# Patient Record
Sex: MALE | Race: BLACK OR AFRICAN AMERICAN | NOT HISPANIC OR LATINO | Employment: FULL TIME | URBAN - METROPOLITAN AREA
[De-identification: names, ages, dates, MRNs, and addresses within clinical notes are randomized per-mention and may not be internally consistent; named-entity substitution may affect disease eponyms.]

---

## 2017-01-06 DIAGNOSIS — E11.9 TYPE 2 DIABETES MELLITUS WITHOUT COMPLICATION: ICD-10-CM

## 2017-01-10 ENCOUNTER — OFFICE VISIT (OUTPATIENT)
Dept: FAMILY MEDICINE | Facility: CLINIC | Age: 52
End: 2017-01-10
Attending: FAMILY MEDICINE
Payer: COMMERCIAL

## 2017-01-10 ENCOUNTER — PATIENT MESSAGE (OUTPATIENT)
Dept: FAMILY MEDICINE | Facility: CLINIC | Age: 52
End: 2017-01-10

## 2017-01-10 ENCOUNTER — HOSPITAL ENCOUNTER (OUTPATIENT)
Dept: RADIOLOGY | Facility: HOSPITAL | Age: 52
Discharge: HOME | End: 2017-01-10
Attending: FAMILY MEDICINE
Payer: COMMERCIAL

## 2017-01-10 VITALS
HEART RATE: 68 BPM | BODY MASS INDEX: 31.3 KG/M2 | WEIGHT: 243.88 LBS | DIASTOLIC BLOOD PRESSURE: 74 MMHG | HEIGHT: 74 IN | OXYGEN SATURATION: 98 % | SYSTOLIC BLOOD PRESSURE: 130 MMHG

## 2017-01-10 DIAGNOSIS — R10.2 SUPRAPUBIC PAIN: Primary | ICD-10-CM

## 2017-01-10 DIAGNOSIS — I15.2 HYPERTENSION ASSOCIATED WITH DIABETES: ICD-10-CM

## 2017-01-10 DIAGNOSIS — R19.5 HARD STOOL: ICD-10-CM

## 2017-01-10 DIAGNOSIS — N31.8 FREQUENCY-URGENCY SYNDROME: ICD-10-CM

## 2017-01-10 DIAGNOSIS — R39.9 LOWER URINARY TRACT SYMPTOMS (LUTS): ICD-10-CM

## 2017-01-10 DIAGNOSIS — R10.2 SUPRAPUBIC PAIN: ICD-10-CM

## 2017-01-10 DIAGNOSIS — E11.59 HYPERTENSION ASSOCIATED WITH DIABETES: ICD-10-CM

## 2017-01-10 LAB
BACTERIA #/AREA URNS AUTO: NORMAL /HPF
BILIRUB UR QL STRIP: NEGATIVE
CLARITY UR REFRACT.AUTO: CLEAR
COLOR UR AUTO: YELLOW
CREAT UR-MCNC: 175 MG/DL
GLUCOSE UR QL STRIP: ABNORMAL
HGB UR QL STRIP: NEGATIVE
KETONES UR QL STRIP: NEGATIVE
LEUKOCYTE ESTERASE UR QL STRIP: NEGATIVE
MICROALBUMIN UR DL<=1MG/L-MCNC: 4 UG/ML
MICROALBUMIN/CREATININE RATIO: 2.3 UG/MG
MICROSCOPIC COMMENT: NORMAL
NITRITE UR QL STRIP: NEGATIVE
PH UR STRIP: 5 [PH] (ref 5–8)
PROT UR QL STRIP: NEGATIVE
RBC #/AREA URNS AUTO: 1 /HPF (ref 0–4)
SP GR UR STRIP: 1.02 (ref 1–1.03)
URN SPEC COLLECT METH UR: ABNORMAL
UROBILINOGEN UR STRIP-ACNC: NEGATIVE EU/DL
WBC #/AREA URNS AUTO: 1 /HPF (ref 0–5)
YEAST UR QL AUTO: NORMAL

## 2017-01-10 PROCEDURE — 3075F SYST BP GE 130 - 139MM HG: CPT | Mod: S$GLB,,, | Performed by: FAMILY MEDICINE

## 2017-01-10 PROCEDURE — 74020 XR ABDOMEN FLAT AND ERECT: CPT | Mod: TC,PO

## 2017-01-10 PROCEDURE — 82570 ASSAY OF URINE CREATININE: CPT

## 2017-01-10 PROCEDURE — 81001 URINALYSIS AUTO W/SCOPE: CPT

## 2017-01-10 PROCEDURE — 99214 OFFICE O/P EST MOD 30 MIN: CPT | Mod: S$GLB,,, | Performed by: FAMILY MEDICINE

## 2017-01-10 PROCEDURE — 3078F DIAST BP <80 MM HG: CPT | Mod: S$GLB,,, | Performed by: FAMILY MEDICINE

## 2017-01-10 PROCEDURE — 1159F MED LIST DOCD IN RCRD: CPT | Mod: S$GLB,,, | Performed by: FAMILY MEDICINE

## 2017-01-10 PROCEDURE — 3045F PR MOST RECENT HEMOGLOBIN A1C LEVEL 7.0-9.0%: CPT | Mod: S$GLB,,, | Performed by: FAMILY MEDICINE

## 2017-01-10 PROCEDURE — 99999 PR PBB SHADOW E&M-EST. PATIENT-LVL III: CPT | Mod: PBBFAC,,, | Performed by: FAMILY MEDICINE

## 2017-01-10 PROCEDURE — 4010F ACE/ARB THERAPY RXD/TAKEN: CPT | Mod: S$GLB,,, | Performed by: FAMILY MEDICINE

## 2017-01-10 PROCEDURE — 2022F DILAT RTA XM EVC RTNOPTHY: CPT | Mod: S$GLB,,, | Performed by: FAMILY MEDICINE

## 2017-01-10 PROCEDURE — 74020 XR ABDOMEN FLAT AND ERECT: CPT | Mod: 26,,, | Performed by: RADIOLOGY

## 2017-01-10 NOTE — MR AVS SNAPSHOT
[facility]  [address]  [address]  [facility] [address]  Phone: [phone]                  Suhas Castillo   1/10/2017 9:00 AM   Office Visit    Description:  Male : 1965   Provider:  Josiah Ching Jr., MD   Department:  [facility]           Reason for Visit     Abdominal Pain           Diagnoses this Visit        Comments    Suprapubic pain    -  Primary     Lower urinary tract symptoms (LUTS)         Frequency-urgency syndrome         Hard stool         Uncontrolled type 2 diabetes mellitus without complication, without long-term current use of insulin         Hypertension associated with diabetes                To Do List           Future Appointments        Provider Department Dept Phone    1/10/2017 9:45 AM LAB, [facility] [phone]    1/10/2017 10:00 AM LKWH XR1 300 LB LIMIT [facility] [phone]      Goals (5 Years of Data)     None      [facility]     [facility] Nurse Care [facility] -  Assistance  Registered nurses in the [facility] provide clinical advisement, health education, appointment booking, and other advisory services.  Call for this free service at [phone].             Medications           Message regarding Medications     Verify the changes and/or additions to your medication regime listed below are the same as discussed with your clinician today.  If any of these changes or additions are incorrect, please notify your healthcare provider.             Verify that the below list of medications is an accurate representation of the medications you are currently taking.  If none reported, the list may be blank. If incorrect, please contact your healthcare provider. Carry this list with you in case of emergency.           Current Medications     amlodipine-benazepril (LOTREL) 10-40 mg per capsule take 1 capsule by mouth once daily    blood sugar diagnostic  "(CONTOUR TEST STRIPS) Strp USE TO TEST BLOOD SUGAR TWICE A DAY    fluticasone (FLONASE) 50 mcg/actuation nasal spray 2 sprays by Each Nare route once daily.    JANUVIA 100 mg Tab take 1 tablet by mouth once daily    metformin (GLUCOPHAGE-XR) 500 MG 24 hr tablet take 2 tablets by mouth twice a day    naproxen (EC NAPROSYN) 500 MG EC tablet Take 1 tablet (500 mg total) by mouth 2 (two) times daily with meals.    simvastatin (ZOCOR) 20 MG tablet Take 1 tablet (20 mg total) by mouth every evening. 1 Tablet Oral Every day    albuterol 90 mcg/actuation inhaler Inhale 2 puffs into the lungs every 4 (four) hours as needed for Wheezing or Shortness of Breath.           Clinical Reference Information           Vital Signs - Last Recorded  Most recent update: 1/10/2017  8:58 AM by Ana Luisa Heaton LPN    BP Pulse Ht Wt SpO2 BMI    130/74 (BP Location: Left arm, Patient Position: Sitting, BP Method: Manual) 68 6' 2" (1.88 m) 110.6 kg (243 lb 14.4 oz) 98% 31.31 kg/m2      Blood Pressure          Most Recent Value    BP  130/74      Allergies as of 1/10/2017     No Known Allergies      Immunizations Administered on Date of Encounter - 1/10/2017     None      Orders Placed During Today's Visit      Normal Orders This Visit    Microalbumin/creatinine urine ratio     Urinalysis     Future Labs/Procedures Expected by Expires    Basic metabolic panel  1/10/2017 1/10/2018    Hemoglobin A1c  1/10/2017 1/11/2018    PSA, Screening  1/10/2017 1/11/2018    X-Ray Abdomen Flat And Erect  1/10/2017 1/10/2018      "

## 2017-01-10 NOTE — PROGRESS NOTES
Subjective:       Patient ID: Suhas Castillo is a 51 y.o. male.    Chief Complaint: Abdominal Pain    Abdominal Pain   This is a new problem. The current episode started 1 to 4 weeks ago. The onset quality is gradual. The problem occurs 2 to 4 times per day. The problem has been gradually worsening. The pain is located in the suprapubic region. The pain is at a severity of 4/10. The pain is mild. The quality of the pain is aching and dull. The abdominal pain does not radiate. Associated symptoms include constipation (firm, hard stools; now normal, nearly daily), flatus and frequency. Pertinent negatives include no belching, diarrhea, dysuria, headaches, hematuria, melena, nausea or vomiting. Nothing aggravates the pain. The pain is relieved by liquids and bowel movements. He has tried nothing for the symptoms. There is no history of abdominal surgery, colon cancer, Crohn's disease, gallstones, GERD, irritable bowel syndrome, pancreatitis, PUD or ulcerative colitis.     Patient Active Problem List   Diagnosis    Uncontrolled type 2 diabetes mellitus without complication, without long-term current use of insulin    Hypertension associated with diabetes    DDD (degenerative disc disease), cervical    Hyperlipidemia associated with type 2 diabetes mellitus    AR (allergic rhinitis)    DJD (degenerative joint disease), cervical    Cervical spinal stenosis    Elevated transaminase level       Current Outpatient Prescriptions:     amlodipine-benazepril (LOTREL) 10-40 mg per capsule, take 1 capsule by mouth once daily, Disp: 30 capsule, Rfl: 2    blood sugar diagnostic (CONTOUR TEST STRIPS) Strp, USE TO TEST BLOOD SUGAR TWICE A DAY, Disp: 50 each, Rfl: 11    fluticasone (FLONASE) 50 mcg/actuation nasal spray, 2 sprays by Each Nare route once daily., Disp: 1 Bottle, Rfl: 0    JANUVIA 100 mg Tab, take 1 tablet by mouth once daily, Disp: 30 tablet, Rfl: 2    metformin (GLUCOPHAGE-XR) 500 MG 24 hr tablet, take 2  tablets by mouth twice a day, Disp: 120 tablet, Rfl: 4    naproxen (EC NAPROSYN) 500 MG EC tablet, Take 1 tablet (500 mg total) by mouth 2 (two) times daily with meals., Disp: 30 tablet, Rfl: 1    simvastatin (ZOCOR) 20 MG tablet, Take 1 tablet (20 mg total) by mouth every evening. 1 Tablet Oral Every day, Disp: 90 tablet, Rfl: 0    albuterol 90 mcg/actuation inhaler, Inhale 2 puffs into the lungs every 4 (four) hours as needed for Wheezing or Shortness of Breath., Disp: 18 g, Rfl: 0    There have been no changes to the patient's past medical, surgical, family, or social histories.      Review of Systems   Respiratory: Negative for cough, chest tightness and wheezing.    Cardiovascular: Positive for palpitations. Negative for chest pain.   Gastrointestinal: Positive for abdominal pain, constipation (firm, hard stools; now normal, nearly daily) and flatus. Negative for diarrhea, melena, nausea and vomiting.   Genitourinary: Positive for frequency and urgency. Negative for dysuria, hematuria, scrotal swelling and testicular pain.   Neurological: Negative for dizziness, light-headedness and headaches.       Objective:      Physical Exam   Constitutional: He is oriented to person, place, and time. He appears well-developed and well-nourished.   HENT:   Head: Normocephalic and atraumatic.   Eyes: Conjunctivae are normal. No scleral icterus.   Abdominal: Soft. Bowel sounds are normal. There is tenderness in the suprapubic area. There is no rigidity and no guarding.   Genitourinary: Testes normal and penis normal. Rectal exam shows no tenderness. Prostate is not tender. Circumcised. No penile tenderness. No discharge found.   Genitourinary Comments: Normal size prostate; left lateral lobe slightly larger than right.   Musculoskeletal: He exhibits no edema.   Neurological: He is alert and oriented to person, place, and time.   Skin: Skin is warm and dry.   Psychiatric: He has a normal mood and affect.   Vitals  "reviewed.        Assessment:       1. Suprapubic pain    2. Lower urinary tract symptoms (LUTS)    3. Frequency-urgency syndrome    4. Hard stool    5. Uncontrolled type 2 diabetes mellitus without complication, without long-term current use of insulin    6. Hypertension associated with diabetes          Plan:       Discussed possible origins for symptoms: BPH vs. Constipation?    Orders Placed This Encounter    X-Ray Abdomen Flat And Erect    PSA, Screening    Urinalysis    Basic metabolic panel    Hemoglobin A1c    Microalbumin/creatinine urine ratio     We will call the patient with results & make further recommendations at that time.      "This note will not be shared with the patient."  "

## 2017-01-11 ENCOUNTER — PATIENT MESSAGE (OUTPATIENT)
Dept: FAMILY MEDICINE | Facility: CLINIC | Age: 52
End: 2017-01-11

## 2017-01-22 ENCOUNTER — PATIENT MESSAGE (OUTPATIENT)
Dept: FAMILY MEDICINE | Facility: CLINIC | Age: 52
End: 2017-01-22

## 2017-01-22 DIAGNOSIS — E11.59 HYPERTENSION ASSOCIATED WITH DIABETES: ICD-10-CM

## 2017-01-22 DIAGNOSIS — E11.69 HYPERLIPIDEMIA ASSOCIATED WITH TYPE 2 DIABETES MELLITUS: ICD-10-CM

## 2017-01-22 DIAGNOSIS — I15.2 HYPERTENSION ASSOCIATED WITH DIABETES: ICD-10-CM

## 2017-01-22 DIAGNOSIS — E78.5 HYPERLIPIDEMIA ASSOCIATED WITH TYPE 2 DIABETES MELLITUS: ICD-10-CM

## 2017-01-23 ENCOUNTER — PATIENT MESSAGE (OUTPATIENT)
Dept: FAMILY MEDICINE | Facility: CLINIC | Age: 52
End: 2017-01-23

## 2017-01-24 NOTE — TELEPHONE ENCOUNTER
Please call patient to facilitate an appointment with endocrinology.  Last referral authorized in March of last year.    Patient should see endocrinologist, or nurse practitioner, soon to discuss possible injectable medication, for uncontrolled diabetes.

## 2017-02-01 RX ORDER — SITAGLIPTIN 100 MG/1
TABLET, FILM COATED ORAL
Qty: 30 TABLET | Refills: 2 | Status: SHIPPED | OUTPATIENT
Start: 2017-02-01 | End: 2017-05-04 | Stop reason: SDUPTHER

## 2017-02-02 ENCOUNTER — OFFICE VISIT (OUTPATIENT)
Dept: ENDOCRINOLOGY | Facility: CLINIC | Age: 52
End: 2017-02-02
Payer: COMMERCIAL

## 2017-02-02 VITALS
WEIGHT: 245 LBS | HEART RATE: 70 BPM | HEIGHT: 74 IN | DIASTOLIC BLOOD PRESSURE: 72 MMHG | SYSTOLIC BLOOD PRESSURE: 119 MMHG | BODY MASS INDEX: 31.44 KG/M2

## 2017-02-02 DIAGNOSIS — I15.2 HYPERTENSION ASSOCIATED WITH DIABETES: ICD-10-CM

## 2017-02-02 DIAGNOSIS — E11.69 HYPERLIPIDEMIA ASSOCIATED WITH TYPE 2 DIABETES MELLITUS: ICD-10-CM

## 2017-02-02 DIAGNOSIS — E78.5 HYPERLIPIDEMIA ASSOCIATED WITH TYPE 2 DIABETES MELLITUS: ICD-10-CM

## 2017-02-02 DIAGNOSIS — E11.59 HYPERTENSION ASSOCIATED WITH DIABETES: ICD-10-CM

## 2017-02-02 DIAGNOSIS — E55.9 VITAMIN D DEFICIENCY: ICD-10-CM

## 2017-02-02 PROCEDURE — 3074F SYST BP LT 130 MM HG: CPT | Mod: S$GLB,,, | Performed by: INTERNAL MEDICINE

## 2017-02-02 PROCEDURE — 99204 OFFICE O/P NEW MOD 45 MIN: CPT | Mod: S$GLB,,, | Performed by: INTERNAL MEDICINE

## 2017-02-02 PROCEDURE — 3045F PR MOST RECENT HEMOGLOBIN A1C LEVEL 7.0-9.0%: CPT | Mod: S$GLB,,, | Performed by: INTERNAL MEDICINE

## 2017-02-02 PROCEDURE — 4010F ACE/ARB THERAPY RXD/TAKEN: CPT | Mod: S$GLB,,, | Performed by: INTERNAL MEDICINE

## 2017-02-02 PROCEDURE — 2022F DILAT RTA XM EVC RTNOPTHY: CPT | Mod: S$GLB,,, | Performed by: INTERNAL MEDICINE

## 2017-02-02 PROCEDURE — 3078F DIAST BP <80 MM HG: CPT | Mod: S$GLB,,, | Performed by: INTERNAL MEDICINE

## 2017-02-02 PROCEDURE — 99999 PR PBB SHADOW E&M-EST. PATIENT-LVL III: CPT | Mod: PBBFAC,,, | Performed by: INTERNAL MEDICINE

## 2017-02-02 RX ORDER — METFORMIN HYDROCHLORIDE 500 MG/1
TABLET, EXTENDED RELEASE ORAL
Qty: 120 TABLET | Refills: 4 | Status: SHIPPED | OUTPATIENT
Start: 2017-02-02 | End: 2017-07-14 | Stop reason: SDUPTHER

## 2017-02-02 NOTE — LETTER
February 2, 2017      Josiah Ching Jr., MD  [address]  [address]  [facility] [address]           [facility]  [address]  [facility] [address]  Phone: [phone]  Fax: [phone]          Patient: Suhas Castillo   MR Number: [id number]   YOB: 1965   Date of Visit: 2/2/2017       Dear Dr. Josiah Ching Jr.:    Thank you for referring Suhas Castillo to me for evaluation. Attached you will find relevant portions of my assessment and plan of care.    If you have questions, please do not hesitate to call me. I look forward to following Suhas Castillo along with you.    Sincerely,    Ba Rucker MD    Enclosure  CC:  No Recipients    If you would like to receive this communication electronically, please contact [email or web address] or [phone] to request more information on [employer] access.    For providers and/or their staff who would like to refer a patient to [facility], please contact us through our one-stop-shop provider referral line, [facility], at [phone].    If you feel you have received this communication in error or would no longer like to receive these types of communications, please e-mail [email or web address]

## 2017-02-02 NOTE — MR AVS SNAPSHOT
[facility]  [address]  [facility] [address]  Phone: [phone]  Fax: [phone]                  Suhas Castillo   2017 1:00 PM   Office Visit    Description:  Male : 1965   Provider:  Fransisca Hightower NP   Department:  [facility]           Reason for Visit     Diabetes Mellitus           Diagnoses this Visit        Comments    Uncontrolled type 2 diabetes mellitus without complication, without long-term current use of insulin    -  Primary     Hypertension associated with diabetes         Hyperlipidemia associated with type 2 diabetes mellitus         Vitamin D deficiency                To Do List           Future Appointments        Provider Department Dept Phone    5/15/2017 8:00 AM [facility], [facility] [phone]    2017 4:00 PM [facility] [phone]      Goals (5 Years of Data)     None      Follow-Up and Disposition     Return in about 3 months (around 2017).       These Medications        Disp Refills Start End    empagliflozin (JARDIANCE) 10 mg Tab 30 tablet 4 2017     Take 10 mg by mouth once daily. - Oral    [facility]: [facility] [place] [address] [address] #: [phone]         [facility]     [facility] Care [facility] -  Assistance  Registered nurses in the [facility] provide clinical advisement, health education, appointment booking, and other advisory services.  Call for this free service at [phone].             Medications           Message regarding Medications     Verify the changes and/or additions to your medication regime listed below are the same as discussed with your clinician today.  If any of these changes or additions are incorrect, please notify your healthcare provider.        START taking these NEW medications        Refills    empagliflozin  "(JARDIANCE) 10 mg Tab 4    Sig: Take 10 mg by mouth once daily.    Class: Normal    Route: Oral           Verify that the below list of medications is an accurate representation of the medications you are currently taking.  If none reported, the list may be blank. If incorrect, please contact your healthcare provider. Carry this list with you in case of emergency.           Current Medications     amlodipine-benazepril (LOTREL) 10-40 mg per capsule take 1 capsule by mouth once daily    blood sugar diagnostic (CONTOUR TEST STRIPS) Strp USE TO TEST BLOOD SUGAR TWICE A DAY    fluticasone (FLONASE) 50 mcg/actuation nasal spray 2 sprays by Each Nare route once daily.    JANUVIA 100 mg Tab take 1 tablet by mouth once daily    metformin (GLUCOPHAGE-XR) 500 MG 24 hr tablet take 2 tablets by mouth twice a day    naproxen (EC NAPROSYN) 500 MG EC tablet Take 1 tablet (500 mg total) by mouth 2 (two) times daily with meals.    simvastatin (ZOCOR) 20 MG tablet Take 1 tablet (20 mg total) by mouth every evening. 1 Tablet Oral Every day    albuterol 90 mcg/actuation inhaler Inhale 2 puffs into the lungs every 4 (four) hours as needed for Wheezing or Shortness of Breath.    empagliflozin (JARDIANCE) 10 mg Tab Take 10 mg by mouth once daily.           Clinical Reference Information           Your Vitals Were     BP Pulse Height Weight BMI    119/72 (BP Location: Left arm, Patient Position: Sitting) 70 6' 2" (1.88 m) 111.1 kg (245 lb) 31.46 kg/m2      Blood Pressure          Most Recent Value    BP  119/72      Allergies as of 2/2/2017     No Known Allergies      Immunizations Administered on Date of Encounter - 2/2/2017     None      Orders Placed During Today's Visit     Future Labs/Procedures Expected by Expires    Comprehensive metabolic panel  2/2/2017 (Approximate) 1/28/2018    Hemoglobin A1c  2/2/2017 4/3/2018    Vitamin D  2/2/2017 4/3/2018      Language Assistance Services     ATTENTION: Language assistance services are " available, free of charge. Please call [phone].      ATENCIÓN: Si habla español, tiene a layton disposición servicios gratuitos de asistencia lingüística. Llame al [phone].     CHÚ Ý: N?u b?n nói Ti?ng Vi?t, có các d?ch v? h? tr? ngôn ng? mi?n phí dành cho b?n. G?i s? [phone].         [facility] complies with applicable Federal civil rights laws and does not discriminate on the basis of race, color, national origin, age, disability, or sex.

## 2017-02-16 RX ORDER — AMLODIPINE AND BENAZEPRIL HYDROCHLORIDE 10; 40 MG/1; MG/1
CAPSULE ORAL
Qty: 30 CAPSULE | Refills: 2 | Status: SHIPPED | OUTPATIENT
Start: 2017-02-16 | End: 2017-05-15 | Stop reason: SDUPTHER

## 2017-02-21 RX ORDER — SIMVASTATIN 20 MG/1
TABLET, FILM COATED ORAL
Qty: 90 TABLET | Refills: 2 | Status: SHIPPED | OUTPATIENT
Start: 2017-02-21 | End: 2017-11-20 | Stop reason: SDUPTHER

## 2017-02-27 ENCOUNTER — OFFICE VISIT (OUTPATIENT)
Dept: FAMILY MEDICINE | Facility: CLINIC | Age: 52
End: 2017-02-27
Attending: FAMILY MEDICINE
Payer: COMMERCIAL

## 2017-02-27 VITALS
OXYGEN SATURATION: 96 % | SYSTOLIC BLOOD PRESSURE: 116 MMHG | BODY MASS INDEX: 30.75 KG/M2 | HEART RATE: 91 BPM | HEIGHT: 74 IN | DIASTOLIC BLOOD PRESSURE: 76 MMHG | WEIGHT: 239.63 LBS

## 2017-02-27 DIAGNOSIS — M47.22 CERVICAL RADICULOPATHY DUE TO DEGENERATIVE JOINT DISEASE OF SPINE: Primary | ICD-10-CM

## 2017-02-27 PROCEDURE — 3078F DIAST BP <80 MM HG: CPT | Mod: S$GLB,,, | Performed by: FAMILY MEDICINE

## 2017-02-27 PROCEDURE — 99999 PR PBB SHADOW E&M-EST. PATIENT-LVL III: CPT | Mod: PBBFAC,,, | Performed by: FAMILY MEDICINE

## 2017-02-27 PROCEDURE — 1160F RVW MEDS BY RX/DR IN RCRD: CPT | Mod: S$GLB,,, | Performed by: FAMILY MEDICINE

## 2017-02-27 PROCEDURE — 99213 OFFICE O/P EST LOW 20 MIN: CPT | Mod: S$GLB,,, | Performed by: FAMILY MEDICINE

## 2017-02-27 PROCEDURE — 3074F SYST BP LT 130 MM HG: CPT | Mod: S$GLB,,, | Performed by: FAMILY MEDICINE

## 2017-02-27 RX ORDER — TRAMADOL HYDROCHLORIDE 50 MG/1
50 TABLET ORAL EVERY 6 HOURS PRN
Qty: 30 TABLET | Refills: 0 | Status: SHIPPED | OUTPATIENT
Start: 2017-02-27 | End: 2017-03-09

## 2017-02-27 RX ORDER — FLUOCINONIDE TOPICAL SOLUTION USP, 0.05% 0.5 MG/ML
SOLUTION TOPICAL
Refills: 0 | COMMUNITY
Start: 2017-01-18 | End: 2022-02-04

## 2017-02-27 NOTE — PROGRESS NOTES
"  Subjective:       Suhas Castillo is a 51 y.o. male who presents for evaluation of neck pain. Event that precipitated these symptoms: none known. Onset of symptoms was 2 days ago, and have been gradually worsening since that time. Current symptoms are numbness in arm/hand and pain in right side of neck (shooting and tingling in character; 6/10 in severity). Patient denies stiffness in RUE and weakness in RUE. Patient has had previous osteoarthritis of cervical spine and s/p cervical DEBORAH. Previous treatments: physical therapy and DEBORAH.    The following portions of the patient's history were reviewed and updated as appropriate: allergies, current medications, past family history, past medical history, past social history, past surgical history and problem list.    Review of Systems  Pertinent items are noted in HPI.      Objective:      /76 (BP Location: Left arm, Patient Position: Sitting, BP Method: Manual)  Pulse 91  Ht 6' 2" (1.88 m)  Wt 108.7 kg (239 lb 9.6 oz)  SpO2 96%  BMI 30.76 kg/m2  General:   alert, appears stated age and cooperative   External Deformity:  absent   ROM Cervical Spine:  normal range of motion, flexion to 45 degrees, extension to 15 degrees, left rotation to 60 degrees, right rotation to 30 degrees, left lateral bending to 45 degrees and right lateral bending to 15 degrees   Midline Tenderness:  absent midline   Paraspinous tenderness:  moderate on the right   UE Neurologic Exam:  unremarkable     X-ray of the cervical spine: Not indicated      Assessment:      Cervical pain 2/t DJD w/radiculopathy     Plan:      NSAIDs per medication orders.  Muscle relaxants added and continued per medication orders.  Tramadol prn pain.    Refer back to [facility] for re-evaluation; previously failed PT w/traction and dry needling.    "

## 2017-02-27 NOTE — MR AVS SNAPSHOT
[address]  [address]  [facility] [address]  Phone: [phone]                  Suhas Castillo   2017 9:40 AM   Office Visit    Description:  Male : 1965   Provider:  Josiah Ching Jr., MD   Department:  [facility]           Reason for Visit     Neck Pain           Diagnoses this Visit        Comments    Cervical radiculopathy due to degenerative joint disease of spine    -  Primary            To Do List           Future Appointments        Provider Department Dept Phone    3/7/2017 3:15 PM Bernie Estrada MD [facility] [phone]    5/15/2017 8:00 AM [facility] [phone]    2017 4:00 PM [facility] [phone]      Goals (5 Years of Data)     None       These Medications        Disp Refills Start End    tramadol (ULTRAM) 50 mg tablet 30 tablet 0 2017 3/9/2017    Take 1 tablet (50 mg total) by mouth every 6 (six) hours as needed for Pain. - Oral    [facility]: [facility] - [address] #: [phone]         [facility]     [facility] Nurse Care [facility] -  Assistance  Registered nurses in the [facility] provide clinical advisement, health education, appointment booking, and other advisory services.  Call for this free service at [phone].             Medications           Message regarding Medications     Verify the changes and/or additions to your medication regime listed below are the same as discussed with your clinician today.  If any of these changes or additions are incorrect, please notify your healthcare provider.        START taking these NEW medications        Refills    tramadol (ULTRAM) 50 mg tablet 0    Sig: Take 1 tablet (50 mg total) by mouth every 6 (six) hours as needed for Pain.    Class: Normal    Route: Oral            Verify that the below list of medications is an accurate representation of the medications you are currently taking.  If none reported, the list may be blank. If incorrect, please contact your healthcare provider. Carry this list with you in case of emergency.           Current Medications     amlodipine-benazepril (LOTREL) 10-40 mg per capsule take 1 capsule by mouth once daily    blood sugar diagnostic (CONTOUR TEST STRIPS) Strp USE TO TEST BLOOD SUGAR TWICE A DAY    empagliflozin (JARDIANCE) 10 mg Tab Take 10 mg by mouth once daily.    fluocinonide (LIDEX) 0.05 % external solution As directed    fluticasone (FLONASE) 50 mcg/actuation nasal spray 2 sprays by Each Nare route once daily.    JANUVIA 100 mg Tab take 1 tablet by mouth once daily    metformin (GLUCOPHAGE-XR) 500 MG 24 hr tablet take 2 tablets by mouth twice a day    naproxen (EC NAPROSYN) 500 MG EC tablet Take 1 tablet (500 mg total) by mouth 2 (two) times daily with meals.    simvastatin (ZOCOR) 20 MG tablet take 1 tablet by mouth every evening    albuterol 90 mcg/actuation inhaler Inhale 2 puffs into the lungs every 4 (four) hours as needed for Wheezing or Shortness of Breath.    tramadol (ULTRAM) 50 mg tablet Take 1 tablet (50 mg total) by mouth every 6 (six) hours as needed for Pain.           Clinical Reference Information           Your Vitals Were     BP                   116/76 (BP Location: Left arm, Patient Position: Sitting, BP Method: Manual)           Blood Pressure          Most Recent Value    BP  116/76      Allergies as of 2/27/2017     No Known Allergies      Immunizations Administered on Date of Encounter - 2/27/2017     None      Orders Placed During Today's Visit      Normal Orders This Visit    Ambulatory referral to Pain Clinic       Language Assistance Services     ATTENTION: Language assistance services are available, free of charge. Please call [phone].      ATENCIÓN: Si earlene mcfadden disposición  servicios gratuitos de asistencia lingüística. Lloyd hook [phone].     [facility] Ý: N?u b?n nói Ti?ng Vi?t, có các d?ch v? h? tr? ngôn ng? mi?n phí dành cho b?n. G?i s? [phone].         [facility] complies with applicable Federal civil rights laws and does not discriminate on the basis of race, color, national origin, age, disability, or sex.

## 2017-03-08 ENCOUNTER — OFFICE VISIT (OUTPATIENT)
Dept: PAIN MEDICINE | Facility: CLINIC | Age: 52
End: 2017-03-08
Attending: ANESTHESIOLOGY
Payer: COMMERCIAL

## 2017-03-08 VITALS
TEMPERATURE: 98 F | WEIGHT: 240 LBS | DIASTOLIC BLOOD PRESSURE: 81 MMHG | BODY MASS INDEX: 30.8 KG/M2 | HEART RATE: 80 BPM | SYSTOLIC BLOOD PRESSURE: 137 MMHG | HEIGHT: 74 IN

## 2017-03-08 DIAGNOSIS — M50.30 DDD (DEGENERATIVE DISC DISEASE), CERVICAL: Primary | ICD-10-CM

## 2017-03-08 DIAGNOSIS — M54.12 CERVICAL RADICULOPATHY: ICD-10-CM

## 2017-03-08 PROCEDURE — 1160F RVW MEDS BY RX/DR IN RCRD: CPT | Mod: S$GLB,,, | Performed by: ANESTHESIOLOGY

## 2017-03-08 PROCEDURE — 3079F DIAST BP 80-89 MM HG: CPT | Mod: S$GLB,,, | Performed by: ANESTHESIOLOGY

## 2017-03-08 PROCEDURE — 99213 OFFICE O/P EST LOW 20 MIN: CPT | Mod: S$GLB,,, | Performed by: ANESTHESIOLOGY

## 2017-03-08 PROCEDURE — 99999 PR PBB SHADOW E&M-EST. PATIENT-LVL III: CPT | Mod: PBBFAC,,, | Performed by: ANESTHESIOLOGY

## 2017-03-08 PROCEDURE — 3075F SYST BP GE 130 - 139MM HG: CPT | Mod: S$GLB,,, | Performed by: ANESTHESIOLOGY

## 2017-03-08 NOTE — PROGRESS NOTES
Subjective:      Patient ID: Suhas Castillo is a 52 y.o. male.    Chief Complaint: Neck Pain    Referred by: No ref. provider found       HPI:    Suhas Castillo is a 52 y.o. male (RHD) who presents today with neck pain with radiation into the right upper extremity. He reports that he has had a h/o this same type and quality of pain before, but did not have radiation into his fingers. He did have a selective C6 nerve root block performed by Dr. Hughes in 2008 which gave her great relief for several years.  His pain returned 4.5 to 5 weeks ago. His pain is right sided in the neck and extends in the medial upper arm and lower arm and into his fingers. He has tingling at the fingertips. His pain is described in detail below.    Interval History (10/30/2015):  He returns today for follow up.  He reports that the cervical DEBORAH has been helpful for the pain.  He is currently pain free    Interval History (3/8/2017):  He returns today for follow up.  He reports that, following the LAURITA in 10/2015, he was pain free until a week ago when he increased his weights at the gym.  Now his pain has returned in the same location as before.    Physical Therapy: Previously tried several years ago - no benefit.  Done before procedures    Non-pharmacologic Treatment: None         · TENS? None    Pain Medications:         · Currently taking: None    · Has tried in the past:  Tramadol 50mg prn and Diclofenac 75mg BID, minimal benefit.  OTC NSAIDS and tylenol    · Has not tried: Gabapentin    Blood thinners: None    Interventional Therapies:   · Selective C6 nerve root block (2008) - Dr. Hughes  · C7/T1 ILESI 10/2015: 100% relief until ~1 week ago    Relevant Surgeries: None    Affecting sleep? Yes    Affecting daily activities? Yes    Depressive symptoms? None          · SI/HI? No    Work status:     Pain Scales  Best: 5/10  Worst: 7/10  Usually: 0/10  Today: 7/10    Neck Pain    This is a chronic problem. The  appt made. Noris CHAVISA     current episode started more than 1 year ago. The problem occurs constantly. The problem has been gradually worsening. The pain is present in the anterior neck and midline. Quality: throbbing numb. The pain is at a severity of 7/10. The pain is mild. Exacerbated by: activity  The pain is same all the time. Stiffness is present all day. He has tried oral narcotics and injection treatment for the symptoms. The treatment provided significant relief.       Review of Systems   Cardiovascular:        Hypertension   Endocrine:        Diabetes   Musculoskeletal: Positive for neck pain.   All other systems reviewed and are negative.            Past Medical History:   Diagnosis Date    AR (allergic rhinitis)     Cervical spinal stenosis     Diabetes mellitus type II     DJD (degenerative joint disease), cervical     Hyperlipidemia     Hypertension        Past Surgical History:   Procedure Laterality Date    EPIDURAL BLOCK INJECTION      cervical x 2    TESTICLE SURGERY  age 16    torsion       Review of patient's allergies indicates:  No Known Allergies    Current Outpatient Prescriptions   Medication Sig Dispense Refill    amlodipine-benazepril (LOTREL) 10-40 mg per capsule take 1 capsule by mouth once daily 30 capsule 2    blood sugar diagnostic (CONTOUR TEST STRIPS) Strp USE TO TEST BLOOD SUGAR TWICE A DAY 50 each 11    empagliflozin (JARDIANCE) 10 mg Tab Take 10 mg by mouth once daily. 30 tablet 4    fluocinonide (LIDEX) 0.05 % external solution As directed  0    fluticasone (FLONASE) 50 mcg/actuation nasal spray 2 sprays by Each Nare route once daily. 1 Bottle 0    JANUVIA 100 mg Tab take 1 tablet by mouth once daily 30 tablet 2    metformin (GLUCOPHAGE-XR) 500 MG 24 hr tablet take 2 tablets by mouth twice a day 120 tablet 4    naproxen (EC NAPROSYN) 500 MG EC tablet Take 1 tablet (500 mg total) by mouth 2 (two) times daily with meals. 30 tablet 1    simvastatin (ZOCOR) 20 MG tablet take 1 tablet by  "mouth every evening 90 tablet 2    tramadol (ULTRAM) 50 mg tablet Take 1 tablet (50 mg total) by mouth every 6 (six) hours as needed for Pain. 30 tablet 0    albuterol 90 mcg/actuation inhaler Inhale 2 puffs into the lungs every 4 (four) hours as needed for Wheezing or Shortness of Breath. 18 g 0     No current facility-administered medications for this visit.        Family History   Problem Relation Age of Onset    Hypertension Mother     Diabetes Mother     Stroke Mother        Social History     Social History    Marital status: Single     Spouse name: N/A    Number of children: 4    Years of education: N/A     Occupational History    secutiry supervisor [employer]     Social History Main Topics    Smoking status: Never Smoker    Smokeless tobacco: Never Used    Alcohol use No      Comment: none x 4 years; previously social drinker    Drug use: No    Sexual activity: Yes     Partners: Female     Birth control/ protection: None     Other Topics Concern    Not on file     Social History Narrative    The patient does exercise regularly (treadmill, light weights: TIW-QIW).      Rates diet as fair.      He is satisfied with weight.               Objective:     Vitals:    03/08/17 0828   BP: 137/81   Pulse: 80   Temp: 98.1 °F (36.7 °C)   TempSrc: Oral   Weight: 108.9 kg (240 lb)   Height: 6' 2" (1.88 m)   PainSc:   7   PainLoc: Neck       GEN:  Well developed, well nourished.  No acute distress.  No pain behavior.  HEENT:  No trauma.  Mucous membranes moist.  Nares patent bilaterally.  PSYCH: Normal affect. Thought content appropriate.  CHEST:  Breathing symmetric.  No audible wheezing.  ABD: Soft, non-tender, non-distended.  SKIN:  Warm, pink, dry.  No rash on exposed areas.    EXT:  No cyanosis, clubbing, or edema.  No color change or changes in nail or hair growth.  NEURO/MUSCULOSKELETAL:  Fully alert, oriented, and appropriate. Speech normal juan. No cranial nerve deficits.   Gait: " normal.  5/5 motor strength throughout upper extremities.   C-Spine:  Normal ROM without pain.    Positive Spurling's to the right with pain in a C6 distribution.  Negative TTP over cervical facet joints, cervical paraspinal muscles, shoulders, elbows or hands.   Negative shirley's.  Positive Tinel's over right cubital tunnel.  No sensory deficit in BUE       Imaging:      Cervical Xray (6/10/15)  Impression     Multilevel degenerative changes as discussed above, most pronounced at the C5-6 level. There has been some progression of this degenerative change, particularly with regard to be more pronounced vertebral endplate spurring, since the 2008 study.     Cervical MRI from 2008 in [facility] shows large disk protrusion at C5/6 to the right.    Assessment:       Encounter Diagnoses   Name Primary?    DDD (degenerative disc disease), cervical Yes    Cervical radiculopathy          Plan:       Suhas was seen today for neck pain.    Diagnoses and all orders for this visit:    DDD (degenerative disc disease), cervical    Cervical radiculopathy     Neck pain is consistent with cervical radiculopathy    I discussed the treatment options with the patient today, including risks, benefits, and alternatives. All available images were reviewed. The patient is aware of the risks and benefits of the medications being prescribed, common side effects, and proper usage.    1. Schedule for repeat cervical DEBORAH.  2. Recommend limiting overhead lifting with current exercise regimen until procedure  3. If limited benefit from home exercises, can consider formal PT  4. RTC 3 weeks after injection or sooner if needed.     The above plan and management options were discussed at length with patient. Patient is in agreement with the above and verbalized understanding. It will be communicated with the consulting physician via electronic record, fax, or mail          Ortho/SPM Exam

## 2017-03-08 NOTE — MR AVS SNAPSHOT
[facility]  [address]  [place] [address]  Phone: [phone]  Fax: [phone]                  Suhas Castillo   3/8/2017 9:15 AM   Office Visit    Description:  Male : 1965   Provider:  Bernie Estrada MD   Department:  [facility]           Reason for Visit     Neck Pain           Diagnoses this Visit        Comments    DDD (degenerative disc disease), cervical    -  Primary     Cervical radiculopathy                To Do List           Future Appointments        Provider Department Dept Phone    2017 7:00 AM Bernie Estrada MD [facility] [phone]    5/15/2017 8:00 AM [facility] [phone]    2017 4:00 PM BETHANY Acu[facility]ay - Endo/Diab/Metab [phone]      Your Future Surgeries/Procedures     Mar 14, 2017   Surgery with Bernie Estrada MD   [facility]    [address] [address] [phone]              Goals (5 Years of Data)     None      [facility]     [facility] Nurse Care Line -  Assistance  Registered nurses in the [facility] provide clinical advisement, health education, appointment booking, and other advisory services.  Call for this free service at [phone].             Medications           Message regarding Medications     Verify the changes and/or additions to your medication regime listed below are the same as discussed with your clinician today.  If any of these changes or additions are incorrect, please notify your healthcare provider.             Verify that the below list of medications is an accurate representation of the medications you are currently taking.  If none reported, the list may be blank. If incorrect, please contact your healthcare provider. Carry this list with you in case of emergency.           Current Medications     albuterol 90  "mcg/actuation inhaler Inhale 2 puffs into the lungs every 4 (four) hours as needed for Wheezing or Shortness of Breath.    amlodipine-benazepril (LOTREL) 10-40 mg per capsule take 1 capsule by mouth once daily    blood sugar diagnostic (CONTOUR TEST STRIPS) Strp USE TO TEST BLOOD SUGAR TWICE A DAY    empagliflozin (JARDIANCE) 10 mg Tab Take 10 mg by mouth once daily.    fluocinonide (LIDEX) 0.05 % external solution As directed    fluticasone (FLONASE) 50 mcg/actuation nasal spray 2 sprays by Each Nare route once daily.    JANUVIA 100 mg Tab take 1 tablet by mouth once daily    metformin (GLUCOPHAGE-XR) 500 MG 24 hr tablet take 2 tablets by mouth twice a day    naproxen (EC NAPROSYN) 500 MG EC tablet Take 1 tablet (500 mg total) by mouth 2 (two) times daily with meals.    simvastatin (ZOCOR) 20 MG tablet take 1 tablet by mouth every evening    tramadol (ULTRAM) 50 mg tablet Take 1 tablet (50 mg total) by mouth every 6 (six) hours as needed for Pain.           Clinical Reference Information           Your Vitals Were     BP Pulse Temp Height Weight BMI    137/81 80 98.1 °F (36.7 °C) (Oral) 6' 2" (1.88 m) 108.9 kg (240 lb) 30.81 kg/m2      Blood Pressure          Most Recent Value    BP  137/81      Allergies as of 3/8/2017     No Known Allergies      Immunizations Administered on Date of Encounter - 3/8/2017     None      Language Assistance Services     ATTENTION: Language assistance services are available, free of charge. Please call [phone].      ATENCIÓN: Si habla español, tiene a layton disposición servicios gratuitos de asistencia lingüística. Llame al [phone].     [facility] Ý: N?u b?n nói Ti?ng Vi?t, có các d?ch v? h? tr? ngôn ng? mi?n phí dành cho b?n. G?i s? [phone].         [facility] complies with applicable Federal civil rights laws and does not discriminate on the basis of race, color, national origin, age, disability, or sex.        "

## 2017-03-14 ENCOUNTER — HOSPITAL ENCOUNTER (OUTPATIENT)
Facility: OTHER | Age: 52
Discharge: HOME | End: 2017-03-14
Attending: ANESTHESIOLOGY | Admitting: ANESTHESIOLOGY
Payer: COMMERCIAL

## 2017-03-14 VITALS
DIASTOLIC BLOOD PRESSURE: 79 MMHG | SYSTOLIC BLOOD PRESSURE: 132 MMHG | TEMPERATURE: 98 F | WEIGHT: 240 LBS | HEIGHT: 74 IN | RESPIRATION RATE: 17 BRPM | BODY MASS INDEX: 30.8 KG/M2 | OXYGEN SATURATION: 97 % | HEART RATE: 71 BPM

## 2017-03-14 DIAGNOSIS — M50.30 DDD (DEGENERATIVE DISC DISEASE), CERVICAL: Primary | ICD-10-CM

## 2017-03-14 LAB — POCT GLUCOSE: 152 MG/DL (ref 70–110)

## 2017-03-14 PROCEDURE — 62321 NJX INTERLAMINAR CRV/THRC: CPT | Performed by: ANESTHESIOLOGY

## 2017-03-14 PROCEDURE — 63600175 PHARM REV CODE 636 W HCPCS: Performed by: ANESTHESIOLOGY

## 2017-03-14 PROCEDURE — 25500020 PHARM REV CODE 255: Performed by: ANESTHESIOLOGY

## 2017-03-14 PROCEDURE — 77003 FLUOROGUIDE FOR SPINE INJECT: CPT | Performed by: ANESTHESIOLOGY

## 2017-03-14 PROCEDURE — 62320 NJX INTERLAMINAR CRV/THRC: CPT | Performed by: ANESTHESIOLOGY

## 2017-03-14 PROCEDURE — 25000003 PHARM REV CODE 250: Performed by: ANESTHESIOLOGY

## 2017-03-14 PROCEDURE — 62321 NJX INTERLAMINAR CRV/THRC: CPT | Mod: ,,, | Performed by: ANESTHESIOLOGY

## 2017-03-14 RX ORDER — SODIUM CHLORIDE 9 MG/ML
INJECTION, SOLUTION INTRAMUSCULAR; INTRAVENOUS; SUBCUTANEOUS
Status: DISCONTINUED | OUTPATIENT
Start: 2017-03-14 | End: 2017-03-14 | Stop reason: HOSPADM

## 2017-03-14 RX ORDER — ALPRAZOLAM 0.5 MG/1
1 TABLET, ORALLY DISINTEGRATING ORAL
Status: COMPLETED | OUTPATIENT
Start: 2017-03-14 | End: 2017-03-14

## 2017-03-14 RX ORDER — METHYLPREDNISOLONE ACETATE 80 MG/ML
80 INJECTION, SUSPENSION INTRA-ARTICULAR; INTRALESIONAL; INTRAMUSCULAR; SOFT TISSUE ONCE
Status: DISCONTINUED | OUTPATIENT
Start: 2017-03-14 | End: 2017-03-14 | Stop reason: HOSPADM

## 2017-03-14 RX ORDER — LIDOCAINE HYDROCHLORIDE 10 MG/ML
INJECTION INFILTRATION; PERINEURAL
Status: DISCONTINUED | OUTPATIENT
Start: 2017-03-14 | End: 2017-03-14 | Stop reason: HOSPADM

## 2017-03-14 RX ORDER — LIDOCAINE HYDROCHLORIDE 10 MG/ML
10 INJECTION INFILTRATION; PERINEURAL ONCE
Status: COMPLETED | OUTPATIENT
Start: 2017-03-14 | End: 2017-03-14

## 2017-03-14 RX ORDER — BUPIVACAINE HYDROCHLORIDE 2.5 MG/ML
INJECTION, SOLUTION EPIDURAL; INFILTRATION; INTRACAUDAL
Status: DISCONTINUED | OUTPATIENT
Start: 1840-12-31 | End: 2017-03-14 | Stop reason: HOSPADM

## 2017-03-14 RX ORDER — METHYLPREDNISOLONE ACETATE 80 MG/ML
INJECTION, SUSPENSION INTRA-ARTICULAR; INTRALESIONAL; INTRAMUSCULAR; SOFT TISSUE
Status: DISCONTINUED | OUTPATIENT
Start: 2017-03-14 | End: 2017-03-14 | Stop reason: HOSPADM

## 2017-03-14 RX ADMIN — ALPRAZOLAM 1 MG: 0.5 TABLET, ORALLY DISINTEGRATING ORAL at 07:03

## 2017-03-14 NOTE — DISCHARGE INSTRUCTIONS

## 2017-03-14 NOTE — IP AVS SNAPSHOT
[facility] ([facility])  [address] [place], [place] [address]  Phone: [phone]           Patient Discharge Instructions     Our goal is to set you up for success. This packet includes information on your condition, medications, and your home care. It will help you to care for yourself so you don't get sicker and need to go back to the hospital.     Please ask your nurse if you have any questions.        There are many details to remember when preparing to leave the hospital. Here is what you will need to do:    1. Take your medicine. If you are prescribed medications, review your Medication List in the following pages. You may have new medications to  at the pharmacy and others that you'll need to stop taking. Review the instructions for how and when to take your medications. Talk with your doctor or nurses if you are unsure of what to do.     2. Go to your follow-up appointments. Specific follow-up information is listed in the following pages. Your may be contacted by a transition nurse or clinical provider about future appointments. Be sure we have all of the phone numbers to reach you, if needed. Please contact your provider's office if you are unable to make an appointment.     3. Watch for warning signs. Your doctor or nurse will give you detailed warning signs to watch for and when to call for assistance. These instructions may also include educational information about your condition. If you experience any of warning signs to your health, call your doctor.               [facility]  Unless otherwise directed by your provider, please contact [facility], our nurse care line that is available for 24/7 assistance.     [phone] (toll-free)    Registered nurses in the [facility] provide clinical advisement, health education, appointment booking, and other advisory services.                    ** Verify the list of medication(s) below is accurate and up to  date. Carry this with you in case of emergency. If your medications have changed, please notify your healthcare provider.             Medication List      ASK your doctor about these medications        Additional Info                      albuterol 90 mcg/actuation inhaler   Quantity:  18 g   Refills:  0   Dose:  2 puff    Instructions:  Inhale 2 puffs into the lungs every 4 (four) hours as needed for Wheezing or Shortness of Breath.     Begin Date    AM    Noon    PM    Bedtime       amlodipine-benazepril 10-40 mg per capsule   Commonly known as:  LOTREL   Quantity:  30 capsule   Refills:  2    Instructions:  take 1 capsule by mouth once daily     Begin Date    AM    Noon    PM    Bedtime       blood sugar diagnostic Strp   Commonly known as:  CONTOUR TEST STRIPS   Quantity:  50 each   Refills:  11    Instructions:  USE TO TEST BLOOD SUGAR TWICE A DAY     Begin Date    AM    Noon    PM    Bedtime       empagliflozin 10 mg Tab   Commonly known as:  JARDIANCE   Quantity:  30 tablet   Refills:  4   Dose:  10 mg    Instructions:  Take 10 mg by mouth once daily.     Begin Date    AM    Noon    PM    Bedtime       fluocinonide 0.05 % external solution   Commonly known as:  LIDEX   Refills:  0    Instructions:  As directed     Begin Date    AM    Noon    PM    Bedtime       fluticasone 50 mcg/actuation nasal spray   Commonly known as:  FLONASE   Quantity:  1 Bottle   Refills:  0   Dose:  2 spray    Instructions:  2 sprays by Each Nare route once daily.     Begin Date    AM    Noon    PM    Bedtime       JANUVIA 100 MG Tab   Quantity:  30 tablet   Refills:  2   Generic drug:  SITagliptan    Instructions:  take 1 tablet by mouth once daily     Begin Date    AM    Noon    PM    Bedtime       metformin 500 MG 24 hr tablet   Commonly known as:  GLUCOPHAGE-XR   Quantity:  120 tablet   Refills:  4    Instructions:  take 2 tablets by mouth twice a day     Begin Date    AM    Noon    PM    Bedtime       naproxen 500 MG EC tablet    Commonly known as:  EC NAPROSYN   Quantity:  30 tablet   Refills:  1   Dose:  500 mg    Instructions:  Take 1 tablet (500 mg total) by mouth 2 (two) times daily with meals.     Begin Date    AM    Noon    PM    Bedtime       simvastatin 20 MG tablet   Commonly known as:  ZOCOR   Quantity:  90 tablet   Refills:  2    Instructions:  take 1 tablet by mouth every evening     Begin Date    AM    Noon    PM    Bedtime                  Please bring to all follow up appointments:    1. A copy of your discharge instructions.  2. All medicines you are currently taking in their original bottles.  3. Identification and insurance card.    Please arrive 15 minutes ahead of scheduled appointment time.    Please call 24 hours in advance if you must reschedule your appointment and/or time.        Your Scheduled Appointments     Apr 05, 2017  7:00 AM CDT   Established Patient Visit with Bernie Estrada MD   [facility] ([facility] [place])    [address]  [place] [address] [phone]            May 15, 2017  8:00 AM CDT   Non-Fasting Lab with LAB, [facility] ([facility])    [address], [address]  [facility] [place] [id number] [phone]            May 22, 2017  4:00 PM CDT   Established Patient with BETHANY Acuña - Endo/Diab/Metab ([facility] )    [address]  [facility] [address] [phone]              Your Future Surgeries/Procedures     Mar 14, 2017   Surgery with Bernie Estrada MD   [facility] ([facility])    [address] [address] [phone]                  Discharge Instructions       Home Care Instructions Pain Management:    1. DIET:   You may resume your normal diet today.   2. BATHING:   You may shower with luke warm water. No soaking in tub.  3. DRESSING:   You may remove your bandage today.   4. ACTIVITY LEVEL:   You may resume your normal  "activities 24 hrs after your procedure.  5. MEDICATIONS:   You may resume your normal medications today.   6. SPECIAL INSTRUCTIONS:   No heat to the injection site for 24 hrs including, bath or shower, heating pad, moist heat, or hot tubs.    Use ice pack to injection site for any pain or discomfort.  Apply ice packs for 20 minute intervals as needed.   If you have received any sedatives by mouth today you may not drive for 12 hours.    If you have received any sedation through your IV, you may not drive for 24 hrs.     PLEASE CALL YOUR DOCTOR IF:  1. Redness or swelling around the injection site.  2. Fever of 101 degrees  3. Drainage (pus) from the injection site.  4. For any continuous bleeding (some dried blood over the incision is normal.)  5. For severe headache that is relieved when lying flat.    FOR EMERGENCIES:   If any unusual problems or difficulties occur during clinic hours, call [phone] or [phone].         Admission Information     Date & Time Provider Department [facility]    3/14/2017  7:18 AM Bernie Estrada MD [facility] [phone]      Care Providers     Provider Role Specialty Primary office phone    Bernie Estrada MD Attending Provider Pain Medicine [phone]    Bernie Estrada MD Surgeon  Pain Medicine [phone]      Your Vitals Were     BP Pulse Temp Resp Height Weight    132/79 (BP Location: Right arm, Patient Position: Lying, BP Method: Automatic) 71 97.8 °F (36.6 °C) (Oral) 17 6' 2" (1.88 m) 108.9 kg (240 lb)    SpO2 BMI             97% 30.81 kg/m2         Recent Lab Values        4/4/2014 10/29/2014 6/10/2015 9/14/2015 11/19/2015 3/24/2016 6/23/2016 1/10/2017      8:00 AM  9:20 AM  8:26 AM 11:08 AM 11:24 AM  8:47 AM 11:21 AM  9:40 AM    A1C 7.5 (H) 7.5 (H) 7.2 (H) 8.5 (H) 7.8 (H) 9.6 (H) 7.9 (H) 8.7 (H)       7.2 (H)     8.7 (H)    Comment for A1C at  9:40 AM on 1/10/2017:  According to ADA guidelines, hemoglobin A1C <7.0% represents  optimal control in non-pregnant " diabetic patients.  Different  metrics may apply to specific populations.   Standards of Medical Care in Diabetes - 2016.  For the purpose of screening for the presence of diabetes:  <5.7%     Consistent with the absence of diabetes  5.7-6.4%  Consistent with increasing risk for diabetes   (prediabetes)  >or=6.5%  Consistent with diabetes  Currently no consensus exists for use of hemoglobin A1C  for diagnosis of diabetes for children.      Comment for A1C at  9:40 AM on 1/10/2017:  According to ADA guidelines, hemoglobin A1C <7.0% represents  optimal control in non-pregnant diabetic patients.  Different  metrics may apply to specific populations.   Standards of Medical Care in Diabetes - 2016.  For the purpose of screening for the presence of diabetes:  <5.7%     Consistent with the absence of diabetes  5.7-6.4%  Consistent with increasing risk for diabetes   (prediabetes)  >or=6.5%  Consistent with diabetes  Currently no consensus exists for use of hemoglobin A1C  for diagnosis of diabetes for children.        Allergies as of 3/14/2017     No Known Allergies      Advance Directives     An advance directive is a document which, in the event you are no longer able to make decisions for yourself, tells your healthcare team what kind of treatment you do or do not want to receive, or who you would like to make those decisions for you.  If you do not currently have an advance directive, [facility] encourages you to create one.  For more information call:  [phone]), [phone] ([phone]),  or log on to [email or web address].        Language Assistance Services     ATTENTION: Language assistance services are available, free of charge. Please call [phone].      ATENCIÓN: Si habla español, tiene a layton disposición servicios gratuitos de asistencia lingüística. Llame al [phone].     CHÚ Ý: N?u b?n nói Ti?ng Vi?t, có các d?ch v? h? tr? ngôn ng? mi?n phí dành cho b?n. G?i s?  [phone].        Diabetes Discharge Instructions                                    [facility] complies with applicable Federal civil rights laws and does not discriminate on the basis of race, color, national origin, age, disability, or sex.

## 2017-04-10 ENCOUNTER — TELEPHONE (OUTPATIENT)
Dept: PAIN MEDICINE | Facility: CLINIC | Age: 52
End: 2017-04-10

## 2017-04-10 NOTE — TELEPHONE ENCOUNTER
----- Message from Bertha Barrow sent at 4/7/2017  6:50 AM CDT -----  Contact: Pt [phone]  Pt called to speak to the nurse to cancel/reschedule his appt 7am today with the provider due to a family emergency and would like a call back asap.    Pt can be reached at [phone].    Thanks

## 2017-04-28 ENCOUNTER — OFFICE VISIT (OUTPATIENT)
Dept: FAMILY MEDICINE | Facility: CLINIC | Age: 52
End: 2017-04-28
Attending: FAMILY MEDICINE
Payer: COMMERCIAL

## 2017-04-28 VITALS
BODY MASS INDEX: 30.62 KG/M2 | HEIGHT: 74 IN | DIASTOLIC BLOOD PRESSURE: 74 MMHG | HEART RATE: 87 BPM | OXYGEN SATURATION: 96 % | SYSTOLIC BLOOD PRESSURE: 120 MMHG | WEIGHT: 238.63 LBS

## 2017-04-28 DIAGNOSIS — I86.1 LEFT VARICOCELE: Primary | ICD-10-CM

## 2017-04-28 PROCEDURE — 3074F SYST BP LT 130 MM HG: CPT | Mod: S$GLB,,, | Performed by: FAMILY MEDICINE

## 2017-04-28 PROCEDURE — 99213 OFFICE O/P EST LOW 20 MIN: CPT | Mod: S$GLB,,, | Performed by: FAMILY MEDICINE

## 2017-04-28 PROCEDURE — 99999 PR PBB SHADOW E&M-EST. PATIENT-LVL III: CPT | Mod: PBBFAC,,, | Performed by: FAMILY MEDICINE

## 2017-04-28 PROCEDURE — 1160F RVW MEDS BY RX/DR IN RCRD: CPT | Mod: S$GLB,,, | Performed by: FAMILY MEDICINE

## 2017-04-28 PROCEDURE — 3078F DIAST BP <80 MM HG: CPT | Mod: S$GLB,,, | Performed by: FAMILY MEDICINE

## 2017-04-28 NOTE — MR AVS SNAPSHOT
[facility]  [address], [address]  [facility] [address]  Phone: [phone]                  Suhas Castillo   2017 9:00 AM   Office Visit    Description:  Male : 1965   Provider:  Josiah Ching Jr., MD   Department:  [facility]           Reason for Visit     Groin Pain           Diagnoses this Visit        Comments    Left varicocele    -  Primary            To Do List           Future Appointments        Provider Department Dept Phone    2017 8:15 AM [facility] USOP2 [facility] [phone]    5/15/2017 8:00 AM LAB, [facility] [phone]    2017 4:00 PM [facility] - Endo/Diab/Metab [phone]      Goals (5 Years of Data)     None      [facility]     [facility] Nurse Care Line -  Assistance  Unless otherwise directed by your provider, please contact [facility], our nurse care line that is available for  assistance.     Registered nurses in the [facility] Center provide: appointment scheduling, clinical advisement, health education, and other advisory services.  Call: [phone] (toll free)               Medications           Message regarding Medications     Verify the changes and/or additions to your medication regime listed below are the same as discussed with your clinician today.  If any of these changes or additions are incorrect, please notify your healthcare provider.        STOP taking these medications     naproxen (EC NAPROSYN) 500 MG EC tablet Take 1 tablet (500 mg total) by mouth 2 (two) times daily with meals.           Verify that the below list of medications is an accurate representation of the medications you are currently taking.  If none reported, the list may be blank. If incorrect, please contact your healthcare provider. Carry this list with you in case of emergency.           Current Medications      "albuterol 90 mcg/actuation inhaler Inhale 2 puffs into the lungs every 4 (four) hours as needed for Wheezing or Shortness of Breath.    amlodipine-benazepril (LOTREL) 10-40 mg per capsule take 1 capsule by mouth once daily    blood sugar diagnostic (CONTOUR TEST STRIPS) Strp USE TO TEST BLOOD SUGAR TWICE A DAY    empagliflozin (JARDIANCE) 10 mg Tab Take 10 mg by mouth once daily.    fluocinonide (LIDEX) 0.05 % external solution As directed    fluticasone (FLONASE) 50 mcg/actuation nasal spray 2 sprays by Each Nare route once daily.    JANUVIA 100 mg Tab take 1 tablet by mouth once daily    metformin (GLUCOPHAGE-XR) 500 MG 24 hr tablet take 2 tablets by mouth twice a day    simvastatin (ZOCOR) 20 MG tablet take 1 tablet by mouth every evening           Clinical Reference Information           Your Vitals Were     BP Pulse Height Weight SpO2 BMI    120/74 (BP Location: Left arm, Patient Position: Sitting, BP Method: Manual) 87 6' 2" (1.88 m) 108.2 kg (238 lb 9.6 oz) 96% 30.63 kg/m2      Blood Pressure          Most Recent Value    BP  120/74      Allergies as of 4/28/2017     No Known Allergies      Immunizations Administered on Date of Encounter - 4/28/2017     None      Orders Placed During Today's Visit     Future Labs/Procedures Expected by Expires    US Scrotum And Testicles  4/28/2017 4/28/2018      Language Assistance Services     ATTENTION: Language assistance services are available, free of charge. Please call [phone].      ATENCIÓN: Si habla español, tiene a layton disposición servicios gratuitos de asistencia lingüística. Llame al [phone].     [facility] Ý: N?u b?n nói Ti?ng Vi?t, có các d?ch v? h? tr? ngôn ng? mi?n phí dành cho b?n. G?i s? [phone].         [facility] complies with applicable Federal civil rights laws and does not discriminate on the basis of race, color, national origin, age, disability, or sex.        "

## 2017-04-28 NOTE — PROGRESS NOTES
"Subjective:       Patient ID: Suhas Castillo is a 52 y.o. male.    Chief Complaint: Groin Pain    Groin Pain   The patient's primary symptoms include testicular pain. This is a new problem. The current episode started in the past 7 days. The problem occurs daily. The problem has been unchanged. The pain is mild. Pertinent negatives include no discolored urine, frequency, hematuria, painful intercourse, urgency or urinary retention. The testicular pain affects the left testicle. He is sexually active. No, his partner does not have an STD. His past medical history is significant for gonorrhea. There is no history of BPH, chlamydia, herpes simplex, prostatitis, syphilis or varicocele.       The following portions of the patient's history were reviewed and updated as appropriate: allergies, current medications, past family history, past medical history, past social history, past surgical history and problem list.    Review of Systems   Genitourinary: Positive for testicular pain. Negative for frequency and urgency.       Objective:      Physical Exam   Constitutional: He appears well-developed and well-nourished.   Eyes: Conjunctivae are normal. No scleral icterus.   Abdominal: Hernia confirmed negative in the right inguinal area and confirmed negative in the left inguinal area.   Genitourinary: Penis normal. Right testis shows no mass and no tenderness. Left testis shows mass and tenderness. Circumcised. No penile tenderness. No discharge found.   Genitourinary Comments: Left varicocele appreciated on exam.   Neurological: He is alert.   Skin: Skin is warm.   Psychiatric: He has a normal mood and affect.   Vitals reviewed.        Assessment:       1. Left varicocele          Plan:       I explained my findings and the patient.  Scrotal ultrasound today.  Discussed probable urologic referral.    "This note will not be shared with the patient."  "

## 2017-05-01 ENCOUNTER — PATIENT MESSAGE (OUTPATIENT)
Dept: FAMILY MEDICINE | Facility: CLINIC | Age: 52
End: 2017-05-01

## 2017-05-01 ENCOUNTER — HOSPITAL ENCOUNTER (OUTPATIENT)
Dept: RADIOLOGY | Facility: OTHER | Age: 52
Discharge: HOME | End: 2017-05-01
Attending: FAMILY MEDICINE
Payer: COMMERCIAL

## 2017-05-01 DIAGNOSIS — I86.1 LEFT VARICOCELE: ICD-10-CM

## 2017-05-01 DIAGNOSIS — N50.82 SCROTAL PAIN: Primary | ICD-10-CM

## 2017-05-01 PROCEDURE — 76870 US EXAM SCROTUM: CPT | Mod: TC

## 2017-05-01 PROCEDURE — 76870 US EXAM SCROTUM: CPT | Mod: 26,,, | Performed by: RADIOLOGY

## 2017-05-04 RX ORDER — SITAGLIPTIN 100 MG/1
TABLET, FILM COATED ORAL
Qty: 30 TABLET | Refills: 2 | Status: SHIPPED | OUTPATIENT
Start: 2017-05-04 | End: 2017-08-04 | Stop reason: SDUPTHER

## 2017-05-15 ENCOUNTER — LAB VISIT (OUTPATIENT)
Dept: LAB | Facility: HOSPITAL | Age: 52
End: 2017-05-15
Attending: FAMILY MEDICINE
Payer: COMMERCIAL

## 2017-05-15 DIAGNOSIS — E11.69 HYPERLIPIDEMIA ASSOCIATED WITH TYPE 2 DIABETES MELLITUS: ICD-10-CM

## 2017-05-15 DIAGNOSIS — E78.5 HYPERLIPIDEMIA ASSOCIATED WITH TYPE 2 DIABETES MELLITUS: ICD-10-CM

## 2017-05-15 DIAGNOSIS — I15.2 HYPERTENSION ASSOCIATED WITH DIABETES: ICD-10-CM

## 2017-05-15 DIAGNOSIS — E11.59 HYPERTENSION ASSOCIATED WITH DIABETES: ICD-10-CM

## 2017-05-15 DIAGNOSIS — E55.9 VITAMIN D DEFICIENCY: ICD-10-CM

## 2017-05-15 LAB
25(OH)D3+25(OH)D2 SERPL-MCNC: 29 NG/ML
ALBUMIN SERPL BCP-MCNC: 3.6 G/DL
ALP SERPL-CCNC: 72 U/L
ALT SERPL W/O P-5'-P-CCNC: 17 U/L
ANION GAP SERPL CALC-SCNC: 10 MMOL/L
AST SERPL-CCNC: 18 U/L
BILIRUB SERPL-MCNC: 0.4 MG/DL
BUN SERPL-MCNC: 12 MG/DL
CALCIUM SERPL-MCNC: 8.7 MG/DL
CHLORIDE SERPL-SCNC: 111 MMOL/L
CO2 SERPL-SCNC: 23 MMOL/L
CREAT SERPL-MCNC: 1.2 MG/DL
EST. GFR  (AFRICAN AMERICAN): >60 ML/MIN/1.73 M^2
EST. GFR  (NON AFRICAN AMERICAN): >60 ML/MIN/1.73 M^2
ESTIMATED AVG GLUCOSE: 146 MG/DL
GLUCOSE SERPL-MCNC: 129 MG/DL
HBA1C MFR BLD HPLC: 6.7 %
POTASSIUM SERPL-SCNC: 4 MMOL/L
PROT SERPL-MCNC: 7.2 G/DL
SODIUM SERPL-SCNC: 144 MMOL/L

## 2017-05-15 PROCEDURE — 83704 LIPOPROTEIN BLD QUAN PART: CPT

## 2017-05-15 PROCEDURE — 36415 COLL VENOUS BLD VENIPUNCTURE: CPT | Mod: PO

## 2017-05-15 PROCEDURE — 80061 LIPID PANEL: CPT

## 2017-05-15 PROCEDURE — 82306 VITAMIN D 25 HYDROXY: CPT

## 2017-05-15 PROCEDURE — 83036 HEMOGLOBIN GLYCOSYLATED A1C: CPT

## 2017-05-15 PROCEDURE — 80053 COMPREHEN METABOLIC PANEL: CPT

## 2017-05-15 RX ORDER — AMLODIPINE AND BENAZEPRIL HYDROCHLORIDE 10; 40 MG/1; MG/1
CAPSULE ORAL
Qty: 30 CAPSULE | Refills: 2 | Status: SHIPPED | OUTPATIENT
Start: 2017-05-15 | End: 2017-08-13 | Stop reason: SDUPTHER

## 2017-05-18 LAB
CHOLEST SERPL-MCNC: 91 MG/DL (ref 100–199)
HDL SERPL QN: 56 NM
HDL SERPL QN: 8.6 NM
HDL SERPL-SCNC: 25.5 UMOL/L
HDLC SERPL-MCNC: 34 MG/DL
HLD.LARGE SERPL-SCNC: 2.3 UMOL/L
LDL MED SERPL QN: 19.8 NM
LDL SERPL QN: 19.8 NM
LDL SERPL-SCNC: 569 NMOL/L
LDL SMALL SERPL-SCNC: 352 NMOL/L
LDL SMALL SERPL-SCNC: 352 NMOL/L
LDLC SERPL CALC-MCNC: 41 MG/DL (ref 0–99)
TRIGL SERPL-MCNC: 80 MG/DL (ref 0–149)
VLDL LARGE SERPL-SCNC: 1.5 NMOL/L
VLDL SERPL QN: 45.4 NM

## 2017-05-19 ENCOUNTER — PATIENT MESSAGE (OUTPATIENT)
Dept: FAMILY MEDICINE | Facility: CLINIC | Age: 52
End: 2017-05-19

## 2017-05-22 ENCOUNTER — OFFICE VISIT (OUTPATIENT)
Dept: UROLOGY | Facility: CLINIC | Age: 52
End: 2017-05-22
Payer: COMMERCIAL

## 2017-05-22 VITALS
DIASTOLIC BLOOD PRESSURE: 76 MMHG | WEIGHT: 236.75 LBS | BODY MASS INDEX: 30.38 KG/M2 | SYSTOLIC BLOOD PRESSURE: 123 MMHG | HEART RATE: 77 BPM | HEIGHT: 74 IN

## 2017-05-22 DIAGNOSIS — N50.82 SCROTAL PAIN: Primary | ICD-10-CM

## 2017-05-22 DIAGNOSIS — R10.32 LLQ PAIN: ICD-10-CM

## 2017-05-22 PROCEDURE — 1160F RVW MEDS BY RX/DR IN RCRD: CPT | Mod: S$GLB,,, | Performed by: UROLOGY

## 2017-05-22 PROCEDURE — 3078F DIAST BP <80 MM HG: CPT | Mod: S$GLB,,, | Performed by: UROLOGY

## 2017-05-22 PROCEDURE — 3074F SYST BP LT 130 MM HG: CPT | Mod: S$GLB,,, | Performed by: UROLOGY

## 2017-05-22 PROCEDURE — 99999 PR PBB SHADOW E&M-EST. PATIENT-LVL III: CPT | Mod: PBBFAC,,, | Performed by: UROLOGY

## 2017-05-22 PROCEDURE — 99203 OFFICE O/P NEW LOW 30 MIN: CPT | Mod: S$GLB,,, | Performed by: UROLOGY

## 2017-05-22 NOTE — PROGRESS NOTES
Subjective:       Patient ID: Suhas Castillo is a 52 y.o. male.    Chief Complaint: Advice Only (c/o  scrotal pain, pt had scrotal u/s done on 04/28/2017.)    HPI  patient is here for left lower quadrant pain and left testicular pain.  It's a 3 out of 10 and has been going on for 3-4 weeks.  Occasionally feels some pain in his left side of his back.  Urinalysis is clear and his had a negative scrotal ultrasound.  No fever chills nausea or vomiting or voiding complaints    Past Medical History:   Diagnosis Date    AR (allergic rhinitis)     Cervical spinal stenosis     Diabetes mellitus type II     DJD (degenerative joint disease), cervical     Hyperlipidemia     Hypertension        Past Surgical History:   Procedure Laterality Date    EPIDURAL BLOCK INJECTION      cervical x 2    TESTICLE SURGERY  age 16    torsion       Family History   Problem Relation Age of Onset    Hypertension Mother     Diabetes Mother     Stroke Mother        Social History     Social History    Marital status: Single     Spouse name: N/A    Number of children: 4    Years of education: N/A     Occupational History    secutiry supervisor [employer]     Social History Main Topics    Smoking status: Never Smoker    Smokeless tobacco: Never Used    Alcohol use No      Comment: none x 4 years; previously social drinker    Drug use: No    Sexual activity: Yes     Partners: Female     Birth control/ protection: None     Other Topics Concern    Not on file     Social History Narrative    The patient does exercise regularly (treadmill, light weights: TIW-QIW).      Rates diet as fair.      He is satisfied with weight.               Allergies:  Review of patient's allergies indicates no known allergies.    Medications:    Current Outpatient Prescriptions:     amlodipine-benazepril (LOTREL) 10-40 mg per capsule, take 1 capsule by mouth once daily, Disp: 30 capsule, Rfl: 2    blood sugar diagnostic (CONTOUR TEST  STRIPS) Strp, USE TO TEST BLOOD SUGAR TWICE A DAY, Disp: 50 each, Rfl: 11    empagliflozin (JARDIANCE) 10 mg Tab, Take 10 mg by mouth once daily., Disp: 30 tablet, Rfl: 4    fluocinonide (LIDEX) 0.05 % external solution, As directed, Disp: , Rfl: 0    fluticasone (FLONASE) 50 mcg/actuation nasal spray, 2 sprays by Each Nare route once daily., Disp: 1 Bottle, Rfl: 0    JANUVIA 100 mg Tab, take 1 tablet by mouth once daily, Disp: 30 tablet, Rfl: 2    metformin (GLUCOPHAGE-XR) 500 MG 24 hr tablet, take 2 tablets by mouth twice a day, Disp: 120 tablet, Rfl: 4    simvastatin (ZOCOR) 20 MG tablet, take 1 tablet by mouth every evening, Disp: 90 tablet, Rfl: 2    albuterol 90 mcg/actuation inhaler, Inhale 2 puffs into the lungs every 4 (four) hours as needed for Wheezing or Shortness of Breath., Disp: 18 g, Rfl: 0    Review of Systems   Constitutional: Negative for activity change, appetite change, chills, diaphoresis, fatigue, fever and unexpected weight change.   HENT: Negative for congestion, dental problem, hearing loss, mouth sores, postnasal drip, rhinorrhea, sinus pressure and trouble swallowing.    Eyes: Negative for pain, discharge and itching.   Respiratory: Negative for apnea, cough, choking, chest tightness, shortness of breath and wheezing.    Cardiovascular: Negative for chest pain, palpitations and leg swelling.   Gastrointestinal: Negative for abdominal distention, abdominal pain, anal bleeding, blood in stool, constipation, diarrhea, nausea, rectal pain and vomiting.   Endocrine: Negative for polydipsia and polyuria.   Genitourinary: Negative for decreased urine volume, difficulty urinating, discharge, dysuria, enuresis, flank pain, frequency, genital sores, hematuria, penile pain, penile swelling, scrotal swelling, testicular pain and urgency.   Musculoskeletal: Negative for arthralgias, back pain and myalgias.   Skin: Negative for color change, rash and wound.   Neurological: Negative for  dizziness, syncope, speech difficulty, light-headedness and headaches.   Hematological: Negative for adenopathy. Does not bruise/bleed easily.   Psychiatric/Behavioral: Negative for behavioral problems, confusion, hallucinations and sleep disturbance.       Objective:      Physical Exam   Constitutional: He appears well-developed.   HENT:   Head: Normocephalic.   Cardiovascular: Normal rate.    Pulmonary/Chest: Effort normal.   Abdominal: Soft.   Genitourinary: Prostate normal.   Genitourinary Comments: Prostate is 30 g benign no nodules.  PSA is normal.  No hernias are palpable.  Abdomen is unremarkable   Neurological: He is alert.   Skin: Skin is warm.     Psychiatric: He has a normal mood and affect.       Assessment:       1. Scrotal pain    2. LLQ pain        Plan:       Suhas was seen today for advice only.    Diagnoses and all orders for this visit:    Scrotal pain    LLQ pain  -     CT Renal Stone Study ABD Pelvis WO; Future        phone review CT scan

## 2017-05-22 NOTE — LETTER
May 22, 2017      Josiah Ching Jr., MD  [address]  [address]  [place] [facility] [address]           [facility] - Urology 4th Floor  [address]  [facility] [address]  Phone: [phone]          Patient: Suhas Castillo   MR Number: [id number]   YOB: 1965   Date of Visit: 5/22/2017       Dear Dr. Josiah Ching Jr.:    Thank you for referring Suhas Castillo to me for evaluation. Attached you will find relevant portions of my assessment and plan of care.    If you have questions, please do not hesitate to call me. I look forward to following Suhas Castillo along with you.    Sincerely,    Erik Ewing Jr., MD    Enclosure  CC:  No Recipients    If you would like to receive this communication electronically, please contact [email or web address] or [phone] to request more information on [facility] access.    For providers and/or their staff who would like to refer a patient to [facility], please contact us through our one-stop-shop provider referral line, [facility], at [phone].    If you feel you have received this communication in error or would no longer like to receive these types of communications, please e-mail [email or web address]

## 2017-05-24 ENCOUNTER — HOSPITAL ENCOUNTER (OUTPATIENT)
Dept: RADIOLOGY | Facility: HOSPITAL | Age: 52
Discharge: HOME | End: 2017-05-24
Attending: UROLOGY
Payer: COMMERCIAL

## 2017-05-24 DIAGNOSIS — R10.32 LLQ PAIN: ICD-10-CM

## 2017-05-24 PROCEDURE — 74176 CT ABD & PELVIS W/O CONTRAST: CPT | Mod: TC

## 2017-05-24 PROCEDURE — 74176 CT ABD & PELVIS W/O CONTRAST: CPT | Mod: 26,,, | Performed by: RADIOLOGY

## 2017-06-02 ENCOUNTER — OFFICE VISIT (OUTPATIENT)
Dept: ENDOCRINOLOGY | Facility: CLINIC | Age: 52
End: 2017-06-02
Payer: COMMERCIAL

## 2017-06-02 VITALS
BODY MASS INDEX: 31.55 KG/M2 | WEIGHT: 245.81 LBS | HEIGHT: 74 IN | HEART RATE: 90 BPM | DIASTOLIC BLOOD PRESSURE: 79 MMHG | SYSTOLIC BLOOD PRESSURE: 133 MMHG

## 2017-06-02 DIAGNOSIS — I15.2 HYPERTENSION ASSOCIATED WITH DIABETES: ICD-10-CM

## 2017-06-02 DIAGNOSIS — E55.9 VITAMIN D DEFICIENCY: Primary | ICD-10-CM

## 2017-06-02 DIAGNOSIS — E78.5 HYPERLIPIDEMIA ASSOCIATED WITH TYPE 2 DIABETES MELLITUS: ICD-10-CM

## 2017-06-02 DIAGNOSIS — E11.69 HYPERLIPIDEMIA ASSOCIATED WITH TYPE 2 DIABETES MELLITUS: ICD-10-CM

## 2017-06-02 DIAGNOSIS — E11.59 HYPERTENSION ASSOCIATED WITH DIABETES: ICD-10-CM

## 2017-06-02 PROCEDURE — 4010F ACE/ARB THERAPY RXD/TAKEN: CPT | Mod: S$GLB,,, | Performed by: INTERNAL MEDICINE

## 2017-06-02 PROCEDURE — 99999 PR PBB SHADOW E&M-EST. PATIENT-LVL III: CPT | Mod: PBBFAC,,, | Performed by: INTERNAL MEDICINE

## 2017-06-02 PROCEDURE — 99214 OFFICE O/P EST MOD 30 MIN: CPT | Mod: S$GLB,,, | Performed by: INTERNAL MEDICINE

## 2017-06-02 PROCEDURE — 3044F HG A1C LEVEL LT 7.0%: CPT | Mod: S$GLB,,, | Performed by: INTERNAL MEDICINE

## 2017-06-02 NOTE — PROGRESS NOTES
Chief Complaint: Diabetes Mellitus      HPI:  Suhas Castillo is 52 y.o. male returns to clinic today for T2DM follow up     The patient reports he is feeling well. He continues to monitor his diet closely and reports that he has lost weight since his last visit     Diabetes was first diagnosed in   At the time of diagnosis, patient reported symptoms of nocturia, polydipsia, unexplained weight loss     Currently, he denies nocturia, polyuria, unexplained weight loss or blurred vision.     Current DM regimen:   Metformin 1000 mg po bid   Januvia 100 mg po qd   Jardiance 10 mg qd ( added in 2017)     Reports drinking 6-8 glasses of water daily   Denies dysuria or hematuria     Other medications tried: none     Hypoglycemia: no recent episodes or symptoms   Reports hospital admission related to diabetes at initial diagnosis in     Known complications: none     Diabetic eye exam: [facility] in [place], [place] 2017     Last podiatry exam: never   Denies neuropathy     24 hour dietary recall:   Breakfast: egg white and turkey on wheat flatbread with lettuce and bell pepper  Lunch: grilled chicken, fried oysters and boiled shrimp   Dinner: grilled shrimp salad  Mainly drinking water/ crystal light     Tests BG 2 x daily   Fastin, 105, 120,118  Bedtime: 97,96, 100      Diabetes education: No  Exercise: treadmill x 30 mins weekly, weights weekly     ADA STANDARDS OF CARE:   ACE inhibitor or angiotension II receptor blocker: benazepril   Statin drug: simvastatin   Low dose ASA: denies   Dental exam: scheduled for 2017       Review of Systems   Constitutional: Negative for fatigue and unexpected weight change.   HENT: Negative for sore throat, trouble swallowing and voice change.    Eyes: Negative for visual disturbance.   Respiratory: Negative for cough and shortness of breath.    Cardiovascular: Negative for chest pain, palpitations and leg swelling.   Gastrointestinal: Negative for abdominal  pain, constipation, diarrhea, nausea and vomiting.   Endocrine: Negative for polydipsia, polyphagia and polyuria.   Genitourinary: Negative for dysuria.   Musculoskeletal: Positive for myalgias.   Skin: Negative for rash and wound.   Neurological: Negative for dizziness, tremors and headaches.   Hematological: Does not bruise/bleed easily.   Psychiatric/Behavioral: Positive for sleep disturbance (+snoring ). Negative for behavioral problems.       Physical Exam   Constitutional: He is oriented to person, place, and time. He appears well-developed and well-nourished. No distress.   HENT:   Hearing normal    Neck: No tracheal deviation present. No thyromegaly present.   Cardiovascular: Normal rate and regular rhythm.    No murmur heard.  Pulmonary/Chest: Effort normal and breath sounds normal.   Abdominal: Soft. He exhibits no mass.   Musculoskeletal: He exhibits no edema.   Neurological: He is alert and oriented to person, place, and time. No cranial nerve deficit or sensory deficit. Coordination and gait normal.        Skin: Skin is warm and dry. No rash noted.   +acanthosis and skin tags  No supraclavicular fulness  Normal proximal muscle strength     Psychiatric: He has a normal mood and affect. Judgment normal.   Nursing note and vitals reviewed.    Labs:  Hemoglobin A1C   Date Value Ref Range Status   05/15/2017 6.7 (H) 4.5 - 6.2 % Final     Comment:     According to ADA guidelines, hemoglobin A1C <7.0% represents  optimal control in non-pregnant diabetic patients.  Different  metrics may apply to specific populations.   Standards of Medical Care in Diabetes - 2016.  For the purpose of screening for the presence of diabetes:  <5.7%     Consistent with the absence of diabetes  5.7-6.4%  Consistent with increasing risk for diabetes   (prediabetes)  >or=6.5%  Consistent with diabetes  Currently no consensus exists for use of hemoglobin A1C  for diagnosis of diabetes for children.     01/10/2017 8.7 (H) 4.5 - 6.2 %  Final     Comment:     According to ADA guidelines, hemoglobin A1C <7.0% represents  optimal control in non-pregnant diabetic patients.  Different  metrics may apply to specific populations.   Standards of Medical Care in Diabetes - 2016.  For the purpose of screening for the presence of diabetes:  <5.7%     Consistent with the absence of diabetes  5.7-6.4%  Consistent with increasing risk for diabetes   (prediabetes)  >or=6.5%  Consistent with diabetes  Currently no consensus exists for use of hemoglobin A1C  for diagnosis of diabetes for children.     01/10/2017 8.7 (H) 4.5 - 6.2 % Final     Comment:     According to ADA guidelines, hemoglobin A1C <7.0% represents  optimal control in non-pregnant diabetic patients.  Different  metrics may apply to specific populations.   Standards of Medical Care in Diabetes - 2016.  For the purpose of screening for the presence of diabetes:  <5.7%     Consistent with the absence of diabetes  5.7-6.4%  Consistent with increasing risk for diabetes   (prediabetes)  >or=6.5%  Consistent with diabetes  Currently no consensus exists for use of hemoglobin A1C  for diagnosis of diabetes for children.     06/23/2016 7.9 (H) 4.5 - 6.2 % Final   03/24/2016 9.6 (H) 4.5 - 6.2 % Final     Lab Results   Component Value Date    CHOL 84 (L) 06/23/2016    HDL 28 (L) 06/23/2016    LDLCALC 43.2 (L) 06/23/2016    TRIG 64 06/23/2016    CHOLHDL 33.3 06/23/2016     Lab Results   Component Value Date     05/15/2017    K 4.0 05/15/2017     (H) 05/15/2017    CO2 23 05/15/2017     (H) 05/15/2017    BUN 12 05/15/2017    CREATININE 1.2 05/15/2017    CALCIUM 8.7 05/15/2017    PROT 7.2 05/15/2017    ALBUMIN 3.6 05/15/2017    BILITOT 0.4 05/15/2017    ALKPHOS 72 05/15/2017    AST 18 05/15/2017    ALT 17 05/15/2017    ANIONGAP 10 05/15/2017    ESTGFRAFRICA >60.0 05/15/2017    EGFRNONAA >60.0 05/15/2017         Assessment and Plan:  1. Uncontrolled type 2 diabetes mellitus without complication,  without long-term current use of insulin  - excellent control at this time per A1c - 6.7%   - continue current medical regimen   - continue dietary and lifestyle modifications   - reviewed signs and symptoms of hypoglycemia along with appropriate treatment protocol     2. Hypertension associated with diabetes  - stable and controlled on ACEi   - discussed the importance of following a low sodium diet   - continue current treatment plan as previously prescribed     3. Hyperlipidemia associated with type 2 diabetes mellitus  - on statin therapy   - discussed the importance of following a low fat, low chol diet   - encouraged to continue exercise as tolerated        RTC in 1 year for follow up with labs prior     The patient is instructed to notify the office with BG concerns or questions

## 2017-07-10 DIAGNOSIS — Z12.11 COLON CANCER SCREENING: ICD-10-CM

## 2017-07-14 RX ORDER — METFORMIN HYDROCHLORIDE 500 MG/1
TABLET, EXTENDED RELEASE ORAL
Qty: 120 TABLET | Refills: 2 | Status: SHIPPED | OUTPATIENT
Start: 2017-07-14 | End: 2017-10-17 | Stop reason: SDUPTHER

## 2017-08-04 DIAGNOSIS — Z12.11 COLON CANCER SCREENING: ICD-10-CM

## 2017-08-04 RX ORDER — SITAGLIPTIN 100 MG/1
TABLET, FILM COATED ORAL
Qty: 30 TABLET | Refills: 2 | Status: SHIPPED | OUTPATIENT
Start: 2017-08-04 | End: 2017-11-12 | Stop reason: SDUPTHER

## 2017-08-13 RX ORDER — AMLODIPINE AND BENAZEPRIL HYDROCHLORIDE 10; 40 MG/1; MG/1
CAPSULE ORAL
Qty: 90 CAPSULE | Refills: 0 | Status: SHIPPED | OUTPATIENT
Start: 2017-08-13 | End: 2017-11-15 | Stop reason: SDUPTHER

## 2017-09-19 ENCOUNTER — OFFICE VISIT (OUTPATIENT)
Dept: FAMILY MEDICINE | Facility: CLINIC | Age: 52
End: 2017-09-19
Attending: FAMILY MEDICINE
Payer: COMMERCIAL

## 2017-09-19 VITALS
HEIGHT: 75 IN | BODY MASS INDEX: 29.57 KG/M2 | HEART RATE: 80 BPM | DIASTOLIC BLOOD PRESSURE: 74 MMHG | WEIGHT: 237.81 LBS | RESPIRATION RATE: 16 BRPM | SYSTOLIC BLOOD PRESSURE: 120 MMHG

## 2017-09-19 DIAGNOSIS — J30.9 ACUTE ALLERGIC RHINITIS, UNSPECIFIED SEASONALITY, UNSPECIFIED TRIGGER: Primary | ICD-10-CM

## 2017-09-19 DIAGNOSIS — E11.9 CONTROLLED TYPE 2 DIABETES MELLITUS WITHOUT COMPLICATION, WITHOUT LONG-TERM CURRENT USE OF INSULIN: ICD-10-CM

## 2017-09-19 PROCEDURE — 96372 THER/PROPH/DIAG INJ SC/IM: CPT | Mod: S$GLB,,, | Performed by: FAMILY MEDICINE

## 2017-09-19 PROCEDURE — 3074F SYST BP LT 130 MM HG: CPT | Mod: S$GLB,,, | Performed by: FAMILY MEDICINE

## 2017-09-19 PROCEDURE — 3008F BODY MASS INDEX DOCD: CPT | Mod: S$GLB,,, | Performed by: FAMILY MEDICINE

## 2017-09-19 PROCEDURE — 99214 OFFICE O/P EST MOD 30 MIN: CPT | Mod: 25,S$GLB,, | Performed by: FAMILY MEDICINE

## 2017-09-19 PROCEDURE — 99999 PR PBB SHADOW E&M-EST. PATIENT-LVL III: CPT | Mod: PBBFAC,,, | Performed by: FAMILY MEDICINE

## 2017-09-19 PROCEDURE — 3044F HG A1C LEVEL LT 7.0%: CPT | Mod: S$GLB,,, | Performed by: FAMILY MEDICINE

## 2017-09-19 PROCEDURE — 3078F DIAST BP <80 MM HG: CPT | Mod: S$GLB,,, | Performed by: FAMILY MEDICINE

## 2017-09-19 PROCEDURE — 4010F ACE/ARB THERAPY RXD/TAKEN: CPT | Mod: S$GLB,,, | Performed by: FAMILY MEDICINE

## 2017-09-19 RX ORDER — MINERAL OIL
180 ENEMA (ML) RECTAL DAILY
Qty: 30 TABLET | Refills: 2 | Status: SHIPPED | OUTPATIENT
Start: 2017-09-19 | End: 2018-01-09 | Stop reason: ALTCHOICE

## 2017-09-19 RX ORDER — FLUTICASONE PROPIONATE 50 MCG
2 SPRAY, SUSPENSION (ML) NASAL DAILY
Qty: 16 G | Refills: 2 | Status: SHIPPED | OUTPATIENT
Start: 2017-09-19 | End: 2018-12-26 | Stop reason: SDUPTHER

## 2017-09-19 RX ORDER — GUAIFENESIN AND PSEUDOEPHEDRINE HCL 1200; 120 MG/1; MG/1
1 TABLET, EXTENDED RELEASE ORAL 2 TIMES DAILY
Qty: 20 EACH | Refills: 0 | Status: SHIPPED | OUTPATIENT
Start: 2017-09-19 | End: 2018-01-09

## 2017-09-19 RX ORDER — METHYLPREDNISOLONE ACETATE 40 MG/ML
40 INJECTION, SUSPENSION INTRA-ARTICULAR; INTRALESIONAL; INTRAMUSCULAR; SOFT TISSUE
Status: COMPLETED | OUTPATIENT
Start: 2017-09-19 | End: 2017-09-19

## 2017-09-19 RX ADMIN — METHYLPREDNISOLONE ACETATE 40 MG: 40 INJECTION, SUSPENSION INTRA-ARTICULAR; INTRALESIONAL; INTRAMUSCULAR; SOFT TISSUE at 02:09

## 2017-09-19 NOTE — PROGRESS NOTES
Patient was given Depo Medrol IM injection in the Left Ventrogluteal as per orders from Dr. Ching. Aseptic technique was used. Patient was monitored for 15 mins with no reaction noted. Patient tolerated well.

## 2017-09-19 NOTE — PROGRESS NOTES
"Subjective:       Suhas Castillo is a 52 y.o. male who presents for evaluation and treatment of allergic symptoms. Symptoms include: clear rhinorrhea, cough, itchy nose, nasal congestion, postnasal drip, pressure sensation in ears, sinus pressure, sneezing and watery eyes and are present in a seasonal pattern. Precipitants include: unknown. Treatment currently includes none.  The following portions of the patient's history were reviewed and updated as appropriate: allergies, current medications, past family history, past medical history, past social history, past surgical history and problem list.    Review of Systems  Pertinent items are noted in HPI.      Objective:      /74 (BP Location: Right arm, Patient Position: Sitting, BP Method: X-Large (Manual))   Pulse 80   Resp 16   Ht 6' 3" (1.905 m)   Wt 107.9 kg (237 lb 12.8 oz)   BMI 29.72 kg/m²   General appearance: alert, appears stated age and cooperative  Head: Normocephalic, without obvious abnormality, atraumatic  Ears: normal TM's and external ear canals both ears  Nose: turbinates pale, swollen, edematous  Throat: abnormal findings: mild oropharyngeal erythema  Neck: no adenopathy, no carotid bruit, no JVD, supple, symmetrical, trachea midline and thyroid not enlarged, symmetric, no tenderness/mass/nodules  Lungs: clear to auscultation bilaterally  Heart: regular rate and rhythm, S1, S2 normal, no murmur, click, rub or gallop      Assessment:      Allergic rhinitis.      Plan:      Medications: intranasal steroids: Flonase, oral decongestants: Mucinex D, oral antihistamines: Allegra, intramuscular steroids: DepoMedrol 40mg.  Allergen avoidance discussed.  Follow-up in a few days.   "

## 2017-09-19 NOTE — PATIENT INSTRUCTIONS
Your test results will be communicated to you via : [facility], Telephone or Letter.   If you have not received your test results in one week, please contact the clinic at [phone].

## 2017-10-17 RX ORDER — METFORMIN HYDROCHLORIDE 500 MG/1
TABLET, EXTENDED RELEASE ORAL
Qty: 120 TABLET | Refills: 2 | Status: SHIPPED | OUTPATIENT
Start: 2017-10-17 | End: 2018-01-05 | Stop reason: SDUPTHER

## 2017-11-12 RX ORDER — SITAGLIPTIN 100 MG/1
TABLET, FILM COATED ORAL
Qty: 30 TABLET | Refills: 11 | Status: SHIPPED | OUTPATIENT
Start: 2017-11-12 | End: 2018-11-28 | Stop reason: SDUPTHER

## 2017-11-16 RX ORDER — AMLODIPINE AND BENAZEPRIL HYDROCHLORIDE 10; 40 MG/1; MG/1
CAPSULE ORAL
Qty: 90 CAPSULE | Refills: 2 | Status: SHIPPED | OUTPATIENT
Start: 2017-11-16 | End: 2018-08-23 | Stop reason: SDUPTHER

## 2017-11-20 RX ORDER — SIMVASTATIN 20 MG/1
TABLET, FILM COATED ORAL
Qty: 90 TABLET | Refills: 2 | Status: SHIPPED | OUTPATIENT
Start: 2017-11-20 | End: 2018-04-18 | Stop reason: SDUPTHER

## 2017-12-26 ENCOUNTER — LAB VISIT (OUTPATIENT)
Dept: LAB | Facility: HOSPITAL | Age: 52
End: 2017-12-26
Attending: FAMILY MEDICINE
Payer: COMMERCIAL

## 2017-12-26 DIAGNOSIS — Z12.11 COLON CANCER SCREENING: ICD-10-CM

## 2017-12-26 PROCEDURE — 82274 ASSAY TEST FOR BLOOD FECAL: CPT

## 2017-12-27 LAB — HEMOCCULT STL QL IA: NEGATIVE

## 2018-01-07 RX ORDER — METFORMIN HYDROCHLORIDE 500 MG/1
TABLET, EXTENDED RELEASE ORAL
Qty: 120 TABLET | Refills: 1 | Status: SHIPPED | OUTPATIENT
Start: 2018-01-07 | End: 2018-03-08 | Stop reason: SDUPTHER

## 2018-01-08 ENCOUNTER — TELEPHONE (OUTPATIENT)
Dept: FAMILY MEDICINE | Facility: CLINIC | Age: 53
End: 2018-01-08

## 2018-01-08 NOTE — TELEPHONE ENCOUNTER
----- Message from Sully Borjas sent at 1/8/2018  8:18 AM CST -----  Contact: Pt  _  1st Request  _  2nd Request  _  3rd Request        Who: pt    Why: Requesting a call back in regards to Pt would like to be seen today for sinus infection    What Number to Call Back:[phone]    When to Expect a call back: (Within 24 hours)    Please return the call at earliest convenience. Thanks!

## 2018-01-09 ENCOUNTER — OFFICE VISIT (OUTPATIENT)
Dept: FAMILY MEDICINE | Facility: CLINIC | Age: 53
End: 2018-01-09
Attending: FAMILY MEDICINE
Payer: COMMERCIAL

## 2018-01-09 VITALS
HEART RATE: 110 BPM | HEIGHT: 75 IN | WEIGHT: 235.63 LBS | OXYGEN SATURATION: 97 % | TEMPERATURE: 98 F | BODY MASS INDEX: 29.3 KG/M2 | SYSTOLIC BLOOD PRESSURE: 124 MMHG | RESPIRATION RATE: 16 BRPM | DIASTOLIC BLOOD PRESSURE: 82 MMHG

## 2018-01-09 DIAGNOSIS — H69.93 ETD (EUSTACHIAN TUBE DYSFUNCTION), BILATERAL: ICD-10-CM

## 2018-01-09 DIAGNOSIS — J30.9 ALLERGIC RHINITIS, UNSPECIFIED CHRONICITY, UNSPECIFIED SEASONALITY, UNSPECIFIED TRIGGER: Primary | ICD-10-CM

## 2018-01-09 DIAGNOSIS — J01.00 ACUTE MAXILLARY SINUSITIS, RECURRENCE NOT SPECIFIED: ICD-10-CM

## 2018-01-09 PROCEDURE — 99213 OFFICE O/P EST LOW 20 MIN: CPT | Mod: 25,S$GLB,, | Performed by: FAMILY MEDICINE

## 2018-01-09 PROCEDURE — 99999 PR PBB SHADOW E&M-EST. PATIENT-LVL III: CPT | Mod: PBBFAC,,, | Performed by: FAMILY MEDICINE

## 2018-01-09 PROCEDURE — 96372 THER/PROPH/DIAG INJ SC/IM: CPT | Mod: S$GLB,,, | Performed by: FAMILY MEDICINE

## 2018-01-09 RX ORDER — METHYLPREDNISOLONE ACETATE 40 MG/ML
40 INJECTION, SUSPENSION INTRA-ARTICULAR; INTRALESIONAL; INTRAMUSCULAR; SOFT TISSUE
Status: COMPLETED | OUTPATIENT
Start: 2018-01-09 | End: 2018-01-09

## 2018-01-09 RX ORDER — AMOXICILLIN AND CLAVULANATE POTASSIUM 875; 125 MG/1; MG/1
1 TABLET, FILM COATED ORAL 2 TIMES DAILY
Qty: 20 TABLET | Refills: 0 | Status: SHIPPED | OUTPATIENT
Start: 2018-01-09 | End: 2018-01-19

## 2018-01-09 RX ADMIN — METHYLPREDNISOLONE ACETATE 40 MG: 40 INJECTION, SUSPENSION INTRA-ARTICULAR; INTRALESIONAL; INTRAMUSCULAR; SOFT TISSUE at 02:01

## 2018-01-09 NOTE — PROGRESS NOTES
After attaining consent, in per orders of Dr. Ching , injection of Depo Medrol LOT [id number] exp. 08/2018 given in Left Ventrogluteal by Ana Luisa Heaton. Patient tolerated well and band aid applied. Pt. Instructed to remain in clinic for 15 mins. afterwards, and to report any adverse reactions to me immediately .

## 2018-01-09 NOTE — PROGRESS NOTES
Subjective:       Suhas Castillo is a 52 y.o. male who presents for evaluation of symptoms of a URI. Symptoms include nasal congestion, no  fever, post nasal drip, productive cough with  yellow and green colored sputum, sinus pressure and sneezing. Onset of symptoms was 4 days ago, and has been gradually worsening since that time. Treatment to date: antihistamines.    Patient Active Problem List   Diagnosis    Controlled type 2 diabetes mellitus without complication, without long-term current use of insulin    Hypertension associated with diabetes    Hyperlipidemia associated with type 2 diabetes mellitus    AR (allergic rhinitis)    DJD (degenerative joint disease), cervical    Cervical spinal stenosis    Elevated transaminase level    Vitamin D deficiency    DDD (degenerative disc disease), cervical       Current Outpatient Prescriptions:     albuterol 90 mcg/actuation inhaler, Inhale 2 puffs into the lungs every 4 (four) hours as needed for Wheezing or Shortness of Breath., Disp: 18 g, Rfl: 0    amlodipine-benazepril (LOTREL) 10-40 mg per capsule, take 1 capsule by mouth once daily, Disp: 90 capsule, Rfl: 2    blood sugar diagnostic (CONTOUR TEST STRIPS) Strp, USE TO TEST BLOOD SUGAR TWICE A DAY, Disp: 50 each, Rfl: 11    empagliflozin (JARDIANCE) 10 mg Tab, Take 10 mg by mouth once daily., Disp: 90 tablet, Rfl: 3    fluocinonide (LIDEX) 0.05 % external solution, As directed, Disp: , Rfl: 0    fluticasone (FLONASE) 50 mcg/actuation nasal spray, 2 sprays by Each Nare route once daily., Disp: 16 g, Rfl: 2    JANUVIA 100 mg Tab, take 1 tablet by mouth once daily, Disp: 30 tablet, Rfl: 11    metFORMIN (GLUCOPHAGE-XR) 500 MG 24 hr tablet, take 2 tablets by mouth twice a day, Disp: 120 tablet, Rfl: 1    simvastatin (ZOCOR) 20 MG tablet, take 1 tablet by mouth every evening, Disp: 90 tablet, Rfl: 2    amoxicillin-clavulanate 875-125mg (AUGMENTIN) 875-125 mg per tablet, Take 1 tablet by mouth 2  "(two) times daily., Disp: 20 tablet, Rfl: 0    IH-AH-JVYADN/NZ-GJCUXH-USNUVLU 10-5-325MG(D)/ -6.25MG ORAL CPSQ, Use as directed., Disp: , Rfl:     Current Facility-Administered Medications:     methylPREDNISolone acetate injection 40 mg, 40 mg, Intramuscular, 1 time in Clinic/HOD, Josiah Ching Jr., MD    The following portions of the patient's history were reviewed and updated as appropriate: allergies, past family history, past medical history, past social history and past surgical history.    Review of Systems  Pertinent items are noted in HPI.     Objective:      /82 (BP Location: Right arm, Patient Position: Sitting, BP Method: Large (Manual))   Pulse 110   Temp 98.3 °F (36.8 °C) (Oral)   Resp 16   Ht 6' 3" (1.905 m)   Wt 106.9 kg (235 lb 9.6 oz)   SpO2 97%   BMI 29.45 kg/m²   General appearance: alert, appears stated age and cooperative  Head: Normocephalic, without obvious abnormality, atraumatic  Ears: abnormal TM right ear - diminished mobility, erythematous and bulging and abnormal TM left ear - diminished mobility, erythematous and bulging  Nose: turbinates pink, swollen, grade 3 out of 4, edematous  Throat: abnormal findings: moderate oropharyngeal erythema  Neck: no adenopathy, no carotid bruit, no JVD, supple, symmetrical, trachea midline and thyroid not enlarged, symmetric, no tenderness/mass/nodules  Lungs: clear to auscultation bilaterally  Heart: regular rate and rhythm, S1, S2 normal, no murmur, click, rub or gallop     Assessment:     1. Allergic rhinitis, unspecified chronicity, unspecified seasonality, unspecified trigger     2. Acute maxillary sinusitis, recurrence not specified     3. ETD (Eustachian tube dysfunction), bilateral         Plan:      Suggested symptomatic OTC remedies.  Suggested brief course of Afrin or similar.  Augmentin per orders.  Nasal steroids per orders.  Follow up in a few days or as needed.   "

## 2018-01-09 NOTE — PATIENT INSTRUCTIONS
Your test results will be communicated to you via : [facility], Telephone or Letter.   If you have not received your test results in one week, please contact the clinic at [phone].

## 2018-01-25 ENCOUNTER — TELEPHONE (OUTPATIENT)
Dept: ENDOCRINOLOGY | Facility: CLINIC | Age: 53
End: 2018-01-25

## 2018-01-25 DIAGNOSIS — E11.9 CONTROLLED TYPE 2 DIABETES MELLITUS WITHOUT COMPLICATION, WITHOUT LONG-TERM CURRENT USE OF INSULIN: Primary | ICD-10-CM

## 2018-01-25 RX ORDER — DAPAGLIFLOZIN 5 MG/1
5 TABLET, FILM COATED ORAL DAILY
Qty: 30 TABLET | Refills: 6 | Status: SHIPPED | OUTPATIENT
Start: 2018-01-25 | End: 2018-10-01 | Stop reason: SDUPTHER

## 2018-01-25 NOTE — TELEPHONE ENCOUNTER
----- Message from Mary Dalton sent at 1/25/2018  9:02 AM CST -----  Contact: pt  x_  1st Request  _  2nd Request  _  3rd Request    Who: TERESE CAMPBELL [[id number]]    Why: Patient would like to speak with staff in reference to his empagliflozin (JARDIANCE) 10 mg prescription. Pt states his insurance did not approve his medication and would like to speak with  To see if he can speak with the insurance company.    What Number to Call Back:[phone]    When to Expect a call back: (Within 24 hours)    Please return the call at earliest convenience. Thanks!

## 2018-03-08 RX ORDER — METFORMIN HYDROCHLORIDE 500 MG/1
TABLET, EXTENDED RELEASE ORAL
Qty: 120 TABLET | Refills: 1 | Status: SHIPPED | OUTPATIENT
Start: 2018-03-08 | End: 2018-05-10 | Stop reason: SDUPTHER

## 2018-03-27 ENCOUNTER — TELEPHONE (OUTPATIENT)
Dept: FAMILY MEDICINE | Facility: CLINIC | Age: 53
End: 2018-03-27

## 2018-03-27 NOTE — TELEPHONE ENCOUNTER
----- Message from Sully Borajs sent at 3/27/2018  8:40 AM CDT -----  Contact: Pt  x_  1st Request  _  2nd Request  _  3rd Request        Who: Pt    Why: Requesting a call back in regards to Pt would like labs drawn before his appointment on 04/10  Please return the call at earliest convenience. Thanks!    What Number to Call Back: [phone]      When to Expect a call back: (Within 24 hours)

## 2018-04-02 ENCOUNTER — LAB VISIT (OUTPATIENT)
Dept: LAB | Facility: HOSPITAL | Age: 53
End: 2018-04-02
Attending: INTERNAL MEDICINE
Payer: COMMERCIAL

## 2018-04-02 DIAGNOSIS — E55.9 VITAMIN D DEFICIENCY: ICD-10-CM

## 2018-04-02 DIAGNOSIS — E11.69 HYPERLIPIDEMIA ASSOCIATED WITH TYPE 2 DIABETES MELLITUS: ICD-10-CM

## 2018-04-02 DIAGNOSIS — E78.5 HYPERLIPIDEMIA ASSOCIATED WITH TYPE 2 DIABETES MELLITUS: ICD-10-CM

## 2018-04-02 LAB
25(OH)D3+25(OH)D2 SERPL-MCNC: 17 NG/ML
ALBUMIN SERPL BCP-MCNC: 3.6 G/DL
ALP SERPL-CCNC: 77 U/L
ALT SERPL W/O P-5'-P-CCNC: 17 U/L
ANION GAP SERPL CALC-SCNC: 6 MMOL/L
AST SERPL-CCNC: 16 U/L
BILIRUB SERPL-MCNC: 0.2 MG/DL
BUN SERPL-MCNC: 17 MG/DL
CALCIUM SERPL-MCNC: 8.7 MG/DL
CHLORIDE SERPL-SCNC: 111 MMOL/L
CHOLEST SERPL-MCNC: 82 MG/DL
CHOLEST/HDLC SERPL: 2.7 {RATIO}
CO2 SERPL-SCNC: 26 MMOL/L
CREAT SERPL-MCNC: 1.3 MG/DL
EST. GFR  (AFRICAN AMERICAN): >60 ML/MIN/1.73 M^2
EST. GFR  (NON AFRICAN AMERICAN): >60 ML/MIN/1.73 M^2
ESTIMATED AVG GLUCOSE: 117 MG/DL
GLUCOSE SERPL-MCNC: 115 MG/DL
HBA1C MFR BLD HPLC: 5.7 %
HDLC SERPL-MCNC: 30 MG/DL
HDLC SERPL: 36.6 %
LDLC SERPL CALC-MCNC: 41.2 MG/DL
NONHDLC SERPL-MCNC: 52 MG/DL
POTASSIUM SERPL-SCNC: 4.1 MMOL/L
PROT SERPL-MCNC: 7 G/DL
SODIUM SERPL-SCNC: 143 MMOL/L
TRIGL SERPL-MCNC: 54 MG/DL
TSH SERPL DL<=0.005 MIU/L-ACNC: 1.69 UIU/ML

## 2018-04-02 PROCEDURE — 84443 ASSAY THYROID STIM HORMONE: CPT

## 2018-04-02 PROCEDURE — 83036 HEMOGLOBIN GLYCOSYLATED A1C: CPT

## 2018-04-02 PROCEDURE — 80053 COMPREHEN METABOLIC PANEL: CPT

## 2018-04-02 PROCEDURE — 82306 VITAMIN D 25 HYDROXY: CPT

## 2018-04-02 PROCEDURE — 80061 LIPID PANEL: CPT

## 2018-04-03 ENCOUNTER — LAB VISIT (OUTPATIENT)
Dept: LAB | Facility: HOSPITAL | Age: 53
End: 2018-04-03
Attending: INTERNAL MEDICINE
Payer: COMMERCIAL

## 2018-04-03 DIAGNOSIS — I15.2 HYPERTENSION ASSOCIATED WITH DIABETES: ICD-10-CM

## 2018-04-03 DIAGNOSIS — E11.59 HYPERTENSION ASSOCIATED WITH DIABETES: ICD-10-CM

## 2018-04-03 LAB
CREAT UR-MCNC: 194 MG/DL
MICROALBUMIN UR DL<=1MG/L-MCNC: 5 UG/ML
MICROALBUMIN/CREATININE RATIO: 2.6 UG/MG

## 2018-04-03 PROCEDURE — 82043 UR ALBUMIN QUANTITATIVE: CPT

## 2018-04-17 NOTE — PROGRESS NOTES
Subjective:       Patient ID: Suhas Castillo is a 53 y.o. male.    Chief Complaint: Follow-up ( discuss labs)    Diabetes   He presents for his follow-up diabetic visit. He has type 2 diabetes mellitus. There are no hypoglycemic associated symptoms. Pertinent negatives for hypoglycemia include no dizziness, headaches or nervousness/anxiousness. There are no diabetic associated symptoms. Pertinent negatives for diabetes include no chest pain, no fatigue and no weakness. There are no hypoglycemic complications. There are no diabetic complications. Risk factors for coronary artery disease include diabetes mellitus, male sex and hypertension. Current diabetic treatment includes oral agent (triple therapy). He is compliant with treatment all of the time. His weight is stable. He is following a generally healthy diet. When asked about meal planning, he reported none. He has had a previous visit with a dietitian. He participates in exercise daily. There is no change in his home blood glucose trend. An ACE inhibitor/angiotensin II receptor blocker is being taken. He does not see a podiatrist.Eye exam is not current (scheduled for next week).       Patient Active Problem List   Diagnosis    Controlled type 2 diabetes mellitus without complication, without long-term current use of insulin    Hypertension associated with diabetes    Hyperlipidemia associated with type 2 diabetes mellitus    AR (allergic rhinitis)    DJD (degenerative joint disease), cervical    Cervical spinal stenosis    Elevated transaminase level    Vitamin D deficiency    DDD (degenerative disc disease), cervical       Current Outpatient Prescriptions:     amlodipine-benazepril (LOTREL) 10-40 mg per capsule, take 1 capsule by mouth once daily, Disp: 90 capsule, Rfl: 2    blood sugar diagnostic (CONTOUR TEST STRIPS) Strp, USE TO TEST BLOOD SUGAR TWICE A DAY, Disp: 50 each, Rfl: 11    dapagliflozin (FARXIGA) 5 mg Tab tablet, Take 1 tablet (5  mg total) by mouth once daily., Disp: 30 tablet, Rfl: 6    fluocinonide (LIDEX) 0.05 % external solution, As directed, Disp: , Rfl: 0    fluticasone (FLONASE) 50 mcg/actuation nasal spray, 2 sprays by Each Nare route once daily., Disp: 16 g, Rfl: 2    JANUVIA 100 mg Tab, take 1 tablet by mouth once daily, Disp: 30 tablet, Rfl: 11    metFORMIN (GLUCOPHAGE-XR) 500 MG 24 hr tablet, take 2 tablets by mouth twice a day, Disp: 120 tablet, Rfl: 1    simvastatin (ZOCOR) 20 MG tablet, take 1 tablet by mouth every evening, Disp: 90 tablet, Rfl: 2    albuterol 90 mcg/actuation inhaler, Inhale 2 puffs into the lungs every 4 (four) hours as needed for Wheezing or Shortness of Breath., Disp: 18 g, Rfl: 0    The following portions of the patient's history were reviewed and updated as appropriate: allergies, past family history, past medical history, past social history and past surgical history    Review of Systems   Constitutional: Negative for fatigue and unexpected weight change.   HENT: Negative for ear discharge, ear pain, hearing loss, tinnitus and voice change.    Eyes: Negative for pain.   Respiratory: Negative for cough and shortness of breath.    Cardiovascular: Negative for chest pain, palpitations and leg swelling.   Gastrointestinal: Negative for abdominal pain, blood in stool, constipation, diarrhea, nausea and vomiting.   Genitourinary: Negative for decreased urine volume, difficulty urinating, dysuria, enuresis, frequency, hematuria and urgency.   Musculoskeletal: Negative for arthralgias, back pain and myalgias.   Skin: Negative for rash.   Neurological: Negative for dizziness, weakness, light-headedness and headaches.   Hematological: Does not bruise/bleed easily.   Psychiatric/Behavioral: Negative for dysphoric mood and sleep disturbance. The patient is not nervous/anxious.          Lab Visit on 04/03/2018   Component Date Value Ref Range Status    Microalbum.,U,Random 04/03/2018 5.0  ug/mL Final     Creatinine, Random Ur 04/03/2018 194.0  23.0 - 375.0 mg/dL Final    Microalb Creat Ratio 04/03/2018 2.6  0.0 - 30.0 ug/mg Final   Lab Visit on 04/02/2018   Component Date Value Ref Range Status    TSH 04/02/2018 1.685  0.400 - 4.000 uIU/mL Final    Hemoglobin A1C 04/02/2018 5.7* 4.0 - 5.6 % Final    Estimated Avg Glucose 04/02/2018 117  68 - 131 mg/dL Final    Sodium 04/02/2018 143  136 - 145 mmol/L Final    Potassium 04/02/2018 4.1  3.5 - 5.1 mmol/L Final    Chloride 04/02/2018 111* 95 - 110 mmol/L Final    CO2 04/02/2018 26  23 - 29 mmol/L Final    Glucose 04/02/2018 115* 70 - 110 mg/dL Final    BUN, Bld 04/02/2018 17  6 - 20 mg/dL Final    Creatinine 04/02/2018 1.3  0.5 - 1.4 mg/dL Final    Calcium 04/02/2018 8.7  8.7 - 10.5 mg/dL Final    Total Protein 04/02/2018 7.0  6.0 - 8.4 g/dL Final    Albumin 04/02/2018 3.6  3.5 - 5.2 g/dL Final    Total Bilirubin 04/02/2018 0.2  0.1 - 1.0 mg/dL Final    Alkaline Phosphatase 04/02/2018 77  55 - 135 U/L Final    AST 04/02/2018 16  10 - 40 U/L Final    ALT 04/02/2018 17  10 - 44 U/L Final    Anion Gap 04/02/2018 6* 8 - 16 mmol/L Final    eGFR if African American 04/02/2018 >60.0  >60 mL/min/1.73 m^2 Final    eGFR if non African American 04/02/2018 >60.0  >60 mL/min/1.73 m^2 Final    Vit D, 25-Hydroxy 04/02/2018 17* 30 - 96 ng/mL Final    Cholesterol 04/02/2018 82* 120 - 199 mg/dL Final    Triglycerides 04/02/2018 54  30 - 150 mg/dL Final    HDL 04/02/2018 30* 40 - 75 mg/dL Final    LDL Cholesterol 04/02/2018 41.2* 63.0 - 159.0 mg/dL Final    HDL/Chol Ratio 04/02/2018 36.6  20.0 - 50.0 % Final    Total Cholesterol/HDL Ratio 04/02/2018 2.7  2.0 - 5.0 Final    Non-HDL Cholesterol 04/02/2018 52  mg/dL Final         Objective:      Physical Exam   Constitutional: He is oriented to person, place, and time. He appears well-developed and well-nourished. He is cooperative.   HENT:   Head: Normocephalic and atraumatic.   Right Ear: External ear normal.  "  Left Ear: External ear normal.   Nose: Nose normal.   Mouth/Throat: Oropharynx is clear and moist and mucous membranes are normal. No oropharyngeal exudate.   Eyes: Conjunctivae are normal. No scleral icterus.   Neck: Neck supple. No JVD present. Carotid bruit is not present. No thyromegaly present.   Cardiovascular: Normal rate, regular rhythm, normal heart sounds and normal pulses.  Exam reveals no gallop and no friction rub.    No murmur heard.  Pulmonary/Chest: Effort normal and breath sounds normal. He has no wheezes. He has no rhonchi. He has no rales.   Abdominal: Soft. Bowel sounds are normal. He exhibits no distension and no mass. There is no splenomegaly or hepatomegaly. There is no tenderness.   Musculoskeletal: Normal range of motion. He exhibits no edema or tenderness.   Lymphadenopathy:     He has no cervical adenopathy.     He has no axillary adenopathy.   Neurological: He is alert and oriented to person, place, and time. He has normal strength and normal reflexes. No cranial nerve deficit or sensory deficit. Coordination normal.   Skin: Skin is warm and dry.   Psychiatric: He has a normal mood and affect.   Vitals reviewed.        EKG: normal sinus rhythm    Assessment:       1. Controlled type 2 diabetes mellitus without complication, without long-term current use of insulin    2. Hypertension associated with diabetes    3. Hyperlipidemia associated with type 2 diabetes mellitus    4. Vitamin D deficiency        Plan:       Decrease simvastatin to 10mg daily.  Continue triple therapy, exercise regimen.  RTC 6 months      "This note will not be shared with the patient."  "

## 2018-04-18 ENCOUNTER — OFFICE VISIT (OUTPATIENT)
Dept: FAMILY MEDICINE | Facility: CLINIC | Age: 53
End: 2018-04-18
Attending: FAMILY MEDICINE
Payer: COMMERCIAL

## 2018-04-18 VITALS
DIASTOLIC BLOOD PRESSURE: 78 MMHG | HEART RATE: 82 BPM | HEIGHT: 75 IN | BODY MASS INDEX: 30.24 KG/M2 | OXYGEN SATURATION: 98 % | WEIGHT: 243.19 LBS | SYSTOLIC BLOOD PRESSURE: 126 MMHG

## 2018-04-18 DIAGNOSIS — E78.5 HYPERLIPIDEMIA ASSOCIATED WITH TYPE 2 DIABETES MELLITUS: ICD-10-CM

## 2018-04-18 DIAGNOSIS — R00.9 HEART BEAT ABNORMALITY: ICD-10-CM

## 2018-04-18 DIAGNOSIS — E11.69 HYPERLIPIDEMIA ASSOCIATED WITH TYPE 2 DIABETES MELLITUS: ICD-10-CM

## 2018-04-18 DIAGNOSIS — E55.9 VITAMIN D DEFICIENCY: ICD-10-CM

## 2018-04-18 DIAGNOSIS — E11.59 HYPERTENSION ASSOCIATED WITH DIABETES: ICD-10-CM

## 2018-04-18 DIAGNOSIS — E11.9 CONTROLLED TYPE 2 DIABETES MELLITUS WITHOUT COMPLICATION, WITHOUT LONG-TERM CURRENT USE OF INSULIN: Primary | ICD-10-CM

## 2018-04-18 DIAGNOSIS — I15.2 HYPERTENSION ASSOCIATED WITH DIABETES: ICD-10-CM

## 2018-04-18 PROCEDURE — 99214 OFFICE O/P EST MOD 30 MIN: CPT | Mod: S$GLB,,, | Performed by: FAMILY MEDICINE

## 2018-04-18 PROCEDURE — 93010 ELECTROCARDIOGRAM REPORT: CPT | Mod: S$GLB,,, | Performed by: INTERNAL MEDICINE

## 2018-04-18 PROCEDURE — 99999 PR PBB SHADOW E&M-EST. PATIENT-LVL III: CPT | Mod: PBBFAC,,, | Performed by: FAMILY MEDICINE

## 2018-04-18 PROCEDURE — 93005 ELECTROCARDIOGRAM TRACING: CPT | Mod: S$GLB,,, | Performed by: FAMILY MEDICINE

## 2018-04-18 RX ORDER — SIMVASTATIN 20 MG/1
10 TABLET, FILM COATED ORAL NIGHTLY
Qty: 90 TABLET | Refills: 2
Start: 2018-04-18 | End: 2019-03-04 | Stop reason: SDUPTHER

## 2018-05-10 RX ORDER — METFORMIN HYDROCHLORIDE 500 MG/1
TABLET, EXTENDED RELEASE ORAL
Qty: 120 TABLET | Refills: 1 | Status: SHIPPED | OUTPATIENT
Start: 2018-05-10 | End: 2018-07-05 | Stop reason: SDUPTHER

## 2018-07-05 NOTE — TELEPHONE ENCOUNTER
----- Message from Cyndi Bergman sent at 7/5/2018 12:38 PM CDT -----  Contact: pt  Can the clinic reply in [facility]: no      Please refill the medication(s) listed below. The patient can be reached at this phone number [phone]) once it is called into the pharmacy.      Medication #1metFORMIN (GLUCOPHAGE-XR) 500 MG 24 hr tablet       Medication #2      [facility]

## 2018-07-06 RX ORDER — METFORMIN HYDROCHLORIDE 500 MG/1
1000 TABLET, EXTENDED RELEASE ORAL 2 TIMES DAILY
Qty: 360 TABLET | Refills: 3 | Status: SHIPPED | OUTPATIENT
Start: 2018-07-06 | End: 2019-08-18 | Stop reason: SDUPTHER

## 2018-07-06 NOTE — TELEPHONE ENCOUNTER
Please inform patient that request has been forwarded to the endocrinology nurse practitioner .  Remind patient to request refills from pharmacy.

## 2018-08-23 RX ORDER — AMLODIPINE AND BENAZEPRIL HYDROCHLORIDE 10; 40 MG/1; MG/1
CAPSULE ORAL
Qty: 90 CAPSULE | Refills: 0 | Status: SHIPPED | OUTPATIENT
Start: 2018-08-23 | End: 2018-11-17 | Stop reason: SDUPTHER

## 2018-09-21 ENCOUNTER — LAB VISIT (OUTPATIENT)
Dept: LAB | Facility: HOSPITAL | Age: 53
End: 2018-09-21
Attending: FAMILY MEDICINE
Payer: COMMERCIAL

## 2018-09-21 ENCOUNTER — OFFICE VISIT (OUTPATIENT)
Dept: FAMILY MEDICINE | Facility: CLINIC | Age: 53
End: 2018-09-21
Attending: FAMILY MEDICINE
Payer: COMMERCIAL

## 2018-09-21 VITALS
WEIGHT: 246 LBS | HEART RATE: 93 BPM | SYSTOLIC BLOOD PRESSURE: 132 MMHG | OXYGEN SATURATION: 96 % | HEIGHT: 75 IN | BODY MASS INDEX: 30.59 KG/M2 | DIASTOLIC BLOOD PRESSURE: 80 MMHG

## 2018-09-21 DIAGNOSIS — N50.812 LEFT TESTICULAR PAIN: ICD-10-CM

## 2018-09-21 DIAGNOSIS — N50.812 LEFT TESTICULAR PAIN: Primary | ICD-10-CM

## 2018-09-21 LAB
BACTERIA #/AREA URNS AUTO: NORMAL /HPF
BILIRUB UR QL STRIP: NEGATIVE
C TRACH DNA SPEC QL NAA+PROBE: NOT DETECTED
CLARITY UR REFRACT.AUTO: CLEAR
COLOR UR AUTO: YELLOW
GLUCOSE UR QL STRIP: ABNORMAL
HGB UR QL STRIP: ABNORMAL
KETONES UR QL STRIP: NEGATIVE
LEUKOCYTE ESTERASE UR QL STRIP: NEGATIVE
MICROSCOPIC COMMENT: NORMAL
N GONORRHOEA DNA SPEC QL NAA+PROBE: NOT DETECTED
NITRITE UR QL STRIP: NEGATIVE
PH UR STRIP: 5 [PH] (ref 5–8)
PROT UR QL STRIP: NEGATIVE
RBC #/AREA URNS AUTO: 1 /HPF (ref 0–4)
SP GR UR STRIP: 1.03 (ref 1–1.03)
SQUAMOUS #/AREA URNS AUTO: 0 /HPF
URN SPEC COLLECT METH UR: ABNORMAL
UROBILINOGEN UR STRIP-ACNC: NEGATIVE EU/DL
WBC #/AREA URNS AUTO: 1 /HPF (ref 0–5)
YEAST UR QL AUTO: NORMAL

## 2018-09-21 PROCEDURE — 99213 OFFICE O/P EST LOW 20 MIN: CPT | Mod: S$GLB,,, | Performed by: FAMILY MEDICINE

## 2018-09-21 PROCEDURE — 99999 PR PBB SHADOW E&M-EST. PATIENT-LVL III: CPT | Mod: PBBFAC,,, | Performed by: FAMILY MEDICINE

## 2018-09-21 PROCEDURE — 87491 CHLMYD TRACH DNA AMP PROBE: CPT

## 2018-09-21 PROCEDURE — 81001 URINALYSIS AUTO W/SCOPE: CPT

## 2018-09-21 NOTE — PROGRESS NOTES
Subjective:       Patient ID: Suhas Castillo is a 53 y.o. male.    Chief Complaint: Groin Pain    Groin Pain   The patient's primary symptoms include scrotal swelling and testicular pain. This is a new problem. The current episode started in the past 7 days. The problem occurs constantly. The problem has been resolved. The pain is mild. Pertinent negatives include no abdominal pain, discolored urine, dysuria, fever, frequency, hesitancy, painful intercourse, urgency or urinary retention. The testicular pain affects the left testicle. There is swelling in the left testicle. The color of the testicles is normal. Nothing aggravates the symptoms. He has tried nothing for the symptoms. He is sexually active. He never uses condoms. No, his partner does not have an STD. His past medical history is significant for gonorrhea. There is no history of chlamydia, herpes simplex, kidney stones or prostatitis.       Patient Active Problem List   Diagnosis    Controlled type 2 diabetes mellitus without complication, without long-term current use of insulin    Hypertension associated with diabetes    Hyperlipidemia associated with type 2 diabetes mellitus    AR (allergic rhinitis)    DJD (degenerative joint disease), cervical    Cervical spinal stenosis    Elevated transaminase level    Vitamin D deficiency    DDD (degenerative disc disease), cervical       Current Outpatient Medications:     amlodipine-benazepril (LOTREL) 10-40 mg per capsule, TAKE 1 CAPSULE BY MOUTH ONCE DAILY, Disp: 90 capsule, Rfl: 0    blood sugar diagnostic (CONTOUR TEST STRIPS) Strp, USE TO TEST BLOOD SUGAR TWICE A DAY, Disp: 50 each, Rfl: 11    dapagliflozin (FARXIGA) 5 mg Tab tablet, Take 1 tablet (5 mg total) by mouth once daily., Disp: 30 tablet, Rfl: 6    fluocinonide (LIDEX) 0.05 % external solution, As directed, Disp: , Rfl: 0    fluticasone (FLONASE) 50 mcg/actuation nasal spray, 2 sprays by Each Nare route once daily., Disp: 16 g,  "Rfl: 2    JANUVIA 100 mg Tab, take 1 tablet by mouth once daily, Disp: 30 tablet, Rfl: 11    metFORMIN (GLUCOPHAGE-XR) 500 MG 24 hr tablet, Take 2 tablets (1,000 mg total) by mouth 2 (two) times daily., Disp: 360 tablet, Rfl: 3    simvastatin (ZOCOR) 20 MG tablet, Take 0.5 tablets (10 mg total) by mouth every evening., Disp: 90 tablet, Rfl: 2    albuterol 90 mcg/actuation inhaler, Inhale 2 puffs into the lungs every 4 (four) hours as needed for Wheezing or Shortness of Breath., Disp: 18 g, Rfl: 0    The following portions of the patient's history were reviewed and updated as appropriate: allergies, past family history, past medical history, past social history and past surgical history.    Review of Systems   Constitutional: Negative for fever.   Gastrointestinal: Negative for abdominal pain.   Genitourinary: Positive for scrotal swelling and testicular pain. Negative for dysuria, frequency, hesitancy and urgency.       Objective:      /80 (BP Location: Left arm, Patient Position: Sitting, BP Method: Large (Manual))   Pulse 93   Ht 6' 3" (1.905 m)   Wt 111.6 kg (246 lb)   SpO2 96%   BMI 30.75 kg/m²     Physical Exam   Constitutional: He is oriented to person, place, and time. He appears well-developed and well-nourished.   Genitourinary: Testes normal and penis normal. Right testis shows no mass and no tenderness. Left testis shows no mass and no tenderness. Circumcised. No penile erythema or penile tenderness. No discharge found.   Genitourinary Comments: Again possiblemild varicocele palpated; US 4/2017 was negative.   Musculoskeletal: He exhibits no edema.   Neurological: He is alert and oriented to person, place, and time.   Skin: Skin is warm and dry.   Vitals reviewed.        Assessment:       1. Left testicular pain, resolved         Plan:       Will check U/A, plus GC/Chlamydia screen.  Hold off on repeat US, given resolution of pain.  We will call the patient with results & make further " "recommendations at that time.          "This note will not be shared with the patient."  "

## 2018-09-22 ENCOUNTER — PATIENT MESSAGE (OUTPATIENT)
Dept: FAMILY MEDICINE | Facility: CLINIC | Age: 53
End: 2018-09-22

## 2018-09-26 ENCOUNTER — RX ONLY (OUTPATIENT)
Age: 53
Setting detail: RX ONLY
End: 2018-09-26

## 2018-09-26 RX ORDER — MOMETASONE FUROATE 1 MG/ML
SOLUTION TOPICAL
Qty: 1 | Refills: 1 | Status: ERX | COMMUNITY
Start: 2018-09-26

## 2018-09-27 ENCOUNTER — PATIENT MESSAGE (OUTPATIENT)
Dept: PAIN MEDICINE | Facility: CLINIC | Age: 53
End: 2018-09-27

## 2018-09-27 ENCOUNTER — OFFICE VISIT (OUTPATIENT)
Dept: PAIN MEDICINE | Facility: CLINIC | Age: 53
End: 2018-09-27
Attending: ANESTHESIOLOGY
Payer: COMMERCIAL

## 2018-09-27 VITALS
SYSTOLIC BLOOD PRESSURE: 138 MMHG | DIASTOLIC BLOOD PRESSURE: 88 MMHG | BODY MASS INDEX: 30.7 KG/M2 | RESPIRATION RATE: 18 BRPM | WEIGHT: 246.88 LBS | HEART RATE: 66 BPM | HEIGHT: 75 IN | TEMPERATURE: 98 F

## 2018-09-27 DIAGNOSIS — M54.12 CERVICAL RADICULOPATHY: Primary | ICD-10-CM

## 2018-09-27 DIAGNOSIS — M54.12 CERVICAL RADICULOPATHY: ICD-10-CM

## 2018-09-27 DIAGNOSIS — M50.30 DDD (DEGENERATIVE DISC DISEASE), CERVICAL: Primary | ICD-10-CM

## 2018-09-27 DIAGNOSIS — M50.30 DDD (DEGENERATIVE DISC DISEASE), CERVICAL: ICD-10-CM

## 2018-09-27 PROCEDURE — 99213 OFFICE O/P EST LOW 20 MIN: CPT | Mod: S$GLB,,, | Performed by: ANESTHESIOLOGY

## 2018-09-27 PROCEDURE — 99999 PR PBB SHADOW E&M-EST. PATIENT-LVL III: CPT | Mod: PBBFAC,,, | Performed by: ANESTHESIOLOGY

## 2018-09-27 NOTE — PROGRESS NOTES
Subjective:      Patient ID: Suhas Castillo is a 53 y.o. male.    Chief Complaint: No chief complaint on file.    Referred by: No ref. provider found       HPI:    Suhas Castillo is a 53 y.o. male (RHD) who presents today with neck pain with radiation into the right upper extremity. He reports that he has had a h/o this same type and quality of pain before, but did not have radiation into his fingers. He did have a selective C6 nerve root block performed by Dr. Hughes in 2008 which gave her great relief for several years.  His pain returned 4.5 to 5 weeks ago. His pain is right sided in the neck and extends in the medial upper arm and lower arm and into his fingers. He has tingling at the fingertips. His pain is described in detail below.    Interval History (10/30/2015):  He returns today for follow up.  He reports that the cervical DEBORAH has been helpful for the pain.  He is currently pain free    Interval History (3/8/2017):  He returns today for follow up.  He reports that, following the LAURITA in 10/2015, he was pain free until a week ago when he increased his weights at the gym.  Now his pain has returned in the same location as before.    Interval History (9/27/2018):  He returns today for follow up.  He reports that the C7/T1 ILESI provided pain relief until this past Tuesday.  This previous weekend, he was doing some work at his mother's house where he pull on a pole.  He felt a twinge then then his pain returned on Tuesday.  The pain has been severe for the past 24 hr.  It is limiting his ability to do his daily activities and his job    Physical Therapy: Previously tried several years ago - no benefit.  Done before procedures    Non-pharmacologic Treatment: None         · TENS? None    Pain Medications:         · Currently taking: None    · Has tried in the past:  Tramadol 50mg prn and Diclofenac 75mg BID, minimal benefit.  OTC NSAIDS and tylenol    · Has not tried: Gabapentin    Blood thinners:  None    Interventional Therapies:   · Selective C6 nerve root block (2008) - Dr. Hughes  · C7/T1 ILESI 10/2015: 100% relief until ~1 week ago  · C7/T1 ILESI 03/14/2017: 100% relief until Tuesday, 09/25/2018    Relevant Surgeries: None    Affecting sleep? Yes    Affecting daily activities? Yes    Depressive symptoms? None          · SI/HI? No    Work status:     Pain Scales  Best: 5/10  Worst: 7/10  Usually: 0/10  Today: 7/10    Neck Pain    This is a chronic problem. The current episode started more than 1 year ago. The problem occurs constantly. The problem has been gradually worsening. The pain is present in the anterior neck and midline. Quality: throbbing numb. The pain is at a severity of 7/10. The pain is mild. Exacerbated by: activity  The pain is same all the time. Stiffness is present all day. He has tried oral narcotics and injection treatment for the symptoms. The treatment provided significant relief.       Review of Systems   Cardiovascular:        Hypertension   Endocrine:        Diabetes   Musculoskeletal: Positive for neck pain.   All other systems reviewed and are negative.            Past Medical History:   Diagnosis Date    AR (allergic rhinitis)     Cervical spinal stenosis     Diabetes mellitus type II     DJD (degenerative joint disease), cervical     Hyperlipidemia     Hypertension        Past Surgical History:   Procedure Laterality Date    EPIDURAL BLOCK INJECTION      cervical x 2    INJECTION-STEROID-EPIDURAL-CERVICAL N/A 3/14/2017    [facility]    INJECTION-STEROID-EPIDURAL-CERVICAL N/A 10/6/2015    [facility] at [facility]    TESTICLE SURGERY  age 16    torsion       Review of patient's allergies indicates:  No Known Allergies    Current Outpatient Medications   Medication Sig Dispense Refill    albuterol 90 mcg/actuation inhaler Inhale 2 puffs into the lungs every 4 (four) hours as needed  for Wheezing or Shortness of Breath. 18 g 0    amlodipine-benazepril (LOTREL) 10-40 mg per capsule TAKE 1 CAPSULE BY MOUTH ONCE DAILY 90 capsule 0    blood sugar diagnostic (CONTOUR TEST STRIPS) Strp USE TO TEST BLOOD SUGAR TWICE A DAY 50 each 11    dapagliflozin (FARXIGA) 5 mg Tab tablet Take 1 tablet (5 mg total) by mouth once daily. 30 tablet 6    fluocinonide (LIDEX) 0.05 % external solution As directed  0    fluticasone (FLONASE) 50 mcg/actuation nasal spray 2 sprays by Each Nare route once daily. 16 g 2    JANUVIA 100 mg Tab take 1 tablet by mouth once daily 30 tablet 11    metFORMIN (GLUCOPHAGE-XR) 500 MG 24 hr tablet Take 2 tablets (1,000 mg total) by mouth 2 (two) times daily. 360 tablet 3    simvastatin (ZOCOR) 20 MG tablet Take 0.5 tablets (10 mg total) by mouth every evening. 90 tablet 2     No current facility-administered medications for this visit.        Family History   Problem Relation Age of Onset    Hypertension Mother     Diabetes Mother     Stroke Mother        Social History     Socioeconomic History    Marital status: Single     Spouse name: Not on file    Number of children: 4    Years of education: Not on file    Highest education level: Not on file   Social Needs    Financial resource strain: Not on file    Food insecurity - worry: Not on file    Food insecurity - inability: Not on file    Transportation needs - medical: Not on file    Transportation needs - non-medical: Not on file   Occupational History    Occupation: secutiry supervisor     Employer: [employer]   Tobacco Use    Smoking status: Never Smoker    Smokeless tobacco: Never Used   Substance and Sexual Activity    Alcohol use: No     Alcohol/week: 0.0 oz     Comment: none x 4 years; previously social drinker    Drug use: No    Sexual activity: Yes     Partners: Female     Birth control/protection: None   Other Topics Concern    Not on file   Social History Narrative    The patient does  "exercise regularly (treadmill, light weights: TIW-QIW).      Rates diet as fair.      He is satisfied with weight.           Objective:     Vitals:    09/27/18 1322   BP: 138/88   Pulse: 66   Resp: 18   Temp: 97.8 °F (36.6 °C)   TempSrc: Oral   Weight: 112 kg (246 lb 14.4 oz)   Height: 6' 3" (1.905 m)   PainSc:   8   PainLoc: Neck       GEN:  Well developed, well nourished.  No acute distress.  No pain behavior.  HEENT:  No trauma.  Mucous membranes moist.  Nares patent bilaterally.  PSYCH: Normal affect. Thought content appropriate.  CHEST:  Breathing symmetric.  No audible wheezing.  ABD: Soft, non-tender, non-distended.  SKIN:  Warm, pink, dry.  No rash on exposed areas.    EXT:  No cyanosis, clubbing, or edema.  No color change or changes in nail or hair growth.  NEURO/MUSCULOSKELETAL:  Fully alert, oriented, and appropriate. Speech normal juan. No cranial nerve deficits.   Gait: normal.  5/5 motor strength throughout upper extremities.   C-Spine:  Normal ROM without pain.    Positive Spurling's to the right with pain in a C6 distribution.  No sensory deficit in BUE     Previous physical exam:  Negative TTP over cervical facet joints, cervical paraspinal muscles, shoulders, elbows or hands.   Negative shirley's.  Positive Tinel's over right cubital tunnel.    Imaging:      Cervical Xray (6/10/15)  Impression     Multilevel degenerative changes as discussed above, most pronounced at the C5-6 level. There has been some progression of this degenerative change, particularly with regard to be more pronounced vertebral endplate spurring, since the 2008 study.     Cervical MRI from 2008 in [facility] shows large disk protrusion at C5/6 to the right.    Assessment:       Encounter Diagnoses   Name Primary?    DDD (degenerative disc disease), cervical Yes    Cervical radiculopathy          Plan:       Suhas was seen today for neck pain.    Diagnoses and all orders for this visit:    DDD (degenerative disc " disease), cervical    Cervical radiculopathy       Neck pain is consistent with cervical radiculopathy    I discussed the treatment options with the patient today, including risks, benefits, and alternatives. All available images were reviewed. The patient is aware of the risks and benefits of the medications being prescribed, common side effects, and proper usage.    1. Schedule for repeat cervical DEBORAH tomorrow.  Given his job, he needs to be at maximum capacity.  The fact that this limiting his functionality inability work has caused me to list this as a medically urgent procedure  2. Recommend limiting overhead lifting with current exercise regimen until procedure  3. If limited benefit from home exercises, can consider formal PT  4. RTC 3 weeks after injection or sooner if needed.     Bernie Estrada MD  09/27/2018    The above plan and management options were discussed at length with patient. Patient is in agreement with the above and verbalized understanding. It will be communicated with the consulting physician via electronic record, fax, or mail          Ortho/SPM Exam

## 2018-09-28 ENCOUNTER — HOSPITAL ENCOUNTER (OUTPATIENT)
Facility: OTHER | Age: 53
Discharge: HOME | End: 2018-09-28
Attending: ANESTHESIOLOGY | Admitting: ANESTHESIOLOGY
Payer: COMMERCIAL

## 2018-09-28 VITALS
SYSTOLIC BLOOD PRESSURE: 141 MMHG | RESPIRATION RATE: 18 BRPM | OXYGEN SATURATION: 96 % | WEIGHT: 240 LBS | TEMPERATURE: 98 F | DIASTOLIC BLOOD PRESSURE: 76 MMHG | HEIGHT: 75 IN | BODY MASS INDEX: 29.84 KG/M2 | HEART RATE: 67 BPM

## 2018-09-28 DIAGNOSIS — M50.30 DDD (DEGENERATIVE DISC DISEASE), CERVICAL: Primary | ICD-10-CM

## 2018-09-28 LAB — POCT GLUCOSE: 73 MG/DL (ref 70–110)

## 2018-09-28 PROCEDURE — 25500020 PHARM REV CODE 255: Performed by: ANESTHESIOLOGY

## 2018-09-28 PROCEDURE — 25000003 PHARM REV CODE 250: Performed by: ANESTHESIOLOGY

## 2018-09-28 PROCEDURE — 82947 ASSAY GLUCOSE BLOOD QUANT: CPT | Performed by: ANESTHESIOLOGY

## 2018-09-28 PROCEDURE — 62321 NJX INTERLAMINAR CRV/THRC: CPT | Mod: ,,, | Performed by: ANESTHESIOLOGY

## 2018-09-28 PROCEDURE — 63600175 PHARM REV CODE 636 W HCPCS: Performed by: ANESTHESIOLOGY

## 2018-09-28 PROCEDURE — 62321 NJX INTERLAMINAR CRV/THRC: CPT | Performed by: ANESTHESIOLOGY

## 2018-09-28 RX ORDER — LIDOCAINE HYDROCHLORIDE 10 MG/ML
INJECTION INFILTRATION; PERINEURAL
Status: DISCONTINUED | OUTPATIENT
Start: 2018-09-28 | End: 2018-09-28 | Stop reason: HOSPADM

## 2018-09-28 RX ORDER — ALPRAZOLAM 0.5 MG/1
1 TABLET, ORALLY DISINTEGRATING ORAL
Status: DISCONTINUED | OUTPATIENT
Start: 2018-09-28 | End: 2018-09-28 | Stop reason: HOSPADM

## 2018-09-28 RX ORDER — METHYLPREDNISOLONE ACETATE 80 MG/ML
INJECTION, SUSPENSION INTRA-ARTICULAR; INTRALESIONAL; INTRAMUSCULAR; SOFT TISSUE
Status: DISCONTINUED | OUTPATIENT
Start: 2018-09-28 | End: 2018-09-28 | Stop reason: HOSPADM

## 2018-09-28 RX ADMIN — ALPRAZOLAM 1 MG: 0.5 TABLET, ORALLY DISINTEGRATING ORAL at 03:09

## 2018-09-28 NOTE — OP NOTE
Date: 09/28/2018    Procedure: C7/A6Prpbergo Steroid Injection    Referring Provider: None     Pre-op diagnosis: DDD (degenerative disc disease), cervical [M50.30]  Cervical radiculopathy [M54.12]    Post-op diagnosis: DDD (degenerative disc disease), cervical [M50.30]  Cervical radiculopathy [M54.12]    Physician: Dr. Bernie Estrada     Assistant: Dr. Bal    Anesthestia: local/niravam    All medications, allergies, and relevant histories were reviewed. No recent antibiotics or infections.   A time-out was taken to verify the correct patient, procedure, laterality, and appropriate medications/allergies.    Fluoroscopically-Guided, Contrast-Controlled Cervical Interlaminar Epidural Steroid Injection:     Following denial of allergy and review of potential side effects and complications including but not necessarily limited to infection, allergic reaction, local tissue breakdown, nerve injury, paresis, paralysis, spinal cord injury, and seizure, the patient indicated they understood and agreed to proceed.     The patient was positioned prone and prepped and draped in the usual sterile fashion. The C7-T1 interspace was identified fluoroscopically. The skin was anesthetized via 25-gauge 1.5 needle with approximately 4cc of bicarbonated 1% lidocaine. A 20-gauge Tuohy needle was atraumatically introduced and advanced under fluoroscopic guidance. Using HIEU to saline technique with 0.9 % saline the epidural space was entered without difficulties. Following negative aspiration for blood and CSF and confirming the absence of paresthesias, injection of approximately 1.5 cc of Omnipaque 300 demonstrated excellent epidural spread without vascular or intrathecal uptake. At this point, 2 cc of 0.9% normal saline with 80 mg of Depo-Medrol was injected without complication. The needle was flushed with 1% lidocaine withdrawn.     The patient was followedpost procedure and discharged under their own power in excellent  condition.    Future Management:   If helpful, can repeat as needed.    Follow up with my clinic in 3 weeks or sooner if needed    I certify that I provided the above services.  I was present for the entire procedure, which was performed by the fellow physician under my supervision.  There were no parts of the procedure that were performed not by myself or without my direct supervision.

## 2018-09-28 NOTE — DISCHARGE INSTRUCTIONS
Thank you for allowing us to care for you today. You may receive a survey about the care we provided. Your feedback is valuable and helps us provide excellent care throughout the community.     Home Care Instructions for Pain Management:    1. DIET:   You may resume your normal diet today.   2. BATHING:   You may shower with luke warm water. No tub baths or anything that will soak injection sites under water for the next 24 hours.  3. DRESSING:   You may remove your bandage today.   4. ACTIVITY LEVEL:   You may resume your normal activities 24 hrs after your procedure. Nothing strenuous today.  5. MEDICATIONS:   You may resume your normal medications today. To restart blood thinners, ask your doctor.  6. DRIVING    If you have received any sedatives by mouth today, you may not drive for 12 hours.    If you have received any sedation through your IV, you may not drive for 24 hrs.   7. SPECIAL INSTRUCTIONS:   No heat to the injection site for 24 hrs including, hot bath or shower, heating pad, moist heat, or hot tubs.    Use ice pack to injection site for any pain or discomfort.  Apply ice packs for 20 minute intervals as needed.    IF you have diabetes, be sure to monitor your blood sugar more closely. IF your injection contained steroids your blood sugar levels may become higher than normal.    If you are still having pain upon discharge:  Your pain may improve over the next 48 hours. The anesthetic (numbing medication) works immediately to 48 hours. IF your injection contained a steroid (anti-inflammatory medication), it takes approximately 3 days to start feeling relief and 7-10 days to see your greatest results from the medication. It is possible you may need subsequent injections. This would be discussed at your follow up appointment with pain management or your referring doctor.      PLEASE CALL YOUR DOCTOR IF:  1. Redness or swelling around the injection site.  2. Fever of 101 degrees or more  3. Drainage  (pus) from the injection site.  4. For any continuous bleeding (some dried blood over the incision is normal.)    FOR EMERGENCIES:   If any unusual problems or difficulties occur during clinic hours, call [phone] or [phone].

## 2018-10-01 DIAGNOSIS — E11.9 CONTROLLED TYPE 2 DIABETES MELLITUS WITHOUT COMPLICATION, WITHOUT LONG-TERM CURRENT USE OF INSULIN: ICD-10-CM

## 2018-10-01 RX ORDER — DAPAGLIFLOZIN 5 MG/1
TABLET, FILM COATED ORAL
Qty: 30 TABLET | Refills: 0 | Status: SHIPPED | OUTPATIENT
Start: 2018-10-01 | End: 2018-11-01 | Stop reason: SDUPTHER

## 2018-10-04 ENCOUNTER — PATIENT OUTREACH (OUTPATIENT)
Dept: ADMINISTRATIVE | Facility: HOSPITAL | Age: 53
End: 2018-10-04

## 2018-10-04 DIAGNOSIS — E11.9 CONTROLLED TYPE 2 DIABETES MELLITUS WITHOUT COMPLICATION, WITHOUT LONG-TERM CURRENT USE OF INSULIN: Primary | ICD-10-CM

## 2018-10-04 NOTE — PROGRESS NOTES
Contacted patient regarding overdue hemoglobin a1c and eye exam    Patient has completed eye exam within the last 12 months can not recall facility, did recall date will have facility fax over records, understanding verbalized    Overdue lab scheduled on the same day as an already scheduled appointment, appointment reminder mailed.

## 2018-10-10 ENCOUNTER — PATIENT MESSAGE (OUTPATIENT)
Dept: PAIN MEDICINE | Facility: CLINIC | Age: 53
End: 2018-10-10

## 2018-10-10 ENCOUNTER — TELEPHONE (OUTPATIENT)
Dept: PAIN MEDICINE | Facility: CLINIC | Age: 53
End: 2018-10-10

## 2018-10-10 DIAGNOSIS — M48.02 SPINAL STENOSIS OF CERVICAL REGION: ICD-10-CM

## 2018-10-10 DIAGNOSIS — M54.12 CERVICAL RADICULOPATHY: ICD-10-CM

## 2018-10-10 DIAGNOSIS — M50.30 DDD (DEGENERATIVE DISC DISEASE), CERVICAL: Primary | ICD-10-CM

## 2018-10-10 NOTE — TELEPHONE ENCOUNTER
Spoke to patient regarding pain after procedure, message forwarded to Dr Estrada for next step in plan of care

## 2018-10-12 ENCOUNTER — PATIENT MESSAGE (OUTPATIENT)
Dept: PAIN MEDICINE | Facility: CLINIC | Age: 53
End: 2018-10-12

## 2018-10-12 ENCOUNTER — HOSPITAL ENCOUNTER (OUTPATIENT)
Dept: RADIOLOGY | Facility: OTHER | Age: 53
Discharge: HOME | End: 2018-10-12
Attending: ANESTHESIOLOGY
Payer: COMMERCIAL

## 2018-10-12 DIAGNOSIS — M48.02 SPINAL STENOSIS OF CERVICAL REGION: ICD-10-CM

## 2018-10-12 PROCEDURE — 72141 MRI NECK SPINE W/O DYE: CPT | Mod: TC

## 2018-10-12 PROCEDURE — 72141 MRI NECK SPINE W/O DYE: CPT | Mod: 26,,, | Performed by: RADIOLOGY

## 2018-10-17 ENCOUNTER — OFFICE VISIT (OUTPATIENT)
Dept: PAIN MEDICINE | Facility: CLINIC | Age: 53
End: 2018-10-17
Payer: COMMERCIAL

## 2018-10-17 VITALS
DIASTOLIC BLOOD PRESSURE: 85 MMHG | TEMPERATURE: 97 F | SYSTOLIC BLOOD PRESSURE: 135 MMHG | RESPIRATION RATE: 18 BRPM | BODY MASS INDEX: 29.88 KG/M2 | HEART RATE: 72 BPM | WEIGHT: 240.31 LBS | HEIGHT: 75 IN

## 2018-10-17 DIAGNOSIS — M50.30 DDD (DEGENERATIVE DISC DISEASE), CERVICAL: ICD-10-CM

## 2018-10-17 DIAGNOSIS — M48.02 SPINAL STENOSIS OF CERVICAL REGION: ICD-10-CM

## 2018-10-17 DIAGNOSIS — M47.22 OSTEOARTHRITIS OF SPINE WITH RADICULOPATHY, CERVICAL REGION: ICD-10-CM

## 2018-10-17 DIAGNOSIS — M54.12 CERVICAL RADICULOPATHY: Primary | ICD-10-CM

## 2018-10-17 PROCEDURE — 99213 OFFICE O/P EST LOW 20 MIN: CPT | Mod: S$GLB,,, | Performed by: NURSE PRACTITIONER

## 2018-10-17 PROCEDURE — 99999 PR PBB SHADOW E&M-EST. PATIENT-LVL III: CPT | Mod: PBBFAC,,, | Performed by: NURSE PRACTITIONER

## 2018-10-17 NOTE — PROGRESS NOTES
Subjective:      Patient ID: Suhas Castillo is a 53 y.o. male.    Chief Complaint: Neck Pain (Radiates to right arm pain )    Referred by: No ref. provider found       HPI:    Suhas Castillo is a 53 y.o. male (RHD) who presents today with neck pain with radiation into the right upper extremity. He reports that he has had a h/o this same type and quality of pain before, but did not have radiation into his fingers. He did have a selective C6 nerve root block performed by Dr. Hughes in 2008 which gave her great relief for several years.  His pain returned 4.5 to 5 weeks ago. His pain is right sided in the neck and extends in the medial upper arm and lower arm and into his fingers. He has tingling at the fingertips. His pain is described in detail below.    Interval History (10/30/2015):  He returns today for follow up.  He reports that the cervical DEBORAH has been helpful for the pain.  He is currently pain free    Interval History (3/8/2017):  He returns today for follow up.  He reports that, following the LAURITA in 10/2015, he was pain free until a week ago when he increased his weights at the gym.  Now his pain has returned in the same location as before.    Interval History (9/27/2018):  He returns today for follow up.  He reports that the C7/T1 ILESI provided pain relief until this past Tuesday.  This previous weekend, he was doing some work at his mother's house where he pull on a pole.  He felt a twinge then then his pain returned on Tuesday.  The pain has been severe for the past 24 hr.  It is limiting his ability to do his daily activities and his job    Interval History (10/17/2018):  The patient returns to clinic today for follow up and image review. He is s/p C7-T1 IL DEBORAH on 9/28/2018. He reports 50% relief for about a week then his pain returned to baseline. He continues to report neck pain that radiates into his right shoulder and arm. He describes this pain as burning and tingling in nature. He denies  any other health changes.     Physical Therapy: Previously tried several years ago - no benefit.  Done before procedures    Non-pharmacologic Treatment: None         · TENS? None    Pain Medications:         · Currently taking: None    · Has tried in the past:  Tramadol 50mg prn and Diclofenac 75mg BID, minimal benefit.  OTC NSAIDS and tylenol    · Has not tried: Gabapentin    Blood thinners: None    Interventional Therapies:   · Selective C6 nerve root block (2008) - Dr. Hughes  · C7/T1 ILESI 10/2015: 100% relief until ~1 week ago  · C7/T1 ILESI 03/14/2017: 100% relief until Tuesday, 09/25/2018    Relevant Surgeries: None    Affecting sleep? Yes    Affecting daily activities? Yes    Depressive symptoms? None          · SI/HI? No    Work status:     Pain Scales  Best: 5/10  Worst: 7/10  Usually: 0/10  Today: 8/10    Neck Pain    This is a chronic problem. The current episode started more than 1 year ago. The problem occurs constantly. The problem has been gradually worsening. The pain is present in the anterior neck and midline. Quality: throbbing numb. The pain is at a severity of 7/10. The pain is mild. Exacerbated by: activity  The pain is same all the time. Stiffness is present all day. He has tried oral narcotics and injection treatment for the symptoms. The treatment provided significant relief.       Review of Systems   Cardiovascular:        Hypertension   Endocrine:        Diabetes   Musculoskeletal: Positive for neck pain.   All other systems reviewed and are negative.            Past Medical History:   Diagnosis Date    AR (allergic rhinitis)     Cervical spinal stenosis     Diabetes mellitus type II     DJD (degenerative joint disease), cervical     Hyperlipidemia     Hypertension        Past Surgical History:   Procedure Laterality Date    EPIDURAL BLOCK INJECTION      cervical x 2    EPIDURAL STEROID INJECTION N/A 9/28/2018    Procedure: Injection, Steroid, Epidural  C7-T1;   Surgeon: Bernie Estrada MD;  Location: [facility];  Service: Pain Management;  Laterality: N/A;  C7-T1 IL DEBORAH   MRDICALLY URGENT    Injection, Steroid, Epidural  C7-T1 N/A 9/28/2018    Performed by Bernie Estrada MD at [facility]    INJECTION-STEROID-EPIDURAL-CERVICAL N/A 3/14/2017    Performed by [facility] at [facility]    INJECTION-STEROID-EPIDURAL-CERVICAL N/A 10/6/2015    Performed by Bernie Gaines MD at [facility]    TESTICLE SURGERY  age 16    torsion       Review of patient's allergies indicates:  No Known Allergies    Current Outpatient Medications   Medication Sig Dispense Refill    albuterol 90 mcg/actuation inhaler Inhale 2 puffs into the lungs every 4 (four) hours as needed for Wheezing or Shortness of Breath. 18 g 0    amlodipine-benazepril (LOTREL) 10-40 mg per capsule TAKE 1 CAPSULE BY MOUTH ONCE DAILY 90 capsule 0    blood sugar diagnostic (CONTOUR TEST STRIPS) Strp USE TO TEST BLOOD SUGAR TWICE A DAY 50 each 11    FARXIGA 5 mg Tab tablet TAKE 1 TABLET BY MOUTH ONCE DAILY 30 tablet 0    fluocinonide (LIDEX) 0.05 % external solution As directed  0    fluticasone (FLONASE) 50 mcg/actuation nasal spray 2 sprays by Each Nare route once daily. 16 g 2    JANUVIA 100 mg Tab take 1 tablet by mouth once daily 30 tablet 11    metFORMIN (GLUCOPHAGE-XR) 500 MG 24 hr tablet Take 2 tablets (1,000 mg total) by mouth 2 (two) times daily. 360 tablet 3    simvastatin (ZOCOR) 20 MG tablet Take 0.5 tablets (10 mg total) by mouth every evening. 90 tablet 2     No current facility-administered medications for this visit.        Family History   Problem Relation Age of Onset    Hypertension Mother     Diabetes Mother     Stroke Mother        Social History     Socioeconomic History    Marital status: Single     Spouse name: Not on file    Number of children: 4    Years of education: Not on file    Highest education level: Not on file   Social Needs    Financial resource  "strain: Not on file    Food insecurity - worry: Not on file    Food insecurity - inability: Not on file    Transportation needs - medical: Not on file    Transportation needs - non-medical: Not on file   Occupational History    Occupation: secutiry supervisor     Employer: [email or web address]   Tobacco Use    Smoking status: Never Smoker    Smokeless tobacco: Never Used   Substance and Sexual Activity    Alcohol use: No     Alcohol/week: 0.0 oz     Comment: none x 4 years; previously social drinker    Drug use: No    Sexual activity: Yes     Partners: Female     Birth control/protection: None   Other Topics Concern    Not on file   Social History Narrative    The patient does exercise regularly (treadmill, light weights: TIW-QIW).      Rates diet as fair.      He is satisfied with weight.           Objective:     Vitals:    10/17/18 1046   BP: 135/85   Pulse: 72   Resp: 18   Temp: 97.1 °F (36.2 °C)   TempSrc: Oral   Weight: 109 kg (240 lb 4.8 oz)   Height: 6' 3" (1.905 m)   PainSc:   8   PainLoc: Neck       GEN:  Well developed, well nourished.  No acute distress.  No pain behavior.  HEENT:  No trauma.  Mucous membranes moist.  Nares patent bilaterally.  PSYCH: Normal affect. Thought content appropriate.  CHEST:  Breathing symmetric.  No audible wheezing.  ABD: Soft, non-tender, non-distended.  SKIN:  Warm, pink, dry.  No rash on exposed areas.    EXT:  No cyanosis, clubbing, or edema.  No color change or changes in nail or hair growth.  NEURO/MUSCULOSKELETAL:  Fully alert, oriented, and appropriate. Speech normal juan. No cranial nerve deficits.   Gait: normal.  5/5 motor strength throughout upper extremities.   C-Spine:  Normal ROM with pain on extension.    Positive Spurling's to the right with pain in a C6 distribution.  No sensory deficit in BUE   Positive TTP over cervical facet joints. Negative TTP over cervical paraspinal muscles, shoulders, elbows or hands.   Negative shirley's.  Positive " Tinel's over right cubital tunnel.    Imaging:      MRI Cervical Spine 10/12/2018:  FINDINGS:  Alignment: Normal.    Vertebrae: Normal marrow signal. No fracture.    Discs: Normal height and signal.    Cord: Normal.    Skull base and craniocervical junction: Normal.    Degenerative findings:    C2-C3: Disc osteophyte complex resulting in mild left neural foraminal narrowing.    C3-C4: Disc osteophyte complex.  This results in mild canal stenosis and moderate left and mild right neural foraminal narrowing.    C4-C5: There is a disc osteophyte complex which was results in mild canal stenosis, moderate severe left and moderate right neural foraminal narrowing.    C5-C6: Disc osteophyte complex results in mild canal stenosis, mild left and moderate-severe right neural foraminal narrowing.    C6-C7: Disc osteophyte complex resulting in moderate bilateral neural foraminal narrowing.  No significant canal stenosis.    C7-T1: Unremarkable.    Paraspinal muscles & soft tissues: Unremarkable.      Impression       Multilevel degenerative changes as outlined above.  This is most prominent at the C3-4, C4-5, C5-6.         Cervical Xray (6/10/15)  Impression     Multilevel degenerative changes as discussed above, most pronounced at the C5-6 level. There has been some progression of this degenerative change, particularly with regard to be more pronounced vertebral endplate spurring, since the 2008 study.     Cervical MRI from 2008 in [facility] shows large disk protrusion at C5/6 to the right.    Assessment:       Encounter Diagnoses   Name Primary?    Cervical radiculopathy Yes    Spinal stenosis of cervical region     DDD (degenerative disc disease), cervical     Osteoarthritis of spine with radiculopathy, cervical region          Plan:       Suhas was seen today for neck pain.    Diagnoses and all orders for this visit:    Cervical radiculopathy    Spinal stenosis of cervical region    DDD (degenerative disc  disease), cervical    Osteoarthritis of spine with radiculopathy, cervical region       Neck pain is consistent with cervical radiculopathy    I discussed the treatment options with the patient today, including risks, benefits, and alternatives. All available images were reviewed. The patient is aware of the risks and benefits of the medications being prescribed, common side effects, and proper usage.    1. Schedule for repeat cervical DEBORAH. The procedure, risks, benefits and options were discussed with patient. There are no contraindications to the procedure. The patient expressed understanding and agreed to proceed.    2. If limited relief, consider cervical MBB.   3. Recommend limiting overhead lifting with current exercise regimen until procedure  4. If limited benefit from home exercises, can consider formal PT  5. RTC 3 weeks after injection or sooner if needed.     - Dr. Estrada was consulted on the patient and agrees with this plan.    The above plan and management options were discussed at length with patient. Patient is in agreement with the above and verbalized understanding.     Roseline Spicer NP  10/17/2018            Ortho/SPM Exam

## 2018-10-25 ENCOUNTER — LAB VISIT (OUTPATIENT)
Dept: LAB | Facility: HOSPITAL | Age: 53
End: 2018-10-25
Attending: FAMILY MEDICINE
Payer: COMMERCIAL

## 2018-10-25 DIAGNOSIS — E11.9 CONTROLLED TYPE 2 DIABETES MELLITUS WITHOUT COMPLICATION, WITHOUT LONG-TERM CURRENT USE OF INSULIN: ICD-10-CM

## 2018-10-25 LAB
ESTIMATED AVG GLUCOSE: 131 MG/DL
HBA1C MFR BLD HPLC: 6.2 %

## 2018-10-25 PROCEDURE — 83036 HEMOGLOBIN GLYCOSYLATED A1C: CPT

## 2018-10-26 ENCOUNTER — HOSPITAL ENCOUNTER (OUTPATIENT)
Facility: OTHER | Age: 53
Discharge: HOME | End: 2018-10-26
Attending: ANESTHESIOLOGY | Admitting: ANESTHESIOLOGY
Payer: COMMERCIAL

## 2018-10-26 VITALS
HEART RATE: 74 BPM | DIASTOLIC BLOOD PRESSURE: 86 MMHG | HEIGHT: 74 IN | RESPIRATION RATE: 18 BRPM | SYSTOLIC BLOOD PRESSURE: 139 MMHG | BODY MASS INDEX: 31.18 KG/M2 | TEMPERATURE: 98 F | WEIGHT: 243 LBS | OXYGEN SATURATION: 97 %

## 2018-10-26 DIAGNOSIS — M50.30 DDD (DEGENERATIVE DISC DISEASE), CERVICAL: Primary | ICD-10-CM

## 2018-10-26 LAB — POCT GLUCOSE: 145 MG/DL (ref 70–110)

## 2018-10-26 PROCEDURE — 62321 NJX INTERLAMINAR CRV/THRC: CPT | Mod: ,,, | Performed by: ANESTHESIOLOGY

## 2018-10-26 PROCEDURE — 63600175 PHARM REV CODE 636 W HCPCS: Performed by: ANESTHESIOLOGY

## 2018-10-26 PROCEDURE — 25000003 PHARM REV CODE 250: Performed by: ANESTHESIOLOGY

## 2018-10-26 PROCEDURE — 25500020 PHARM REV CODE 255: Performed by: ANESTHESIOLOGY

## 2018-10-26 PROCEDURE — 62321 NJX INTERLAMINAR CRV/THRC: CPT | Performed by: ANESTHESIOLOGY

## 2018-10-26 RX ORDER — ALPRAZOLAM 0.5 MG/1
1 TABLET, ORALLY DISINTEGRATING ORAL
Status: COMPLETED | OUTPATIENT
Start: 2018-10-26 | End: 2018-10-26

## 2018-10-26 RX ORDER — DEXAMETHASONE SODIUM PHOSPHATE 10 MG/ML
INJECTION INTRAMUSCULAR; INTRAVENOUS
Status: DISCONTINUED | OUTPATIENT
Start: 2018-10-26 | End: 2018-10-26 | Stop reason: HOSPADM

## 2018-10-26 RX ORDER — LIDOCAINE HYDROCHLORIDE 10 MG/ML
INJECTION INFILTRATION; PERINEURAL
Status: DISCONTINUED | OUTPATIENT
Start: 2018-10-26 | End: 2018-10-26 | Stop reason: HOSPADM

## 2018-10-26 RX ORDER — METHYLPREDNISOLONE ACETATE 80 MG/ML
INJECTION, SUSPENSION INTRA-ARTICULAR; INTRALESIONAL; INTRAMUSCULAR; SOFT TISSUE
Status: DISCONTINUED | OUTPATIENT
Start: 2018-10-26 | End: 2018-10-26 | Stop reason: HOSPADM

## 2018-10-26 RX ADMIN — ALPRAZOLAM 1 MG: 0.5 TABLET, ORALLY DISINTEGRATING ORAL at 09:10

## 2018-10-26 NOTE — OP NOTE
Date: 10/26/2018    Procedure: C7/[id number] Steroid Injection    Referring Provider: None     Pre-op diagnosis: DDD (degenerative disc disease), cervical [M50.30]  Cervical radiculopathy [M54.12]    Post-op diagnosis: DDD (degenerative disc disease), cervical [M50.30]  Cervical radiculopathy [M54.12]    Physician: Dr. Bernie Estrada     Assistant: Dr. Mohan    Anesthestia: local.[facility]    All medications, allergies, and relevant histories were reviewed. No recent antibiotics or infections.   A time-out was taken to verify the correct patient, procedure, laterality, and appropriate medications/allergies.    Fluoroscopically-Guided, Contrast-Controlled Cervical Interlaminar Epidural Steroid Injection:     Following denial of allergy and review of potential side effects and complications including but not necessarily limited to infection, allergic reaction, local tissue breakdown, nerve injury, paresis, paralysis, spinal cord injury, and seizure, the patient indicated they understood and agreed to proceed.     The patient was positioned prone and prepped and draped in the usual sterile fashion. The C7-T1 interspace was identified fluoroscopically. The skin was anesthetized via 25-gauge 1.5 needle with approximately 4cc of bicarbonated 1% lidocaine. A 20-gauge Tuohy needle was atraumatically introduced and advanced under fluoroscopic guidance. Using HIEU to saline technique with 0.9 % saline the epidural space was entered without difficulties. Following negative aspiration for blood and CSF and confirming the absence of paresthesias, injection of approximately 1.5 cc of Omnipaque 300 demonstrated excellent epidural spread without vascular or intrathecal uptake. At this point, 2 cc of 0.9% normal saline with 80 mg of Depo-Medrol was injected without complication. The needle was flushed with 1% lidocaine withdrawn.     The patient was followedpost procedure and discharged under their own power in excellent  condition.    Future Management:   If helpful, can repeat as needed.    Follow up with my clinic in 3 weeks or sooner if needed    I certify that I provided the above services.  I was present for the entire procedure, which was performed by myself with the assistance of the resident physician.  There were no parts of the procedure that were performed not by myself or without my direct supervision.

## 2018-10-26 NOTE — PLAN OF CARE
Pt tolerated procedure well. Pt reports 3/10 pain after procedure. Assisted pt up for first time. Steady on feet. All discharge instructions given.

## 2018-10-26 NOTE — DISCHARGE INSTRUCTIONS
Thank you for allowing us to care for you today. You may receive a survey about the care we provided. Your feedback is valuable and helps us provide excellent care throughout the community.     Home Care Instructions for Pain Management:    1. DIET:   You may resume your normal diet today.   2. BATHING:   You may shower with luke warm water. No tub baths or anything that will soak injection sites under water for the next 24 hours.  3. DRESSING:   You may remove your bandage today.   4. ACTIVITY LEVEL:   You may resume your normal activities 24 hrs after your procedure. Nothing strenuous today.  5. MEDICATIONS:   You may resume your normal medications today. To restart blood thinners, ask your doctor.  6. DRIVING    If you have received any sedatives by mouth today, you may not drive for 12 hours.    If you have received any sedation through your IV, you may not drive for 24 hrs.   7. SPECIAL INSTRUCTIONS:   No heat to the injection site for 24 hrs including, hot bath or shower, heating pad, moist heat, or hot tubs.    Use ice pack to injection site for any pain or discomfort.  Apply ice packs for 20 minute intervals as needed.    IF you have diabetes, be sure to monitor your blood sugar more closely. IF your injection contained steroids your blood sugar levels may become higher than normal.    If you are still having pain upon discharge:  Your pain may improve over the next 48 hours. The anesthetic (numbing medication) works immediately to 48 hours. IF your injection contained a steroid (anti-inflammatory medication), it takes approximately 3 days to start feeling relief and 7-10 days to see your greatest results from the medication. It is possible you may need subsequent injections. This would be discussed at your follow up appointment with pain management or your referring doctor.      PLEASE CALL YOUR DOCTOR IF:  1. Redness or swelling around the injection site.  2. Fever of 101 degrees or more  3. Drainage  (pus) from the injection site.  4. For any continuous bleeding (some dried blood over the incision is normal.)    FOR EMERGENCIES:   If any unusual problems or difficulties occur during clinic hours, call [phone] or [phone].

## 2018-10-27 ENCOUNTER — PATIENT MESSAGE (OUTPATIENT)
Dept: FAMILY MEDICINE | Facility: CLINIC | Age: 53
End: 2018-10-27

## 2018-11-01 DIAGNOSIS — E11.9 CONTROLLED TYPE 2 DIABETES MELLITUS WITHOUT COMPLICATION, WITHOUT LONG-TERM CURRENT USE OF INSULIN: ICD-10-CM

## 2018-11-01 RX ORDER — DAPAGLIFLOZIN 5 MG/1
TABLET, FILM COATED ORAL
Qty: 30 TABLET | Refills: 0 | Status: SHIPPED | OUTPATIENT
Start: 2018-11-01 | End: 2018-11-28 | Stop reason: SDUPTHER

## 2018-11-14 ENCOUNTER — OFFICE VISIT (OUTPATIENT)
Dept: PAIN MEDICINE | Facility: CLINIC | Age: 53
End: 2018-11-14
Attending: ANESTHESIOLOGY
Payer: COMMERCIAL

## 2018-11-14 VITALS
WEIGHT: 250.25 LBS | TEMPERATURE: 98 F | BODY MASS INDEX: 32.12 KG/M2 | HEIGHT: 74 IN | HEART RATE: 90 BPM | SYSTOLIC BLOOD PRESSURE: 125 MMHG | DIASTOLIC BLOOD PRESSURE: 80 MMHG

## 2018-11-14 DIAGNOSIS — M47.22 OSTEOARTHRITIS OF SPINE WITH RADICULOPATHY, CERVICAL REGION: ICD-10-CM

## 2018-11-14 DIAGNOSIS — M48.02 SPINAL STENOSIS OF CERVICAL REGION: ICD-10-CM

## 2018-11-14 DIAGNOSIS — M54.12 CERVICAL RADICULOPATHY: Primary | ICD-10-CM

## 2018-11-14 DIAGNOSIS — M50.30 DDD (DEGENERATIVE DISC DISEASE), CERVICAL: ICD-10-CM

## 2018-11-14 PROCEDURE — 99213 OFFICE O/P EST LOW 20 MIN: CPT | Mod: S$GLB,,, | Performed by: NURSE PRACTITIONER

## 2018-11-14 PROCEDURE — 99999 PR PBB SHADOW E&M-EST. PATIENT-LVL III: CPT | Mod: PBBFAC,,, | Performed by: NURSE PRACTITIONER

## 2018-11-14 NOTE — PROGRESS NOTES
Subjective:      Patient ID: Suhas Castillo is a 53 y.o. male.    Chief Complaint: Follow-up (3 week after DEBORAH)    Referred by: No ref. provider found       HPI:    Suhas Castillo is a 53 y.o. male (RHD) who presents today with neck pain with radiation into the right upper extremity. He reports that he has had a h/o this same type and quality of pain before, but did not have radiation into his fingers. He did have a selective C6 nerve root block performed by Dr. Hughes in 2008 which gave her great relief for several years.  His pain returned 4.5 to 5 weeks ago. His pain is right sided in the neck and extends in the medial upper arm and lower arm and into his fingers. He has tingling at the fingertips. His pain is described in detail below.    Interval History (10/30/2015):  He returns today for follow up.  He reports that the cervical DEBORAH has been helpful for the pain.  He is currently pain free    Interval History (3/8/2017):  He returns today for follow up.  He reports that, following the LAURITA in 10/2015, he was pain free until a week ago when he increased his weights at the gym.  Now his pain has returned in the same location as before.    Interval History (9/27/2018):  He returns today for follow up.  He reports that the C7/T1 ILESI provided pain relief until this past Tuesday.  This previous weekend, he was doing some work at his mother's house where he pull on a pole.  He felt a twinge then then his pain returned on Tuesday.  The pain has been severe for the past 24 hr.  It is limiting his ability to do his daily activities and his job    Interval History (10/17/2018):  The patient returns to clinic today for follow up and image review. He is s/p C7-T1 IL DEBORAH on 9/28/2018. He reports 50% relief for about a week then his pain returned to baseline. He continues to report neck pain that radiates into his right shoulder and arm. He describes this pain as burning and tingling in nature. He denies any other  health changes.     Interval History (11/14/2018):  The patient presents for follow up of neck pain.  He is s/p repeat cervical DEBORAH with 100% relief.  He is no longer having shooting pain.  He does have numbness to right hand, digits 1-3.  He denies any weakness.  His pain today is 0/10.    Physical Therapy: Previously tried several years ago - no benefit.  Done before procedures    Non-pharmacologic Treatment: None         · TENS? None    Pain Medications:         · Currently taking: None    · Has tried in the past:  Tramadol 50mg prn and Diclofenac 75mg BID, minimal benefit.  OTC NSAIDS and tylenol    · Has not tried: Gabapentin    Blood thinners: None    Interventional Therapies:   · Selective C6 nerve root block (2008) - Dr. Hughes  · C7/T1 ILESI 10/2015: 100% relief until ~1 week ago  · C7/T1 ILESI 03/14/2017: 100% relief until Tuesday, 09/25/2018  · 10/26/18 C7/T1 IL DEBORAH- 100% relief    Relevant Surgeries: None    Affecting sleep? Yes    Affecting daily activities? Yes    Depressive symptoms? None          · SI/HI? No    Work status:     Pain Scales  Best: 5/10  Worst: 7/10  Usually: 0/10  Today: 0/10    Neck Pain    This is a chronic problem. The current episode started more than 1 year ago. The problem occurs constantly. The problem has been gradually worsening. The pain is present in the anterior neck and midline. Quality: throbbing numb. The pain is at a severity of 7/10. The pain is mild. Exacerbated by: activity  The pain is same all the time. Stiffness is present all day. He has tried oral narcotics and injection treatment for the symptoms. The treatment provided significant relief.       Review of Systems   Cardiovascular:        Hypertension   Endocrine:        Diabetes   Musculoskeletal: Positive for neck pain.   All other systems reviewed and are negative.            Past Medical History:   Diagnosis Date    AR (allergic rhinitis)     Cervical spinal stenosis     Diabetes  mellitus type II     DJD (degenerative joint disease), cervical     Hyperlipidemia     Hypertension        Past Surgical History:   Procedure Laterality Date    EPIDURAL BLOCK INJECTION      cervical x 2    EPIDURAL STEROID INJECTION N/A 9/28/2018    Procedure: Injection, Steroid, Epidural  C7-T1;  Surgeon: Bernie Estrada MD;  Location: [facility];  Service: Pain Management;  Laterality: N/A;  C7-T1 IL DEBORAH   MRDICALLY URGENT    EPIDURAL STEROID INJECTION N/A 10/26/2018    Procedure: Injection, Steroid, Epidural CERVICAL C7-T1 DEBORAH;  Surgeon: Bernie Estrada MD;  Location: [facility];  Service: Pain Management;  Laterality: N/A;    Injection, Steroid, Epidural  C7-T1 N/A 9/28/2018    Performed by Bernie Estrada MD at [facility]    Injection, Steroid, Epidural CERVICAL C7-T1 DEBORAH N/A 10/26/2018    [facility]    INJECTION-STEROID-EPIDURAL-CERVICAL N/A 3/14/2017    [facility]    INJECTION-STEROID-EPIDURAL-CERVICAL N/A 10/6/2015    Performed by Bernie Gaines MD at [facility]    TESTICLE SURGERY  age 16    torsion       Review of patient's allergies indicates:  No Known Allergies    Current Outpatient Medications   Medication Sig Dispense Refill    amlodipine-benazepril (LOTREL) 10-40 mg per capsule TAKE 1 CAPSULE BY MOUTH ONCE DAILY 90 capsule 0    blood sugar diagnostic (CONTOUR TEST STRIPS) Strp USE TO TEST BLOOD SUGAR TWICE A DAY 50 each 11    FARXIGA 5 mg Tab tablet TAKE 1 TABLET BY MOUTH EVERY DAY 30 tablet 0    fluticasone (FLONASE) 50 mcg/actuation nasal spray 2 sprays by Each Nare route once daily. 16 g 2    JANUVIA 100 mg Tab take 1 tablet by mouth once daily 30 tablet 11    metFORMIN (GLUCOPHAGE-XR) 500 MG 24 hr tablet Take 2 tablets (1,000 mg total) by mouth 2 (two) times daily. 360 tablet 3    simvastatin (ZOCOR) 20 MG tablet Take 0.5 tablets (10 mg total) by mouth every evening. 90 tablet 2     "albuterol 90 mcg/actuation inhaler Inhale 2 puffs into the lungs every 4 (four) hours as needed for Wheezing or Shortness of Breath. 18 g 0    fluocinonide (LIDEX) 0.05 % external solution As directed  0     No current facility-administered medications for this visit.        Family History   Problem Relation Age of Onset    Hypertension Mother     Diabetes Mother     Stroke Mother        Social History     Socioeconomic History    Marital status: Single     Spouse name: Not on file    Number of children: 4    Years of education: Not on file    Highest education level: Not on file   Social Needs    Financial resource strain: Not on file    Food insecurity - worry: Not on file    Food insecurity - inability: Not on file    Transportation needs - medical: Not on file    Transportation needs - non-medical: Not on file   Occupational History    Occupation: secutiry supervisor     Employer: [employer]   Tobacco Use    Smoking status: Never Smoker    Smokeless tobacco: Never Used   Substance and Sexual Activity    Alcohol use: No     Alcohol/week: 0.0 oz     Comment: none x 4 years; previously social drinker    Drug use: No    Sexual activity: Yes     Partners: Female     Birth control/protection: None   Other Topics Concern    Not on file   Social History Narrative    The patient does exercise regularly (treadmill, light weights: TIW-QIW).      Rates diet as fair.      He is satisfied with weight.           Objective:     Vitals:    11/14/18 1441   BP: 125/80   Pulse: 90   Temp: 98.1 °F (36.7 °C)   Weight: 113.5 kg (250 lb 3.6 oz)   Height: 6' 2" (1.88 m)   PainSc: 0-No pain   PainLoc: Neck       GEN:  Well developed, well nourished.  No acute distress.  No pain behavior.  HEENT:  No trauma.  Mucous membranes moist.  Nares patent bilaterally.  PSYCH: Normal affect. Thought content appropriate.  CHEST:  Breathing symmetric.  No audible wheezing.  ABD: Soft, non-tender, non-distended.  SKIN:  " Warm, pink, dry.  No rash on exposed areas.    EXT:  No cyanosis, clubbing, or edema.  No color change or changes in nail or hair growth.  NEURO/MUSCULOSKELETAL:  Fully alert, oriented, and appropriate. Speech normal juan. No cranial nerve deficits.   Gait: normal.  5/5 motor strength throughout upper extremities.   C-Spine:  Normal ROM with pain on extension.  Negative Spurling.  Negative facet loading bilaterally.  No sensory deficit in BUE   Negative TTP over cervical paraspinal muscles, shoulders, elbows or hands.   Negative shirley's.  Positive Tinel's over right cubital tunnel.    Imaging:      MRI Cervical Spine 10/12/2018:  FINDINGS:  Alignment: Normal.    Vertebrae: Normal marrow signal. No fracture.    Discs: Normal height and signal.    Cord: Normal.    Skull base and craniocervical junction: Normal.    Degenerative findings:    C2-C3: Disc osteophyte complex resulting in mild left neural foraminal narrowing.    C3-C4: Disc osteophyte complex.  This results in mild canal stenosis and moderate left and mild right neural foraminal narrowing.    C4-C5: There is a disc osteophyte complex which was results in mild canal stenosis, moderate severe left and moderate right neural foraminal narrowing.    C5-C6: Disc osteophyte complex results in mild canal stenosis, mild left and moderate-severe right neural foraminal narrowing.    C6-C7: Disc osteophyte complex resulting in moderate bilateral neural foraminal narrowing.  No significant canal stenosis.    C7-T1: Unremarkable.    Paraspinal muscles & soft tissues: Unremarkable.      Impression       Multilevel degenerative changes as outlined above.  This is most prominent at the C3-4, C4-5, C5-6.         Cervical Xray (6/10/15)  Impression     Multilevel degenerative changes as discussed above, most pronounced at the C5-6 level. There has been some progression of this degenerative change, particularly with regard to be more pronounced vertebral endplate  spurring, since the 2008 study.     Cervical MRI from 2008 in [facility] shows large disk protrusion at C5/6 to the right.    Assessment:       Encounter Diagnoses   Name Primary?    Cervical radiculopathy Yes    DDD (degenerative disc disease), cervical     Spinal stenosis of cervical region     Osteoarthritis of spine with radiculopathy, cervical region          Plan:       Suhas was seen today for follow-up.    Diagnoses and all orders for this visit:    Cervical radiculopathy    DDD (degenerative disc disease), cervical    Spinal stenosis of cervical region    Osteoarthritis of spine with radiculopathy, cervical region       Neck pain is consistent with cervical radiculopathy    I discussed the treatment options with the patient today, including risks, benefits, and alternatives. All available images were reviewed. The patient is aware of the risks and benefits of the medications being prescribed, common side effects, and proper usage.    1. He is s/p repeat cervical DEBORAH with 100% relief.  Will continue to monitor progress.  2. The patient will continue a home exercise routine to help with pain and strengthening.    3. RTC PRN.    The above plan and management options were discussed at length with patient. Patient is in agreement with the above and verbalized understanding.     JENNIFER Rocha  11/14/2018            Ortho/SPM Exam

## 2018-11-18 RX ORDER — AMLODIPINE AND BENAZEPRIL HYDROCHLORIDE 10; 40 MG/1; MG/1
CAPSULE ORAL
Qty: 90 CAPSULE | Refills: 3 | Status: SHIPPED | OUTPATIENT
Start: 2018-11-18 | End: 2019-07-11 | Stop reason: SINTOL

## 2018-11-28 DIAGNOSIS — E11.9 CONTROLLED TYPE 2 DIABETES MELLITUS WITHOUT COMPLICATION, WITHOUT LONG-TERM CURRENT USE OF INSULIN: ICD-10-CM

## 2018-11-29 RX ORDER — SITAGLIPTIN 100 MG/1
TABLET, FILM COATED ORAL
Qty: 30 TABLET | Refills: 3 | Status: SHIPPED | OUTPATIENT
Start: 2018-11-29 | End: 2019-04-18 | Stop reason: SDUPTHER

## 2018-11-29 RX ORDER — DAPAGLIFLOZIN 5 MG/1
TABLET, FILM COATED ORAL
Qty: 30 TABLET | Refills: 3 | Status: SHIPPED | OUTPATIENT
Start: 2018-11-29 | End: 2019-07-11 | Stop reason: SDUPTHER

## 2018-12-26 ENCOUNTER — OFFICE VISIT (OUTPATIENT)
Dept: FAMILY MEDICINE | Facility: CLINIC | Age: 53
End: 2018-12-26
Payer: COMMERCIAL

## 2018-12-26 ENCOUNTER — TELEPHONE (OUTPATIENT)
Dept: FAMILY MEDICINE | Facility: CLINIC | Age: 53
End: 2018-12-26

## 2018-12-26 VITALS
SYSTOLIC BLOOD PRESSURE: 110 MMHG | WEIGHT: 242.5 LBS | OXYGEN SATURATION: 97 % | BODY MASS INDEX: 31.12 KG/M2 | DIASTOLIC BLOOD PRESSURE: 78 MMHG | HEIGHT: 74 IN | HEART RATE: 76 BPM

## 2018-12-26 DIAGNOSIS — J01.00 SUBACUTE MAXILLARY SINUSITIS: Primary | ICD-10-CM

## 2018-12-26 DIAGNOSIS — J30.9 ALLERGIC RHINITIS, UNSPECIFIED SEASONALITY, UNSPECIFIED TRIGGER: ICD-10-CM

## 2018-12-26 PROCEDURE — 99999 PR PBB SHADOW E&M-EST. PATIENT-LVL III: CPT | Mod: PBBFAC,,, | Performed by: NURSE PRACTITIONER

## 2018-12-26 PROCEDURE — 99213 OFFICE O/P EST LOW 20 MIN: CPT | Mod: 25,S$GLB,, | Performed by: NURSE PRACTITIONER

## 2018-12-26 PROCEDURE — 96372 THER/PROPH/DIAG INJ SC/IM: CPT | Mod: S$GLB,,, | Performed by: NURSE PRACTITIONER

## 2018-12-26 RX ORDER — METHYLPREDNISOLONE ACETATE 40 MG/ML
40 INJECTION, SUSPENSION INTRA-ARTICULAR; INTRALESIONAL; INTRAMUSCULAR; SOFT TISSUE
Status: COMPLETED | OUTPATIENT
Start: 2018-12-26 | End: 2018-12-26

## 2018-12-26 RX ORDER — AMOXICILLIN AND CLAVULANATE POTASSIUM 875; 125 MG/1; MG/1
1 TABLET, FILM COATED ORAL EVERY 12 HOURS
Qty: 14 TABLET | Refills: 0 | Status: SHIPPED | OUTPATIENT
Start: 2018-12-26 | End: 2019-01-02

## 2018-12-26 RX ORDER — FLUTICASONE PROPIONATE 50 MCG
2 SPRAY, SUSPENSION (ML) NASAL DAILY
Qty: 16 G | Refills: 2 | Status: SHIPPED | OUTPATIENT
Start: 2018-12-26 | End: 2019-08-06 | Stop reason: SDUPTHER

## 2018-12-26 RX ADMIN — METHYLPREDNISOLONE ACETATE 40 MG: 40 INJECTION, SUSPENSION INTRA-ARTICULAR; INTRALESIONAL; INTRAMUSCULAR; SOFT TISSUE at 03:12

## 2018-12-26 NOTE — TELEPHONE ENCOUNTER
----- Message from Ana Cristina Maurice sent at 12/26/2018  8:09 AM CST -----  Contact: pt    Name of Who is Calling:TERESE CAMPBELL [[id number]]    What is the request in detail: patient would like to be seen sooner then 01/02/19 , patient  States he has a sinus infection and sore throat Please contact to further discuss and advise    Can the clinic reply by [facility]: No    What Number to Call Back if not in [facility]: [phone]

## 2018-12-26 NOTE — PROGRESS NOTES
After attaining consent, in per orders of NP Arian , injection of Depo Medrol IM  LOT [id number] exp. 11/2019 given in Left Ventrogluteal  by Ana Luisa Heaton. Patient tolerated well and band aid applied. Pt. Instructed to remain in clinic for 15 mins. afterwards, and to report any adverse reactions to me immediately .

## 2018-12-26 NOTE — PATIENT INSTRUCTIONS
Your test results will be communicated to you via : [facility], Telephone or Letter.   If you have not received your test results in one week, please contact the clinic at [phone].

## 2018-12-26 NOTE — PROGRESS NOTES
"Subjective:       Patient ID: Suhas Castlilo is a 53 y.o. male.    Chief Complaint: URI    HPI   Patient presents with c/o URI symptoms. Pt states that his symptoms have been getting worse over the last 2 weeks. He has a postnasal drip, a cough that is worse in the morning - productive, green mucus. No fever/chills. Nasal/maxiallary sinus pressure, no pain. No ear pain or fullness. He is taking zyrtec daily for the last 2 weeks and he also gargles with warm salt water in the morning to help "clear his throat".      Current Outpatient Medications:     albuterol 90 mcg/actuation inhaler, Inhale 2 puffs into the lungs every 4 (four) hours as needed for Wheezing or Shortness of Breath., Disp: 18 g, Rfl: 0    amlodipine-benazepril (LOTREL) 10-40 mg per capsule, TAKE 1 CAPSULE BY MOUTH ONCE DAILY, Disp: 90 capsule, Rfl: 3    blood sugar diagnostic (CONTOUR TEST STRIPS) Strp, USE TO TEST BLOOD SUGAR TWICE A DAY, Disp: 50 each, Rfl: 11    FARXIGA 5 mg Tab tablet, TAKE 1 TABLET BY MOUTH EVERY DAY, Disp: 30 tablet, Rfl: 3    fluocinonide (LIDEX) 0.05 % external solution, As directed, Disp: , Rfl: 0    fluticasone (FLONASE) 50 mcg/actuation nasal spray, 2 sprays (100 mcg total) by Each Nare route once daily., Disp: 16 g, Rfl: 2    JANUVIA 100 mg Tab, TAKE 1 TABLET BY MOUTH ONCE DAILY, Disp: 30 tablet, Rfl: 3    metFORMIN (GLUCOPHAGE-XR) 500 MG 24 hr tablet, Take 2 tablets (1,000 mg total) by mouth 2 (two) times daily., Disp: 360 tablet, Rfl: 3    simvastatin (ZOCOR) 20 MG tablet, Take 0.5 tablets (10 mg total) by mouth every evening., Disp: 90 tablet, Rfl: 2    amoxicillin-clavulanate 875-125mg (AUGMENTIN) 875-125 mg per tablet, Take 1 tablet by mouth every 12 (twelve) hours. for 7 days, Disp: 14 tablet, Rfl: 0  No current facility-administered medications for this visit.     Patient Active Problem List   Diagnosis    Controlled type 2 diabetes mellitus without complication, without long-term current use of " insulin    Hypertension associated with diabetes    Hyperlipidemia associated with type 2 diabetes mellitus    AR (allergic rhinitis)    DJD (degenerative joint disease), cervical    Cervical spinal stenosis    Elevated transaminase level    Vitamin D deficiency    DDD (degenerative disc disease), cervical       Review of Systems    Pertinent information noted in HPI    Objective:      Vitals:    12/26/18 1506   BP: 110/78   Pulse: 76     Physical Exam   Constitutional: He is oriented to person, place, and time. He appears well-developed and well-nourished.   HENT:   Right Ear: Tympanic membrane is not erythematous and not bulging. A middle ear effusion is present.   Left Ear: Tympanic membrane is not erythematous and not bulging. A middle ear effusion is present.   Nose: Mucosal edema present. No rhinorrhea. Right sinus exhibits maxillary sinus tenderness (mild). Right sinus exhibits no frontal sinus tenderness. Left sinus exhibits maxillary sinus tenderness (mild). Left sinus exhibits no frontal sinus tenderness.   Mouth/Throat: Posterior oropharyngeal edema present. No oropharyngeal exudate or posterior oropharyngeal erythema.   Eyes: Conjunctivae are normal.   Neck: No JVD present.   Cardiovascular: Normal rate, regular rhythm and normal heart sounds. Exam reveals no friction rub.   No murmur heard.  Pulmonary/Chest: Effort normal and breath sounds normal. No respiratory distress. He has no wheezes. He has no rales.   Lymphadenopathy:     He has no cervical adenopathy.   Neurological: He is alert and oriented to person, place, and time.   Psychiatric: He has a normal mood and affect. His behavior is normal. Thought content normal.       Assessment:       1. Subacute maxillary sinusitis    2. Allergic rhinitis, unspecified seasonality, unspecified trigger        Plan:     DepoMedrol 40mg IM today  Continue Zyrtec  Augmentin x 7 days  Flonase 2 sprays each nostril daily  Increase water intake  Rest    RTC  prn   Increase water intake

## 2018-12-28 DIAGNOSIS — Z12.11 COLON CANCER SCREENING: ICD-10-CM

## 2019-01-21 ENCOUNTER — OFFICE VISIT (OUTPATIENT)
Dept: PAIN MEDICINE | Facility: CLINIC | Age: 54
End: 2019-01-21
Payer: COMMERCIAL

## 2019-01-21 VITALS
WEIGHT: 235.88 LBS | RESPIRATION RATE: 18 BRPM | HEIGHT: 74 IN | TEMPERATURE: 98 F | DIASTOLIC BLOOD PRESSURE: 87 MMHG | HEART RATE: 81 BPM | BODY MASS INDEX: 30.27 KG/M2 | SYSTOLIC BLOOD PRESSURE: 145 MMHG

## 2019-01-21 DIAGNOSIS — M50.30 DDD (DEGENERATIVE DISC DISEASE), CERVICAL: ICD-10-CM

## 2019-01-21 DIAGNOSIS — M54.12 CERVICAL RADICULOPATHY: ICD-10-CM

## 2019-01-21 DIAGNOSIS — M79.18 MYOFASCIAL PAIN: Primary | ICD-10-CM

## 2019-01-21 DIAGNOSIS — M48.02 SPINAL STENOSIS OF CERVICAL REGION: ICD-10-CM

## 2019-01-21 DIAGNOSIS — M47.22 OSTEOARTHRITIS OF SPINE WITH RADICULOPATHY, CERVICAL REGION: ICD-10-CM

## 2019-01-21 PROCEDURE — 20553 PR INJECT TRIGGER POINTS, > 3: ICD-10-PCS | Mod: S$GLB,,, | Performed by: NURSE PRACTITIONER

## 2019-01-21 PROCEDURE — 99213 PR OFFICE/OUTPT VISIT, EST, LEVL III, 20-29 MIN: ICD-10-PCS | Mod: 25,S$GLB,, | Performed by: NURSE PRACTITIONER

## 2019-01-21 PROCEDURE — 99999 PR PBB SHADOW E&M-EST. PATIENT-LVL III: ICD-10-PCS | Mod: PBBFAC,,, | Performed by: NURSE PRACTITIONER

## 2019-01-21 PROCEDURE — 20553 NJX 1/MLT TRIGGER POINTS 3/>: CPT | Mod: S$GLB,,, | Performed by: NURSE PRACTITIONER

## 2019-01-21 PROCEDURE — 99213 OFFICE O/P EST LOW 20 MIN: CPT | Mod: 25,S$GLB,, | Performed by: NURSE PRACTITIONER

## 2019-01-21 PROCEDURE — 99999 PR PBB SHADOW E&M-EST. PATIENT-LVL III: CPT | Mod: PBBFAC,,, | Performed by: NURSE PRACTITIONER

## 2019-01-21 RX ORDER — BUPIVACAINE HYDROCHLORIDE 5 MG/ML
9 INJECTION, SOLUTION EPIDURAL; INTRACAUDAL ONCE
Status: COMPLETED | OUTPATIENT
Start: 2019-01-21 | End: 2019-01-21

## 2019-01-21 RX ORDER — METHYLPREDNISOLONE ACETATE 40 MG/ML
40 INJECTION, SUSPENSION INTRA-ARTICULAR; INTRALESIONAL; INTRAMUSCULAR; SOFT TISSUE ONCE
Status: COMPLETED | OUTPATIENT
Start: 2019-01-21 | End: 2019-01-21

## 2019-01-21 RX ADMIN — METHYLPREDNISOLONE ACETATE 40 MG: 40 INJECTION, SUSPENSION INTRA-ARTICULAR; INTRALESIONAL; INTRAMUSCULAR; SOFT TISSUE at 03:01

## 2019-01-21 RX ADMIN — BUPIVACAINE HYDROCHLORIDE 45 MG: 5 INJECTION, SOLUTION EPIDURAL; INTRACAUDAL at 03:01

## 2019-01-21 NOTE — PROGRESS NOTES
Subjective:      Patient ID: Suhas Castillo is a 53 y.o. male.    Chief Complaint: Neck Pain    Referred by: No ref. provider found       HPI:    Suhas Castillo is a 53 y.o. male (RHD) who presents today with neck pain with radiation into the right upper extremity. He reports that he has had a h/o this same type and quality of pain before, but did not have radiation into his fingers. He did have a selective C6 nerve root block performed by Dr. Hughes in 2008 which gave her great relief for several years.  His pain returned 4.5 to 5 weeks ago. His pain is right sided in the neck and extends in the medial upper arm and lower arm and into his fingers. He has tingling at the fingertips. His pain is described in detail below.    Interval History (10/30/2015):  He returns today for follow up.  He reports that the cervical DEBORAH has been helpful for the pain.  He is currently pain free    Interval History (3/8/2017):  He returns today for follow up.  He reports that, following the LAURITA in 10/2015, he was pain free until a week ago when he increased his weights at the gym.  Now his pain has returned in the same location as before.    Interval History (9/27/2018):  He returns today for follow up.  He reports that the C7/T1 ILESI provided pain relief until this past Tuesday.  This previous weekend, he was doing some work at his mother's house where he pull on a pole.  He felt a twinge then then his pain returned on Tuesday.  The pain has been severe for the past 24 hr.  It is limiting his ability to do his daily activities and his job    Interval History (10/17/2018):  The patient returns to clinic today for follow up and image review. He is s/p C7-T1 IL DEBORAH on 9/28/2018. He reports 50% relief for about a week then his pain returned to baseline. He continues to report neck pain that radiates into his right shoulder and arm. He describes this pain as burning and tingling in nature. He denies any other health changes.      Interval History (11/14/2018):  The patient presents for follow up of neck pain.  He is s/p repeat cervical DEBORAH with 100% relief.  He is no longer having shooting pain.  He does have numbness to right hand, digits 1-3.  He denies any weakness.  His pain today is 0/10.    Interval History (1/21/2019):  The patient returns to clinic today for follow up. He reports increased right sided neck and shoulder pain in the last few days. The worst area is below his right shoulder blade. He describes this pain as tight, pulling, and burning in nature. He denies any injury or fall. He reports intermittent radiating pain down his right arm. He continues to perform a home exercise routine. He denies any other health changes. His pain today is 9/10.    Physical Therapy: Previously tried several years ago - no benefit.  Done before procedures    Non-pharmacologic Treatment: None         · TENS? None    Pain Medications:         · Currently taking: None    · Has tried in the past:  Tramadol 50mg prn and Diclofenac 75mg BID, minimal benefit.  OTC NSAIDS and tylenol    · Has not tried: Gabapentin    Blood thinners: None    Interventional Therapies:   · Selective C6 nerve root block (2008) - Dr. Hughes  · C7/T1 ILESI 10/2015: 100% relief until ~1 week ago  · C7/T1 ILESI 03/14/2017: 100% relief until Tuesday, 09/25/2018  · 10/26/18 C7/T1 IL DEBORAH- 100% relief    Relevant Surgeries: None    Affecting sleep? Yes    Affecting daily activities? Yes    Depressive symptoms? None          · SI/HI? No    Work status:     Pain Scales  Best: 5/10  Worst: 7/10  Usually: 0/10  Today: 9/10    Neck Pain    This is a chronic problem. The current episode started more than 1 year ago. The problem occurs constantly. The problem has been gradually worsening. The pain is present in the anterior neck and midline. Quality: throbbing numb. The pain is at a severity of 7/10. The pain is mild. Exacerbated by: activity  The pain is same all  the time. Stiffness is present all day. He has tried oral narcotics and injection treatment for the symptoms. The treatment provided significant relief.       Review of Systems   Cardiovascular:        Hypertension   Endocrine:        Diabetes   Musculoskeletal: Positive for neck pain.   All other systems reviewed and are negative.            Past Medical History:   Diagnosis Date    AR (allergic rhinitis)     Cervical spinal stenosis     Diabetes mellitus type II     DJD (degenerative joint disease), cervical     Hyperlipidemia     Hypertension        Past Surgical History:   Procedure Laterality Date    EPIDURAL BLOCK INJECTION      cervical x 2    Injection, Steroid, Epidural  C7-T1 N/A 9/28/2018    [facility]    Injection, Steroid, Epidural CERVICAL C7-T1 DEBORAH N/A 10/26/2018    [facility]    INJECTION-STEROID-EPIDURAL-CERVICAL N/A 3/14/2017    [facility]    INJECTION-STEROID-EPIDURAL-CERVICAL N/A 10/6/2015    [facility]    TESTICLE SURGERY  age 16    torsion       Review of patient's allergies indicates:  No Known Allergies    Current Outpatient Medications   Medication Sig Dispense Refill    albuterol 90 mcg/actuation inhaler Inhale 2 puffs into the lungs every 4 (four) hours as needed for Wheezing or Shortness of Breath. 18 g 0    amlodipine-benazepril (LOTREL) 10-40 mg per capsule TAKE 1 CAPSULE BY MOUTH ONCE DAILY 90 capsule 3    blood sugar diagnostic (CONTOUR TEST STRIPS) Strp USE TO TEST BLOOD SUGAR TWICE A DAY 50 each 11    FARXIGA 5 mg Tab tablet TAKE 1 TABLET BY MOUTH EVERY DAY 30 tablet 3    fluocinonide (LIDEX) 0.05 % external solution As directed  0    fluticasone (FLONASE) 50 mcg/actuation nasal spray 2 sprays (100 mcg total) by Each Nare route once daily. 16 g 2    JANUVIA 100 mg Tab TAKE 1 TABLET BY MOUTH ONCE DAILY 30  "tablet 3    metFORMIN (GLUCOPHAGE-XR) 500 MG 24 hr tablet Take 2 tablets (1,000 mg total) by mouth 2 (two) times daily. 360 tablet 3    simvastatin (ZOCOR) 20 MG tablet Take 0.5 tablets (10 mg total) by mouth every evening. 90 tablet 2     No current facility-administered medications for this visit.        Family History   Problem Relation Age of Onset    Hypertension Mother     Diabetes Mother     Stroke Mother        Social History     Socioeconomic History    Marital status: Single     Spouse name: Not on file    Number of children: 4    Years of education: Not on file    Highest education level: Not on file   Social Needs    Financial resource strain: Not on file    Food insecurity - worry: Not on file    Food insecurity - inability: Not on file    Transportation needs - medical: Not on file    Transportation needs - non-medical: Not on file   Occupational History    Occupation: secutiry supervisor     Employer: [employer]   Tobacco Use    Smoking status: Never Smoker    Smokeless tobacco: Never Used   Substance and Sexual Activity    Alcohol use: No     Alcohol/week: 0.0 oz     Comment: none x 4 years; previously social drinker    Drug use: No    Sexual activity: Yes     Partners: Female     Birth control/protection: None   Other Topics Concern    Not on file   Social History Narrative    The patient does exercise regularly (treadmill, light weights: TIW-QIW).      Rates diet as fair.      He is satisfied with weight.           Objective:     Vitals:    01/21/19 1515   BP: (!) 145/87   Pulse: 81   Resp: 18   Temp: 98.2 °F (36.8 °C)   TempSrc: Oral   Weight: 107 kg (235 lb 14.3 oz)   Height: 6' 2" (1.88 m)   PainSc:   9       GEN:  Well developed, well nourished.  No acute distress.  No pain behavior.  HEENT:  No trauma.  Mucous membranes moist.  Nares patent bilaterally.  PSYCH: Normal affect. Thought content appropriate.  CHEST:  Breathing symmetric.  No audible wheezing.  ABD: " Soft, non-tender, non-distended.  SKIN:  Warm, pink, dry.  No rash on exposed areas.    EXT:  No cyanosis, clubbing, or edema.  No color change or changes in nail or hair growth.  NEURO/MUSCULOSKELETAL:  Fully alert, oriented, and appropriate. Speech normal juan. No cranial nerve deficits.   Gait: normal.  5/5 motor strength throughout upper extremities.   C-Spine:  Normal ROM with pain on flexion, extension, and lateral rotation.  Negative Spurling.  Negative facet loading bilaterally.  No sensory deficit in BUE   TTP over right trapezius and rhomboid muscles.   Negative TTP cervical paraspinal muscles, shoulders, elbows or hands.   Negative shirley's.  Positive Tinel's over right cubital tunnel.    Imaging:      MRI Cervical Spine 10/12/2018:  FINDINGS:  Alignment: Normal.    Vertebrae: Normal marrow signal. No fracture.    Discs: Normal height and signal.    Cord: Normal.    Skull base and craniocervical junction: Normal.    Degenerative findings:    C2-C3: Disc osteophyte complex resulting in mild left neural foraminal narrowing.    C3-C4: Disc osteophyte complex.  This results in mild canal stenosis and moderate left and mild right neural foraminal narrowing.    C4-C5: There is a disc osteophyte complex which was results in mild canal stenosis, moderate severe left and moderate right neural foraminal narrowing.    C5-C6: Disc osteophyte complex results in mild canal stenosis, mild left and moderate-severe right neural foraminal narrowing.    C6-C7: Disc osteophyte complex resulting in moderate bilateral neural foraminal narrowing.  No significant canal stenosis.    C7-T1: Unremarkable.    Paraspinal muscles & soft tissues: Unremarkable.      Impression       Multilevel degenerative changes as outlined above.  This is most prominent at the C3-4, C4-5, C5-6.         Cervical Xray (6/10/15)  Impression     Multilevel degenerative changes as discussed above, most pronounced at the C5-6 level. There has been some  progression of this degenerative change, particularly with regard to be more pronounced vertebral endplate spurring, since the 2008 study.     Cervical MRI from 2008 in [facility] shows large disk protrusion at C5/6 to the right.    Assessment:       Encounter Diagnoses   Name Primary?    Myofascial pain Yes    Cervical radiculopathy     DDD (degenerative disc disease), cervical     Spinal stenosis of cervical region     Osteoarthritis of spine with radiculopathy, cervical region          Plan:       Suhas was seen today for neck pain.    Diagnoses and all orders for this visit:    Myofascial pain  -     methylPREDNISolone acetate injection 40 mg  -     bupivacaine (PF) injection 45 mg    Cervical radiculopathy    DDD (degenerative disc disease), cervical    Spinal stenosis of cervical region    Osteoarthritis of spine with radiculopathy, cervical region       Neck pain is consistent with cervical radiculopathy    I discussed the treatment options with the patient today, including risks, benefits, and alternatives. All available images were reviewed. The patient is aware of the risks and benefits of the medications being prescribed, common side effects, and proper usage.    1. Trigger point injections as per below.  2. We can repeat cervical DEBORAH as needed.   3. The patient will continue a home exercise routine to help with pain and strengthening.    4. RTC in 2 weeks.     The above plan and management options were discussed at length with patient. Patient is in agreement with the above and verbalized understanding.     Roseline Spicer NP  01/21/2019    Trigger Point Injection:   The procedure was discussed with the patient including complications of nerve damage,  bleeding, infection, and failure of pain relief.   Trigger points were identified by palpation and marked. Alcohol prep of sites done. A mixture of 9mL 0.5% bupivacaine +40mg Depo-Medrol was prepared (10 mL total).   A 27-gauge needle was  advanced to the point of maximal tenderness, and  1.5 mL  was injected after negative aspiration. All sites done in the same manner. Patient tolerated the procedure well and without complications. Sites injected included: trapezius x1 on the right, rhomboid x2 on the right (3 total).          Ortho/SPM Exam

## 2019-01-21 NOTE — TELEPHONE ENCOUNTER
----- Message from Rafael Morales sent at 1/21/2019 11:41 AM CST -----  Contact: TERESE CAMPBELL [[id number]]      Can the clinic reply in [facility]: yes      Please refill the medication(s) listed below. Please call the patient when the prescription(s) is ready for  at this phone number   [phone]      Medication #1 blood sugar diagnostic (CONTOUR TEST STRIPS) Strp      Preferred Pharmacy: [facility] [address] [place] [address] AT [facility]

## 2019-02-04 ENCOUNTER — OFFICE VISIT (OUTPATIENT)
Dept: PAIN MEDICINE | Facility: CLINIC | Age: 54
End: 2019-02-04
Payer: COMMERCIAL

## 2019-02-04 VITALS
BODY MASS INDEX: 31.21 KG/M2 | HEIGHT: 74 IN | DIASTOLIC BLOOD PRESSURE: 90 MMHG | HEART RATE: 72 BPM | WEIGHT: 243.19 LBS | TEMPERATURE: 98 F | SYSTOLIC BLOOD PRESSURE: 140 MMHG

## 2019-02-04 DIAGNOSIS — M54.12 CERVICAL RADICULOPATHY: Primary | ICD-10-CM

## 2019-02-04 DIAGNOSIS — M48.02 SPINAL STENOSIS OF CERVICAL REGION: ICD-10-CM

## 2019-02-04 DIAGNOSIS — M50.30 DDD (DEGENERATIVE DISC DISEASE), CERVICAL: ICD-10-CM

## 2019-02-04 DIAGNOSIS — M47.22 OSTEOARTHRITIS OF SPINE WITH RADICULOPATHY, CERVICAL REGION: ICD-10-CM

## 2019-02-04 PROCEDURE — 99213 PR OFFICE/OUTPT VISIT, EST, LEVL III, 20-29 MIN: ICD-10-PCS | Mod: S$GLB,,, | Performed by: NURSE PRACTITIONER

## 2019-02-04 PROCEDURE — 99999 PR PBB SHADOW E&M-EST. PATIENT-LVL III: ICD-10-PCS | Mod: PBBFAC,,, | Performed by: NURSE PRACTITIONER

## 2019-02-04 PROCEDURE — 99213 OFFICE O/P EST LOW 20 MIN: CPT | Mod: S$GLB,,, | Performed by: NURSE PRACTITIONER

## 2019-02-04 PROCEDURE — 99999 PR PBB SHADOW E&M-EST. PATIENT-LVL III: CPT | Mod: PBBFAC,,, | Performed by: NURSE PRACTITIONER

## 2019-02-04 NOTE — PROGRESS NOTES
Subjective:      Patient ID: Suhas Castillo is a 53 y.o. male.    Chief Complaint: No chief complaint on file.    Referred by: No ref. provider found       HPI:    Suhas Castillo is a 53 y.o. male (RHD) who presents today with neck pain with radiation into the right upper extremity. He reports that he has had a h/o this same type and quality of pain before, but did not have radiation into his fingers. He did have a selective C6 nerve root block performed by Dr. Hughes in 2008 which gave her great relief for several years.  His pain returned 4.5 to 5 weeks ago. His pain is right sided in the neck and extends in the medial upper arm and lower arm and into his fingers. He has tingling at the fingertips. His pain is described in detail below.    Interval History (10/30/2015):  He returns today for follow up.  He reports that the cervical DEBORAH has been helpful for the pain.  He is currently pain free    Interval History (3/8/2017):  He returns today for follow up.  He reports that, following the LAURITA in 10/2015, he was pain free until a week ago when he increased his weights at the gym.  Now his pain has returned in the same location as before.    Interval History (9/27/2018):  He returns today for follow up.  He reports that the C7/T1 ILESI provided pain relief until this past Tuesday.  This previous weekend, he was doing some work at his mother's house where he pull on a pole.  He felt a twinge then then his pain returned on Tuesday.  The pain has been severe for the past 24 hr.  It is limiting his ability to do his daily activities and his job    Interval History (10/17/2018):  The patient returns to clinic today for follow up and image review. He is s/p C7-T1 IL DEBORAH on 9/28/2018. He reports 50% relief for about a week then his pain returned to baseline. He continues to report neck pain that radiates into his right shoulder and arm. He describes this pain as burning and tingling in nature. He denies any other  health changes.     Interval History (11/14/2018):  The patient presents for follow up of neck pain.  He is s/p repeat cervical DEBORAH with 100% relief.  He is no longer having shooting pain.  He does have numbness to right hand, digits 1-3.  He denies any weakness.  His pain today is 0/10.    Interval History (1/21/2019):  The patient returns to clinic today for follow up. He reports increased right sided neck and shoulder pain in the last few days. The worst area is below his right shoulder blade. He describes this pain as tight, pulling, and burning in nature. He denies any injury or fall. He reports intermittent radiating pain down his right arm. He continues to perform a home exercise routine. He denies any other health changes. His pain today is 9/10.    Interval History (2/4/2019):  The patient returns to clinic today for follow up. He reports trigger point injections provided significant relief of his shoulder pain. He continues to report neck pain that radiates down his right arm to his ring finger. He describes this pain as burning and shooting. He denies any left arm pain. He continues to perform a home exercise routine. He denies any other health changes.     Physical Therapy: Previously tried several years ago - no benefit.  Done before procedures    Non-pharmacologic Treatment: None         · TENS? None    Pain Medications:         · Currently taking: None    · Has tried in the past:  Tramadol 50mg prn and Diclofenac 75mg BID, minimal benefit.  OTC NSAIDS and tylenol    · Has not tried: Gabapentin    Blood thinners: None    Interventional Therapies:   · Selective C6 nerve root block (2008) - Dr. Hughes  · C7/T1 ILESI 10/2015: 100% relief until ~1 week ago  · C7/T1 ILESI 03/14/2017: 100% relief until Tuesday, 09/25/2018  · 10/26/18 C7/T1 IL DEBORAH- 100% relief    Relevant Surgeries: None    Affecting sleep? Yes    Affecting daily activities? Yes    Depressive symptoms? None          · SI/HI? No    Work  status:     Pain Scales  Best: 5/10  Worst: 7/10  Usually: 0/10  Today: 9/10    Neck Pain    This is a chronic problem. The current episode started more than 1 year ago. The problem occurs constantly. The problem has been gradually worsening. The pain is present in the anterior neck and midline. Quality: throbbing numb. The pain is at a severity of 7/10. The pain is mild. Exacerbated by: activity  The pain is same all the time. Stiffness is present all day. He has tried oral narcotics and injection treatment for the symptoms. The treatment provided significant relief.       Review of Systems   Cardiovascular:        Hypertension   Endocrine:        Diabetes   Musculoskeletal: Positive for neck pain.   All other systems reviewed and are negative.            Past Medical History:   Diagnosis Date    AR (allergic rhinitis)     Cervical spinal stenosis     Diabetes mellitus type II     DJD (degenerative joint disease), cervical     Hyperlipidemia     Hypertension        Past Surgical History:   Procedure Laterality Date    EPIDURAL BLOCK INJECTION      cervical x 2    Injection, Steroid, Epidural  C7-T1 N/A 9/28/2018    [facility]    Injection, Steroid, Epidural CERVICAL C7-T1 DEBORAH N/A 10/26/2018    [facility]    INJECTION-STEROID-EPIDURAL-CERVICAL N/A 3/14/2017    [facility]    INJECTION-STEROID-EPIDURAL-CERVICAL N/A 10/6/2015    Performed by [facility] Gaines [facility] at [facility]    TESTICLE SURGERY  age 16    torsion       Review of patient's allergies indicates:  No Known Allergies    Current Outpatient Medications   Medication Sig Dispense Refill    albuterol 90 mcg/actuation inhaler Inhale 2 puffs into the lungs every 4 (four) hours as needed for Wheezing or Shortness of Breath. 18 g 0    amlodipine-benazepril (LOTREL) 10-40 mg per capsule TAKE 1 CAPSULE BY MOUTH ONCE  DAILY 90 capsule 3    blood sugar diagnostic (CONTOUR TEST STRIPS) Strp USE TO TEST BLOOD SUGAR TWICE A DAY 50 each 11    FARXIGA 5 mg Tab tablet TAKE 1 TABLET BY MOUTH EVERY DAY 30 tablet 3    fluocinonide (LIDEX) 0.05 % external solution As directed  0    fluticasone (FLONASE) 50 mcg/actuation nasal spray 2 sprays (100 mcg total) by Each Nare route once daily. 16 g 2    JANUVIA 100 mg Tab TAKE 1 TABLET BY MOUTH ONCE DAILY 30 tablet 3    metFORMIN (GLUCOPHAGE-XR) 500 MG 24 hr tablet Take 2 tablets (1,000 mg total) by mouth 2 (two) times daily. 360 tablet 3    simvastatin (ZOCOR) 20 MG tablet Take 0.5 tablets (10 mg total) by mouth every evening. 90 tablet 2     No current facility-administered medications for this visit.        Family History   Problem Relation Age of Onset    Hypertension Mother     Diabetes Mother     Stroke Mother        Social History     Socioeconomic History    Marital status: Single     Spouse name: Not on file    Number of children: 4    Years of education: Not on file    Highest education level: Not on file   Social Needs    Financial resource strain: Not on file    Food insecurity - worry: Not on file    Food insecurity - inability: Not on file    Transportation needs - medical: Not on file    Transportation needs - non-medical: Not on file   Occupational History    Occupation: secutiry supervisor     Employer: [employer]   Tobacco Use    Smoking status: Never Smoker    Smokeless tobacco: Never Used   Substance and Sexual Activity    Alcohol use: No     Alcohol/week: 0.0 oz     Comment: none x 4 years; previously social drinker    Drug use: No    Sexual activity: Yes     Partners: Female     Birth control/protection: None   Other Topics Concern    Not on file   Social History Narrative    The patient does exercise regularly (treadmill, light weights: TIW-QIW).      Rates diet as fair.      He is satisfied with weight.           Objective:     Vitals:  "   02/04/19 0852   BP: (!) 140/90   Pulse: 72   Temp: 97.9 °F (36.6 °C)   Weight: 110.3 kg (243 lb 2.7 oz)   Height: 6' 2" (1.88 m)   PainSc:   9       GEN:  Well developed, well nourished.  No acute distress.  No pain behavior.  HEENT:  No trauma.  Mucous membranes moist.  Nares patent bilaterally.  PSYCH: Normal affect. Thought content appropriate.  CHEST:  Breathing symmetric.  No audible wheezing.  ABD: Soft, non-tender, non-distended.  SKIN:  Warm, pink, dry.  No rash on exposed areas.    EXT:  No cyanosis, clubbing, or edema.  No color change or changes in nail or hair growth.  NEURO/MUSCULOSKELETAL:  Fully alert, oriented, and appropriate. Speech normal jaun. No cranial nerve deficits.   Gait: normal.  5/5 motor strength throughout upper extremities.   C-Spine:  Normal ROM with pain on flexion and extension.  Positive Spurling on the right, negative on the left.  Negative facet loading bilaterally.  No sensory deficit in BUE   TTP over right trapezius and rhomboid muscles.   Negative TTP cervical paraspinal muscles, shoulders, elbows or hands.   Negative shirley's.  Positive Tinel's over right cubital tunnel.    Imaging:      MRI Cervical Spine 10/12/2018:  FINDINGS:  Alignment: Normal.    Vertebrae: Normal marrow signal. No fracture.    Discs: Normal height and signal.    Cord: Normal.    Skull base and craniocervical junction: Normal.    Degenerative findings:    C2-C3: Disc osteophyte complex resulting in mild left neural foraminal narrowing.    C3-C4: Disc osteophyte complex.  This results in mild canal stenosis and moderate left and mild right neural foraminal narrowing.    C4-C5: There is a disc osteophyte complex which was results in mild canal stenosis, moderate severe left and moderate right neural foraminal narrowing.    C5-C6: Disc osteophyte complex results in mild canal stenosis, mild left and moderate-severe right neural foraminal narrowing.    C6-C7: Disc osteophyte complex resulting in " moderate bilateral neural foraminal narrowing.  No significant canal stenosis.    C7-T1: Unremarkable.    Paraspinal muscles & soft tissues: Unremarkable.      Impression       Multilevel degenerative changes as outlined above.  This is most prominent at the C3-4, C4-5, C5-6.         Cervical Xray (6/10/15)  Impression     Multilevel degenerative changes as discussed above, most pronounced at the C5-6 level. There has been some progression of this degenerative change, particularly with regard to be more pronounced vertebral endplate spurring, since the 2008 study.     Cervical MRI from 2008 in [facility] shows large disk protrusion at C5/6 to the right.    Assessment:       Encounter Diagnoses   Name Primary?    Cervical radiculopathy Yes    Spinal stenosis of cervical region     Osteoarthritis of spine with radiculopathy, cervical region     DDD (degenerative disc disease), cervical          Plan:       Suhas was seen today for neck pain.    Diagnoses and all orders for this visit:    Cervical radiculopathy    Spinal stenosis of cervical region    Osteoarthritis of spine with radiculopathy, cervical region    DDD (degenerative disc disease), cervical       Neck pain is consistent with cervical radiculopathy    I discussed the treatment options with the patient today, including risks, benefits, and alternatives. All available images were reviewed. The patient is aware of the risks and benefits of the medications being prescribed, common side effects, and proper usage.    1. Schedule for C7-T1 IL DEBORAH. This will be done with first available provider as Dr. Estrada is on leave. The procedure, risks, benefits and options were discussed with patient. There are no contraindications to the procedure. The patient expressed understanding and agreed to proceed.    2. The patient will continue a home exercise routine to help with pain and strengthening.    3. RTC 2 weeks after above procedure.     The above plan and  management options were discussed at length with patient. Patient is in agreement with the above and verbalized understanding.     Roseline Spicer NP  02/04/2019            Ortho/SPM Exam

## 2019-02-07 ENCOUNTER — HOSPITAL ENCOUNTER (OUTPATIENT)
Facility: OTHER | Age: 54
Discharge: HOME | End: 2019-02-07
Attending: ANESTHESIOLOGY | Admitting: ANESTHESIOLOGY
Payer: COMMERCIAL

## 2019-02-07 VITALS
SYSTOLIC BLOOD PRESSURE: 131 MMHG | DIASTOLIC BLOOD PRESSURE: 77 MMHG | TEMPERATURE: 98 F | HEIGHT: 74 IN | WEIGHT: 243 LBS | HEART RATE: 67 BPM | OXYGEN SATURATION: 95 % | RESPIRATION RATE: 18 BRPM | BODY MASS INDEX: 31.18 KG/M2

## 2019-02-07 DIAGNOSIS — M50.30 DDD (DEGENERATIVE DISC DISEASE), CERVICAL: Primary | ICD-10-CM

## 2019-02-07 LAB — POCT GLUCOSE: 78 MG/DL (ref 70–110)

## 2019-02-07 PROCEDURE — 82947 ASSAY GLUCOSE BLOOD QUANT: CPT | Performed by: ANESTHESIOLOGY

## 2019-02-07 PROCEDURE — 25500020 PHARM REV CODE 255: Performed by: ANESTHESIOLOGY

## 2019-02-07 PROCEDURE — 62321 NJX INTERLAMINAR CRV/THRC: CPT | Performed by: ANESTHESIOLOGY

## 2019-02-07 PROCEDURE — 63600175 PHARM REV CODE 636 W HCPCS: Performed by: ANESTHESIOLOGY

## 2019-02-07 PROCEDURE — 25000003 PHARM REV CODE 250: Performed by: ANESTHESIOLOGY

## 2019-02-07 PROCEDURE — A4216 STERILE WATER/SALINE, 10 ML: HCPCS | Performed by: ANESTHESIOLOGY

## 2019-02-07 PROCEDURE — 62321 NJX INTERLAMINAR CRV/THRC: CPT | Mod: ,,, | Performed by: ANESTHESIOLOGY

## 2019-02-07 PROCEDURE — 62321 PR INJ CERV/THORAC, W/GUIDANCE: ICD-10-PCS | Mod: ,,, | Performed by: ANESTHESIOLOGY

## 2019-02-07 RX ORDER — SODIUM CHLORIDE 9 MG/ML
INJECTION, SOLUTION INTRAMUSCULAR; INTRAVENOUS; SUBCUTANEOUS
Status: DISCONTINUED | OUTPATIENT
Start: 2019-02-07 | End: 2019-02-07 | Stop reason: HOSPADM

## 2019-02-07 RX ORDER — LIDOCAINE HYDROCHLORIDE 10 MG/ML
INJECTION, SOLUTION EPIDURAL; INFILTRATION; INTRACAUDAL; PERINEURAL
Status: DISCONTINUED | OUTPATIENT
Start: 2019-02-07 | End: 2019-02-07 | Stop reason: HOSPADM

## 2019-02-07 RX ORDER — LIDOCAINE HYDROCHLORIDE 10 MG/ML
INJECTION INFILTRATION; PERINEURAL
Status: DISCONTINUED | OUTPATIENT
Start: 2019-02-07 | End: 2019-02-07 | Stop reason: HOSPADM

## 2019-02-07 RX ORDER — SODIUM CHLORIDE 9 MG/ML
500 INJECTION, SOLUTION INTRAVENOUS CONTINUOUS
Status: DISCONTINUED | OUTPATIENT
Start: 2019-02-07 | End: 2019-02-07 | Stop reason: HOSPADM

## 2019-02-07 RX ORDER — ALPRAZOLAM 0.5 MG/1
1 TABLET, ORALLY DISINTEGRATING ORAL
Status: DISCONTINUED | OUTPATIENT
Start: 2019-02-07 | End: 2019-02-07 | Stop reason: HOSPADM

## 2019-02-07 RX ORDER — DEXAMETHASONE SODIUM PHOSPHATE 100 MG/10ML
INJECTION INTRAMUSCULAR; INTRAVENOUS
Status: DISCONTINUED | OUTPATIENT
Start: 2019-02-07 | End: 2019-02-07 | Stop reason: HOSPADM

## 2019-02-07 RX ADMIN — ALPRAZOLAM 1 MG: 0.5 TABLET, ORALLY DISINTEGRATING ORAL at 03:02

## 2019-02-07 NOTE — OP NOTE
Cervical Interlaminar Epidural Steroid Injection under fluoroscopic guidance.  Time-out taken to identify patient and procedure side prior to starting the procedure.   I attest that I have reviewed the patient's home medications prior to the procedure and no contraindication have been identified. I  re-evaluated the patient after the patient was positioned for the procedure in the procedure room immediately before the procedural time-out. The vital signs are current and represent the current state of the patient which has not significantly changed since the preprocedure assessment.                                                            Date of Service: 02/07/2019    PCP: Josiah Ching Jr, MD    Procedure: C7-T1 cervical interlaminar epidural steroid injection under fluoroscopy.    Reasons for procedure: Cervical radiculopathy  POST PROCEDURE DX: Cervical radiculopathy    Physician: Tania Bajwa MD  ASSISTANTS: Emigdio Fox DO    Medications injected:  Preservative-free dexamethasone 10mg and Xylocaine 1% MPF 1ml.  This was followed by a slow injection of 4 mL sterile, preservative-free normal saline.    Local anesthetic used: Xylocaine 1% 9ml with Sodium Bicarbonate 1ml.     Sedation Medications: None    Complications:  none.    Estimated blood loss: none.    Technique:  With the patient laying in a prone position with the neck in a flexed forward position, the area was prepped and draped in the usual sterile fashion using ChloraPrep and a fenestrated drape.  The area was determined under fluoroscopic guidance.  Local anesthetic was given using a 27-gauge needle by raising a wheal and going down to the osseous elements of the cervical spine.  A 3.5 inch 20-gauge Touhy needle was introduced under fluoroscopic guidance to meet the lamina of T1.  The needle was then hinged under the lamina then advanced using loss of resistance technique.  Once the tip of the needle was in the desired position, the contrast dye  Omnipaque was injected to determine placement and no vascular runoff.  Digital subtraction was employed to confirm that there is no vascular runoff.  The steroid was then injected slowly followed by a slow injection of the 4 mL of the sterile preservative-free normal saline.  The patient tolerated the procedure well.    Pain before the procedure: 8/10    Pain after the procedure: 0/10    The patient was monitored after the procedure and was given post-procedure and discharge instructions to follow at home. The patient was discharged in a stable condition

## 2019-02-07 NOTE — DISCHARGE INSTRUCTIONS
Thank you for allowing us to care for you today. You may receive a survey about the care we provided. Your feedback is valuable and helps us provide excellent care throughout the community.     Home Care Instructions for Pain Management:    1. DIET:   You may resume your normal diet today.   2. BATHING:   You may shower with luke warm water. No tub baths or anything that will soak injection sites under water for the next 24 hours.  3. DRESSING:   You may remove your bandage today.   4. ACTIVITY LEVEL:   You may resume your normal activities 24 hrs after your procedure. Nothing strenuous today.  5. MEDICATIONS:   You may resume your normal medications today. To restart blood thinners, ask your doctor.  6. DRIVING    If you have received any sedatives by mouth today, you may not drive for 12 hours.    If you have received any sedation through your IV, you may not drive for 24 hrs.   7. SPECIAL INSTRUCTIONS:   No heat to the injection site for 24 hrs including, hot bath or shower, heating pad, moist heat, or hot tubs.    Use ice pack to injection site for any pain or discomfort.  Apply ice packs for 20 minute intervals as needed.    IF you have diabetes, be sure to monitor your blood sugar more closely. IF your injection contained steroids your blood sugar levels may become higher than normal.    If you are still having pain upon discharge:  Your pain may improve over the next 48 hours. The anesthetic (numbing medication) works immediately to 48 hours. IF your injection contained a steroid (anti-inflammatory medication), it takes approximately 3 days to start feeling relief and 7-10 days to see your greatest results from the medication. It is possible you may need subsequent injections. This would be discussed at your follow up appointment with pain management or your referring doctor.      PLEASE CALL YOUR DOCTOR IF:  1. Redness or swelling around the injection site.  2. Fever of 101 degrees or more  3. Drainage  (pus) from the injection site.  4. For any continuous bleeding (some dried blood over the incision is normal.)    FOR EMERGENCIES:   If any unusual problems or difficulties occur during clinic hours, call [phone] or [phone].

## 2019-02-07 NOTE — PLAN OF CARE
Pt AAOx3, pt able to tolerate PO intake at this time. Pt able to stand and ambulate independently. Pt denies pain at this time, bandaids CDI. Pt awaiting transport. Will continue to monitor.

## 2019-02-22 ENCOUNTER — OFFICE VISIT (OUTPATIENT)
Dept: PAIN MEDICINE | Facility: CLINIC | Age: 54
End: 2019-02-22
Payer: COMMERCIAL

## 2019-02-22 VITALS
HEIGHT: 74 IN | SYSTOLIC BLOOD PRESSURE: 118 MMHG | HEART RATE: 80 BPM | BODY MASS INDEX: 32 KG/M2 | WEIGHT: 249.31 LBS | DIASTOLIC BLOOD PRESSURE: 78 MMHG | TEMPERATURE: 98 F

## 2019-02-22 DIAGNOSIS — M79.18 MYOFASCIAL PAIN: ICD-10-CM

## 2019-02-22 DIAGNOSIS — M54.12 CERVICAL RADICULOPATHY: Primary | ICD-10-CM

## 2019-02-22 DIAGNOSIS — M48.02 SPINAL STENOSIS OF CERVICAL REGION: ICD-10-CM

## 2019-02-22 DIAGNOSIS — M50.30 DDD (DEGENERATIVE DISC DISEASE), CERVICAL: ICD-10-CM

## 2019-02-22 DIAGNOSIS — M47.22 OSTEOARTHRITIS OF SPINE WITH RADICULOPATHY, CERVICAL REGION: ICD-10-CM

## 2019-02-22 PROCEDURE — 99213 OFFICE O/P EST LOW 20 MIN: CPT | Mod: S$GLB,,, | Performed by: NURSE PRACTITIONER

## 2019-02-22 PROCEDURE — 99999 PR PBB SHADOW E&M-EST. PATIENT-LVL III: ICD-10-PCS | Mod: PBBFAC,,, | Performed by: NURSE PRACTITIONER

## 2019-02-22 PROCEDURE — 99999 PR PBB SHADOW E&M-EST. PATIENT-LVL III: CPT | Mod: PBBFAC,,, | Performed by: NURSE PRACTITIONER

## 2019-02-22 PROCEDURE — 99213 PR OFFICE/OUTPT VISIT, EST, LEVL III, 20-29 MIN: ICD-10-PCS | Mod: S$GLB,,, | Performed by: NURSE PRACTITIONER

## 2019-02-22 NOTE — PROGRESS NOTES
Subjective:      Patient ID: Suhas Castillo is a 53 y.o. male.    Chief Complaint: Follow-up (2 week after DEBORAH)    Referred by: No ref. provider found       HPI:    Suhas Castillo is a 53 y.o. male (RHD) who presents today with neck pain with radiation into the right upper extremity. He reports that he has had a h/o this same type and quality of pain before, but did not have radiation into his fingers. He did have a selective C6 nerve root block performed by Dr. Hughes in 2008 which gave her great relief for several years.  His pain returned 4.5 to 5 weeks ago. His pain is right sided in the neck and extends in the medial upper arm and lower arm and into his fingers. He has tingling at the fingertips. His pain is described in detail below.    Interval History (10/30/2015):  He returns today for follow up.  He reports that the cervical DEBORAH has been helpful for the pain.  He is currently pain free    Interval History (3/8/2017):  He returns today for follow up.  He reports that, following the LAURITA in 10/2015, he was pain free until a week ago when he increased his weights at the gym.  Now his pain has returned in the same location as before.    Interval History (9/27/2018):  He returns today for follow up.  He reports that the C7/T1 ILESI provided pain relief until this past Tuesday.  This previous weekend, he was doing some work at his mother's house where he pull on a pole.  He felt a twinge then then his pain returned on Tuesday.  The pain has been severe for the past 24 hr.  It is limiting his ability to do his daily activities and his job    Interval History (10/17/2018):  The patient returns to clinic today for follow up and image review. He is s/p C7-T1 IL DEBORAH on 9/28/2018. He reports 50% relief for about a week then his pain returned to baseline. He continues to report neck pain that radiates into his right shoulder and arm. He describes this pain as burning and tingling in nature. He denies any other  health changes.     Interval History (11/14/2018):  The patient presents for follow up of neck pain.  He is s/p repeat cervical DEBORAH with 100% relief.  He is no longer having shooting pain.  He does have numbness to right hand, digits 1-3.  He denies any weakness.  His pain today is 0/10.    Interval History (1/21/2019):  The patient returns to clinic today for follow up. He reports increased right sided neck and shoulder pain in the last few days. The worst area is below his right shoulder blade. He describes this pain as tight, pulling, and burning in nature. He denies any injury or fall. He reports intermittent radiating pain down his right arm. He continues to perform a home exercise routine. He denies any other health changes. His pain today is 9/10.    Interval History (2/4/2019):  The patient returns to clinic today for follow up. He reports trigger point injections provided significant relief of his shoulder pain. He continues to report neck pain that radiates down his right arm to his ring finger. He describes this pain as burning and shooting. He denies any left arm pain. He continues to perform a home exercise routine. He denies any other health changes.     Interval History (2/22/2019):  The patient returns to clinic today for follow up. He is s/p C7-T1 IL DEBORAH on 2/7/2019. He reports 90% relief of his neck and arm pain. He reports intermittent aching neck pain that is tolerable at this time. He denies any radiating leg pain. He continues to perform a home exercise routine. He denies any other health changes.     Physical Therapy: Previously tried several years ago - no benefit.  Done before procedures    Non-pharmacologic Treatment: None         · TENS? None    Pain Medications:         · Currently taking: None    · Has tried in the past:  Tramadol 50mg prn and Diclofenac 75mg BID, minimal benefit.  OTC NSAIDS and tylenol    · Has not tried: Gabapentin    Blood thinners: None    Interventional Therapies:    · Selective C6 nerve root block (2008) - Dr. Hughes  · C7/T1 ILESI 10/2015: 100% relief until ~1 week ago  · C7/T1 ILESI 03/14/2017: 100% relief until Tuesday, 09/25/2018  · 10/26/18 C7/T1 IL DEBORAH- 100% relief  · 2/7/2019- C7-T1 IL DEBORAH    Relevant Surgeries: None    Affecting sleep? Yes    Affecting daily activities? Yes    Depressive symptoms? None          · SI/HI? No    Work status:     Pain Scales  Best: 5/10  Worst: 7/10  Usually: 0/10  Today: 1/10    Neck Pain    This is a chronic problem. The current episode started more than 1 year ago. The problem occurs constantly. The problem has been gradually worsening. The pain is present in the anterior neck and midline. Quality: throbbing numb. The pain is at a severity of 7/10. The pain is mild. Exacerbated by: activity  The pain is same all the time. Stiffness is present all day. He has tried oral narcotics and injection treatment for the symptoms. The treatment provided significant relief.       Review of Systems   Cardiovascular:        Hypertension   Endocrine:        Diabetes   Musculoskeletal: Positive for neck pain.   All other systems reviewed and are negative.            Past Medical History:   Diagnosis Date    AR (allergic rhinitis)     Cervical spinal stenosis     Diabetes mellitus type II     DJD (degenerative joint disease), cervical     Hyperlipidemia     Hypertension        Past Surgical History:   Procedure Laterality Date    EPIDURAL BLOCK INJECTION      cervical x 2    INJECTION, STEROID, EPIDURAL N/A 2/7/2019    [facility]    Injection, Steroid, Epidural  C7-T1 N/A 9/28/2018    [facility]    Injection, Steroid, Epidural CERVICAL C7-T1 DEBORAH N/A 10/26/2018    [facility]    INJECTION-STEROID-EPIDURAL-CERVICAL N/A 3/14/2017    [facility]     INJECTION-STEROID-EPIDURAL-CERVICAL N/A 10/6/2015    [facility]    TESTICLE SURGERY  age 16    torsion       Review of patient's allergies indicates:  No Known Allergies    Current Outpatient Medications   Medication Sig Dispense Refill    albuterol 90 mcg/actuation inhaler Inhale 2 puffs into the lungs every 4 (four) hours as needed for Wheezing or Shortness of Breath. 18 g 0    amlodipine-benazepril (LOTREL) 10-40 mg per capsule TAKE 1 CAPSULE BY MOUTH ONCE DAILY 90 capsule 3    blood sugar diagnostic (CONTOUR TEST STRIPS) Strp USE TO TEST BLOOD SUGAR TWICE A DAY 50 each 11    FARXIGA 5 mg Tab tablet TAKE 1 TABLET BY MOUTH EVERY DAY 30 tablet 3    fluocinonide (LIDEX) 0.05 % external solution As directed  0    fluticasone (FLONASE) 50 mcg/actuation nasal spray 2 sprays (100 mcg total) by Each Nare route once daily. 16 g 2    JANUVIA 100 mg Tab TAKE 1 TABLET BY MOUTH ONCE DAILY 30 tablet 3    metFORMIN (GLUCOPHAGE-XR) 500 MG 24 hr tablet Take 2 tablets (1,000 mg total) by mouth 2 (two) times daily. 360 tablet 3    simvastatin (ZOCOR) 20 MG tablet Take 0.5 tablets (10 mg total) by mouth every evening. 90 tablet 2     No current facility-administered medications for this visit.        Family History   Problem Relation Age of Onset    Hypertension Mother     Diabetes Mother     Stroke Mother        Social History     Socioeconomic History    Marital status: Single     Spouse name: Not on file    Number of children: 4    Years of education: Not on file    Highest education level: Not on file   Social Needs    Financial resource strain: Not on file    Food insecurity - worry: Not on file    Food insecurity - inability: Not on file    Transportation needs - medical: Not on file    Transportation needs - non-medical: Not on file   Occupational History    Occupation: secutiry supervisor     Employer: [employer]   Tobacco Use    Smoking status:  "Never Smoker    Smokeless tobacco: Never Used   Substance and Sexual Activity    Alcohol use: No     Alcohol/week: 0.0 oz     Comment: none x 4 years; previously social drinker    Drug use: No    Sexual activity: Yes     Partners: Female     Birth control/protection: None   Other Topics Concern    Not on file   Social History Narrative    The patient does exercise regularly (treadmill, light weights: TIW-QIW).      Rates diet as fair.      He is satisfied with weight.           Objective:     Vitals:    02/22/19 0853   BP: 118/78   Pulse: 80   Temp: 98.3 °F (36.8 °C)   Weight: 113.1 kg (249 lb 5.4 oz)   Height: 6' 2" (1.88 m)   PainSc:   1   PainLoc: Neck       GEN:  Well developed, well nourished.  No acute distress.  No pain behavior.  HEENT:  No trauma.  Mucous membranes moist.  Nares patent bilaterally.  PSYCH: Normal affect. Thought content appropriate.  CHEST:  Breathing symmetric.  No audible wheezing.  ABD: Soft, non-tender, non-distended.  SKIN:  Warm, pink, dry.  No rash on exposed areas.    EXT:  No cyanosis, clubbing, or edema.  No color change or changes in nail or hair growth.  NEURO/MUSCULOSKELETAL:  Fully alert, oriented, and appropriate. Speech normal juan. No cranial nerve deficits.   Gait: normal.  5/5 motor strength throughout upper extremities.   C-Spine:  Normal ROM with mild pain on extension.  Negative Spurling bilaterally.  Negative facet loading bilaterally.  No sensory deficit in BUE   Negative TTP cervical paraspinal muscles, shoulders, elbows or hands.   Negative shirley's.  Positive Tinel's over right cubital tunnel.    Imaging:      MRI Cervical Spine 10/12/2018:  FINDINGS:  Alignment: Normal.    Vertebrae: Normal marrow signal. No fracture.    Discs: Normal height and signal.    Cord: Normal.    Skull base and craniocervical junction: Normal.    Degenerative findings:    C2-C3: Disc osteophyte complex resulting in mild left neural foraminal narrowing.    C3-C4: Disc osteophyte " complex.  This results in mild canal stenosis and moderate left and mild right neural foraminal narrowing.    C4-C5: There is a disc osteophyte complex which was results in mild canal stenosis, moderate severe left and moderate right neural foraminal narrowing.    C5-C6: Disc osteophyte complex results in mild canal stenosis, mild left and moderate-severe right neural foraminal narrowing.    C6-C7: Disc osteophyte complex resulting in moderate bilateral neural foraminal narrowing.  No significant canal stenosis.    C7-T1: Unremarkable.    Paraspinal muscles & soft tissues: Unremarkable.      Impression       Multilevel degenerative changes as outlined above.  This is most prominent at the C3-4, C4-5, C5-6.         Cervical Xray (6/10/15)  Impression     Multilevel degenerative changes as discussed above, most pronounced at the C5-6 level. There has been some progression of this degenerative change, particularly with regard to be more pronounced vertebral endplate spurring, since the 2008 study.     Cervical MRI from 2008 in [facility] shows large disk protrusion at C5/6 to the right.    Assessment:       Encounter Diagnoses   Name Primary?    Cervical radiculopathy Yes    Spinal stenosis of cervical region     Osteoarthritis of spine with radiculopathy, cervical region     DDD (degenerative disc disease), cervical     Myofascial pain          Plan:       Suhas was seen today for follow-up.    Diagnoses and all orders for this visit:    Cervical radiculopathy    Spinal stenosis of cervical region    Osteoarthritis of spine with radiculopathy, cervical region    DDD (degenerative disc disease), cervical    Myofascial pain       Neck pain is consistent with cervical radiculopathy    I discussed the treatment options with the patient today, including risks, benefits, and alternatives. All available images were reviewed. The patient is aware of the risks and benefits of the medications being prescribed,  common side effects, and proper usage.    1. He is s/p C7-T1 IL DEBORAH with significant relief. We can repeat this as needed.    2. The patient will continue a home exercise routine to help with pain and strengthening.    3. RTC PRN.     The above plan and management options were discussed at length with patient. Patient is in agreement with the above and verbalized understanding.     Roseline Spicer NP  02/22/2019            Ortho/SPM Exam

## 2019-03-04 ENCOUNTER — TELEPHONE (OUTPATIENT)
Dept: FAMILY MEDICINE | Facility: CLINIC | Age: 54
End: 2019-03-04

## 2019-03-04 RX ORDER — SIMVASTATIN 10 MG/1
10 TABLET, FILM COATED ORAL NIGHTLY
Qty: 90 TABLET | Refills: 0
Start: 2019-03-04 | End: 2019-04-02 | Stop reason: SDUPTHER

## 2019-03-04 NOTE — TELEPHONE ENCOUNTER
----- Message from Mary Dalton sent at 3/4/2019 11:29 AM CST -----  Can the clinic reply in [facility]: no      Please refill the medication(s) listed below. Please call the patient when the prescription(s) is ready for  at this phone number   [phone]    Medication #1 simvastatin (ZOCOR) 20 MG tablet      Preferred Pharmacy:    [facility] [address] [place] [address] AT [facility]

## 2019-03-04 NOTE — TELEPHONE ENCOUNTER
Patient would like to have labs drawn prior to his appt with you on 3/12.  Please enter lab orders if needed.    Thanks

## 2019-03-04 NOTE — TELEPHONE ENCOUNTER
----- Message from Olena Howell sent at 3/4/2019 11:17 AM CST -----  Contact:   Name of Who is Calling: TERESE CAMPBELL [[id number]]      What is the request in detail: pt needs blood work and colonoscopy scheduled.. Please advise      Can the clinic reply by [facility]: no      What Number to Call Back if not [facility]: [phone]

## 2019-03-12 ENCOUNTER — OFFICE VISIT (OUTPATIENT)
Dept: FAMILY MEDICINE | Facility: CLINIC | Age: 54
End: 2019-03-12
Attending: FAMILY MEDICINE
Payer: COMMERCIAL

## 2019-03-12 VITALS
BODY MASS INDEX: 31.51 KG/M2 | HEART RATE: 76 BPM | HEIGHT: 74 IN | DIASTOLIC BLOOD PRESSURE: 82 MMHG | WEIGHT: 245.5 LBS | SYSTOLIC BLOOD PRESSURE: 124 MMHG | RESPIRATION RATE: 16 BRPM | OXYGEN SATURATION: 98 %

## 2019-03-12 DIAGNOSIS — J30.9 ALLERGIC RHINITIS, UNSPECIFIED SEASONALITY, UNSPECIFIED TRIGGER: ICD-10-CM

## 2019-03-12 DIAGNOSIS — E78.5 HYPERLIPIDEMIA ASSOCIATED WITH TYPE 2 DIABETES MELLITUS: ICD-10-CM

## 2019-03-12 DIAGNOSIS — R74.01 ELEVATED TRANSAMINASE LEVEL: ICD-10-CM

## 2019-03-12 DIAGNOSIS — E11.9 CONTROLLED TYPE 2 DIABETES MELLITUS WITHOUT COMPLICATION, WITHOUT LONG-TERM CURRENT USE OF INSULIN: ICD-10-CM

## 2019-03-12 DIAGNOSIS — E11.59 HYPERTENSION ASSOCIATED WITH DIABETES: ICD-10-CM

## 2019-03-12 DIAGNOSIS — E55.9 VITAMIN D DEFICIENCY: ICD-10-CM

## 2019-03-12 DIAGNOSIS — E11.69 HYPERLIPIDEMIA ASSOCIATED WITH TYPE 2 DIABETES MELLITUS: ICD-10-CM

## 2019-03-12 DIAGNOSIS — Z12.5 PROSTATE CANCER SCREENING: ICD-10-CM

## 2019-03-12 DIAGNOSIS — Z12.11 COLON CANCER SCREENING: ICD-10-CM

## 2019-03-12 DIAGNOSIS — I15.2 HYPERTENSION ASSOCIATED WITH DIABETES: ICD-10-CM

## 2019-03-12 DIAGNOSIS — Z00.00 ROUTINE GENERAL MEDICAL EXAMINATION AT A HEALTH CARE FACILITY: Primary | ICD-10-CM

## 2019-03-12 PROCEDURE — 99999 PR PBB SHADOW E&M-EST. PATIENT-LVL III: CPT | Mod: PBBFAC,,, | Performed by: FAMILY MEDICINE

## 2019-03-12 PROCEDURE — 99396 PR PREVENTIVE VISIT,EST,40-64: ICD-10-PCS | Mod: S$GLB,,, | Performed by: FAMILY MEDICINE

## 2019-03-12 PROCEDURE — 99999 PR PBB SHADOW E&M-EST. PATIENT-LVL III: ICD-10-PCS | Mod: PBBFAC,,, | Performed by: FAMILY MEDICINE

## 2019-03-12 PROCEDURE — 99396 PREV VISIT EST AGE 40-64: CPT | Mod: S$GLB,,, | Performed by: FAMILY MEDICINE

## 2019-03-12 NOTE — PATIENT INSTRUCTIONS
Your test results will be communicated to you via : [facility], Telephone or Letter.   If you have not received your test results in one week, please contact the clinic at [phone].

## 2019-03-12 NOTE — PROGRESS NOTES
Subjective:       Patient ID: Suhas Castillo is a 54 y.o. male.    Chief Complaint: No chief complaint on file.    HPI     The patient presents to the office today requesting a routine periodic health examination.    Patient Active Problem List   Diagnosis    Controlled type 2 diabetes mellitus without complication, without long-term current use of insulin    Hypertension associated with diabetes    Hyperlipidemia associated with type 2 diabetes mellitus    AR (allergic rhinitis)    DJD (degenerative joint disease), cervical    Cervical spinal stenosis    Elevated transaminase level    Vitamin D deficiency    DDD (degenerative disc disease), cervical       Past Surgical History:   Procedure Laterality Date    EPIDURAL BLOCK INJECTION      cervical x 2    INJECTION, STEROID, EPIDURAL N/A 2/7/2019    [facility] at [facility]    Injection, Steroid, Epidural  C7-T1 N/A 9/28/2018    [facility] at [facility]    Injection, Steroid, Epidural CERVICAL C7-T1 DEBORAH N/A 10/26/2018    [facility] at [facility]    INJECTION-STEROID-EPIDURAL-CERVICAL N/A 3/14/2017    [facility] at [facility]    INJECTION-STEROID-EPIDURAL-CERVICAL N/A 10/6/2015    [facility]    TESTICLE SURGERY  age 16    torsion         Current Outpatient Medications:     albuterol 90 mcg/actuation inhaler, Inhale 2 puffs into the lungs every 4 (four) hours as needed for Wheezing or Shortness of Breath., Disp: 18 g, Rfl: 0    amlodipine-benazepril (LOTREL) 10-40 mg per capsule, TAKE 1 CAPSULE BY MOUTH ONCE DAILY, Disp: 90 capsule, Rfl: 3    blood sugar diagnostic (CONTOUR TEST STRIPS) Strp, USE TO TEST BLOOD SUGAR TWICE A DAY, Disp: 50 each, Rfl: 11    FARXIGA 5 mg Tab tablet, TAKE 1 TABLET BY MOUTH EVERY DAY, Disp: 30 tablet, Rfl: 3    fluocinonide (LIDEX) 0.05 % external solution, As directed, Disp: , Rfl:  0    fluticasone (FLONASE) 50 mcg/actuation nasal spray, 2 sprays (100 mcg total) by Each Nare route once daily., Disp: 16 g, Rfl: 2    JANUVIA 100 mg Tab, TAKE 1 TABLET BY MOUTH ONCE DAILY, Disp: 30 tablet, Rfl: 3    metFORMIN (GLUCOPHAGE-XR) 500 MG 24 hr tablet, Take 2 tablets (1,000 mg total) by mouth 2 (two) times daily., Disp: 360 tablet, Rfl: 3    simvastatin (ZOCOR) 10 MG tablet, Take 1 tablet (10 mg total) by mouth every evening., Disp: 90 tablet, Rfl: 0    Review of patient's allergies indicates:  No Known Allergies    Family History   Problem Relation Age of Onset    Hypertension Mother     Diabetes Mother     Stroke Mother        Social History     Socioeconomic History    Marital status: Single     Spouse name: Not on file    Number of children: 4    Years of education: Not on file    Highest education level: Not on file   Social Needs    Financial resource strain: Not on file    Food insecurity - worry: Not on file    Food insecurity - inability: Not on file    Transportation needs - medical: Not on file    Transportation needs - non-medical: Not on file   Occupational History    Occupation: secutiry supervisor     Employer: [employer]   Tobacco Use    Smoking status: Never Smoker    Smokeless tobacco: Never Used   Substance and Sexual Activity    Alcohol use: No     Alcohol/week: 0.0 oz     Comment: none x 4 years; previously social drinker    Drug use: No    Sexual activity: Yes     Partners: Female     Birth control/protection: None   Other Topics Concern    Not on file   Social History Narrative    The patient does exercise regularly (treadmill, light weights: TIW-QIW).      Rates diet as fair.      He is satisfied with weight.           Patient Care Team:  Josiah Ching Jr., MD as PCP - General (Family Medicine)  Bernie Estrada MD as Consulting Physician (Pain Medicine)  Courtney Locke LPN as Care Coordinator      Review of Systems   Constitutional: Negative  "for fatigue and unexpected weight change.   HENT: Negative for ear discharge, ear pain, hearing loss, tinnitus and voice change.    Eyes: Negative for pain.   Respiratory: Negative for cough and shortness of breath.    Cardiovascular: Negative for chest pain, palpitations and leg swelling.   Gastrointestinal: Negative for abdominal pain, blood in stool, constipation, diarrhea, nausea and vomiting.   Genitourinary: Negative for decreased urine volume, difficulty urinating, dysuria, enuresis, frequency, hematuria and urgency.   Musculoskeletal: Negative for arthralgias, back pain and myalgias.   Skin: Negative for rash.   Neurological: Negative for dizziness, weakness, light-headedness and headaches.   Hematological: Does not bruise/bleed easily.   Psychiatric/Behavioral: Negative for dysphoric mood and sleep disturbance. The patient is not nervous/anxious.        Objective:      /82 (BP Location: Right arm, Patient Position: Sitting, BP Method: Large (Manual))   Pulse 76   Resp 16   Ht 6' 2" (1.88 m)   Wt 111.4 kg (245 lb 8 oz)   SpO2 98%   BMI 31.52 kg/m²     Physical Exam   Constitutional: He is oriented to person, place, and time. He appears well-developed and well-nourished. He is cooperative.   HENT:   Head: Normocephalic and atraumatic.   Right Ear: External ear normal.   Left Ear: External ear normal.   Nose: Nose normal.   Mouth/Throat: Oropharynx is clear and moist and mucous membranes are normal. No oropharyngeal exudate.   Eyes: Conjunctivae are normal. No scleral icterus.   Neck: Neck supple. No JVD present. Carotid bruit is not present. No thyromegaly present.   Cardiovascular: Normal rate, regular rhythm, normal heart sounds and normal pulses. Exam reveals no gallop and no friction rub.   No murmur heard.  Pulses:       Dorsalis pedis pulses are 2+ on the right side, and 2+ on the left side.        Posterior tibial pulses are 2+ on the right side, and 2+ on the left side.   Pulmonary/Chest: " "Effort normal and breath sounds normal. He has no wheezes. He has no rhonchi. He has no rales.   Abdominal: Soft. Bowel sounds are normal. He exhibits no distension and no mass. There is no splenomegaly or hepatomegaly. There is no tenderness.   Musculoskeletal: Normal range of motion. He exhibits no edema or tenderness.        Right foot: There is normal range of motion and no deformity.        Left foot: There is normal range of motion and no deformity.   Feet:   Right Foot:   Protective Sensation: 9 sites tested. 9 sites sensed.   Skin Integrity: Negative for erythema, warmth or dry skin.   Left Foot:   Protective Sensation: 9 sites tested. 9 sites sensed.   Skin Integrity: Negative for erythema, callus or dry skin.   Lymphadenopathy:     He has no cervical adenopathy.     He has no axillary adenopathy.   Neurological: He is alert and oriented to person, place, and time. He has normal strength and normal reflexes. No cranial nerve deficit or sensory deficit. Coordination normal.   Skin: Skin is warm and dry.   Psychiatric: He has a normal mood and affect.   Vitals reviewed.        Assessment:       1. Routine general medical examination at a health care facility    2. Controlled type 2 diabetes mellitus without complication, without long-term current use of insulin    3. Hypertension associated with diabetes    4. Hyperlipidemia associated with type 2 diabetes mellitus    5. Elevated transaminase level    6. Vitamin D deficiency    7. Allergic rhinitis, unspecified seasonality, unspecified trigger          Plan:       Declines flu vaccine.  Labs (see Orders).  Refer for colonoscopy.  Eye exam scheduled in 2 days.  We will call the patient with results & make further recommendations at that time.  RTC 6 months.        "This note will not be shared with the patient."  "

## 2019-03-12 NOTE — PROGRESS NOTES
Your test results will be communicated to you via : [facility], Telephone or Letter.   If you have not received your test results in one week, please contact the clinic at [phone].

## 2019-03-13 ENCOUNTER — PATIENT MESSAGE (OUTPATIENT)
Dept: FAMILY MEDICINE | Facility: CLINIC | Age: 54
End: 2019-03-13

## 2019-03-13 ENCOUNTER — LAB VISIT (OUTPATIENT)
Dept: LAB | Facility: HOSPITAL | Age: 54
End: 2019-03-13
Attending: FAMILY MEDICINE
Payer: COMMERCIAL

## 2019-03-13 DIAGNOSIS — R74.01 ELEVATED TRANSAMINASE LEVEL: ICD-10-CM

## 2019-03-13 DIAGNOSIS — E11.69 HYPERLIPIDEMIA ASSOCIATED WITH TYPE 2 DIABETES MELLITUS: ICD-10-CM

## 2019-03-13 DIAGNOSIS — E11.9 CONTROLLED TYPE 2 DIABETES MELLITUS WITHOUT COMPLICATION, WITHOUT LONG-TERM CURRENT USE OF INSULIN: ICD-10-CM

## 2019-03-13 DIAGNOSIS — Z12.11 SPECIAL SCREENING FOR MALIGNANT NEOPLASMS, COLON: Primary | ICD-10-CM

## 2019-03-13 DIAGNOSIS — E11.59 HYPERTENSION ASSOCIATED WITH DIABETES: ICD-10-CM

## 2019-03-13 DIAGNOSIS — E78.5 HYPERLIPIDEMIA ASSOCIATED WITH TYPE 2 DIABETES MELLITUS: ICD-10-CM

## 2019-03-13 DIAGNOSIS — Z12.5 PROSTATE CANCER SCREENING: ICD-10-CM

## 2019-03-13 DIAGNOSIS — I15.2 HYPERTENSION ASSOCIATED WITH DIABETES: ICD-10-CM

## 2019-03-13 LAB
ALBUMIN SERPL BCP-MCNC: 3.9 G/DL
ALP SERPL-CCNC: 77 U/L
ALT SERPL W/O P-5'-P-CCNC: 17 U/L
ANION GAP SERPL CALC-SCNC: 5 MMOL/L
AST SERPL-CCNC: 18 U/L
BILIRUB SERPL-MCNC: 0.5 MG/DL
BUN SERPL-MCNC: 15 MG/DL
CALCIUM SERPL-MCNC: 9.1 MG/DL
CHLORIDE SERPL-SCNC: 108 MMOL/L
CHOLEST SERPL-MCNC: 146 MG/DL
CHOLEST/HDLC SERPL: 4.9 {RATIO}
CO2 SERPL-SCNC: 27 MMOL/L
COMPLEXED PSA SERPL-MCNC: 0.82 NG/ML
CREAT SERPL-MCNC: 1.2 MG/DL
EST. GFR  (AFRICAN AMERICAN): >60 ML/MIN/1.73 M^2
EST. GFR  (NON AFRICAN AMERICAN): >60 ML/MIN/1.73 M^2
ESTIMATED AVG GLUCOSE: 160 MG/DL
GLUCOSE SERPL-MCNC: 140 MG/DL
HBA1C MFR BLD HPLC: 7.2 %
HDLC SERPL-MCNC: 30 MG/DL
HDLC SERPL: 20.5 %
LDLC SERPL CALC-MCNC: 100 MG/DL
NONHDLC SERPL-MCNC: 116 MG/DL
POTASSIUM SERPL-SCNC: 4.1 MMOL/L
PROT SERPL-MCNC: 7.6 G/DL
SODIUM SERPL-SCNC: 140 MMOL/L
TRIGL SERPL-MCNC: 80 MG/DL

## 2019-03-13 PROCEDURE — 84153 ASSAY OF PSA TOTAL: CPT

## 2019-03-13 PROCEDURE — 36415 COLL VENOUS BLD VENIPUNCTURE: CPT | Mod: PO

## 2019-03-13 PROCEDURE — 83036 HEMOGLOBIN GLYCOSYLATED A1C: CPT

## 2019-03-13 PROCEDURE — 80053 COMPREHEN METABOLIC PANEL: CPT

## 2019-03-13 PROCEDURE — 80061 LIPID PANEL: CPT

## 2019-03-13 RX ORDER — POLYETHYLENE GLYCOL 3350, SODIUM SULFATE ANHYDROUS, SODIUM BICARBONATE, SODIUM CHLORIDE, POTASSIUM CHLORIDE 236; 22.74; 6.74; 5.86; 2.97 G/4L; G/4L; G/4L; G/4L; G/4L
4 POWDER, FOR SOLUTION ORAL ONCE
Qty: 4000 ML | Refills: 0 | Status: SHIPPED | OUTPATIENT
Start: 2019-03-13 | End: 2019-03-13

## 2019-04-02 RX ORDER — SIMVASTATIN 10 MG/1
10 TABLET, FILM COATED ORAL NIGHTLY
Qty: 90 TABLET | Refills: 0 | Status: SHIPPED | OUTPATIENT
Start: 2019-04-02 | End: 2019-07-08 | Stop reason: SDUPTHER

## 2019-04-02 NOTE — TELEPHONE ENCOUNTER
----- Message from Lucina Mathew sent at 4/2/2019 11:51 AM CDT -----  Contact: je Koch the clinic reply in [facility]: no      Please refill the medication(s) listed below. Please call the patient when the prescription(s) is ready for  at this phone number   [phone]      Medication #1 simvastatin (ZOCOR) 10 MG tablet    Medication #2       Preferred Pharmacy: [facility] [address] [place] [address] AT [facility]

## 2019-04-09 ENCOUNTER — NURSE TRIAGE (OUTPATIENT)
Dept: ADMINISTRATIVE | Facility: CLINIC | Age: 54
End: 2019-04-09

## 2019-04-10 NOTE — TELEPHONE ENCOUNTER
Doing prep for colonoscopy starting tomorrow. Has questions about taking medication and not eating with medication during prep. Call transferred to on call provider     Reason for Disposition   Caller has urgent medication question about med that PCP prescribed and triager unable to answer question    Protocols used: MEDICATION QUESTION CALL-A-AH

## 2019-04-11 ENCOUNTER — HOSPITAL ENCOUNTER (OUTPATIENT)
Facility: HOSPITAL | Age: 54
Discharge: HOME | End: 2019-04-11
Attending: COLON & RECTAL SURGERY | Admitting: COLON & RECTAL SURGERY
Payer: COMMERCIAL

## 2019-04-11 ENCOUNTER — ANESTHESIA EVENT (OUTPATIENT)
Dept: ENDOSCOPY | Facility: HOSPITAL | Age: 54
End: 2019-04-11
Payer: COMMERCIAL

## 2019-04-11 ENCOUNTER — ANESTHESIA (OUTPATIENT)
Dept: ENDOSCOPY | Facility: HOSPITAL | Age: 54
End: 2019-04-11
Payer: COMMERCIAL

## 2019-04-11 VITALS
HEART RATE: 71 BPM | BODY MASS INDEX: 30.84 KG/M2 | HEIGHT: 75 IN | DIASTOLIC BLOOD PRESSURE: 65 MMHG | RESPIRATION RATE: 16 BRPM | OXYGEN SATURATION: 95 % | SYSTOLIC BLOOD PRESSURE: 109 MMHG | WEIGHT: 248 LBS | TEMPERATURE: 98 F

## 2019-04-11 DIAGNOSIS — Z12.11 SCREENING FOR COLON CANCER: ICD-10-CM

## 2019-04-11 PROCEDURE — 37000009 HC ANESTHESIA EA ADD 15 MINS: Performed by: COLON & RECTAL SURGERY

## 2019-04-11 PROCEDURE — E9220 PRA ENDO ANESTHESIA: HCPCS | Mod: ,,, | Performed by: NURSE ANESTHETIST, CERTIFIED REGISTERED

## 2019-04-11 PROCEDURE — 25000003 PHARM REV CODE 250: Performed by: NURSE PRACTITIONER

## 2019-04-11 PROCEDURE — G0121 COLON CA SCRN NOT HI RSK IND: HCPCS | Performed by: COLON & RECTAL SURGERY

## 2019-04-11 PROCEDURE — 63600175 PHARM REV CODE 636 W HCPCS: Performed by: NURSE ANESTHETIST, CERTIFIED REGISTERED

## 2019-04-11 PROCEDURE — G0121 COLON CA SCRN NOT HI RSK IND: HCPCS | Mod: ,,, | Performed by: COLON & RECTAL SURGERY

## 2019-04-11 PROCEDURE — G0121 COLON CA SCRN NOT HI RSK IND: ICD-10-PCS | Mod: ,,, | Performed by: COLON & RECTAL SURGERY

## 2019-04-11 PROCEDURE — E9220 PRA ENDO ANESTHESIA: ICD-10-PCS | Mod: ,,, | Performed by: NURSE ANESTHETIST, CERTIFIED REGISTERED

## 2019-04-11 PROCEDURE — 37000008 HC ANESTHESIA 1ST 15 MINUTES: Performed by: COLON & RECTAL SURGERY

## 2019-04-11 RX ORDER — PROPOFOL 10 MG/ML
VIAL (ML) INTRAVENOUS CONTINUOUS PRN
Status: DISCONTINUED | OUTPATIENT
Start: 2019-04-11 | End: 2019-04-11

## 2019-04-11 RX ORDER — PROPOFOL 10 MG/ML
VIAL (ML) INTRAVENOUS
Status: DISCONTINUED | OUTPATIENT
Start: 2019-04-11 | End: 2019-04-11

## 2019-04-11 RX ORDER — LIDOCAINE HCL/PF 100 MG/5ML
SYRINGE (ML) INTRAVENOUS
Status: DISCONTINUED | OUTPATIENT
Start: 2019-04-11 | End: 2019-04-11

## 2019-04-11 RX ORDER — SODIUM CHLORIDE 0.9 % (FLUSH) 0.9 %
10 SYRINGE (ML) INJECTION
Status: DISCONTINUED | OUTPATIENT
Start: 2019-04-11 | End: 2019-04-11 | Stop reason: HOSPADM

## 2019-04-11 RX ORDER — SODIUM CHLORIDE 9 MG/ML
INJECTION, SOLUTION INTRAVENOUS CONTINUOUS
Status: DISCONTINUED | OUTPATIENT
Start: 2019-04-11 | End: 2019-04-11 | Stop reason: HOSPADM

## 2019-04-11 RX ADMIN — PROPOFOL 70 MG: 10 INJECTION, EMULSION INTRAVENOUS at 08:04

## 2019-04-11 RX ADMIN — PROPOFOL 150 MCG/KG/MIN: 10 INJECTION, EMULSION INTRAVENOUS at 08:04

## 2019-04-11 RX ADMIN — LIDOCAINE HYDROCHLORIDE 50 MG: 20 INJECTION, SOLUTION INTRAVENOUS at 08:04

## 2019-04-11 RX ADMIN — SODIUM CHLORIDE: 0.9 INJECTION, SOLUTION INTRAVENOUS at 08:04

## 2019-04-11 NOTE — DISCHARGE INSTRUCTIONS
Colonoscopy     A camera attached to a flexible tube with a viewing lens is used to take video pictures.     Colonoscopy is a test to view the inside of your lower digestive tract (colon and rectum). Sometimes it can show the last part of the small intestine (ileum). During the test, small pieces of tissue may be removed for testing. This is called a biopsy. Small growths, such as polyps, may also be removed.   Why is colonoscopy done?  The test is done to help look for colon cancer. And it can help find the source of abdominal pain, bleeding, and changes in bowel habits. It may be needed once a year, depending on factors such as your:  · Age  · Health history  · Family health history  · Symptoms  · Results from any prior colonoscopy  Risks and possible complications  These include:  · Bleeding               · A puncture or tear in the colon   · Risks of anesthesia  · A cancer lesion not being seen  Getting ready   To prepare for the test:  · Talk with your healthcare provider about the risks of the test (see below). Also ask your healthcare provider about alternatives to the test.  · Tell your healthcare provider about any medicines you take. Also tell him or her about any health conditions you may have.  · Make sure your rectum and colon are empty for the test. Follow the diet and bowel prep instructions exactly. If you dont, the test may need to be rescheduled.  · Plan for a friend or family member to drive you home after the test.     Colonoscopy provides an inside view of the entire colon.     You may discuss the results with your doctor right away or at a future visit.  During the test   The test is usually done in the hospital on an outpatient basis. This means you go home the same day. The procedure takes about 30 minutes. During that time:  · You are given relaxing (sedating) medicine through an IV line. You may be drowsy, or fully asleep.  · The healthcare provider will first give you a physical exam to  check for anal and rectal problems.  · Then the anus is lubricated and the scope inserted.  · If you are awake, you may have a feeling similar to needing to have a bowel movement. You may also feel pressure as air is pumped into the colon. Its OK to pass gas during the procedure.  · Biopsy, polyp removal, or other treatments may be done during the test.  After the test   You may have gas right after the test. It can help to try to pass it to help prevent later bloating. Your healthcare provider may discuss the results with you right away. Or you may need to schedule a follow-up visit to talk about the results. After the test, you can go back to your normal eating and other activities. You may be tired from the sedation and need to rest for a few hours.  Date Last Reviewed: 11/1/2016 © 2000-2017 [employer]. [address] [place] [address], [place] [address]. All rights reserved. This information is not intended as a substitute for professional medical care. Always follow your healthcare professional's instructions.

## 2019-04-11 NOTE — ANESTHESIA RELEASE NOTE
"Anesthesia Release from PACU Note    Patient: Suhas Castillo    Procedure(s) Performed: Procedure(s) (LRB):  COLONOSCOPY (N/A)    Anesthesia type: general    Post pain: Adequate analgesia    Post assessment: no apparent anesthetic complications and tolerated procedure well    Last Vitals:   Visit Vitals  /65   Pulse 71   Temp 36.8 °C (98.2 °F) (Temporal)   Resp 16   Ht 6' 2.5" (1.892 m)   Wt 112.5 kg (248 lb)   SpO2 95%   BMI 31.42 kg/m²       Post vital signs: stable    Level of consciousness: awake, alert  and oriented    Nausea/Vomiting: no nausea/no vomiting    Complications: none    Airway Patency: patent    Respiratory: unassisted, spontaneous ventilation, room air    Cardiovascular: stable and blood pressure at baseline    Hydration: euvolemic  "

## 2019-04-11 NOTE — ANESTHESIA POSTPROCEDURE EVALUATION
Anesthesia Post Evaluation    Patient: Suhas Castillo    Procedure(s) Performed: Procedure(s) (LRB):  COLONOSCOPY (N/A)    Final Anesthesia Type: general  Patient location during evaluation: GI PACU  Patient participation: Yes- Able to Participate  Level of consciousness: awake and alert  Post-procedure vital signs: reviewed and stable  Pain management: adequate  Airway patency: patent  PONV status at discharge: No PONV  Anesthetic complications: no      Cardiovascular status: blood pressure returned to baseline and stable  Respiratory status: unassisted, spontaneous ventilation and room air  Hydration status: euvolemic  Follow-up not needed.          Vitals Value Taken Time   /65 4/11/2019  9:48 AM   Temp 36.8 °C (98.2 °F) 4/11/2019  9:18 AM   Pulse 71 4/11/2019  9:48 AM   Resp 16 4/11/2019  9:48 AM   SpO2 95 % 4/11/2019  9:48 AM         Event Time     Out of Recovery 10:00:23          Pain/Lio Score: Lio Score: 10 (4/11/2019  9:48 AM)

## 2019-04-11 NOTE — [employer] PATIENT INSTRUCTIONS
Discharge Summary/Instructions after an Endoscopic Procedure  Patient Name: Suhas Martinez  Patient MRN: [id number]  Patient YOB: 1965  Thursday, April 11, 2019  Gato Corbin MD  RESTRICTIONS:  During your procedure today, you received medications for sedation.  These   medications may affect your judgment, balance and coordination.  Therefore,   for 24 hours, you have the following restrictions:   - DO NOT drive a car, operate machinery, make legal/financial decisions,   sign important papers or drink alcohol.    ACTIVITY:  Today: no heavy lifting, straining or running due to procedural   sedation/anesthesia.  The following day: return to full activity including work.  DIET:  Eat and drink normally unless instructed otherwise.     TREATMENT FOR COMMON SIDE EFFECTS:  - Mild abdominal pain, nausea, belching, bloating or excessive gas:  rest,   eat lightly and use a heating pad.  - Sore Throat: treat with throat lozenges and/or gargle with warm salt   water.  - Because air was used during the procedure, expelling large amounts of air   from your rectum or belching is normal.  - If a bowel prep was taken, you may not have a bowel movement for 1-3 days.    This is normal.  SYMPTOMS TO WATCH FOR AND REPORT TO YOUR PHYSICIAN:  1. Abdominal pain or bloating, other than gas cramps.  2. Chest pain.  3. Back pain.  4. Signs of infection such as: chills or fever occurring within 24 hours   after the procedure.  5. Rectal bleeding, which would show as bright red, maroon, or black stools.   (A tablespoon of blood from the rectum is not serious, especially if   hemorrhoids are present.)  6. Vomiting.  7. Weakness or dizziness.  GO DIRECTLY TO THE NEAREST EMERGENCY ROOM IF YOU HAVE ANY OF THE FOLLOWING:      Difficulty breathing              Chills and/or fever over 101 F   Persistent vomiting and/or vomiting blood   Severe abdominal pain   Severe chest pain   Black, tarry stools   Bleeding- more than one  tablespoon   Any other symptom or condition that you feel may need urgent attention  Your doctor recommends these additional instructions:  If any biopsies were taken, your doctors clinic will contact you in 1 to 2   weeks with any results.  - Discharge patient to home (ambulatory).   - Resume previous diet indefinitely.   - Continue present medications.   - Repeat colonoscopy in 10 years for screening purposes.  For questions, problems or results please call your physician - Gato Corbin MD at Work:  [phone].  [facility] [place], EMERGENCY ROOM PHONE NUMBER: [phone]  IF A COMPLICATION OR EMERGENCY SITUATION ARISES AND YOU ARE UNABLE TO REACH   YOUR PHYSICIAN - GO DIRECTLY TO THE EMERGENCY ROOM.  Gato Corbin MD  4/11/2019 9:21:20 AM  This report has been verified and signed electronically.  [employer]

## 2019-04-11 NOTE — H&P
Endoscopy H&P    Procedure : Colonoscopy      asymptomatic screening exam      Past Medical History:   Diagnosis Date    AR (allergic rhinitis)     Cervical spinal stenosis     Diabetes mellitus type II     DJD (degenerative joint disease), cervical     Hyperlipidemia     Hypertension        Family History   Problem Relation Age of Onset    Hypertension Mother     Diabetes Mother     Stroke Mother        Social History     Socioeconomic History    Marital status: Single     Spouse name: Not on file    Number of children: 4    Years of education: Not on file    Highest education level: Not on file   Occupational History    Occupation: secutiry supervisor     Employer: [employer]   Social Needs    Financial resource strain: Not on file    Food insecurity:     Worry: Not on file     Inability: Not on file    Transportation needs:     Medical: Not on file     Non-medical: Not on file   Tobacco Use    Smoking status: Never Smoker    Smokeless tobacco: Never Used   Substance and Sexual Activity    Alcohol use: No     Alcohol/week: 0.0 oz     Comment: none x 4 years; previously social drinker    Drug use: No    Sexual activity: Yes     Partners: Female     Birth control/protection: None   Lifestyle    Physical activity:     Days per week: Not on file     Minutes per session: Not on file    Stress: Not on file   Relationships    Social connections:     Talks on phone: Not on file     Gets together: Not on file     Attends Jain service: Not on file     Active member of club or organization: Not on file     Attends meetings of clubs or organizations: Not on file     Relationship status: Not on file   Other Topics Concern    Not on file   Social History Narrative    The patient does exercise regularly (treadmill, light weights: TIW-QIW).      Rates diet as fair.      He is satisfied with weight.           Review  of Systems:  Respiratory ROS: no cough, shortness of breath, or wheezing  Cardiovascular ROS: no chest pain or dyspnea on exertion  Gastrointestinal ROS: no abdominal pain, change in bowel habits, or black or bloody stools  Musculoskeletal ROS: negative  Neurological ROS: no TIA or stroke symptoms        Physical Exam:  General: no distress  Head: normocephalic  Neck: supple, symmetrical, trachea midline  Lungs:  clear to auscultation bilaterally and normal respiratory effort  Heart: regular rate and rhythm, S1, S2 normal, no murmur, rub or gallop  Abdomen: soft, non-tender non-distented; bowel sounds normal; no masses,  no organomegaly  Extremities: no cyanosis or edema, or clubbing       Deep Sedation: Mallampati Score II (hard and soft palate, upper portion of tonsils anduvula visible)    II    Assessment and Plan:  Proceed with Colonoscopy

## 2019-04-11 NOTE — H&P
Endoscopy H&P    Procedure : Colonoscopy      asymptomatic screening exam      Past Medical History:   Diagnosis Date    AR (allergic rhinitis)     Cervical spinal stenosis     Diabetes mellitus type II     DJD (degenerative joint disease), cervical     Hyperlipidemia     Hypertension        Family History   Problem Relation Age of Onset    Hypertension Mother     Diabetes Mother     Stroke Mother        Social History     Socioeconomic History    Marital status: Single     Spouse name: Not on file    Number of children: 4    Years of education: Not on file    Highest education level: Not on file   Occupational History    Occupation: secutiry supervisor     Employer: [employer]   Social Needs    Financial resource strain: Not on file    Food insecurity:     Worry: Not on file     Inability: Not on file    Transportation needs:     Medical: Not on file     Non-medical: Not on file   Tobacco Use    Smoking status: Never Smoker    Smokeless tobacco: Never Used   Substance and Sexual Activity    Alcohol use: No     Alcohol/week: 0.0 oz     Comment: none x 4 years; previously social drinker    Drug use: No    Sexual activity: Yes     Partners: Female     Birth control/protection: None   Lifestyle    Physical activity:     Days per week: Not on file     Minutes per session: Not on file    Stress: Not on file   Relationships    Social connections:     Talks on phone: Not on file     Gets together: Not on file     Attends Christian service: Not on file     Active member of club or organization: Not on file     Attends meetings of clubs or organizations: Not on file     Relationship status: Not on file   Other Topics Concern    Not on file   Social History Narrative    The patient does exercise regularly (treadmill, light weights: TIW-QIW).      Rates diet as fair.      He is satisfied with weight.           Review  of Systems:  Respiratory ROS: no cough, shortness of breath, or wheezing  Cardiovascular ROS: no chest pain or dyspnea on exertion  Gastrointestinal ROS: no abdominal pain, change in bowel habits, or black or bloody stools  Musculoskeletal ROS: negative  Neurological ROS: no TIA or stroke symptoms        Physical Exam:  General: no distress  Head: normocephalic  Neck: supple, symmetrical, trachea midline  Lungs:  clear to auscultation bilaterally and normal respiratory effort  Heart: regular rate and rhythm, S1, S2 normal, no murmur, rub or gallop  Abdomen: soft, non-tender non-distented; bowel sounds normal; no masses,  no organomegaly  Extremities: no cyanosis or edema, or clubbing       Deep Sedation: Mallampati Score II (hard and soft palate, upper portion of tonsils anduvula visible)    II    Assessment and Plan:  Proceed with Colonoscopy

## 2019-04-11 NOTE — TRANSFER OF CARE
"Anesthesia Transfer of Care Note    Patient: Suhas Castillo    Procedure(s) Performed: Procedure(s) (LRB):  COLONOSCOPY (N/A)    Patient location: PACU    Anesthesia Type: general    Transport from OR: Transported from OR on room air with adequate spontaneous ventilation    Post pain: adequate analgesia    Post assessment: no apparent anesthetic complications and tolerated procedure well    Post vital signs: stable    Level of consciousness: awake    Nausea/Vomiting: no nausea/vomiting    Complications: none    Transfer of care protocol was followed      Last vitals:   Visit Vitals  BP (!) 116/59 (BP Location: Left arm, Patient Position: Lying)   Pulse 77   Temp 36.8 °C (98.2 °F) (Temporal)   Resp 16   Ht 6' 2.5" (1.892 m)   Wt 112.5 kg (248 lb)   SpO2 95%   BMI 31.42 kg/m²     "

## 2019-04-11 NOTE — ANESTHESIA PREPROCEDURE EVALUATION
04/11/2019  Suhas Castillo is a 54 y.o., male.  Patient Active Problem List   Diagnosis    Controlled type 2 diabetes mellitus without complication, without long-term current use of insulin    Hypertension associated with diabetes    Hyperlipidemia associated with type 2 diabetes mellitus    AR (allergic rhinitis)    DJD (degenerative joint disease), cervical    Cervical spinal stenosis    Elevated transaminase level    Vitamin D deficiency    DDD (degenerative disc disease), cervical     Past Medical History:   Diagnosis Date    AR (allergic rhinitis)     Cervical spinal stenosis     Diabetes mellitus type II     DJD (degenerative joint disease), cervical     Hyperlipidemia     Hypertension      No current facility-administered medications on file prior to encounter.      Current Outpatient Medications on File Prior to Encounter   Medication Sig Dispense Refill    albuterol 90 mcg/actuation inhaler Inhale 2 puffs into the lungs every 4 (four) hours as needed for Wheezing or Shortness of Breath. 18 g 0    amlodipine-benazepril (LOTREL) 10-40 mg per capsule TAKE 1 CAPSULE BY MOUTH ONCE DAILY 90 capsule 3    blood sugar diagnostic (CONTOUR TEST STRIPS) Strp USE TO TEST BLOOD SUGAR TWICE A DAY 50 each 11    FARXIGA 5 mg Tab tablet TAKE 1 TABLET BY MOUTH EVERY DAY 30 tablet 3    fluocinonide (LIDEX) 0.05 % external solution As directed  0    fluticasone (FLONASE) 50 mcg/actuation nasal spray 2 sprays (100 mcg total) by Each Nare route once daily. 16 g 2    JANUVIA 100 mg Tab TAKE 1 TABLET BY MOUTH ONCE DAILY 30 tablet 3    metFORMIN (GLUCOPHAGE-XR) 500 MG 24 hr tablet Take 2 tablets (1,000 mg total) by mouth 2 (two) times daily. 360 tablet 3           Anesthesia Evaluation    I have reviewed the Patient Summary Reports.     I have reviewed the Medications.     Review of  Systems  Anesthesia Hx:  No problems with previous Anesthesia Denies Hx of Anesthetic complications  Denies Family Hx of Anesthesia complications.   Denies Personal Hx of Anesthesia complications.   Hematology/Oncology:  Hematology Normal   Oncology Normal     EENT/Dental:EENT/Dental Normal   Cardiovascular:   Exercise tolerance: good Hypertension    Pulmonary:  Pulmonary Normal    Renal/:  Renal/ Normal     Hepatic/GI:  Hepatic/GI Normal Bowel Prep.    Musculoskeletal:   Arthritis     Neurological:  Neurology Normal    Endocrine:   Diabetes, type 2    Dermatological:  Skin Normal    Psych:  Psychiatric Normal           Physical Exam  General:  Well nourished    Airway/Jaw/Neck:  Airway Findings: Mouth Opening: Normal Tongue: Normal  General Airway Assessment: Adult  Mallampati: I  TM Distance: Normal, at least 6 cm  Jaw/Neck Findings:  Neck ROM: Normal ROM     Eyes/Ears/Nose:  EYES/EARS/NOSE FINDINGS: Normal   Dental:  Dental Findings: In tact   Chest/Lungs:  Chest/Lungs Findings: Clear to auscultation, Normal Respiratory Rate     Heart/Vascular:  Heart Findings: Rate: Normal  Sounds: Normal     Abdomen:  Abdomen Findings: Normal    Musculoskeletal:  Musculoskeletal Findings: Normal   Skin:  Skin Findings: Normal    Mental Status:  Mental Status Findings:  Cooperative, Alert and Oriented         Anesthesia Plan  Type of Anesthesia, risks & benefits discussed:  Anesthesia Type:  general  Patient's Preference: General  Intra-op Monitoring Plan: standard ASA monitors  Intra-op Monitoring Plan Comments:   Post Op Pain Control Plan:   Post Op Pain Control Plan Comments:   Induction:   IV  Beta Blocker:  Patient is not currently on a Beta-Blocker (No further documentation required).       Informed Consent: Patient understands risks and agrees with Anesthesia plan.  Questions answered. Anesthesia consent signed with patient.  ASA Score: 2     Day of Surgery Review of History & Physical: I have interviewed and examined  the patient. I have reviewed the patient's H&P dated:  There are no significant changes.  H&P update referred to the provider.     Anesthesia Plan Notes: NPO confirmed.  States neck pain comes and goes, no limitations on neck ROM.         Ready For Surgery From Anesthesia Perspective.

## 2019-04-18 ENCOUNTER — TELEPHONE (OUTPATIENT)
Dept: ENDOSCOPY | Facility: HOSPITAL | Age: 54
End: 2019-04-18

## 2019-04-18 NOTE — TELEPHONE ENCOUNTER
----- Message from Candy Lantigua sent at 4/18/2019 10:00 AM CDT -----  Contact: pt   Can the clinic reply in [facility]: No     Please refill the medication(s) listed below. The patient can be reached at this phone number once it is called into the pharmacy [phone]..    Medication #1 JANUVIA 100 mg Tab  Medication #2    Preferred Pharmacy: [facility] [address] [place] [address] AT [facility]

## 2019-04-18 NOTE — TELEPHONE ENCOUNTER
Patient was informed to next time contact his pharmacy first in regards to his refills. Patient states he contacted the pharmacy on Monday 04/15/19, but the pharmacy informed him that they never received a response from the physician.

## 2019-04-18 NOTE — TELEPHONE ENCOUNTER
Please inform patient that his refill request was sent to the last prescribing provider (Ms. Zambrano) who was no longer with the endocrinology department.  On behalf of the [facility] system, we apologize that no responded to that request.    I have sent new prescription to his pharmacy.

## 2019-06-04 ENCOUNTER — OFFICE VISIT (OUTPATIENT)
Dept: PRIMARY CARE CLINIC | Facility: CLINIC | Age: 54
End: 2019-06-04
Payer: COMMERCIAL

## 2019-06-04 VITALS
OXYGEN SATURATION: 98 % | BODY MASS INDEX: 31.14 KG/M2 | TEMPERATURE: 98 F | DIASTOLIC BLOOD PRESSURE: 86 MMHG | SYSTOLIC BLOOD PRESSURE: 144 MMHG | WEIGHT: 250.44 LBS | HEIGHT: 75 IN | HEART RATE: 73 BPM

## 2019-06-04 DIAGNOSIS — L02.429 BOIL OF LOWER EXTREMITY: Primary | ICD-10-CM

## 2019-06-04 PROCEDURE — 87070 CULTURE OTHR SPECIMN AEROBIC: CPT

## 2019-06-04 PROCEDURE — 99213 OFFICE O/P EST LOW 20 MIN: CPT | Mod: S$GLB,,, | Performed by: NURSE PRACTITIONER

## 2019-06-04 PROCEDURE — 99213 PR OFFICE/OUTPT VISIT, EST, LEVL III, 20-29 MIN: ICD-10-PCS | Mod: S$GLB,,, | Performed by: NURSE PRACTITIONER

## 2019-06-04 PROCEDURE — 99999 PR PBB SHADOW E&M-EST. PATIENT-LVL IV: CPT | Mod: PBBFAC,,, | Performed by: NURSE PRACTITIONER

## 2019-06-04 PROCEDURE — 99999 PR PBB SHADOW E&M-EST. PATIENT-LVL IV: ICD-10-PCS | Mod: PBBFAC,,, | Performed by: NURSE PRACTITIONER

## 2019-06-04 RX ORDER — MUPIROCIN 20 MG/G
OINTMENT TOPICAL 2 TIMES DAILY
Qty: 30 G | Refills: 0 | Status: SHIPPED | OUTPATIENT
Start: 2019-06-04 | End: 2021-08-04

## 2019-06-04 NOTE — PROGRESS NOTES
"Subjective:       Patient ID: Suhas Castillo is a 54 y.o. male.    Chief Complaint: Insect Bite (left leg)    Mr Castillo presents today for a sore on his left calf that appeared on Sunday after doing yard work on Saturday.  It looked like a small pimple or bite. On Monday he squeezed the area to "pop" it, and noticed pus draining.  He has diabetes and his sister insisted he come in to be evaluated.     Review of Systems   Constitutional: Negative for fever.   HENT: Negative for facial swelling.    Eyes: Negative for visual disturbance.   Respiratory: Negative for shortness of breath.    Cardiovascular: Negative for chest pain.   Gastrointestinal: Negative for nausea and vomiting.   Genitourinary: Negative for dysuria.   Musculoskeletal: Negative for gait problem.   Skin: Positive for wound.   Neurological: Negative for headaches.   Psychiatric/Behavioral: Negative for confusion.       Objective:      Physical Exam   Constitutional: He is oriented to person, place, and time. He appears well-developed and well-nourished. No distress.   HENT:   Head: Normocephalic and atraumatic.   Eyes: No scleral icterus.   Cardiovascular: Normal rate, regular rhythm and normal heart sounds.   Pulmonary/Chest: Effort normal and breath sounds normal. No stridor. No respiratory distress. He has no wheezes. He has no rales.   Musculoskeletal: He exhibits no edema.   Neurological: He is alert and oriented to person, place, and time.   Skin: He is not diaphoretic.        Nursing note and vitals reviewed.      Assessment:       1. Boil of lower extremity        Plan:   1. Boil of lower extremity  - mupirocin (BACTROBAN) 2 % ointment; Apply topically 2 (two) times daily.  Dispense: 30 g; Refill: 0  - Aerobic culture      Pt has been given instructions populated from [employer] database and has verbalized understanding of the after visit summary and information contained wherein.    Follow up with a primary care provider. May go to ER for " acute shortness of breath, lightheadedness, fever, or any other emergent complaints or changes in condition.

## 2019-06-06 LAB — BACTERIA SPEC AEROBE CULT: NORMAL

## 2019-06-18 ENCOUNTER — TELEPHONE (OUTPATIENT)
Dept: ENDOCRINOLOGY | Facility: CLINIC | Age: 54
End: 2019-06-18

## 2019-06-18 NOTE — TELEPHONE ENCOUNTER
Talk to Pt haven;t been seen in 2 years. We can't refill until he seen by us. He will need to get a ppt. Pt said he will call us pcp he will out of town

## 2019-06-18 NOTE — TELEPHONE ENCOUNTER
----- Message from Misa Curry sent at 6/18/2019  2:52 PM CDT -----  Contact: Pt   Can the clinic reply in [facility]: No     Please refill the medication(s) listed below. The patient can be reached at this phone number once it is called into the pharmacy.    Medication #1 metFORMIN (GLUCOPHAGE-XR) 500 MG 24 hr tablet    Medication #2 FARXIGA 5 mg Tab tablet    Preferred Pharmacy:[facility] [address] [place] [address] AT [facility]

## 2019-06-18 NOTE — TELEPHONE ENCOUNTER
----- Message from Misa Curry sent at 6/18/2019  2:52 PM CDT -----  Contact: Pt   Can the clinic reply in [facility]: No     Please refill the medication(s) listed below. The patient can be reached at this phone number once it is called into the pharmacy.    Medication #1 metFORMIN (GLUCOPHAGE-XR) 500 MG 24 hr tablet    Medication #2 FARXIGA 5 mg Tab tablet    Preferred Pharmacy:[facility] [address] [place] [address] AT [facility]

## 2019-07-09 RX ORDER — SIMVASTATIN 10 MG/1
TABLET, FILM COATED ORAL
Qty: 90 TABLET | Refills: 0 | Status: SHIPPED | OUTPATIENT
Start: 2019-07-09 | End: 2019-10-14 | Stop reason: SDUPTHER

## 2019-07-11 ENCOUNTER — OFFICE VISIT (OUTPATIENT)
Dept: FAMILY MEDICINE | Facility: CLINIC | Age: 54
End: 2019-07-11
Attending: FAMILY MEDICINE
Payer: COMMERCIAL

## 2019-07-11 ENCOUNTER — LAB VISIT (OUTPATIENT)
Dept: LAB | Facility: HOSPITAL | Age: 54
End: 2019-07-11
Attending: FAMILY MEDICINE
Payer: COMMERCIAL

## 2019-07-11 VITALS
WEIGHT: 248 LBS | DIASTOLIC BLOOD PRESSURE: 80 MMHG | HEIGHT: 75 IN | OXYGEN SATURATION: 97 % | HEART RATE: 90 BPM | BODY MASS INDEX: 30.84 KG/M2 | SYSTOLIC BLOOD PRESSURE: 136 MMHG

## 2019-07-11 DIAGNOSIS — E11.9 CONTROLLED TYPE 2 DIABETES MELLITUS WITHOUT COMPLICATION, WITHOUT LONG-TERM CURRENT USE OF INSULIN: ICD-10-CM

## 2019-07-11 DIAGNOSIS — T78.3XXA ANGIOEDEMA, INITIAL ENCOUNTER: ICD-10-CM

## 2019-07-11 LAB — C4 SERPL-MCNC: 27 MG/DL (ref 11–44)

## 2019-07-11 PROCEDURE — 86160 COMPLEMENT ANTIGEN: CPT

## 2019-07-11 PROCEDURE — 99213 PR OFFICE/OUTPT VISIT, EST, LEVL III, 20-29 MIN: ICD-10-PCS | Mod: S$GLB,,, | Performed by: FAMILY MEDICINE

## 2019-07-11 PROCEDURE — 36415 COLL VENOUS BLD VENIPUNCTURE: CPT | Mod: PO

## 2019-07-11 PROCEDURE — 99999 PR PBB SHADOW E&M-EST. PATIENT-LVL III: CPT | Mod: PBBFAC,,, | Performed by: FAMILY MEDICINE

## 2019-07-11 PROCEDURE — 99999 PR PBB SHADOW E&M-EST. PATIENT-LVL III: ICD-10-PCS | Mod: PBBFAC,,, | Performed by: FAMILY MEDICINE

## 2019-07-11 PROCEDURE — 99213 OFFICE O/P EST LOW 20 MIN: CPT | Mod: S$GLB,,, | Performed by: FAMILY MEDICINE

## 2019-07-11 RX ORDER — DAPAGLIFLOZIN 5 MG/1
5 TABLET, FILM COATED ORAL DAILY
Qty: 30 TABLET | Refills: 3 | Status: SHIPPED | OUTPATIENT
Start: 2019-07-11 | End: 2019-11-04 | Stop reason: SDUPTHER

## 2019-07-11 RX ORDER — AMLODIPINE BESYLATE 10 MG/1
10 TABLET ORAL DAILY
Qty: 30 TABLET | Refills: 2 | Status: SHIPPED | OUTPATIENT
Start: 2019-07-11 | End: 2020-05-05

## 2019-07-11 RX ORDER — LOSARTAN POTASSIUM 100 MG/1
100 TABLET ORAL DAILY
Qty: 30 TABLET | Refills: 2 | Status: SHIPPED | OUTPATIENT
Start: 2019-07-11 | End: 2019-10-20 | Stop reason: SDUPTHER

## 2019-07-11 NOTE — PATIENT INSTRUCTIONS
Suhas,     We are always striving for excellence. Should you receive a patient experience survey in the mail, we would appreciate if you would take a few moments to give us your feedback. These surveys let us know our strengths as well as areas of opportunity for improvement to better serve you.    Thank you for your time,  Ana Luisa Heaton LPN    Test results will be communicated to you via : [facility], Telephone or Letter.   If you have not received test results in one week, please contact the clinic at   [phone].

## 2019-07-11 NOTE — PROGRESS NOTES
"Subjective:       Patient ID: Suhas Castillo is a 54 y.o. male.    Chief Complaint: Oral Swelling    HPI     The patient had an episode of "both lips swelling" ~ noon 2 days ago.  He took diphenhydramine x 2 doses, and the swelling spontaneously resolved over the next 36 hours.  It is completely resolved now.    No previous episodes.  No recent changes in medications (although pharmacy recently changed 2 of his medications with new generic suppliers); no exposures to new foods/drink.    Patient Active Problem List   Diagnosis    Controlled type 2 diabetes mellitus without complication, without long-term current use of insulin    Hypertension associated with diabetes    Hyperlipidemia associated with type 2 diabetes mellitus    AR (allergic rhinitis)    DJD (degenerative joint disease), cervical    Cervical spinal stenosis    Elevated transaminase level    Vitamin D deficiency    DDD (degenerative disc disease), cervical    Screening for colon cancer    Angioedema       Current Outpatient Medications:     albuterol 90 mcg/actuation inhaler, Inhale 2 puffs into the lungs every 4 (four) hours as needed for Wheezing or Shortness of Breath., Disp: 18 g, Rfl: 0    blood sugar diagnostic (CONTOUR TEST STRIPS) Strp, USE TO TEST BLOOD SUGAR TWICE A DAY, Disp: 50 each, Rfl: 11    FARXIGA 5 mg Tab tablet, TAKE 1 TABLET BY MOUTH EVERY DAY, Disp: 30 tablet, Rfl: 3    fluocinonide (LIDEX) 0.05 % external solution, As directed, Disp: , Rfl: 0    fluticasone (FLONASE) 50 mcg/actuation nasal spray, 2 sprays (100 mcg total) by Each Nare route once daily., Disp: 16 g, Rfl: 2    metFORMIN (GLUCOPHAGE-XR) 500 MG 24 hr tablet, Take 2 tablets (1,000 mg total) by mouth 2 (two) times daily., Disp: 360 tablet, Rfl: 3    mupirocin (BACTROBAN) 2 % ointment, Apply topically 2 (two) times daily., Disp: 30 g, Rfl: 0    simvastatin (ZOCOR) 10 MG tablet, TAKE 1 TABLET(10 MG) BY MOUTH EVERY EVENING, Disp: 90 tablet, Rfl: 0    " "SITagliptin (JANUVIA) 100 MG Tab, Take 1 tablet (100 mg total) by mouth once daily., Disp: 90 tablet, Rfl: 1    amLODIPine (NORVASC) 10 MG tablet, Take 1 tablet (10 mg total) by mouth once daily., Disp: 30 tablet, Rfl: 2    losartan (COZAAR) 100 MG tablet, Take 1 tablet (100 mg total) by mouth once daily., Disp: 30 tablet, Rfl: 2    The following portions of the patient's history were reviewed and updated as appropriate: allergies, past family history, past medical history, past social history and past surgical history.    Review of Systems    Other than history of present illness, noncontributory.    Objective:      /80   Pulse 90   Ht 6' 2.5" (1.892 m)   Wt 112.5 kg (248 lb)   SpO2 97%   BMI 31.42 kg/m²     Physical Exam   Constitutional: He appears well-developed and well-nourished.   HENT:   Head: Normocephalic and atraumatic.   Mouth/Throat: Uvula is midline, oropharynx is clear and moist and mucous membranes are normal. No oral lesions. No uvula swelling.   Vitals reviewed.        Assessment:       1. Angioedema, initial encounter          Plan:       D/C Lotrel.  Will Rx amlodipine and losartan.  Enroll in digital medicine clinics.  Follow-up by phone/email in a few days.        "This note will not be shared with the patient."  "

## 2019-07-16 ENCOUNTER — PATIENT MESSAGE (OUTPATIENT)
Dept: FAMILY MEDICINE | Facility: CLINIC | Age: 54
End: 2019-07-16

## 2019-07-22 ENCOUNTER — TELEPHONE (OUTPATIENT)
Dept: FAMILY MEDICINE | Facility: CLINIC | Age: 54
End: 2019-07-22

## 2019-07-22 NOTE — TELEPHONE ENCOUNTER
----- Message from Sully Borjas sent at 7/22/2019  9:43 AM CDT -----  Contact: Pt  Pt asking if he should he be taking Losartan 100 mg or 10 mg. Please call to advise.

## 2019-07-22 NOTE — TELEPHONE ENCOUNTER
Attempted to contact the patient to inform him that yes he should be taking Losartan 100 mg. No answer. Left a voicemail.

## 2019-08-06 DIAGNOSIS — J30.9 ALLERGIC RHINITIS, UNSPECIFIED SEASONALITY, UNSPECIFIED TRIGGER: ICD-10-CM

## 2019-08-06 RX ORDER — FLUTICASONE PROPIONATE 50 MCG
SPRAY, SUSPENSION (ML) NASAL
Qty: 16 ML | Refills: 0 | Status: SHIPPED | OUTPATIENT
Start: 2019-08-06 | End: 2020-02-24 | Stop reason: SDUPTHER

## 2019-08-18 DIAGNOSIS — E55.9 VITAMIN D DEFICIENCY: ICD-10-CM

## 2019-08-18 DIAGNOSIS — E11.59 HYPERTENSION ASSOCIATED WITH DIABETES: ICD-10-CM

## 2019-08-18 DIAGNOSIS — E11.69 HYPERLIPIDEMIA ASSOCIATED WITH TYPE 2 DIABETES MELLITUS: ICD-10-CM

## 2019-08-18 DIAGNOSIS — E78.5 HYPERLIPIDEMIA ASSOCIATED WITH TYPE 2 DIABETES MELLITUS: ICD-10-CM

## 2019-08-18 DIAGNOSIS — I15.2 HYPERTENSION ASSOCIATED WITH DIABETES: ICD-10-CM

## 2019-08-18 DIAGNOSIS — E11.9 CONTROLLED TYPE 2 DIABETES MELLITUS WITHOUT COMPLICATION, WITHOUT LONG-TERM CURRENT USE OF INSULIN: Primary | ICD-10-CM

## 2019-08-19 RX ORDER — METFORMIN HYDROCHLORIDE 500 MG/1
TABLET, EXTENDED RELEASE ORAL
Qty: 360 TABLET | Refills: 0 | Status: SHIPPED | OUTPATIENT
Start: 2019-08-19 | End: 2019-11-24 | Stop reason: SDUPTHER

## 2019-08-19 NOTE — TELEPHONE ENCOUNTER
Medication refilled.  Please enter appointment reminder for patient to return in mid to late September 2019, w/previsit labs.

## 2019-08-20 ENCOUNTER — LAB VISIT (OUTPATIENT)
Dept: LAB | Facility: HOSPITAL | Age: 54
End: 2019-08-20
Attending: FAMILY MEDICINE
Payer: COMMERCIAL

## 2019-08-20 DIAGNOSIS — I15.2 HYPERTENSION ASSOCIATED WITH DIABETES: ICD-10-CM

## 2019-08-20 DIAGNOSIS — E11.59 HYPERTENSION ASSOCIATED WITH DIABETES: ICD-10-CM

## 2019-08-20 DIAGNOSIS — E78.5 HYPERLIPIDEMIA ASSOCIATED WITH TYPE 2 DIABETES MELLITUS: ICD-10-CM

## 2019-08-20 DIAGNOSIS — E11.9 CONTROLLED TYPE 2 DIABETES MELLITUS WITHOUT COMPLICATION, WITHOUT LONG-TERM CURRENT USE OF INSULIN: ICD-10-CM

## 2019-08-20 DIAGNOSIS — E11.69 HYPERLIPIDEMIA ASSOCIATED WITH TYPE 2 DIABETES MELLITUS: ICD-10-CM

## 2019-08-20 LAB
ALBUMIN SERPL BCP-MCNC: 3.7 G/DL (ref 3.5–5.2)
ALP SERPL-CCNC: 81 U/L (ref 55–135)
ALT SERPL W/O P-5'-P-CCNC: 17 U/L (ref 10–44)
ANION GAP SERPL CALC-SCNC: 9 MMOL/L (ref 8–16)
AST SERPL-CCNC: 17 U/L (ref 10–40)
BILIRUB SERPL-MCNC: 0.3 MG/DL (ref 0.1–1)
BUN SERPL-MCNC: 15 MG/DL (ref 6–20)
CALCIUM SERPL-MCNC: 8.7 MG/DL (ref 8.7–10.5)
CHLORIDE SERPL-SCNC: 110 MMOL/L (ref 95–110)
CHOLEST SERPL-MCNC: 106 MG/DL (ref 120–199)
CHOLEST/HDLC SERPL: 3.5 {RATIO} (ref 2–5)
CO2 SERPL-SCNC: 23 MMOL/L (ref 23–29)
CREAT SERPL-MCNC: 1.3 MG/DL (ref 0.5–1.4)
EST. GFR  (AFRICAN AMERICAN): >60 ML/MIN/1.73 M^2
EST. GFR  (NON AFRICAN AMERICAN): >60 ML/MIN/1.73 M^2
ESTIMATED AVG GLUCOSE: 151 MG/DL (ref 68–131)
GLUCOSE SERPL-MCNC: 126 MG/DL (ref 70–110)
HBA1C MFR BLD HPLC: 6.9 % (ref 4–5.6)
HDLC SERPL-MCNC: 30 MG/DL (ref 40–75)
HDLC SERPL: 28.3 % (ref 20–50)
LDLC SERPL CALC-MCNC: 58.6 MG/DL (ref 63–159)
NONHDLC SERPL-MCNC: 76 MG/DL
POTASSIUM SERPL-SCNC: 3.9 MMOL/L (ref 3.5–5.1)
PROT SERPL-MCNC: 7.1 G/DL (ref 6–8.4)
SODIUM SERPL-SCNC: 142 MMOL/L (ref 136–145)
TRIGL SERPL-MCNC: 87 MG/DL (ref 30–150)

## 2019-08-20 PROCEDURE — 83036 HEMOGLOBIN GLYCOSYLATED A1C: CPT

## 2019-08-20 PROCEDURE — 80061 LIPID PANEL: CPT

## 2019-08-20 PROCEDURE — 36415 COLL VENOUS BLD VENIPUNCTURE: CPT | Mod: PN

## 2019-08-20 PROCEDURE — 80053 COMPREHEN METABOLIC PANEL: CPT

## 2019-08-22 ENCOUNTER — OFFICE VISIT (OUTPATIENT)
Dept: FAMILY MEDICINE | Facility: CLINIC | Age: 54
End: 2019-08-22
Attending: FAMILY MEDICINE
Payer: COMMERCIAL

## 2019-08-22 VITALS
OXYGEN SATURATION: 97 % | DIASTOLIC BLOOD PRESSURE: 88 MMHG | HEART RATE: 87 BPM | SYSTOLIC BLOOD PRESSURE: 138 MMHG | WEIGHT: 253 LBS | HEIGHT: 75 IN | BODY MASS INDEX: 31.46 KG/M2

## 2019-08-22 DIAGNOSIS — E11.69 HYPERLIPIDEMIA ASSOCIATED WITH TYPE 2 DIABETES MELLITUS: ICD-10-CM

## 2019-08-22 DIAGNOSIS — E11.9 CONTROLLED TYPE 2 DIABETES MELLITUS WITHOUT COMPLICATION, WITHOUT LONG-TERM CURRENT USE OF INSULIN: Primary | ICD-10-CM

## 2019-08-22 DIAGNOSIS — E78.5 HYPERLIPIDEMIA ASSOCIATED WITH TYPE 2 DIABETES MELLITUS: ICD-10-CM

## 2019-08-22 DIAGNOSIS — I15.2 HYPERTENSION ASSOCIATED WITH DIABETES: ICD-10-CM

## 2019-08-22 DIAGNOSIS — E11.59 HYPERTENSION ASSOCIATED WITH DIABETES: ICD-10-CM

## 2019-08-22 PROCEDURE — 99999 PR PBB SHADOW E&M-EST. PATIENT-LVL III: ICD-10-PCS | Mod: PBBFAC,,, | Performed by: FAMILY MEDICINE

## 2019-08-22 PROCEDURE — 99214 OFFICE O/P EST MOD 30 MIN: CPT | Mod: S$GLB,,, | Performed by: FAMILY MEDICINE

## 2019-08-22 PROCEDURE — 99999 PR PBB SHADOW E&M-EST. PATIENT-LVL III: CPT | Mod: PBBFAC,,, | Performed by: FAMILY MEDICINE

## 2019-08-22 PROCEDURE — 99214 PR OFFICE/OUTPT VISIT, EST, LEVL IV, 30-39 MIN: ICD-10-PCS | Mod: S$GLB,,, | Performed by: FAMILY MEDICINE

## 2019-08-22 NOTE — PROGRESS NOTES
Subjective:       Patient ID: Suhas Castillo is a 54 y.o. male.    Chief Complaint: Results    Diabetes   He presents for his follow-up diabetic visit. He has type 2 diabetes mellitus. His disease course has been stable. There are no hypoglycemic associated symptoms. There are no diabetic associated symptoms. There are no diabetic complications. Risk factors for coronary artery disease include diabetes mellitus, dyslipidemia, hypertension, male sex and stress. He is compliant with treatment all of the time. His weight is stable. He is following a diabetic diet. Exercise: 6 days/week. He monitors blood glucose at home 3-4 x per week. His home blood glucose trend is decreasing steadily. An ACE inhibitor/angiotensin II receptor blocker is being taken. He does not see a podiatrist.Eye exam is current.       Patient Active Problem List   Diagnosis    Controlled type 2 diabetes mellitus without complication, without long-term current use of insulin    Hypertension associated with diabetes    Hyperlipidemia associated with type 2 diabetes mellitus    AR (allergic rhinitis)    DJD (degenerative joint disease), cervical    Cervical spinal stenosis    Elevated transaminase level    Vitamin D deficiency    DDD (degenerative disc disease), cervical    Angioedema       Current Outpatient Medications:     albuterol 90 mcg/actuation inhaler, Inhale 2 puffs into the lungs every 4 (four) hours as needed for Wheezing or Shortness of Breath., Disp: 18 g, Rfl: 0    amLODIPine (NORVASC) 10 MG tablet, Take 1 tablet (10 mg total) by mouth once daily., Disp: 30 tablet, Rfl: 2    blood sugar diagnostic (CONTOUR TEST STRIPS) Strp, USE TO TEST BLOOD SUGAR TWICE A DAY, Disp: 50 each, Rfl: 11    dapagliflozin (FARXIGA) 5 mg Tab tablet, Take 1 tablet (5 mg total) by mouth once daily., Disp: 30 tablet, Rfl: 3    fluocinonide (LIDEX) 0.05 % external solution, As directed, Disp: , Rfl: 0    fluticasone propionate (FLONASE) 50  mcg/actuation nasal spray, SHAKE LIQUID AND USE 2 SPRAYS(100 MCG) IN EACH NOSTRIL EVERY DAY, Disp: 16 mL, Rfl: 0    losartan (COZAAR) 100 MG tablet, Take 1 tablet (100 mg total) by mouth once daily., Disp: 30 tablet, Rfl: 2    metFORMIN (GLUCOPHAGE-XR) 500 MG 24 hr tablet, TAKE 2 TABLETS BY MOUTH TWICE DAILY, Disp: 360 tablet, Rfl: 0    mupirocin (BACTROBAN) 2 % ointment, Apply topically 2 (two) times daily., Disp: 30 g, Rfl: 0    simvastatin (ZOCOR) 10 MG tablet, TAKE 1 TABLET(10 MG) BY MOUTH EVERY EVENING, Disp: 90 tablet, Rfl: 0    SITagliptin (JANUVIA) 100 MG Tab, Take 1 tablet (100 mg total) by mouth once daily., Disp: 90 tablet, Rfl: 1    The following portions of the patient's history were reviewed and updated as appropriate: allergies, past family history, past medical history, past social history and past surgical history.    Review of Systems    Other than history of present illness, noncontributory.    Component Date Value Ref Range    Sodium 08/20/2019 142  136 - 145 mmol/L    Potassium 08/20/2019 3.9  3.5 - 5.1 mmol/L    Chloride 08/20/2019 110  95 - 110 mmol/L    CO2 08/20/2019 23  23 - 29 mmol/L    Glucose 08/20/2019 126* 70 - 110 mg/dL    BUN, Bld 08/20/2019 15  6 - 20 mg/dL    Creatinine 08/20/2019 1.3  0.5 - 1.4 mg/dL    Calcium 08/20/2019 8.7  8.7 - 10.5 mg/dL    Total Protein 08/20/2019 7.1  6.0 - 8.4 g/dL    Albumin 08/20/2019 3.7  3.5 - 5.2 g/dL    Total Bilirubin 08/20/2019 0.3  0.1 - 1.0 mg/dL    Alkaline Phosphatase 08/20/2019 81  55 - 135 U/L    AST 08/20/2019 17  10 - 40 U/L    ALT 08/20/2019 17  10 - 44 U/L    Anion Gap 08/20/2019 9  8 - 16 mmol/L    eGFR if African American 08/20/2019 >60.0  >60 mL/min/1.73 m^2    eGFR if non African American 08/20/2019 >60.0  >60 mL/min/1.73 m^2    Cholesterol 08/20/2019 106* 120 - 199 mg/dL    Triglycerides 08/20/2019 87  30 - 150 mg/dL    HDL 08/20/2019 30* 40 - 75 mg/dL    LDL Cholesterol 08/20/2019 58.6* 63.0 - 159.0 mg/dL  "   Hdl/Cholesterol Ratio 08/20/2019 28.3  20.0 - 50.0 %    Total Cholesterol/HDL Ratio 08/20/2019 3.5  2.0 - 5.0    Non-HDL Cholesterol 08/20/2019 76  mg/dL    Hemoglobin A1C 08/20/2019 6.9* 4.0 - 5.6 %    Estimated Avg Glucose 08/20/2019 151* 68 - 131 mg/dL       Objective:      /88 (BP Location: Left arm, Patient Position: Sitting, BP Method: Large (Manual))   Pulse 87   Ht 6' 2.5" (1.892 m)   Wt 114.8 kg (253 lb)   SpO2 97%   BMI 32.05 kg/m²     Physical Exam   Constitutional: He is oriented to person, place, and time. He appears well-developed and well-nourished. He is cooperative.   HENT:   Head: Normocephalic and atraumatic.   Right Ear: External ear normal.   Left Ear: External ear normal.   Nose: Nose normal.   Mouth/Throat: Oropharynx is clear and moist and mucous membranes are normal. No oropharyngeal exudate.   Eyes: Conjunctivae are normal. No scleral icterus.   Neck: Neck supple. No JVD present. Carotid bruit is not present. No thyromegaly present.   Cardiovascular: Normal rate, regular rhythm, normal heart sounds and normal pulses. Exam reveals no gallop and no friction rub.   No murmur heard.  Pulmonary/Chest: Effort normal and breath sounds normal. He has no wheezes. He has no rhonchi. He has no rales.   Abdominal: Soft. Bowel sounds are normal. He exhibits no distension and no mass. There is no splenomegaly or hepatomegaly. There is no tenderness.   Musculoskeletal: Normal range of motion. He exhibits no edema or tenderness.   Lymphadenopathy:     He has no cervical adenopathy.     He has no axillary adenopathy.   Neurological: He is alert and oriented to person, place, and time. He has normal strength and normal reflexes. No cranial nerve deficit or sensory deficit. Coordination normal.   Skin: Skin is warm and dry.   Psychiatric: He has a normal mood and affect.   Vitals reviewed.        Assessment:       1. Controlled type 2 diabetes mellitus without complication, without " "long-term current use of insulin    2. Hypertension associated with diabetes    3. Hyperlipidemia associated with type 2 diabetes mellitus        Plan:       Enroll in digital medicine clinics for HTN and DM.  Switch from simvastatin to pravastatin.  Offered shingles vaccine.  Health Maintenance reviewed - up to date.  RTC 6 months.      "This note will not be shared with the patient."  "

## 2019-08-22 NOTE — PATIENT INSTRUCTIONS
Suhas,     We are always striving for excellence. Should you receive a patient experience survey electronically or by mail, we would appreciate if you would take a few moments to give us your feedback. These surveys let us know our strengths as well as areas of opportunity for improvement to better serve you.    Thank you for your time,  Ana Luisa Heaton LPN    Test results will be communicated to you via : [facility], Telephone or Letter.   If you have not received test results in one week, please contact the clinic at   [phone].

## 2019-10-07 RX ORDER — SITAGLIPTIN 100 MG/1
TABLET, FILM COATED ORAL
Qty: 90 TABLET | Refills: 0 | Status: SHIPPED | OUTPATIENT
Start: 2019-10-07 | End: 2020-01-13

## 2019-10-15 RX ORDER — SIMVASTATIN 10 MG/1
TABLET, FILM COATED ORAL
Qty: 90 TABLET | Refills: 2 | Status: SHIPPED | OUTPATIENT
Start: 2019-10-15 | End: 2020-03-16 | Stop reason: ALTCHOICE

## 2019-10-21 RX ORDER — LOSARTAN POTASSIUM 100 MG/1
TABLET ORAL
Qty: 90 TABLET | Refills: 3 | Status: SHIPPED | OUTPATIENT
Start: 2019-10-21 | End: 2020-10-21

## 2019-11-04 DIAGNOSIS — E11.9 CONTROLLED TYPE 2 DIABETES MELLITUS WITHOUT COMPLICATION, WITHOUT LONG-TERM CURRENT USE OF INSULIN: ICD-10-CM

## 2019-11-04 RX ORDER — DAPAGLIFLOZIN 5 MG/1
TABLET, FILM COATED ORAL
Qty: 30 TABLET | Refills: 3 | Status: SHIPPED | OUTPATIENT
Start: 2019-11-04 | End: 2020-03-09

## 2019-11-24 DIAGNOSIS — E11.9 CONTROLLED TYPE 2 DIABETES MELLITUS WITHOUT COMPLICATION, WITHOUT LONG-TERM CURRENT USE OF INSULIN: ICD-10-CM

## 2019-11-24 RX ORDER — METFORMIN HYDROCHLORIDE 500 MG/1
TABLET, EXTENDED RELEASE ORAL
Qty: 360 TABLET | Refills: 0 | Status: SHIPPED | OUTPATIENT
Start: 2019-11-24 | End: 2020-03-05

## 2019-11-25 ENCOUNTER — PATIENT MESSAGE (OUTPATIENT)
Dept: FAMILY MEDICINE | Facility: CLINIC | Age: 54
End: 2019-11-25

## 2019-11-26 ENCOUNTER — PATIENT MESSAGE (OUTPATIENT)
Dept: FAMILY MEDICINE | Facility: CLINIC | Age: 54
End: 2019-11-26

## 2020-01-09 ENCOUNTER — PATIENT MESSAGE (OUTPATIENT)
Dept: FAMILY MEDICINE | Facility: CLINIC | Age: 55
End: 2020-01-09

## 2020-01-10 NOTE — TELEPHONE ENCOUNTER
"This is the first time I am hearing that the patient is requesting a sleep study.  This is not something we have discussed in the past.  I was not forwarded any messages concerning this in November.    We cannot just order a sleep study.  He must see one of the providers in that department for evaluation 1st.  Please schedule the patient with a sleep medicine physician.  He does not need a "referral" from me.  "

## 2020-01-13 RX ORDER — SITAGLIPTIN 100 MG/1
TABLET, FILM COATED ORAL
Qty: 90 TABLET | Refills: 0 | Status: SHIPPED | OUTPATIENT
Start: 2020-01-13 | End: 2020-04-11

## 2020-01-14 ENCOUNTER — TELEPHONE (OUTPATIENT)
Dept: SLEEP MEDICINE | Facility: CLINIC | Age: 55
End: 2020-01-14

## 2020-01-15 ENCOUNTER — TELEPHONE (OUTPATIENT)
Dept: SLEEP MEDICINE | Facility: CLINIC | Age: 55
End: 2020-01-15

## 2020-01-30 ENCOUNTER — PATIENT OUTREACH (OUTPATIENT)
Dept: ADMINISTRATIVE | Facility: OTHER | Age: 55
End: 2020-01-30

## 2020-01-30 DIAGNOSIS — Z12.11 COLON CANCER SCREENING: Primary | ICD-10-CM

## 2020-01-30 NOTE — PROGRESS NOTES
Chart reviewed.   Immunizations: Triggered Imm Registry     Orders placed: Fit Kit  Upcoming appts: A1c 2/7/2020

## 2020-02-04 ENCOUNTER — OFFICE VISIT (OUTPATIENT)
Dept: SLEEP MEDICINE | Facility: CLINIC | Age: 55
End: 2020-02-04
Payer: COMMERCIAL

## 2020-02-04 VITALS
DIASTOLIC BLOOD PRESSURE: 89 MMHG | WEIGHT: 247.38 LBS | HEART RATE: 73 BPM | HEIGHT: 74 IN | SYSTOLIC BLOOD PRESSURE: 148 MMHG | BODY MASS INDEX: 31.75 KG/M2

## 2020-02-04 DIAGNOSIS — R06.83 SNORING: Primary | ICD-10-CM

## 2020-02-04 DIAGNOSIS — G47.30 SLEEP APNEA, UNSPECIFIED TYPE: ICD-10-CM

## 2020-02-04 DIAGNOSIS — G47.10 HYPERSOMNIA: ICD-10-CM

## 2020-02-04 PROCEDURE — 99999 PR PBB SHADOW E&M-EST. PATIENT-LVL III: CPT | Mod: PBBFAC,,, | Performed by: INTERNAL MEDICINE

## 2020-02-04 PROCEDURE — 99999 PR PBB SHADOW E&M-EST. PATIENT-LVL III: ICD-10-PCS | Mod: PBBFAC,,, | Performed by: INTERNAL MEDICINE

## 2020-02-04 PROCEDURE — 99204 OFFICE O/P NEW MOD 45 MIN: CPT | Mod: S$GLB,,, | Performed by: INTERNAL MEDICINE

## 2020-02-04 PROCEDURE — 99204 PR OFFICE/OUTPT VISIT, NEW, LEVL IV, 45-59 MIN: ICD-10-PCS | Mod: S$GLB,,, | Performed by: INTERNAL MEDICINE

## 2020-02-04 NOTE — PROGRESS NOTES
Subjective:       Patient ID: Suhas Castillo is a 54 y.o. male.    Chief Complaint: Sleeping Problem    HPI   I had the pleasure of seeing Suhas Castillo today, who is a 54 y.o. male that presents with non-restful sleep and witness apnea.        Suhas Castillo is nota shift worker.    Suhas Castillo presents with has interrupted sleep and is not rested upon awakening that has been going on for 12 months    Bedtime when working ranges from 2300 to 2330.   When not working, bedtime ranges from 2330 to 2330.   Sleep latency ranges from 10 to 15 minutes.   Average number of awakenings is 2+ and return to sleep is quick.   Wake up time when working is 0445 to 0445.   When not working, wake up time is 0730 to 0800.   Patient does notrested upon awakening.    Suhas Castillo consumes approximately 0 beverages with caffeine are consumed daily.   An average of 4 beverages with alcohol are consumed weekly   Medications taken for sleep currently: none  Previous medications taken: none    Suhas Castillo does experience daytime sleepiness.   Naps are taken about 1 times weekly, usually lasting 30 to 60 minutes.  Suhas currently does operate an automobile.  Suhas Castillo does not experience drowsiness when driving.   Patient does doze off when sedentary.   Suhas Castillo does not have auxiliary symptoms of narcolepsy including sleep onset paralysis, hypnagogic hallucinations, sleep attacks and cataplexy  ESS 11.    Suhas Castillo has a history of snoring.   Snoring is described as moderate and constant.   Apneic episodes have been noticed during sleep.   A witness to sleep is not present.   The patient awakens with mouth dryness.      Suhas Castillo does not not have symptoms of Restless Legs Syndrome. Nocturnal leg movements have not been noticed.   The patient does not experience sleep related leg cramps.   There is not a history of parasomnia.      Current Outpatient Medications:     amLODIPine  (NORVASC) 10 MG tablet, Take 1 tablet (10 mg total) by mouth once daily., Disp: 30 tablet, Rfl: 2    blood sugar diagnostic (CONTOUR TEST STRIPS) Strp, USE TO TEST BLOOD SUGAR TWICE A DAY, Disp: 50 each, Rfl: 11    FARXIGA 5 mg Tab tablet, TAKE 1 TABLET(5 MG) BY MOUTH EVERY DAY, Disp: 30 tablet, Rfl: 3    fluticasone propionate (FLONASE) 50 mcg/actuation nasal spray, SHAKE LIQUID AND USE 2 SPRAYS(100 MCG) IN EACH NOSTRIL EVERY DAY, Disp: 16 mL, Rfl: 0    JANUVIA 100 mg Tab, TAKE 1 TABLET(100 MG) BY MOUTH EVERY DAY, Disp: 90 tablet, Rfl: 0    losartan (COZAAR) 100 MG tablet, TAKE 1 TABLET(100 MG) BY MOUTH EVERY DAY, Disp: 90 tablet, Rfl: 3    metFORMIN (GLUCOPHAGE-XR) 500 MG 24 hr tablet, TAKE 2 TABLETS BY MOUTH TWICE DAILY, Disp: 360 tablet, Rfl: 0    simvastatin (ZOCOR) 10 MG tablet, TAKE 1 TABLET(10 MG) BY MOUTH EVERY EVENING, Disp: 90 tablet, Rfl: 2    albuterol 90 mcg/actuation inhaler, Inhale 2 puffs into the lungs every 4 (four) hours as needed for Wheezing or Shortness of Breath., Disp: 18 g, Rfl: 0    fluocinonide (LIDEX) 0.05 % external solution, As directed, Disp: , Rfl: 0    mupirocin (BACTROBAN) 2 % ointment, Apply topically 2 (two) times daily. (Patient not taking: Reported on 2/4/2020), Disp: 30 g, Rfl: 0     Review of patient's allergies indicates:   Allergen Reactions    Ace inhibitors Swelling         Past Medical History:   Diagnosis Date    AR (allergic rhinitis)     Cervical spinal stenosis     Diabetes mellitus type II     DJD (degenerative joint disease), cervical     Hyperlipidemia     Hypertension        Past Surgical History:   Procedure Laterality Date    COLONOSCOPY N/A 4/11/2019    Procedure: COLONOSCOPY;  Surgeon: Gato Corbin MD;  Location: [address];  Service: Endoscopy;  Laterality: N/A;    EPIDURAL BLOCK INJECTION      cervical x 2    EPIDURAL STEROID INJECTION N/A 9/28/2018    Procedure: Injection, Steroid, Epidural  C7-T1;  Surgeon: Bernie LEVINE  MD Sean;  Location: [facility];  Service: Pain Management;  Laterality: N/A;  C7-T1 IL DEBORAH   MRDICALLY URGENT    EPIDURAL STEROID INJECTION N/A 10/26/2018    Procedure: Injection, Steroid, Epidural CERVICAL C7-T1 DEBORAH;  Surgeon: Bernie Estrada MD;  Location: [facility];  Service: Pain Management;  Laterality: N/A;    EPIDURAL STEROID INJECTION N/A 2/7/2019    Procedure: INJECTION, STEROID, EPIDURAL;  Surgeon: Tania Bajwa MD;  Location: [facility];  Service: Pain Management;  Laterality: N/A;  C DEBORAH C7-T1    ESTRADA PT ON YONI SCHEDULE    TESTICLE SURGERY  age 16    torsion       Family History   Problem Relation Age of Onset    Hypertension Mother     Diabetes Mother     Stroke Mother        Social History     Socioeconomic History    Marital status: Single     Spouse name: Not on file    Number of children: 4    Years of education: Not on file    Highest education level: Not on file   Occupational History    Occupation: secutiry supervisor     Employer: "[employer]"   Social Needs    Financial resource strain: Not on file    Food insecurity:     Worry: Not on file     Inability: Not on file    Transportation needs:     Medical: Not on file     Non-medical: Not on file   Tobacco Use    Smoking status: Never Smoker    Smokeless tobacco: Never Used   Substance and Sexual Activity    Alcohol use: No     Alcohol/week: 0.0 standard drinks     Comment: none x 4 years; previously social drinker    Drug use: No    Sexual activity: Yes     Partners: Female     Birth control/protection: None   Lifestyle    Physical activity:     Days per week: Not on file     Minutes per session: Not on file    Stress: Not on file   Relationships    Social connections:     Talks on phone: Not on file     Gets together: Not on file     Attends Spiritism service: Not on file     Active member of club or organization: Not on file     Attends meetings of clubs or organizations: Not on file      Relationship status: Not on file   Other Topics Concern    Not on file   Social History Narrative    The patient does exercise regularly (treadmill, light weights: [id number]).      Rates diet as fair.      He is satisfied with weight.               Old medical records.    Vitals:    02/04/20 0857   BP: (!) 148/89   Pulse: 73         Suhas was seen today for sleeping problem.    Diagnoses and all orders for this visit:    Snoring    Hypersomnia             The patient was given open opportunity to ask questions and/or express concerns about treatment plan.   All questions/concerns were discussed.   Driving precautions were provided.     Two patient identifiers used prior to evaluation.    Thank you for referring Suhascynthia Castillo for evaluation.             Review of Systems    Objective:      Physical Exam    Assessment:       1. Snoring    2. Hypersomnia    3. Sleep apnea, unspecified type        Plan:       Due to listed symptoms, a polysomnogram is recommended and ordered.   Description of procedure given to patient.   If significant Obstructive Sleep Apnea (LIZZY) is found during the initial portion of the study, therapy will be initiated with nasal Continuous Positive Airway Pressure (CPAP).   Goals of therapy were discussed, alternative treatments listed and patient agrees to this form of therapy if indicated.   The pathophysiology of LIZZY was discussed.   The effects of LIZZY on patient's co-morbid conditions and the increased morbidity and/or mortality associated with this condition were reviewed.   The patient was given open opportunity to ask questions and/or express concerns about treatment plan.   All questions/concerns were discussed.   Driving precautions were provided.       Thank you for referring Suhas Castillo for evaluation.

## 2020-02-07 ENCOUNTER — LAB VISIT (OUTPATIENT)
Dept: LAB | Facility: HOSPITAL | Age: 55
End: 2020-02-07
Attending: FAMILY MEDICINE
Payer: COMMERCIAL

## 2020-02-07 DIAGNOSIS — E11.9 CONTROLLED TYPE 2 DIABETES MELLITUS WITHOUT COMPLICATION, WITHOUT LONG-TERM CURRENT USE OF INSULIN: ICD-10-CM

## 2020-02-07 DIAGNOSIS — E78.5 HYPERLIPIDEMIA ASSOCIATED WITH TYPE 2 DIABETES MELLITUS: ICD-10-CM

## 2020-02-07 DIAGNOSIS — E11.59 HYPERTENSION ASSOCIATED WITH DIABETES: ICD-10-CM

## 2020-02-07 DIAGNOSIS — E11.69 HYPERLIPIDEMIA ASSOCIATED WITH TYPE 2 DIABETES MELLITUS: ICD-10-CM

## 2020-02-07 DIAGNOSIS — I15.2 HYPERTENSION ASSOCIATED WITH DIABETES: ICD-10-CM

## 2020-02-07 LAB
ALBUMIN SERPL BCP-MCNC: 4 G/DL (ref 3.5–5.2)
ALP SERPL-CCNC: 84 U/L (ref 55–135)
ALT SERPL W/O P-5'-P-CCNC: 21 U/L (ref 10–44)
ANION GAP SERPL CALC-SCNC: 7 MMOL/L (ref 8–16)
AST SERPL-CCNC: 20 U/L (ref 10–40)
BILIRUB SERPL-MCNC: 0.5 MG/DL (ref 0.1–1)
BUN SERPL-MCNC: 19 MG/DL (ref 6–20)
CALCIUM SERPL-MCNC: 9.3 MG/DL (ref 8.7–10.5)
CHLORIDE SERPL-SCNC: 108 MMOL/L (ref 95–110)
CHOLEST SERPL-MCNC: 108 MG/DL (ref 120–199)
CHOLEST/HDLC SERPL: 3.3 {RATIO} (ref 2–5)
CO2 SERPL-SCNC: 26 MMOL/L (ref 23–29)
CREAT SERPL-MCNC: 1.2 MG/DL (ref 0.5–1.4)
EST. GFR  (AFRICAN AMERICAN): >60 ML/MIN/1.73 M^2
EST. GFR  (NON AFRICAN AMERICAN): >60 ML/MIN/1.73 M^2
ESTIMATED AVG GLUCOSE: 146 MG/DL (ref 68–131)
GLUCOSE SERPL-MCNC: 108 MG/DL (ref 70–110)
HBA1C MFR BLD HPLC: 6.7 % (ref 4–5.6)
HDLC SERPL-MCNC: 33 MG/DL (ref 40–75)
HDLC SERPL: 30.6 % (ref 20–50)
LDLC SERPL CALC-MCNC: 56.6 MG/DL (ref 63–159)
NONHDLC SERPL-MCNC: 75 MG/DL
POTASSIUM SERPL-SCNC: 4.4 MMOL/L (ref 3.5–5.1)
PROT SERPL-MCNC: 7.8 G/DL (ref 6–8.4)
SODIUM SERPL-SCNC: 141 MMOL/L (ref 136–145)
TRIGL SERPL-MCNC: 92 MG/DL (ref 30–150)

## 2020-02-07 PROCEDURE — 36415 COLL VENOUS BLD VENIPUNCTURE: CPT | Mod: PN

## 2020-02-07 PROCEDURE — 83036 HEMOGLOBIN GLYCOSYLATED A1C: CPT

## 2020-02-07 PROCEDURE — 80061 LIPID PANEL: CPT

## 2020-02-07 PROCEDURE — 80053 COMPREHEN METABOLIC PANEL: CPT

## 2020-02-11 ENCOUNTER — TELEPHONE (OUTPATIENT)
Dept: SLEEP MEDICINE | Facility: OTHER | Age: 55
End: 2020-02-11

## 2020-02-12 ENCOUNTER — PATIENT OUTREACH (OUTPATIENT)
Dept: ADMINISTRATIVE | Facility: HOSPITAL | Age: 55
End: 2020-02-12

## 2020-02-18 DIAGNOSIS — J30.9 ALLERGIC RHINITIS, UNSPECIFIED SEASONALITY, UNSPECIFIED TRIGGER: ICD-10-CM

## 2020-02-20 RX ORDER — FLUTICASONE PROPIONATE 50 MCG
SPRAY, SUSPENSION (ML) NASAL
Qty: 16 G | OUTPATIENT
Start: 2020-02-20

## 2020-02-21 ENCOUNTER — HOSPITAL ENCOUNTER (OUTPATIENT)
Dept: SLEEP MEDICINE | Facility: OTHER | Age: 55
Discharge: HOME | End: 2020-02-21
Attending: INTERNAL MEDICINE
Payer: COMMERCIAL

## 2020-02-21 DIAGNOSIS — G47.33 OSA (OBSTRUCTIVE SLEEP APNEA): Primary | ICD-10-CM

## 2020-02-21 DIAGNOSIS — G47.30 SLEEP APNEA, UNSPECIFIED TYPE: ICD-10-CM

## 2020-02-21 DIAGNOSIS — G47.10 HYPERSOMNIA: ICD-10-CM

## 2020-02-21 DIAGNOSIS — R06.83 SNORING: ICD-10-CM

## 2020-02-21 PROCEDURE — 95811 POLYSOM 6/>YRS CPAP 4/> PARM: CPT | Mod: 26,,, | Performed by: INTERNAL MEDICINE

## 2020-02-21 PROCEDURE — 95811 PR POLYSOMNOGRAPHY W/CPAP: ICD-10-PCS | Mod: 26,,, | Performed by: INTERNAL MEDICINE

## 2020-02-21 PROCEDURE — 95811 POLYSOM 6/>YRS CPAP 4/> PARM: CPT

## 2020-02-22 NOTE — PROGRESS NOTES
"Baseline PSG study was performed on Suhas Castillo. The following was explained to the patient for the study: the set-up procedure in detail, time to bed, wake time in morning, the reasons to enter the room during the night, and the possibility in having to try the PAP mask on if he/she meets criteria for a split-night study. A post -sleep thank you letter was given to the patient the end of the study that explains the timeframe of their results and other expectations. Pt was the asked to fill out a patient survey that they should receive via email or regular mail to give us feedback on their experience.    EKG:  apparent Rare PAC,      Difficulties:   Averaged EEG for EKG artifact; TIR Changed belt connector for poor signal    Pt met criteria for CPAP.    Mask:     Med. Eson nasal mask with chinstrap  Humidity:  3  CFlex:  3  Pressure range explored:   5cm to 8cm  Optimal pressure:  8cm    Pt response to CPAP:  " I liked the mask. I didn't want to wake this morning".     "

## 2020-02-24 ENCOUNTER — TELEPHONE (OUTPATIENT)
Dept: SLEEP MEDICINE | Facility: CLINIC | Age: 55
End: 2020-02-24

## 2020-02-24 DIAGNOSIS — J30.9 ALLERGIC RHINITIS, UNSPECIFIED SEASONALITY, UNSPECIFIED TRIGGER: ICD-10-CM

## 2020-02-24 RX ORDER — FLUTICASONE PROPIONATE 50 MCG
SPRAY, SUSPENSION (ML) NASAL
Qty: 16 ML | Refills: 11 | Status: SHIPPED | OUTPATIENT
Start: 2020-02-24 | End: 2021-03-24 | Stop reason: SDUPTHER

## 2020-02-24 NOTE — PROCEDURES
[facility]  Tel: [phone]    Suhas Castillo  1965  BMI 31.7  [id number]      Split night polysomnogram  Hypopnea definition used AASM 1a    Sleep parameters:  Prior to initiation of CPAP, Total recording time 196 minutes, total sleep time 180.5 minutes and sleep efficiency 92.1%.   Sleep latency 6.5 minutes and REM sleep latency 51.5 minutes.   Stage NREM1 30.2%, stage NREM2 38.2%, stage NREM 3 2.8% and REM 28.8% of total sleep time.   Wake after sleep onset 9 minutes.    After the initiation of CPAP, Total recording time 269.5 minutes, total sleep time 252.5 minutes and sleep efficiency 93.7%.   Sleep latency 7 minutes and REM sleep latency 67.5 minutes.   Stage NREM1 9.5%, stage NREM2 57.5%, stage NREM 3 6.5% and REM 26.3% of total sleep time.   Wake after sleep onset 10 minutes.    Respiratory parameters:  During the diagnostic portion of the study, 104 obstructive respiratory events were present with AHI 34.6 events per hour of sleep.   Oxygen saturation thalia with events was 73%.   Baseline oxygen saturation ranged from 93% to 97%.   Baseline hypoxemia was not present.   severe snoring was present.    Events were not more frequent during REM sleep, with REM AHI 11.5 events per hour of REM sleep.   Sleep in the supine position was present.    The supine AHI was 34.6 and lateral AHI NA.    CPAP was initiated at 5 cm H20 and adjusted to 8 cm H20.  REM sleep was present at 7-8 cm H20.   The most effective pressure setting was 8 cm H20, which reduced the AHI to 0.   Supine sleep was present at this setting.    EM periodic limb movements during sleep were present with Periodic Limb Movement Index 0 per hour of sleep.   0 were associated with arousal, with Periodic Limb Movement Arousal Index 0 per hour of sleep.       ECG:  Mean pulse rate during sleep was 70 beats per minute with No significant arrhythmias noted    EEG:  Expanded seizure montage was not used.   No seizure  activity was noted.    Impression:  severe Obstructive Sleep Apnea         Recommendations:  Auto CPAP at 5-9  cm H20 is ordered for home use.   The patient will be followed up after the initiation of therapy to assess for symptom resolution, tolerance and compliance., PAP order was placed.    and Patient has follow up with Sleep Medicine        (This Sleep Study was interpreted by a Board Certified Sleep Specialist who conducted an epoch-by-epoch review of the entire raw data recording.)     (The indication for this sleep study was reviewed and deemed appropriate by AASM Practice Parameters or other reasons by a Board Certified Sleep Specialist.)

## 2020-02-24 NOTE — TELEPHONE ENCOUNTER
Called patient to review study results and treatment options.   Severe LIZZY (ahi 35)   Resolved at low cpap setting and order entered.   Compliance f/u  LM    Patient called back and I reviewed study results.   Patient agrees with treatment plan.

## 2020-03-04 ENCOUNTER — TELEPHONE (OUTPATIENT)
Dept: SLEEP MEDICINE | Facility: CLINIC | Age: 55
End: 2020-03-04

## 2020-03-04 DIAGNOSIS — E11.9 CONTROLLED TYPE 2 DIABETES MELLITUS WITHOUT COMPLICATION, WITHOUT LONG-TERM CURRENT USE OF INSULIN: ICD-10-CM

## 2020-03-04 NOTE — TELEPHONE ENCOUNTER
----- Message from Susan Granados MA sent at 2/24/2020 11:38 AM CST -----  Contact: TERESE CAMPBELL  Pt. Returning phone call to discuss test results  ----- Message -----  From: Tomasa Brito  Sent: 2/24/2020  11:17 AM CST  To: Aiden Dukes Staff    Type:  Patient Returning Call    Who Called: TERESE CAMPBELL    Who Left Message for Patient: Roseline Wolfe    Does the patient know what this is regarding?: Yes    Best Call Back Number: [phone]    Additional Information:

## 2020-03-05 RX ORDER — METFORMIN HYDROCHLORIDE 500 MG/1
TABLET, EXTENDED RELEASE ORAL
Qty: 360 TABLET | Refills: 0 | Status: SHIPPED | OUTPATIENT
Start: 2020-03-05 | End: 2020-05-23

## 2020-03-05 NOTE — TELEPHONE ENCOUNTER
Please contact patient reminded that you do for his six-month follow-up visit with me in February (last month).  Will he be returning for continuation of care?

## 2020-03-07 ENCOUNTER — OFFICE VISIT (OUTPATIENT)
Dept: URGENT CARE | Facility: CLINIC | Age: 55
End: 2020-03-07
Payer: COMMERCIAL

## 2020-03-07 VITALS
HEART RATE: 87 BPM | HEIGHT: 74 IN | DIASTOLIC BLOOD PRESSURE: 88 MMHG | BODY MASS INDEX: 31.7 KG/M2 | OXYGEN SATURATION: 95 % | RESPIRATION RATE: 16 BRPM | WEIGHT: 247 LBS | TEMPERATURE: 97 F | SYSTOLIC BLOOD PRESSURE: 154 MMHG

## 2020-03-07 DIAGNOSIS — J32.9 SINUSITIS, UNSPECIFIED CHRONICITY, UNSPECIFIED LOCATION: Primary | ICD-10-CM

## 2020-03-07 PROCEDURE — 99203 PR OFFICE/OUTPT VISIT, NEW, LEVL III, 30-44 MIN: ICD-10-PCS | Mod: 25,S$GLB,, | Performed by: NURSE PRACTITIONER

## 2020-03-07 PROCEDURE — 99203 OFFICE O/P NEW LOW 30 MIN: CPT | Mod: 25,S$GLB,, | Performed by: NURSE PRACTITIONER

## 2020-03-07 PROCEDURE — 96372 PR INJECTION,THERAP/PROPH/DIAG2ST, IM OR SUBCUT: ICD-10-PCS | Mod: S$GLB,,, | Performed by: NURSE PRACTITIONER

## 2020-03-07 PROCEDURE — 96372 THER/PROPH/DIAG INJ SC/IM: CPT | Mod: S$GLB,,, | Performed by: NURSE PRACTITIONER

## 2020-03-07 RX ORDER — BETAMETHASONE SODIUM PHOSPHATE AND BETAMETHASONE ACETATE 3; 3 MG/ML; MG/ML
6 INJECTION, SUSPENSION INTRA-ARTICULAR; INTRALESIONAL; INTRAMUSCULAR; SOFT TISSUE
Status: COMPLETED | OUTPATIENT
Start: 2020-03-07 | End: 2020-03-07

## 2020-03-07 RX ORDER — AMOXICILLIN AND CLAVULANATE POTASSIUM 875; 125 MG/1; MG/1
1 TABLET, FILM COATED ORAL 2 TIMES DAILY
Qty: 28 TABLET | Refills: 0 | Status: SHIPPED | OUTPATIENT
Start: 2020-03-07 | End: 2020-03-21

## 2020-03-07 RX ORDER — BENZONATATE 100 MG/1
100 CAPSULE ORAL 3 TIMES DAILY PRN
Qty: 30 CAPSULE | Refills: 1 | Status: SHIPPED | OUTPATIENT
Start: 2020-03-07 | End: 2020-05-05

## 2020-03-07 RX ADMIN — BETAMETHASONE SODIUM PHOSPHATE AND BETAMETHASONE ACETATE 6 MG: 3; 3 INJECTION, SUSPENSION INTRA-ARTICULAR; INTRALESIONAL; INTRAMUSCULAR; SOFT TISSUE at 05:03

## 2020-03-07 NOTE — PATIENT INSTRUCTIONS
Sinusitis (Antibiotic Treatment)    The sinuses are air-filled spaces within the bones of the face. They connect to the inside of the nose. Sinusitis is an inflammation of the tissue lining the sinus cavity. Sinus inflammation can occur during a cold. It can also be due to allergies to pollens and other particles in the air. Sinusitis can cause symptoms of sinus congestion and fullness. A sinus infection causes fever, headache and facial pain. There is often green or yellow drainage from the nose or into the back of the throat (post-nasal drip). You have been given antibiotics to treat this condition.  Home care:  · Take the full course of antibiotics as instructed. Do not stop taking them, even if you feel better.  · Drink plenty of water, hot tea, and other liquids. This may help thin mucus. It also may promote sinus drainage.  · Heat may help soothe painful areas of the face. Use a towel soaked in hot water. Or,  the shower and direct the hot spray onto your face. Using a vaporizer along with a menthol rub at night may also help.   · An expectorant containing guaifenesin may help thin the mucus and promote drainage from the sinuses.  · Over-the-counter decongestants may be used unless a similar medicine was prescribed. Nasal sprays work the fastest. Use one that contains phenylephrine or oxymetazoline. First blow the nose gently. Then use the spray. Do not use these medicines more often than directed on the label or symptoms may get worse. You may also use tablets containing pseudoephedrine. Avoid products that combine ingredients, because side effects may be increased. Read labels. You can also ask the pharmacist for help. (NOTE: Persons with high blood pressure should not use decongestants. They can raise blood pressure.)  · Over-the-counter antihistamines may help if allergies contributed to your sinusitis.    · Do not use nasal rinses or irrigation during an acute sinus infection, unless told to by  your health care provider. Rinsing may spread the infection to other sinuses.  · Use acetaminophen or ibuprofen to control pain, unless another pain medicine was prescribed. (If you have chronic liver or kidney disease or ever had a stomach ulcer, talk with your doctor before using these medicines. Aspirin should never be used in anyone under 18 years of age who is ill with a fever. It may cause severe liver damage.)  · Don't smoke. This can worsen symptoms.  Follow-up care  Follow up with your healthcare provider or our staff if you are not improving within the next week.  When to seek medical advice  Call your healthcare provider if any of these occur:  · Facial pain or headache becoming more severe  · Stiff neck  · Unusual drowsiness or confusion  · Swelling of the forehead or eyelids  · Vision problems, including blurred or double vision  · Fever of 100.4ºF (38ºC) or higher, or as directed by your healthcare provider  · Seizure  · Breathing problems  · Symptoms not resolving within 10 days  Date Last Reviewed: 4/13/2015  © 8787-0572 [employer]. [address] [place] [address], [place] [address]. All rights reserved. This information is not intended as a substitute for professional medical care. Always follow your healthcare professional's instructions.

## 2020-03-07 NOTE — PROGRESS NOTES
"Subjective:       Patient ID: Suhas Castillo is a 55 y.o. male.    Vitals:  height is 6' 2" (1.88 m) and weight is 112 kg (247 lb). His temperature is 97.1 °F (36.2 °C). His blood pressure is 154/88 (abnormal) and his pulse is 87. His respiration is 16 and oxygen saturation is 95%.     Chief Complaint: Cough    Pt states watery itchy eyes, sinus congestion and cough due to allergies x 2 days.    Cough   This is a new problem. The current episode started in the past 7 days. The problem has been unchanged. The cough is productive of sputum. Associated symptoms include nasal congestion. Pertinent negatives include no chest pain, chills, fever, headaches, myalgias, rash, sore throat or shortness of breath. The symptoms are aggravated by pollens. Treatments tried: zyrtex, flonase. The treatment provided mild relief.   Has been ill 2 weeks ago, got better but now feeing much worse    Constitution: Negative for chills, fatigue and fever.   HENT: Positive for congestion. Negative for sore throat.    Neck: Negative for painful lymph nodes.   Cardiovascular: Negative for chest pain and leg swelling.   Eyes: Positive for eye discharge and eye itching. Negative for double vision and blurred vision.   Respiratory: Positive for cough and sputum production. Negative for shortness of breath.    Gastrointestinal: Negative for nausea, vomiting and diarrhea.   Genitourinary: Negative for dysuria, frequency and urgency.   Musculoskeletal: Negative for joint pain, joint swelling, muscle cramps and muscle ache.   Skin: Negative for color change, pale and rash.   Allergic/Immunologic: Negative for seasonal allergies.   Neurological: Negative for dizziness, history of vertigo, light-headedness, passing out and headaches.   Hematologic/Lymphatic: Negative for swollen lymph nodes, easy bruising/bleeding and history of blood clots. Does not bruise/bleed easily.   Psychiatric/Behavioral: Negative for nervous/anxious, sleep disturbance and " depression. The patient is not nervous/anxious.        Objective:      Physical Exam   Constitutional: He is oriented to person, place, and time. He appears well-developed and well-nourished. He is cooperative.  Non-toxic appearance. He does not have a sickly appearance. He does not appear ill. No distress.   HENT:   Head: Normocephalic. Head is with raccoon's eyes.   Right Ear: Hearing, tympanic membrane, external ear and ear canal normal.   Left Ear: Hearing, tympanic membrane, external ear and ear canal normal.   Nose: No mucosal edema, rhinorrhea or nasal deformity. No epistaxis. Right sinus exhibits frontal sinus tenderness. Right sinus exhibits no maxillary sinus tenderness. Left sinus exhibits frontal sinus tenderness. Left sinus exhibits no maxillary sinus tenderness.   Mouth/Throat: Uvula is midline, oropharynx is clear and moist and mucous membranes are normal. No trismus in the jaw. Normal dentition. No uvula swelling. No oropharyngeal exudate, posterior oropharyngeal edema or posterior oropharyngeal erythema.   Eyes: Conjunctivae and lids are normal. No scleral icterus.   Neck: Trachea normal, full passive range of motion without pain and phonation normal. Neck supple. No neck rigidity. No edema and no erythema present.   Cardiovascular: Normal rate, regular rhythm, normal heart sounds, intact distal pulses and normal pulses.   Pulmonary/Chest: Effort normal and breath sounds normal. No respiratory distress. He has no decreased breath sounds. He has no rhonchi.   Abdominal: Normal appearance.   Musculoskeletal: Normal range of motion. He exhibits no edema or deformity.   Lymphadenopathy:     He has cervical adenopathy.   Neurological: He is alert and oriented to person, place, and time. He exhibits normal muscle tone. Coordination normal.   Skin: Skin is warm, dry, intact, not diaphoretic and not pale.   Psychiatric: He has a normal mood and affect. His speech is normal and behavior is normal. Judgment  and thought content normal. Cognition and memory are normal.   Nursing note and vitals reviewed.        Assessment:         sinusitis  Plan:       You were given a steroid injection today. Risks and benefits were discussed with you.   If you have diabetes this injection will raise your blood sugar. This will normalize over the next 2-3 days. Please make sure you monitor you blood sugar accordingly.   This medication can also increase you blood pressure. Please monitor your blood pressure as discussed.   Please keep in mind that you should not take long term steroids unless prescribed by your physician. You should not exceed 4 steroid injections in a year and if you have multiple injections they should be spaced out adequately. Long term side effects and risks can occur as mentioned at todays visit.     You may use your choice of the following, In the order of strength (and drowsiness) they are Claritin, then Allegra, then Zyrtec:    There are no diagnoses linked to this encounter.     Continue using flonase daily    Suhas was seen today for cough.    Diagnoses and all orders for this visit:    Sinusitis, unspecified chronicity, unspecified location    Other orders  -     betamethasone acetate-betamethasone sodium phosphate injection 6 mg  -     amoxicillin-clavulanate 875-125mg (AUGMENTIN) 875-125 mg per tablet; Take 1 tablet by mouth 2 (two) times daily. for 14 days  -     benzonatate (TESSALON PERLES) 100 MG capsule; Take 1 capsule (100 mg total) by mouth 3 (three) times daily as needed for Cough.        Education  Pt has been given instructions populated from [employer] database and those entered into patient instructions field and has verbalized understanding of the after visit summary and information contained wherein.    Follow Up  If no better one week fu with pcp.    In Case of Emergency   May go to ER for acute shortness of breath, lightheadedness, fever, or any other emergent complaints or changes in  condition.

## 2020-03-08 DIAGNOSIS — E11.9 CONTROLLED TYPE 2 DIABETES MELLITUS WITHOUT COMPLICATION, WITHOUT LONG-TERM CURRENT USE OF INSULIN: ICD-10-CM

## 2020-03-09 RX ORDER — DAPAGLIFLOZIN 5 MG/1
TABLET, FILM COATED ORAL
Qty: 30 TABLET | Refills: 3 | Status: SHIPPED | OUTPATIENT
Start: 2020-03-09 | End: 2020-05-05

## 2020-03-12 ENCOUNTER — TELEPHONE (OUTPATIENT)
Dept: FAMILY MEDICINE | Facility: CLINIC | Age: 55
End: 2020-03-12

## 2020-03-12 NOTE — TELEPHONE ENCOUNTER
----- Message from Michelle Noyola sent at 3/12/2020 11:00 AM CDT -----  Contact: pt  Patient returning call from office would like  A call back at [phone]

## 2020-03-15 ENCOUNTER — TELEPHONE (OUTPATIENT)
Dept: FAMILY MEDICINE | Facility: CLINIC | Age: 55
End: 2020-03-15

## 2020-03-15 NOTE — PROGRESS NOTES
"Subjective:       Patient ID: Suhas Castillo is a 55 y.o. male diabetic who returns for his "annual exam.".    Chief Complaint: Diabetes and Annual Exam    HPI    The patient presents to the office today requesting a routine periodic health examination.    Patient Active Problem List   Diagnosis    Controlled type 2 diabetes mellitus without complication, without long-term current use of insulin    Hypertension associated with diabetes    Hyperlipidemia associated with type 2 diabetes mellitus    AR (allergic rhinitis)    DJD (degenerative joint disease), cervical    Cervical spinal stenosis    Elevated transaminase level    Vitamin D deficiency    DDD (degenerative disc disease), cervical    Angioedema    LIZZY (obstructive sleep apnea)       Past Surgical History:   Procedure Laterality Date    COLONOSCOPY N/A 4/11/2019    Procedure: COLONOSCOPY;  Surgeon: Gato Corbin MD;  Location: [facility] ([address] [place] [address]);  Service: Endoscopy;  Laterality: N/A;    EPIDURAL BLOCK INJECTION      cervical x 2    EPIDURAL STEROID INJECTION N/A 9/28/2018    Procedure: Injection, Steroid, Epidural  C7-T1;  Surgeon: Bernie Guzmán MD;  Location: [facility];  Service: Pain Management;  Laterality: N/A;  C7-T1 IL DEBORAH   MRDICALLY URGENT    EPIDURAL STEROID INJECTION N/A 10/26/2018    Procedure: Injection, Steroid, Epidural CERVICAL C7-T1 DEBORAH;  Surgeon: Bernie Guzmán MD;  Location: [facility];  Service: Pain Management;  Laterality: N/A;    EPIDURAL STEROID INJECTION N/A 2/7/2019    Procedure: INJECTION, STEROID, EPIDURAL;  Surgeon: Tania Bajwa MD;  Location: [facility];  Service: Pain Management;  Laterality: N/A;  C DEBORAH C7-T1    GUZMÁN PT ON [facility] SCHEDULE    TESTICLE SURGERY  age 16    torsion         Current Outpatient Medications:     albuterol 90 mcg/actuation inhaler, Inhale 2 puffs into the lungs every 4 (four) hours as needed for Wheezing or Shortness of Breath., Disp: 18 g, Rfl: 0    amLODIPine " (NORVASC) 10 MG tablet, Take 1 tablet (10 mg total) by mouth once daily., Disp: 30 tablet, Rfl: 2    amoxicillin-clavulanate 875-125mg (AUGMENTIN) 875-125 mg per tablet, Take 1 tablet by mouth 2 (two) times daily. for 14 days, Disp: 28 tablet, Rfl: 0    benzonatate (TESSALON PERLES) 100 MG capsule, Take 1 capsule (100 mg total) by mouth 3 (three) times daily as needed for Cough., Disp: 30 capsule, Rfl: 1    blood sugar diagnostic (CONTOUR TEST STRIPS) Strp, USE TO TEST BLOOD SUGAR TWICE A DAY, Disp: 50 each, Rfl: 11    FARXIGA 5 mg Tab tablet, TAKE 1 TABLET(5 MG) BY MOUTH EVERY DAY, Disp: 30 tablet, Rfl: 3    fluocinonide (LIDEX) 0.05 % external solution, As directed, Disp: , Rfl: 0    fluticasone propionate (FLONASE) 50 mcg/actuation nasal spray, SHAKE LIQUID AND USE 2 SPRAYS(100 MCG) IN EACH NOSTRIL EVERY DAY, Disp: 16 mL, Rfl: 11    JANUVIA 100 mg Tab, TAKE 1 TABLET(100 MG) BY MOUTH EVERY DAY, Disp: 90 tablet, Rfl: 0    losartan (COZAAR) 100 MG tablet, TAKE 1 TABLET(100 MG) BY MOUTH EVERY DAY, Disp: 90 tablet, Rfl: 3    metFORMIN (GLUCOPHAGE-XR) 500 MG XR 24hr tablet, TAKE 2 TABLETS BY MOUTH TWICE DAILY, Disp: 360 tablet, Rfl: 0    mupirocin (BACTROBAN) 2 % ointment, Apply topically 2 (two) times daily., Disp: 30 g, Rfl: 0    simvastatin (ZOCOR) 10 MG tablet, TAKE 1 TABLET(10 MG) BY MOUTH EVERY EVENING, Disp: 90 tablet, Rfl: 2    hydroCHLOROthiazide (HYDRODIURIL) 12.5 MG Tab, Take 1 tablet (12.5 mg total) by mouth once daily., Disp: 30 tablet, Rfl: 0    Review of patient's allergies indicates:   Allergen Reactions    Ace inhibitors Swelling       Family History   Problem Relation Age of Onset    Hypertension Mother     Diabetes Mother     Stroke Mother        Social History     Socioeconomic History    Marital status: Single     Spouse name: Not on file    Number of children: 4    Years of education: Not on file    Highest education level: Not on file   Occupational History    Occupation:  secutiry supervisor     Employer: [employer]   Social Needs    Financial resource strain: Not hard at all    Food insecurity:     Worry: Never true     Inability: Never true    Transportation needs:     Medical: No     Non-medical: No   Tobacco Use    Smoking status: Never Smoker    Smokeless tobacco: Never Used   Substance and Sexual Activity    Alcohol use: No     Alcohol/week: 0.0 standard drinks     Comment: none x 4 years; previously social drinker    Drug use: No    Sexual activity: Yes     Partners: Female     Birth control/protection: None   Lifestyle    Physical activity:     Days per week: 5 days     Minutes per session: 60 min    Stress: Not on file   Relationships    Social connections:     Talks on phone: More than three times a week     Gets together: Three times a week     Attends [employer] service: More than 4 times per year     Active member of club or organization: Yes     Attends meetings of clubs or organizations: More than 4 times per year     Relationship status:    Other Topics Concern    Not on file   Social History Narrative    The patient does exercise regularly (treadmill, light weights: [id number]-[id number]).      Rates diet as fair.      He is satisfied with weight.           Patient Care Team:  Josiah Ching Jr., MD as PCP - General (Family Medicine)  Bernie Estrada MD as Consulting Physician (Pain Medicine)  Jess Casey LPN as Care Coordinator  Moon Rincon as Consulting Physician (Optometry)      Review of Systems   Constitutional: Negative for fatigue and unexpected weight change.   HENT: Negative for ear discharge, ear pain, hearing loss, tinnitus and voice change.    Eyes: Negative for pain.   Respiratory: Negative for cough and shortness of breath.    Cardiovascular: Negative for chest pain, palpitations and leg swelling.   Gastrointestinal: Negative for abdominal pain, blood in stool, constipation, diarrhea, nausea and vomiting.   Genitourinary:  "Negative for decreased urine volume, difficulty urinating, dysuria, enuresis, frequency, hematuria and urgency.   Musculoskeletal: Negative for arthralgias, back pain and myalgias.   Skin: Negative for rash.   Neurological: Negative for dizziness, weakness, light-headedness and headaches.   Hematological: Does not bruise/bleed easily.   Psychiatric/Behavioral: Negative for dysphoric mood and sleep disturbance. The patient is not nervous/anxious.          Component Date Value Ref Range    Sodium 02/07/2020 141  136 - 145 mmol/L    Potassium 02/07/2020 4.4  3.5 - 5.1 mmol/L    Chloride 02/07/2020 108  95 - 110 mmol/L    CO2 02/07/2020 26  23 - 29 mmol/L    Glucose 02/07/2020 108  70 - 110 mg/dL    BUN, Bld 02/07/2020 19  6 - 20 mg/dL    Creatinine 02/07/2020 1.2  0.5 - 1.4 mg/dL    Calcium 02/07/2020 9.3  8.7 - 10.5 mg/dL    Total Protein 02/07/2020 7.8  6.0 - 8.4 g/dL    Albumin 02/07/2020 4.0  3.5 - 5.2 g/dL    Total Bilirubin 02/07/2020 0.5  0.1 - 1.0 mg/dL    Alkaline Phosphatase 02/07/2020 84  55 - 135 U/L    AST 02/07/2020 20  10 - 40 U/L    ALT 02/07/2020 21  10 - 44 U/L    Anion Gap 02/07/2020 7* 8 - 16 mmol/L    eGFR if African American 02/07/2020 >60.0  >60 mL/min/1.73 m^2    eGFR if non African American 02/07/2020 >60.0  >60 mL/min/1.73 m^2    Cholesterol 02/07/2020 108* 120 - 199 mg/dL    Triglycerides 02/07/2020 92  30 - 150 mg/dL    HDL 02/07/2020 33* 40 - 75 mg/dL    LDL Cholesterol 02/07/2020 56.6* 63.0 - 159.0 mg/dL    Hdl/Cholesterol Ratio 02/07/2020 30.6  20.0 - 50.0 %    Total Cholesterol/HDL Ratio 02/07/2020 3.3  2.0 - 5.0    Non-HDL Cholesterol 02/07/2020 75  mg/dL    Hemoglobin A1C 02/07/2020 6.7* 4.0 - 5.6 %    Estimated Avg Glucose 02/07/2020 146* 68 - 131 mg/dL     Cholesterol level extremely low; excellent glycemic control.    Objective:      BP (!) 146/80 (BP Location: Left arm, Patient Position: Sitting, BP Method: Large (Manual))   Pulse 87   Ht 6' 2" (1.88 m)  "  Wt 110.9 kg (244 lb 8 oz)   SpO2 95%   BMI 31.39 kg/m²     Physical Exam   Constitutional: He is oriented to person, place, and time. He appears well-developed and well-nourished. He is cooperative.   HENT:   Head: Normocephalic and atraumatic.   Right Ear: External ear normal.   Left Ear: External ear normal.   Nose: Nose normal.   Mouth/Throat: Oropharynx is clear and moist and mucous membranes are normal. No oropharyngeal exudate.   Eyes: Conjunctivae are normal. No scleral icterus.   Neck: Neck supple. No JVD present. Carotid bruit is not present. No thyromegaly present.   Cardiovascular: Normal rate, regular rhythm, normal heart sounds and normal pulses. Exam reveals no gallop and no friction rub.   No murmur heard.  Pulses:       Dorsalis pedis pulses are 2+ on the right side, and 2+ on the left side.        Posterior tibial pulses are 2+ on the right side, and 2+ on the left side.   Pulmonary/Chest: Effort normal and breath sounds normal. He has no wheezes. He has no rhonchi. He has no rales.   Abdominal: Soft. Bowel sounds are normal. He exhibits no distension and no mass. There is no splenomegaly or hepatomegaly. There is no tenderness.   Musculoskeletal: Normal range of motion. He exhibits no edema or tenderness.        Right foot: There is normal range of motion and no deformity.        Left foot: There is normal range of motion and no deformity.   Feet:   Right Foot:   Protective Sensation: 9 sites tested. 9 sites sensed.   Skin Integrity: Negative for erythema, warmth or dry skin.   Left Foot:   Protective Sensation: 9 sites tested. 9 sites sensed.   Skin Integrity: Negative for erythema, callus or dry skin.   Lymphadenopathy:     He has no cervical adenopathy.     He has no axillary adenopathy.   Neurological: He is alert and oriented to person, place, and time. He has normal strength and normal reflexes. No cranial nerve deficit or sensory deficit. Coordination normal.   Skin: Skin is warm and  "dry.   Psychiatric: He has a normal mood and affect.   Vitals reviewed.        Assessment:       1. Routine general medical examination at a health care facility    2. Controlled type 2 diabetes mellitus without complication, without long-term current use of insulin    3. Hypertension associated with diabetes    4. Hyperlipidemia associated with type 2 diabetes mellitus    5. Elevated transaminase level    6. LIZZY (obstructive sleep apnea)        Plan:       Discuss switching from simvastatin 10 mg daily to pravastatin 10 mg daily.    Diabetic exam performed today.  Offered shingles and influenza vaccine today; declined both.    Add HCTZ 12.5mg daily.  RTC 3-4 weeks for nurse visit/BP check.                "This note will not be shared with the patient."  "

## 2020-03-16 ENCOUNTER — OFFICE VISIT (OUTPATIENT)
Dept: FAMILY MEDICINE | Facility: CLINIC | Age: 55
End: 2020-03-16
Attending: FAMILY MEDICINE
Payer: COMMERCIAL

## 2020-03-16 VITALS
HEIGHT: 74 IN | BODY MASS INDEX: 31.38 KG/M2 | SYSTOLIC BLOOD PRESSURE: 146 MMHG | HEART RATE: 87 BPM | OXYGEN SATURATION: 95 % | DIASTOLIC BLOOD PRESSURE: 80 MMHG | WEIGHT: 244.5 LBS

## 2020-03-16 DIAGNOSIS — E11.9 CONTROLLED TYPE 2 DIABETES MELLITUS WITHOUT COMPLICATION, WITHOUT LONG-TERM CURRENT USE OF INSULIN: ICD-10-CM

## 2020-03-16 DIAGNOSIS — R74.01 ELEVATED TRANSAMINASE LEVEL: ICD-10-CM

## 2020-03-16 DIAGNOSIS — E11.59 HYPERTENSION ASSOCIATED WITH DIABETES: ICD-10-CM

## 2020-03-16 DIAGNOSIS — E78.5 HYPERLIPIDEMIA ASSOCIATED WITH TYPE 2 DIABETES MELLITUS: ICD-10-CM

## 2020-03-16 DIAGNOSIS — I15.2 HYPERTENSION ASSOCIATED WITH DIABETES: ICD-10-CM

## 2020-03-16 DIAGNOSIS — G47.33 OSA (OBSTRUCTIVE SLEEP APNEA): ICD-10-CM

## 2020-03-16 DIAGNOSIS — Z00.00 ROUTINE GENERAL MEDICAL EXAMINATION AT A HEALTH CARE FACILITY: Primary | ICD-10-CM

## 2020-03-16 DIAGNOSIS — E11.69 HYPERLIPIDEMIA ASSOCIATED WITH TYPE 2 DIABETES MELLITUS: ICD-10-CM

## 2020-03-16 PROCEDURE — 99396 PREV VISIT EST AGE 40-64: CPT | Mod: S$GLB,,, | Performed by: FAMILY MEDICINE

## 2020-03-16 PROCEDURE — 99396 PR PREVENTIVE VISIT,EST,40-64: ICD-10-PCS | Mod: S$GLB,,, | Performed by: FAMILY MEDICINE

## 2020-03-16 PROCEDURE — 99999 PR PBB SHADOW E&M-EST. PATIENT-LVL III: ICD-10-PCS | Mod: PBBFAC,,, | Performed by: FAMILY MEDICINE

## 2020-03-16 PROCEDURE — 99999 PR PBB SHADOW E&M-EST. PATIENT-LVL III: CPT | Mod: PBBFAC,,, | Performed by: FAMILY MEDICINE

## 2020-03-16 RX ORDER — HYDROCHLOROTHIAZIDE 12.5 MG/1
12.5 TABLET ORAL DAILY
Qty: 30 TABLET | Refills: 0 | Status: SHIPPED | OUTPATIENT
Start: 2020-03-16 | End: 2020-05-01

## 2020-03-16 RX ORDER — PRAVASTATIN SODIUM 10 MG/1
10 TABLET ORAL DAILY
Qty: 90 TABLET | Refills: 0 | Status: SHIPPED | OUTPATIENT
Start: 2020-03-16 | End: 2020-07-06

## 2020-04-05 ENCOUNTER — PATIENT OUTREACH (OUTPATIENT)
Dept: ADMINISTRATIVE | Facility: OTHER | Age: 55
End: 2020-04-05

## 2020-04-11 RX ORDER — SITAGLIPTIN 100 MG/1
TABLET, FILM COATED ORAL
Qty: 90 TABLET | Refills: 3 | Status: SHIPPED | OUTPATIENT
Start: 2020-04-11 | End: 2021-04-22

## 2020-04-25 ENCOUNTER — NURSE TRIAGE (OUTPATIENT)
Dept: ADMINISTRATIVE | Facility: CLINIC | Age: 55
End: 2020-04-25

## 2020-04-25 PROCEDURE — 80047 BASIC METABLC PNL IONIZED CA: CPT

## 2020-04-25 PROCEDURE — 93010 EKG 12-LEAD: ICD-10-PCS | Mod: ,,, | Performed by: INTERNAL MEDICINE

## 2020-04-25 PROCEDURE — 93005 ELECTROCARDIOGRAM TRACING: CPT

## 2020-04-25 PROCEDURE — 99285 EMERGENCY DEPT VISIT HI MDM: CPT | Mod: 25

## 2020-04-25 PROCEDURE — 99285 PR EMERGENCY DEPT VISIT,LEVEL V: ICD-10-PCS | Mod: ,,, | Performed by: EMERGENCY MEDICINE

## 2020-04-25 PROCEDURE — 93010 ELECTROCARDIOGRAM REPORT: CPT | Mod: ,,, | Performed by: INTERNAL MEDICINE

## 2020-04-25 PROCEDURE — 99285 EMERGENCY DEPT VISIT HI MDM: CPT | Mod: ,,, | Performed by: EMERGENCY MEDICINE

## 2020-04-26 ENCOUNTER — HOSPITAL ENCOUNTER (EMERGENCY)
Facility: HOSPITAL | Age: 55
Discharge: HOME | End: 2020-04-26
Attending: EMERGENCY MEDICINE
Payer: COMMERCIAL

## 2020-04-26 VITALS
HEART RATE: 61 BPM | DIASTOLIC BLOOD PRESSURE: 89 MMHG | RESPIRATION RATE: 17 BRPM | TEMPERATURE: 98 F | SYSTOLIC BLOOD PRESSURE: 131 MMHG | OXYGEN SATURATION: 98 %

## 2020-04-26 DIAGNOSIS — R07.9 CHEST PAIN: ICD-10-CM

## 2020-04-26 LAB
ALBUMIN SERPL BCP-MCNC: 3.9 G/DL (ref 3.5–5.2)
ALP SERPL-CCNC: 86 U/L (ref 55–135)
ALT SERPL W/O P-5'-P-CCNC: 26 U/L (ref 10–44)
ANION GAP SERPL CALC-SCNC: 13 MMOL/L (ref 8–16)
AST SERPL-CCNC: 18 U/L (ref 10–40)
BASOPHILS # BLD AUTO: 0.06 K/UL (ref 0–0.2)
BASOPHILS NFR BLD: 0.7 % (ref 0–1.9)
BILIRUB SERPL-MCNC: 0.2 MG/DL (ref 0.1–1)
BNP SERPL-MCNC: <10 PG/ML (ref 0–99)
BUN SERPL-MCNC: 17 MG/DL (ref 6–20)
BUN SERPL-MCNC: 18 MG/DL (ref 6–30)
CALCIUM SERPL-MCNC: 9.4 MG/DL (ref 8.7–10.5)
CHLORIDE SERPL-SCNC: 102 MMOL/L (ref 95–110)
CHLORIDE SERPL-SCNC: 105 MMOL/L (ref 95–110)
CO2 SERPL-SCNC: 23 MMOL/L (ref 23–29)
CREAT SERPL-MCNC: 1.2 MG/DL (ref 0.5–1.4)
CREAT SERPL-MCNC: 1.3 MG/DL (ref 0.5–1.4)
DIFFERENTIAL METHOD: ABNORMAL
EOSINOPHIL # BLD AUTO: 0.1 K/UL (ref 0–0.5)
EOSINOPHIL NFR BLD: 1 % (ref 0–8)
ERYTHROCYTE [DISTWIDTH] IN BLOOD BY AUTOMATED COUNT: 14.9 % (ref 11.5–14.5)
EST. GFR  (AFRICAN AMERICAN): >60 ML/MIN/1.73 M^2
EST. GFR  (NON AFRICAN AMERICAN): >60 ML/MIN/1.73 M^2
GLUCOSE SERPL-MCNC: 148 MG/DL (ref 70–110)
GLUCOSE SERPL-MCNC: 148 MG/DL (ref 70–110)
HCT VFR BLD AUTO: 45.5 % (ref 40–54)
HCT VFR BLD CALC: 45 %PCV (ref 36–54)
HGB BLD-MCNC: 13.6 G/DL (ref 14–18)
IMM GRANULOCYTES # BLD AUTO: 0.03 K/UL (ref 0–0.04)
IMM GRANULOCYTES NFR BLD AUTO: 0.4 % (ref 0–0.5)
LYMPHOCYTES # BLD AUTO: 2.6 K/UL (ref 1–4.8)
LYMPHOCYTES NFR BLD: 31.8 % (ref 18–48)
MCH RBC QN AUTO: 22.3 PG (ref 27–31)
MCHC RBC AUTO-ENTMCNC: 29.9 G/DL (ref 32–36)
MCV RBC AUTO: 75 FL (ref 82–98)
MONOCYTES # BLD AUTO: 0.8 K/UL (ref 0.3–1)
MONOCYTES NFR BLD: 10.1 % (ref 4–15)
NEUTROPHILS # BLD AUTO: 4.5 K/UL (ref 1.8–7.7)
NEUTROPHILS NFR BLD: 56 % (ref 38–73)
NRBC BLD-RTO: 0 /100 WBC
PLATELET # BLD AUTO: 255 K/UL (ref 150–350)
PMV BLD AUTO: 11.9 FL (ref 9.2–12.9)
POC IONIZED CALCIUM: 1.27 MMOL/L (ref 1.06–1.42)
POC TCO2 (MEASURED): 26 MMOL/L (ref 23–29)
POTASSIUM BLD-SCNC: 3.6 MMOL/L (ref 3.5–5.1)
POTASSIUM SERPL-SCNC: 3.7 MMOL/L (ref 3.5–5.1)
PROT SERPL-MCNC: 7.8 G/DL (ref 6–8.4)
RBC # BLD AUTO: 6.11 M/UL (ref 4.6–6.2)
SAMPLE: ABNORMAL
SODIUM BLD-SCNC: 142 MMOL/L (ref 136–145)
SODIUM SERPL-SCNC: 141 MMOL/L (ref 136–145)
TROPONIN I SERPL DL<=0.01 NG/ML-MCNC: <0.006 NG/ML (ref 0–0.03)
TROPONIN I SERPL DL<=0.01 NG/ML-MCNC: <0.006 NG/ML (ref 0–0.03)
WBC # BLD AUTO: 8.05 K/UL (ref 3.9–12.7)

## 2020-04-26 PROCEDURE — 83880 ASSAY OF NATRIURETIC PEPTIDE: CPT

## 2020-04-26 PROCEDURE — 84484 ASSAY OF TROPONIN QUANT: CPT

## 2020-04-26 PROCEDURE — 85025 COMPLETE CBC W/AUTO DIFF WBC: CPT

## 2020-04-26 PROCEDURE — 80053 COMPREHEN METABOLIC PANEL: CPT

## 2020-04-26 PROCEDURE — 25000003 PHARM REV CODE 250: Performed by: EMERGENCY MEDICINE

## 2020-04-26 RX ORDER — ASPIRIN 325 MG
325 TABLET ORAL
Status: COMPLETED | OUTPATIENT
Start: 2020-04-26 | End: 2020-04-26

## 2020-04-26 RX ADMIN — ASPIRIN 325 MG ORAL TABLET 325 MG: 325 PILL ORAL at 12:04

## 2020-04-26 NOTE — TELEPHONE ENCOUNTER
This is a duplicate call. Patient was already triaged by me.     Reason for Disposition   Caller has already spoken with another triager and has no further questions.    Additional Information   Negative: Caller is angry or rude (e.g., hangs up, verbally abusive, yelling)   Negative: Caller hangs up   Negative: Caller has already spoken with the PCP and has no further questions.    Protocols used: NO CONTACT OR DUPLICATE CONTACT CALL-A-AH

## 2020-04-26 NOTE — ED PROVIDER NOTES
Encounter Date: 4/25/2020       History     Chief Complaint   Patient presents with    Chest Pain     intermittent chest pain x 2 days that radiates to left shoulder. Denies N/V.      HPI   55-year-old male with past history of hypertension, hyperlipidemia, diabetes, coming in with intermittent chest pain since Friday lasting 10-15 minutes, left-sided pressure-like some radiation into his shoulder although not down arm, not to back, not to neck.  No associated symptoms such as nausea, diaphoresis.  Not exertional.  Resolve spontaneously within that timeframe.  No cough, shortness of breath.  No abdominal pain.  Patient has no PE risk factors including no leg swelling, no recent travel, no recent surgeries, no history of PE DVT, no history of malignancy.    No trauma or heavy lifting identified.  Not worse with arm movement.    Review of patient's allergies indicates:   Allergen Reactions    Ace inhibitors Swelling     Past Medical History:   Diagnosis Date    AR (allergic rhinitis)     Cervical spinal stenosis     Diabetes mellitus type II     DJD (degenerative joint disease), cervical     Hyperlipidemia     Hypertension      Past Surgical History:   Procedure Laterality Date    COLONOSCOPY N/A 4/11/2019    Procedure: COLONOSCOPY;  Surgeon: Gato Corbin MD;  Location: [address]);  Service: Endoscopy;  Laterality: N/A;    EPIDURAL BLOCK INJECTION      cervical x 2    EPIDURAL STEROID INJECTION N/A 9/28/2018    Procedure: Injection, Steroid, Epidural  C7-T1;  Surgeon: Bernie Estrada MD;  Location: [facility]IN [facility]GT;  Service: Pain Management;  Laterality: N/A;  C7-T1 IL DEBORAH   MRDICALLY URGENT    EPIDURAL STEROID INJECTION N/A 10/26/2018    Procedure: Injection, Steroid, Epidural CERVICAL C7-T1 DEBORAH;  Surgeon: Bernie Estrada MD;  Location: [facility]IN [facility]GT;  Service: Pain Management;  Laterality: N/A;    EPIDURAL STEROID INJECTION N/A 2/7/2019    Procedure: INJECTION, STEROID, EPIDURAL;  Surgeon:  Tania Bajwa MD;  Location: [facility];  Service: Pain Management;  Laterality: N/A;  C DEBORAH C7-T1    GUZMÁN PT ON YONI SCHEDULE    TESTICLE SURGERY  age 16    torsion     Family History   Problem Relation Age of Onset    Hypertension Mother     Diabetes Mother     Stroke Mother      Social History     Tobacco Use    Smoking status: Never Smoker    Smokeless tobacco: Never Used   Substance Use Topics    Alcohol use: No     Alcohol/week: 0.0 standard drinks     Comment: none x 4 years; previously social drinker    Drug use: No     Review of Systems   Constitutional:  No Fever, No Chills,   Eyes: No Vision Changes  ENT/Mouth: No sore throat  Cardiovascular:  Positive intermittent Chest Pain, No Palpitations  Respiratory:  No Cough, No SOB  Gastrointestinal:  No Nausea, No Vomiting, No Diarrhea, No abdo pain.  Genitourinary:  No  pain   Musculoskeletal: No Back Pain, No Neck Pain, No recent trauma.  Skin:  No skin Lesions  Neuro:  No Weakness,  No Dizziness, No Headache        Physical Exam     Initial Vitals [04/25/20 2353]   BP Pulse Resp Temp SpO2   (!) 186/107 93 18 98.2 °F (36.8 °C) 98 %      MAP       --         Physical Exam  Physical Exam:  CONSTITUTIONAL: Well developed, well nourished, in no acute distress.  HENT: Normocephalic, atraumatic    EYES: Sclerae anicteric   NECK: Supple, no thyroid enlargement  CARDIOVASCULAR: Regular rate and rhythm without any murmurs, gallops, rubs.  No lower extremity swelling or tenderness to palpation.  RESPIRATORY: Speaking in full sentences. Breathing comfortably. Auscultation of the lungs revealed normal breath sounds b/l, no wheezing, no rales, no rhonchi.  ABDOMEN: Soft and nontender, no masses, no rebound or guarding   NEUROLOGIC: Alert, interacting normally. No facial droop.   MSK: Moving all four extremities.  Skin: Warm and dry. No visible rash on exposed areas of skin.    Psych: Mood and affect normal.       ED Course   Procedures  Labs Reviewed   CBC W/  AUTO DIFFERENTIAL   COMPREHENSIVE METABOLIC PANEL   TROPONIN I   B-TYPE NATRIURETIC PEPTIDE     EKG Readings: (Independently Interpreted)   Normal sinus rhythm at a rate of 84 with new T-wave inversions in the 5 V6, some inferior T-wave inversions as well.  No ST elevations or depressions.       Imaging Results    None          Medical Decision Making:   History:   Old Medical Records: I decided to obtain old medical records.    55-year-old male with past medical history as noted coming in with intermittent chest pain for the last day and half.  Currently chest pain-free.  On his EKG he has new T-wave inversions.  Will undertake workup with chest x-ray, troponins, CBC, CMP.  Once labs results will discuss case cardiology given new EKG changes.    No shortness of breath, no PE risk factors, good stable vitals, no hypoxia, this not consistent with PE.  Patient is relatively young, no radiation to back, very comfortable, this is not consistent with dissection.  No infectious symptoms.  Dispo per labs, discussion with Cardiology.    Update:  Labs are unremarkable.  X-rays negative.  Discussed with Cardiology, given that he has no active chest pain, that this pain is as described above, and despite presence of above described EKG changes at this time cardiology does not believe he requires inpatient cardiology/Medicine admission.  Sec troponin obtain it is negative.  Patient continues to be asymptomatic in the emergency department.  Unclear cause of pain at this time but given his benign workup, lack of ED findings, no chest pain while in the emergency department, at this time safe for outpatient workup.    Will refer to Cardiology.    Given strict return precautions for new, worsening or worrisome symptoms.    Findings of ED work up explained to patient. Patient agrees with discharge plan and verbalizes understanding of return precautions.       MDM Complexity Points:   Problem Points:  1.New problem, with no additional  ED work-up planned (maximum of 1) - chest pain.    Data Points:  Review or order clinical lab tests, Review or order radiology test, Review or order medicine test (PFTs, EKG, cardiac echo or catheterization), Independent review of image, tracing, or specimen, Decision to obtain old records (in the EHR) and Discuss test with performing physician/consulting physician                     Clinical Impression:       ICD-10-CM ICD-9-CM   1. Chest pain R07.9 786.50                                Cash Allen MD  04/26/20 0443

## 2020-04-26 NOTE — ED NOTES
Telemetry Verification   Patient placed on Telemetry Box    Box # [id number]   Monitor Tech    Rate 89   Rhythm NSR

## 2020-04-26 NOTE — TELEPHONE ENCOUNTER
Patient complains of sharp pain, has a slight headache and had two bouts of dizziness earlier today. Patient's blood pressure is 162/101the patient was advised per protocol and was advised to call back with questions,concerns or symptoms. The patient verbalized understanding.     Reason for Disposition   [1] Numbness (i.e., loss of sensation) of the face, arm or leg on one side of the body AND [2] new onset    Additional Information   Negative: [1] Weakness of the face, arm or leg on one side of the body AND [2] new onset   Negative: Severe difficulty breathing (e.g., struggling for each breath, speaks in single words)   Negative: Difficult to awaken or acting confused (e.g., disoriented, slurred speech)    Protocols used: HIGH BLOOD PRESSURE-A-AH

## 2020-04-26 NOTE — DISCHARGE INSTRUCTIONS
Your labs, chest x-ray, EKG, did not show any signs of immediately dangerous medical conditions.    Your some nonspecific abnormalities in your EKG which were not judged to be dangerous at this time but needs to be followed up with a cardiologist.    You were referred to Cardiology here at [facility].    If you develop any new, worsening or recurring chest pain, shortness of breath, fevers, feel like going to pass out, palpitations, or any other new, worsening or worrisome symptoms please return to the emergency department for re-evaluation.

## 2020-04-26 NOTE — ED TRIAGE NOTES
Suhas Castillo, an 55 y.o. male presents to the ED c/o L sided chest pain radiates to his L shoulder  Sharp pain 6/10 for 2 days. Denies heavy lifting, sob, abdominal pain, n/v  Chief Complaint   Patient presents with    Chest Pain     intermittent chest pain x 2 days that radiates to left shoulder. Denies N/V.      Review of patient's allergies indicates:   Allergen Reactions    Ace inhibitors Swelling     Past Medical History:   Diagnosis Date    AR (allergic rhinitis)     Cervical spinal stenosis     Diabetes mellitus type II     DJD (degenerative joint disease), cervical     Hyperlipidemia     Hypertension        LOC: The patient is awake, alert, aware of environment with an appropriate affect. Oriented x4, speaking appropriately  APPEARANCE: Pt resting comfortably, in no acute distress, pt is clean and well groomed, clothing properly fastened  SKIN:The skin is warm and dry, color consistent with ethnicity, patient has normal skin turgor and moist mucus membranes  RESPIRATORY:Airway is open and patent, respirations are spontaneous, patient has a normal effort and rate, no accessory muscle use noted.  CARDIAC: Normal rate and rhythm, no peripheral edema noted, capillary refill < 3 seconds, bilateral radial pulses 2+. L sided chestpain radiates to L shoulder 6/10  ABDOMEN: Soft, non tender, non distended.   NEUROLOGIC: PERRLA, facial expression is symmetrical, patient moving all extremities spontaneously, normal sensation in all extremities when touched with a finger.  Follows all commands appropriately  MUSCULOSKELETAL: Patient moving all extremities spontaneously, no obvious swelling or deformities noted.

## 2020-04-30 ENCOUNTER — TELEPHONE (OUTPATIENT)
Dept: FAMILY MEDICINE | Facility: CLINIC | Age: 55
End: 2020-04-30

## 2020-04-30 NOTE — TELEPHONE ENCOUNTER
----- Message from Brandon Salgado sent at 4/30/2020  3:01 PM CDT -----  Contact: Self      Name of Who is Calling: TERESE CAMPBELL [[id number]]      What is the request in detail: pt pressure has been high and would an appt for tomorrow.Please contact to further discuss and advise.        Can the clinic reply by [facility]: Y      [facility] [phone]

## 2020-04-30 NOTE — TELEPHONE ENCOUNTER
Spoke with the patient to inform him that Dr. Ching is out of the office on tomorrow and Monday 05/04/2020. Patient has been scheduled for 05/05/2020.

## 2020-05-01 RX ORDER — HYDROCHLOROTHIAZIDE 12.5 MG/1
TABLET ORAL
Qty: 30 TABLET | Refills: 0 | Status: SHIPPED | OUTPATIENT
Start: 2020-05-01 | End: 2020-06-12

## 2020-05-02 PROBLEM — Z91.89 FRAMINGHAM CARDIAC RISK 10-20% IN NEXT 10 YEARS: Status: ACTIVE | Noted: 2020-05-02

## 2020-05-02 RX ORDER — METHOCARBAMOL 750 MG/1
TABLET, FILM COATED ORAL
COMMUNITY
Start: 2020-03-27 | End: 2020-06-20

## 2020-05-02 NOTE — PROGRESS NOTES
Subjective:       Patient ID: Suhas Castillo is a 55 y.o. male.    Chief Complaint: No chief complaint on file.    HPI     The patient returns today for follow-up of his hypertension.  At his last visit, HCTZ 12.5 mg daily was added to losartan 100mg daily.    On 04/26, the patient was seen in the emergency department at [facility] with complaints of chest pain.  His EKG did show new T-wave inversions.  Cardiac enzymes and troponin were negative.  He was discharged with expectations for further outpatient evaluation.  That ED record reviewed in detail.      Patient Active Problem List   Diagnosis    Controlled type 2 diabetes mellitus without complication, without long-term current use of insulin    Hypertension associated with diabetes    Hyperlipidemia associated with type 2 diabetes mellitus    AR (allergic rhinitis)    DJD (degenerative joint disease), cervical    Cervical spinal stenosis    Elevated transaminase level    Vitamin D deficiency    DDD (degenerative disc disease), cervical    Angioedema    LIZZY (obstructive sleep apnea)    [place] cardiac risk 10-20% in next 10 years       Current Outpatient Medications:     albuterol 90 mcg/actuation inhaler, Inhale 2 puffs into the lungs every 4 (four) hours as needed for Wheezing or Shortness of Breath., Disp: 18 g, Rfl: 0    blood sugar diagnostic (CONTOUR TEST STRIPS) Strp, USE TO TEST BLOOD SUGAR TWICE A DAY, Disp: 50 each, Rfl: 11    fluocinonide (LIDEX) 0.05 % external solution, As directed, Disp: , Rfl: 0    fluticasone propionate (FLONASE) 50 mcg/actuation nasal spray, SHAKE LIQUID AND USE 2 SPRAYS(100 MCG) IN EACH NOSTRIL EVERY DAY, Disp: 16 mL, Rfl: 11    hydroCHLOROthiazide (HYDRODIURIL) 12.5 MG Tab, TAKE 1 TABLET(12.5 MG) BY MOUTH EVERY DAY, Disp: 30 tablet, Rfl: 0    JANUVIA 100 mg Tab, TAKE 1 TABLET(100 MG) BY MOUTH EVERY DAY, Disp: 90 tablet, Rfl: 3    losartan (COZAAR) 100 MG tablet, TAKE 1  "TABLET(100 MG) BY MOUTH EVERY DAY, Disp: 90 tablet, Rfl: 3    metFORMIN (GLUCOPHAGE-XR) 500 MG XR 24hr tablet, TAKE 2 TABLETS BY MOUTH TWICE DAILY, Disp: 360 tablet, Rfl: 0    methocarbamoL (ROBAXIN) 750 MG Tab, , Disp: , Rfl:     mupirocin (BACTROBAN) 2 % ointment, Apply topically 2 (two) times daily., Disp: 30 g, Rfl: 0    pravastatin (PRAVACHOL) 10 MG tablet, Take 1 tablet (10 mg total) by mouth once daily., Disp: 90 tablet, Rfl: 0    dapagliflozin (FARXIGA) 5 mg Tab tablet, TAKE 1 TABLET(5 MG) BY MOUTH EVERY DAY, Disp: , Rfl:     The following portions of the patient's history were reviewed and updated as appropriate: allergies, past family history, past medical history, past social history and past surgical history.  He has been working many 12 hr shifts since the coronovirus pandemic.    Review of Systems   Constitutional: Negative for fatigue and unexpected weight change.   HENT: Negative for ear discharge, ear pain, hearing loss, tinnitus and voice change.    Eyes: Negative for pain.   Respiratory: Negative for cough and shortness of breath.    Cardiovascular: Negative for chest pain (none since ED), palpitations and leg swelling.   Gastrointestinal: Negative for abdominal pain, blood in stool, constipation, diarrhea, nausea and vomiting.   Genitourinary: Negative for decreased urine volume, difficulty urinating, dysuria, enuresis, frequency, hematuria and urgency.   Musculoskeletal: Negative for arthralgias, back pain and myalgias.   Skin: Negative for rash.   Neurological: Positive for dizziness (when walking) and headaches. Negative for weakness and light-headedness.   Hematological: Does not bruise/bleed easily.   Psychiatric/Behavioral: Negative for dysphoric mood and sleep disturbance. The patient is not nervous/anxious.          Objective:      /84   Pulse 92   Temp 98.8 °F (37.1 °C) (Oral)   Ht 6' 2" (1.88 m)   Wt 112.5 kg (248 lb)   SpO2 97%   BMI 31.84 kg/m²     Physical Exam "   Constitutional: He is oriented to person, place, and time. He appears well-developed and well-nourished. He is cooperative.   HENT:   Head: Normocephalic and atraumatic.   Right Ear: External ear normal.   Left Ear: External ear normal.   Nose: Nose normal.   Mouth/Throat: Oropharynx is clear and moist and mucous membranes are normal. No oropharyngeal exudate.   Eyes: Conjunctivae are normal. No scleral icterus.   Neck: Neck supple. No JVD present. Carotid bruit is not present. No thyromegaly present.   Cardiovascular: Normal rate, regular rhythm, normal heart sounds and normal pulses. Exam reveals no gallop and no friction rub.   No murmur heard.  Pulmonary/Chest: Effort normal and breath sounds normal. He has no wheezes. He has no rhonchi. He has no rales.   Abdominal: Soft. Bowel sounds are normal. He exhibits no distension and no mass. There is no splenomegaly or hepatomegaly. There is no tenderness.   Musculoskeletal: Normal range of motion. He exhibits no edema or tenderness.   Lymphadenopathy:     He has no cervical adenopathy.     He has no axillary adenopathy.   Neurological: He is alert and oriented to person, place, and time. He has normal strength and normal reflexes. No cranial nerve deficit or sensory deficit. He displays a negative Romberg sign. Coordination and gait normal.   Skin: Skin is warm and dry.   Psychiatric: He has a normal mood and affect.   Vitals reviewed.        Assessment:       1. Chest pain, unspecified type    2. [place] cardiac risk 10-20% in next 10 years    3. Controlled type 2 diabetes mellitus without complication, without long-term current use of insulin    4. Hypertension associated with diabetes    5. Hyperlipidemia associated with type 2 diabetes mellitus        Plan:       The patient is at intermediate risk for heart disease.  Discussed further evaluation, includin.  Stress echocardiogram.  2.  Coronary calcium score.  He agrees.  Will also check carotid  "US.    Further recommendations to follow after above.      "This note will not be shared with the patient."  "

## 2020-05-05 ENCOUNTER — OFFICE VISIT (OUTPATIENT)
Dept: FAMILY MEDICINE | Facility: CLINIC | Age: 55
End: 2020-05-05
Attending: FAMILY MEDICINE
Payer: COMMERCIAL

## 2020-05-05 VITALS
BODY MASS INDEX: 31.83 KG/M2 | HEART RATE: 92 BPM | TEMPERATURE: 99 F | HEIGHT: 74 IN | SYSTOLIC BLOOD PRESSURE: 130 MMHG | DIASTOLIC BLOOD PRESSURE: 84 MMHG | OXYGEN SATURATION: 97 % | WEIGHT: 248 LBS

## 2020-05-05 DIAGNOSIS — R42 DIZZINESS: ICD-10-CM

## 2020-05-05 DIAGNOSIS — E11.69 HYPERLIPIDEMIA ASSOCIATED WITH TYPE 2 DIABETES MELLITUS: ICD-10-CM

## 2020-05-05 DIAGNOSIS — E78.5 HYPERLIPIDEMIA ASSOCIATED WITH TYPE 2 DIABETES MELLITUS: ICD-10-CM

## 2020-05-05 DIAGNOSIS — E11.9 CONTROLLED TYPE 2 DIABETES MELLITUS WITHOUT COMPLICATION, WITHOUT LONG-TERM CURRENT USE OF INSULIN: ICD-10-CM

## 2020-05-05 DIAGNOSIS — R07.9 CHEST PAIN, UNSPECIFIED TYPE: Primary | ICD-10-CM

## 2020-05-05 DIAGNOSIS — E11.59 HYPERTENSION ASSOCIATED WITH DIABETES: ICD-10-CM

## 2020-05-05 DIAGNOSIS — I15.2 HYPERTENSION ASSOCIATED WITH DIABETES: ICD-10-CM

## 2020-05-05 DIAGNOSIS — Z91.89 FRAMINGHAM CARDIAC RISK 10-20% IN NEXT 10 YEARS: ICD-10-CM

## 2020-05-05 PROCEDURE — 99999 PR PBB SHADOW E&M-EST. PATIENT-LVL III: CPT | Mod: PBBFAC,,, | Performed by: FAMILY MEDICINE

## 2020-05-05 PROCEDURE — 99214 PR OFFICE/OUTPT VISIT, EST, LEVL IV, 30-39 MIN: ICD-10-PCS | Mod: S$GLB,,, | Performed by: FAMILY MEDICINE

## 2020-05-05 PROCEDURE — 99214 OFFICE O/P EST MOD 30 MIN: CPT | Mod: S$GLB,,, | Performed by: FAMILY MEDICINE

## 2020-05-05 PROCEDURE — 99999 PR PBB SHADOW E&M-EST. PATIENT-LVL III: ICD-10-PCS | Mod: PBBFAC,,, | Performed by: FAMILY MEDICINE

## 2020-05-05 RX ORDER — DAPAGLIFLOZIN 5 MG/1
TABLET, FILM COATED ORAL
Start: 2020-05-05 | End: 2020-06-20

## 2020-05-06 ENCOUNTER — HOSPITAL ENCOUNTER (OUTPATIENT)
Dept: RADIOLOGY | Facility: OTHER | Age: 55
Discharge: HOME | End: 2020-05-06
Attending: FAMILY MEDICINE
Payer: COMMERCIAL

## 2020-05-06 DIAGNOSIS — I15.2 HYPERTENSION ASSOCIATED WITH DIABETES: ICD-10-CM

## 2020-05-06 DIAGNOSIS — E11.59 HYPERTENSION ASSOCIATED WITH DIABETES: ICD-10-CM

## 2020-05-06 DIAGNOSIS — R42 DIZZINESS: ICD-10-CM

## 2020-05-06 DIAGNOSIS — E11.69 HYPERLIPIDEMIA ASSOCIATED WITH TYPE 2 DIABETES MELLITUS: ICD-10-CM

## 2020-05-06 DIAGNOSIS — E78.5 HYPERLIPIDEMIA ASSOCIATED WITH TYPE 2 DIABETES MELLITUS: ICD-10-CM

## 2020-05-06 PROCEDURE — 93880 EXTRACRANIAL BILAT STUDY: CPT | Mod: TC

## 2020-05-06 PROCEDURE — 93880 US CAROTID BILATERAL: ICD-10-PCS | Mod: 26,,, | Performed by: RADIOLOGY

## 2020-05-06 PROCEDURE — 93880 EXTRACRANIAL BILAT STUDY: CPT | Mod: 26,,, | Performed by: RADIOLOGY

## 2020-05-11 ENCOUNTER — PATIENT MESSAGE (OUTPATIENT)
Dept: FAMILY MEDICINE | Facility: CLINIC | Age: 55
End: 2020-05-11

## 2020-05-11 ENCOUNTER — TELEPHONE (OUTPATIENT)
Dept: FAMILY MEDICINE | Facility: CLINIC | Age: 55
End: 2020-05-11

## 2020-05-11 NOTE — TELEPHONE ENCOUNTER
Spoke with the patient to inform him that the referral department states they are waiting on a approval from his insurance company. The turn around time can take from 10 -15 days.

## 2020-05-12 ENCOUNTER — TELEPHONE (OUTPATIENT)
Dept: FAMILY MEDICINE | Facility: CLINIC | Age: 55
End: 2020-05-12

## 2020-05-12 NOTE — TELEPHONE ENCOUNTER
----- Message from Helio Khan, Patient Care Assistant sent at 5/11/2020  3:15 PM CDT -----  Contact: TERESE CAMPBELL [[id number]]  Name of Who is Calling: TERESE CAMPBELL [[id number]]    What is the request in detail:Requesting a call back in regards of insurance issues for Ct scan.  Please contact to further discuss and advise      Can the clinic reply by [facility]: No    What Number to [facility] Back [facility]: [phone]

## 2020-05-12 NOTE — TELEPHONE ENCOUNTER
Please contact the patient. Patient may need to reschedule his Coronary Calcium Score Test. The referral dept sent a message stating they are awaiting an approval from his insurance company and the turnaround time can be between 10-15 days.

## 2020-05-18 ENCOUNTER — HOSPITAL ENCOUNTER (OUTPATIENT)
Dept: RADIOLOGY | Facility: HOSPITAL | Age: 55
Discharge: HOME | End: 2020-05-18
Attending: FAMILY MEDICINE
Payer: COMMERCIAL

## 2020-05-18 ENCOUNTER — PATIENT MESSAGE (OUTPATIENT)
Dept: FAMILY MEDICINE | Facility: CLINIC | Age: 55
End: 2020-05-18

## 2020-05-18 ENCOUNTER — HOSPITAL ENCOUNTER (OUTPATIENT)
Dept: CARDIOLOGY | Facility: CLINIC | Age: 55
Discharge: HOME | End: 2020-05-18
Attending: FAMILY MEDICINE
Payer: COMMERCIAL

## 2020-05-18 VITALS — BODY MASS INDEX: 30.71 KG/M2 | HEIGHT: 75 IN | WEIGHT: 247 LBS

## 2020-05-18 DIAGNOSIS — E78.5 HYPERLIPIDEMIA ASSOCIATED WITH TYPE 2 DIABETES MELLITUS: ICD-10-CM

## 2020-05-18 DIAGNOSIS — Z91.89 FRAMINGHAM CARDIAC RISK 10-20% IN NEXT 10 YEARS: ICD-10-CM

## 2020-05-18 DIAGNOSIS — E11.9 CONTROLLED TYPE 2 DIABETES MELLITUS WITHOUT COMPLICATION, WITHOUT LONG-TERM CURRENT USE OF INSULIN: ICD-10-CM

## 2020-05-18 DIAGNOSIS — I15.2 HYPERTENSION ASSOCIATED WITH DIABETES: ICD-10-CM

## 2020-05-18 DIAGNOSIS — E11.59 HYPERTENSION ASSOCIATED WITH DIABETES: ICD-10-CM

## 2020-05-18 DIAGNOSIS — E11.69 HYPERLIPIDEMIA ASSOCIATED WITH TYPE 2 DIABETES MELLITUS: ICD-10-CM

## 2020-05-18 LAB
AORTIC ROOT ANNULUS: 2.3 CM
ASCENDING AORTA: 2.89 CM
BSA FOR ECHO PROCEDURE: 2.43 M2
CV ECHO LV RWT: 0.32 CM
CV STRESS BASE HR: 74 BPM
DIASTOLIC BLOOD PRESSURE: 87 MMHG
DOP CALC LVOT AREA: 4.1 CM2
DOP CALC LVOT DIAMETER: 2.28 CM
DOP CALC LVOT PEAK VEL: 1.01 M/S
DOP CALC LVOT STROKE VOLUME: 84.14 CM3
DOP CALCLVOT PEAK VEL VTI: 20.62 CM
E WAVE DECELERATION TIME: 190.96 MSEC
E/A RATIO: 1.21
E/E' RATIO: 8 M/S
ECHO LV POSTERIOR WALL: 0.77 CM (ref 0.6–1.1)
FRACTIONAL SHORTENING: 33 % (ref 28–44)
INTERVENTRICULAR SEPTUM: 0.9 CM (ref 0.6–1.1)
IVRT: 70.41 MSEC
LA MAJOR: 4.43 CM
LA MINOR: 4.9 CM
LA WIDTH: 4.3 CM
LEFT ATRIUM SIZE: 4.43 CM
LEFT ATRIUM VOLUME INDEX: 31.4 ML/M2
LEFT ATRIUM VOLUME: 75.34 CM3
LEFT INTERNAL DIMENSION IN SYSTOLE: 3.21 CM (ref 2.1–4)
LEFT VENTRICLE DIASTOLIC VOLUME INDEX: 44.49 ML/M2
LEFT VENTRICLE DIASTOLIC VOLUME: 106.79 ML
LEFT VENTRICLE MASS INDEX: 56 G/M2
LEFT VENTRICLE SYSTOLIC VOLUME INDEX: 17.2 ML/M2
LEFT VENTRICLE SYSTOLIC VOLUME: 41.35 ML
LEFT VENTRICULAR INTERNAL DIMENSION IN DIASTOLE: 4.79 CM (ref 3.5–6)
LEFT VENTRICULAR MASS: 133.46 G
LV LATERAL E/E' RATIO: 8.5 M/S
LV SEPTAL E/E' RATIO: 7.56 M/S
MV PEAK A VEL: 0.56 M/S
MV PEAK E VEL: 0.68 M/S
OHS CV CPX 1 MINUTE RECOVERY HEART RATE: 122 BPM
OHS CV CPX 85 PERCENT MAX PREDICTED HEART RATE MALE: 142
OHS CV CPX ESTIMATED METS: 14
OHS CV CPX MAX PREDICTED HEART RATE: 167
OHS CV CPX PATIENT IS FEMALE: 0
OHS CV CPX PATIENT IS MALE: 1
OHS CV CPX PEAK DIASTOLIC BLOOD PRESSURE: 86 MMHG
OHS CV CPX PEAK HEAR RATE: 155 BPM
OHS CV CPX PEAK RATE PRESSURE PRODUCT: NORMAL
OHS CV CPX PEAK SYSTOLIC BLOOD PRESSURE: 221 MMHG
OHS CV CPX PERCENT MAX PREDICTED HEART RATE ACHIEVED: 93
OHS CV CPX RATE PRESSURE PRODUCT PRESENTING: NORMAL
PULM VEIN S/D RATIO: 1.7
PV PEAK D VEL: 0.33 M/S
PV PEAK S VEL: 0.56 M/S
RA MAJOR: 4.4 CM
RA WIDTH: 3.16 CM
RIGHT VENTRICULAR END-DIASTOLIC DIMENSION: 2.77 CM
RV TISSUE DOPPLER FREE WALL SYSTOLIC VELOCITY 1 (APICAL 4 CHAMBER VIEW): 17.16 CM/S
SINUS: 2.93 CM
STJ: 2.81 CM
STRESS ECHO POST EXERCISE DUR MIN: 8 MINUTES
STRESS ECHO POST EXERCISE DUR SEC: 3 SECONDS
STRESS ST DEPRESSION: 0.5 MM
SYSTOLIC BLOOD PRESSURE: 159 MMHG
TDI LATERAL: 0.08 M/S
TDI SEPTAL: 0.09 M/S
TDI: 0.09 M/S
TRICUSPID ANNULAR PLANE SYSTOLIC EXCURSION: 2.27 CM

## 2020-05-18 PROCEDURE — 93351 STRESS ECHO (CUPID ONLY): ICD-10-PCS | Mod: S$GLB,,, | Performed by: INTERNAL MEDICINE

## 2020-05-18 PROCEDURE — 93351 STRESS TTE COMPLETE: CPT | Mod: S$GLB,,, | Performed by: INTERNAL MEDICINE

## 2020-05-18 PROCEDURE — 75571 CT HRT W/O DYE W/CA TEST: CPT | Mod: TC

## 2020-05-18 PROCEDURE — 75571 CT CALCIUM SCORING CARDIAC: ICD-10-PCS | Mod: 26,,, | Performed by: RADIOLOGY

## 2020-05-18 PROCEDURE — 75571 CT HRT W/O DYE W/CA TEST: CPT | Mod: 26,,, | Performed by: RADIOLOGY

## 2020-05-19 ENCOUNTER — PATIENT MESSAGE (OUTPATIENT)
Dept: FAMILY MEDICINE | Facility: CLINIC | Age: 55
End: 2020-05-19

## 2020-05-23 DIAGNOSIS — E11.9 CONTROLLED TYPE 2 DIABETES MELLITUS WITHOUT COMPLICATION, WITHOUT LONG-TERM CURRENT USE OF INSULIN: ICD-10-CM

## 2020-05-23 RX ORDER — METFORMIN HYDROCHLORIDE 500 MG/1
TABLET, EXTENDED RELEASE ORAL
Qty: 360 TABLET | Refills: 3 | Status: SHIPPED | OUTPATIENT
Start: 2020-05-23 | End: 2021-06-11 | Stop reason: SDUPTHER

## 2020-06-11 DIAGNOSIS — E11.59 HYPERTENSION ASSOCIATED WITH DIABETES: Primary | ICD-10-CM

## 2020-06-11 DIAGNOSIS — I15.2 HYPERTENSION ASSOCIATED WITH DIABETES: Primary | ICD-10-CM

## 2020-06-12 RX ORDER — IBUPROFEN 400 MG/1
TABLET ORAL
COMMUNITY
Start: 2020-05-30 | End: 2020-08-27 | Stop reason: DRUGHIGH

## 2020-06-12 RX ORDER — HYDROCHLOROTHIAZIDE 12.5 MG/1
TABLET ORAL
Qty: 90 TABLET | Refills: 1 | Status: SHIPPED | OUTPATIENT
Start: 2020-06-12 | End: 2021-01-10

## 2020-06-18 NOTE — PROGRESS NOTES
The patient location is: home  The chief complaint leading to consultation is: headaches  Visit type: Virtual visit with synchronous audio and video  Total time spent with patient: 20 min  Each patient to whom he or she provides medical services by telemedicine is:  (1) informed of the relationship between the physician and patient and the respective role of any other health care provider with respect to management of the patient; and (2) notified that he or she may decline to receive medical services by telemedicine and may withdraw from such care at any time.    Subjective:       Patient ID: Suhas Castillo is a 55 y.o. male.    Chief Complaint: No chief complaint on file.    Hypertension  This is a recurrent problem. The current episode started more than 1 year ago. The problem has been waxing and waning since onset. The problem is resistant. Associated symptoms include headaches. Pertinent negatives include no anxiety, blurred vision, chest pain, malaise/fatigue, neck pain, orthopnea, palpitations, peripheral edema, PND, shortness of breath or sweats. Risk factors for coronary artery disease include diabetes mellitus. Past treatments include angiotensin blockers and diuretics. The current treatment provides moderate improvement. There are no compliance problems.    Headache   This is a new problem. The current episode started 1 to 4 weeks ago. The problem occurs intermittently. The problem has been gradually improving. The pain is located in the bilateral region. The pain does not radiate. The pain quality is similar to prior headaches. The quality of the pain is described as band-like and aching. The pain is mild. Pertinent negatives include no abdominal pain, back pain, blurred vision, coughing, dizziness, ear pain, eye pain, hearing loss, nausea, neck pain, photophobia, tinnitus, vomiting or weakness. Nothing aggravates the symptoms. He has tried NSAIDs for the symptoms. The treatment provided mild relief.  His past medical history is significant for hypertension.     Headaches have declined in severity, intensity, and frequency as he has restarted his exercise regimen, which was somewhat curtailed due to local restrictions during the early stages of the pandemic.    He monitors his blood pressure at home, using a wrist monitor.  According the patient, blood pressures at home have been ranging 110s/90s (?).    His diabetes has been well controlled.  Cholesterol levels are at goal therapy.  Liver and kidney function are normal.    Patient Active Problem List   Diagnosis    Controlled type 2 diabetes mellitus without complication, without long-term current use of insulin    Hypertension associated with diabetes    Hyperlipidemia associated with type 2 diabetes mellitus    AR (allergic rhinitis)    DJD (degenerative joint disease), cervical    Cervical spinal stenosis    Elevated transaminase level    Vitamin D deficiency    DDD (degenerative disc disease), cervical    Angioedema    LIZZY (obstructive sleep apnea)    [place] cardiac risk 10-20% in next 10 years       Current Outpatient Medications:     albuterol 90 mcg/actuation inhaler, Inhale 2 puffs into the lungs every 4 (four) hours as needed for Wheezing or Shortness of Breath., Disp: 18 g, Rfl: 0    blood sugar diagnostic (CONTOUR TEST STRIPS) Strp, USE TO TEST BLOOD SUGAR TWICE A DAY, Disp: 50 each, Rfl: 11    butalbital-acetaminophen-caffeine -40 mg (FIORICET, ESGIC) -40 mg per tablet, Take 1 tablet by mouth every 6 (six) hours as needed for Headaches., Disp: 20 tablet, Rfl: 0    fluocinonide (LIDEX) 0.05 % external solution, As directed, Disp: , Rfl: 0    fluticasone propionate (FLONASE) 50 mcg/actuation nasal spray, SHAKE LIQUID AND USE 2 SPRAYS(100 MCG) IN EACH NOSTRIL EVERY DAY, Disp: 16 mL, Rfl: 11    hydroCHLOROthiazide (HYDRODIURIL) 12.5 MG Tab, TAKE 1 TABLET(12.5 MG) BY MOUTH EVERY DAY, Disp: 90 tablet, Rfl: 1    ibuprofen  (ADVIL,MOTRIN) 400 MG tablet, , Disp: , Rfl:     JANUVIA 100 mg Tab, TAKE 1 TABLET(100 MG) BY MOUTH EVERY DAY, Disp: 90 tablet, Rfl: 3    losartan (COZAAR) 100 MG tablet, TAKE 1 TABLET(100 MG) BY MOUTH EVERY DAY, Disp: 90 tablet, Rfl: 3    metFORMIN (GLUCOPHAGE-XR) 500 MG XR 24hr tablet, TAKE 2 TABLETS BY MOUTH TWICE DAILY, Disp: 360 tablet, Rfl: 3    mupirocin (BACTROBAN) 2 % ointment, Apply topically 2 (two) times daily., Disp: 30 g, Rfl: 0    pravastatin (PRAVACHOL) 10 MG tablet, Take 1 tablet (10 mg total) by mouth once daily., Disp: 90 tablet, Rfl: 0    The following portions of the patient's history were reviewed and updated as appropriate: allergies, past family history, past medical history, past social history and past surgical history.    Review of Systems   Constitutional: Negative for fatigue, malaise/fatigue and unexpected weight change.   HENT: Negative for ear discharge, ear pain, hearing loss, tinnitus and voice change.    Eyes: Negative for blurred vision, photophobia and pain.   Respiratory: Negative for cough and shortness of breath.    Cardiovascular: Negative for chest pain, palpitations, orthopnea, leg swelling and PND.   Gastrointestinal: Negative for abdominal pain, blood in stool, constipation, diarrhea, nausea and vomiting.   Genitourinary: Negative for decreased urine volume, difficulty urinating, dysuria, enuresis, frequency, hematuria and urgency.   Musculoskeletal: Negative for arthralgias, back pain, myalgias and neck pain.   Skin: Negative for rash.   Neurological: Positive for headaches. Negative for dizziness, weakness and light-headedness.   Hematological: Does not bruise/bleed easily.   Psychiatric/Behavioral: Negative for dysphoric mood and sleep disturbance (Denies nighttime headache, or headache upon arising in the morning). The patient is not nervous/anxious.        Objective:      There were no vitals taken for this visit.    Physical Exam  Constitutional:       General:  "He is not in acute distress.     Appearance: He is well-developed.   Neurological:      Mental Status: He is alert and oriented to person, place, and time.   Psychiatric:         Behavior: Behavior normal.         Assessment:       1. Episodic tension-type headache, not intractable    2. Hypertension associated with diabetes    3. Controlled type 2 diabetes mellitus without complication, without long-term current use of insulin    4. Allergic rhinitis, unspecified seasonality, unspecified trigger    5. LIZZY (obstructive sleep apnea)        Plan:       Offered shingles vaccine.  Remainder of Health Maintenance reviewed - up to date.    Trial of Fioricet p.r.n. headache, which by description sounds like a tension/vascular headache.  Patient will purchase new blood pressure monitor, and begin keeping blood pressure log.  He will remain in touch with me and send me his blood pressure readings weekly.    Further recommendations to follow after above.      "This note will not be shared with the patient."      "

## 2020-06-20 ENCOUNTER — OFFICE VISIT (OUTPATIENT)
Dept: FAMILY MEDICINE | Facility: CLINIC | Age: 55
End: 2020-06-20
Attending: FAMILY MEDICINE
Payer: COMMERCIAL

## 2020-06-20 DIAGNOSIS — G47.33 OSA (OBSTRUCTIVE SLEEP APNEA): ICD-10-CM

## 2020-06-20 DIAGNOSIS — J30.9 ALLERGIC RHINITIS, UNSPECIFIED SEASONALITY, UNSPECIFIED TRIGGER: ICD-10-CM

## 2020-06-20 DIAGNOSIS — G44.219 EPISODIC TENSION-TYPE HEADACHE, NOT INTRACTABLE: Primary | ICD-10-CM

## 2020-06-20 DIAGNOSIS — E11.59 HYPERTENSION ASSOCIATED WITH DIABETES: ICD-10-CM

## 2020-06-20 DIAGNOSIS — I15.2 HYPERTENSION ASSOCIATED WITH DIABETES: ICD-10-CM

## 2020-06-20 DIAGNOSIS — E11.9 CONTROLLED TYPE 2 DIABETES MELLITUS WITHOUT COMPLICATION, WITHOUT LONG-TERM CURRENT USE OF INSULIN: ICD-10-CM

## 2020-06-20 PROCEDURE — 99214 PR OFFICE/OUTPT VISIT, EST, LEVL IV, 30-39 MIN: ICD-10-PCS | Mod: 95,,, | Performed by: FAMILY MEDICINE

## 2020-06-20 PROCEDURE — 99214 OFFICE O/P EST MOD 30 MIN: CPT | Mod: 95,,, | Performed by: FAMILY MEDICINE

## 2020-06-20 RX ORDER — BUTALBITAL, ACETAMINOPHEN AND CAFFEINE 50; 325; 40 MG/1; MG/1; MG/1
1 TABLET ORAL EVERY 6 HOURS PRN
Qty: 20 TABLET | Refills: 0 | Status: SHIPPED | OUTPATIENT
Start: 2020-06-20 | End: 2021-08-04

## 2020-07-17 ENCOUNTER — TELEPHONE (OUTPATIENT)
Dept: PAIN MEDICINE | Facility: CLINIC | Age: 55
End: 2020-07-17

## 2020-07-17 NOTE — TELEPHONE ENCOUNTER
Staff spoke with patient to confirm appointment scheduled 7/20/20 at 8 am as in office visit with Zechariah Zambrano and to inform patient of direct line to call upon arriving to the clinic also visitors policy patient verbalized understanding.

## 2020-07-20 ENCOUNTER — OFFICE VISIT (OUTPATIENT)
Dept: PAIN MEDICINE | Facility: CLINIC | Age: 55
End: 2020-07-20
Payer: COMMERCIAL

## 2020-07-20 VITALS
WEIGHT: 242.5 LBS | HEIGHT: 75 IN | RESPIRATION RATE: 18 BRPM | BODY MASS INDEX: 30.15 KG/M2 | HEART RATE: 79 BPM | DIASTOLIC BLOOD PRESSURE: 99 MMHG | TEMPERATURE: 98 F | SYSTOLIC BLOOD PRESSURE: 157 MMHG | OXYGEN SATURATION: 100 %

## 2020-07-20 DIAGNOSIS — M54.12 CERVICAL RADICULOPATHY: Primary | ICD-10-CM

## 2020-07-20 DIAGNOSIS — M50.30 DDD (DEGENERATIVE DISC DISEASE), CERVICAL: ICD-10-CM

## 2020-07-20 DIAGNOSIS — M48.02 SPINAL STENOSIS OF CERVICAL REGION: ICD-10-CM

## 2020-07-20 PROCEDURE — 99213 OFFICE O/P EST LOW 20 MIN: CPT | Mod: S$GLB,,, | Performed by: NURSE PRACTITIONER

## 2020-07-20 PROCEDURE — 99999 PR PBB SHADOW E&M-EST. PATIENT-LVL IV: ICD-10-PCS | Mod: PBBFAC,,, | Performed by: NURSE PRACTITIONER

## 2020-07-20 PROCEDURE — 99999 PR PBB SHADOW E&M-EST. PATIENT-LVL IV: CPT | Mod: PBBFAC,,, | Performed by: NURSE PRACTITIONER

## 2020-07-20 PROCEDURE — 99213 PR OFFICE/OUTPT VISIT, EST, LEVL III, 20-29 MIN: ICD-10-PCS | Mod: S$GLB,,, | Performed by: NURSE PRACTITIONER

## 2020-07-20 NOTE — H&P (VIEW-ONLY)
Subjective:      Patient ID: Suhas Castillo is a 55 y.o. male.    Chief Complaint: No chief complaint on file.    Referred by: No ref. provider found       HPI:    Suhas Castillo is a 55 y.o. male (RHD) who presents today with neck pain with radiation into the right upper extremity. He reports that he has had a h/o this same type and quality of pain before, but did not have radiation into his fingers. He did have a selective C6 nerve root block performed by Dr. Hughes in 2008 which gave her great relief for several years.  His pain returned 4.5 to 5 weeks ago. His pain is right sided in the neck and extends in the medial upper arm and lower arm and into his fingers. He has tingling at the fingertips. His pain is described in detail below.    Interval History (10/30/2015):  He returns today for follow up.  He reports that the cervical DEBORAH has been helpful for the pain.  He is currently pain free    Interval History (3/8/2017):  He returns today for follow up.  He reports that, following the LAURITA in 10/2015, he was pain free until a week ago when he increased his weights at the gym.  Now his pain has returned in the same location as before.    Interval History (9/27/2018):  He returns today for follow up.  He reports that the C7/T1 ILESI provided pain relief until this past Tuesday.  This previous weekend, he was doing some work at his mother's house where he pull on a pole.  He felt a twinge then then his pain returned on Tuesday.  The pain has been severe for the past 24 hr.  It is limiting his ability to do his daily activities and his job    Interval History (10/17/2018):  The patient returns to clinic today for follow up and image review. He is s/p C7-T1 IL DEBORAH on 9/28/2018. He reports 50% relief for about a week then his pain returned to baseline. He continues to report neck pain that radiates into his right shoulder and arm. He describes this pain as burning and tingling in nature. He denies any other  health changes.     Interval History (11/14/2018):  The patient presents for follow up of neck pain.  He is s/p repeat cervical DEBORAH with 100% relief.  He is no longer having shooting pain.  He does have numbness to right hand, digits 1-3.  He denies any weakness.  His pain today is 0/10.    Interval History (1/21/2019):  The patient returns to clinic today for follow up. He reports increased right sided neck and shoulder pain in the last few days. The worst area is below his right shoulder blade. He describes this pain as tight, pulling, and burning in nature. He denies any injury or fall. He reports intermittent radiating pain down his right arm. He continues to perform a home exercise routine. He denies any other health changes. His pain today is 9/10.    Interval History (2/4/2019):  The patient returns to clinic today for follow up. He reports trigger point injections provided significant relief of his shoulder pain. He continues to report neck pain that radiates down his right arm to his ring finger. He describes this pain as burning and shooting. He denies any left arm pain. He continues to perform a home exercise routine. He denies any other health changes.     Interval History (2/22/2019):  The patient returns to clinic today for follow up. He is s/p C7-T1 IL DEBORAH on 2/7/2019. He reports 90% relief of his neck and arm pain. He reports intermittent aching neck pain that is tolerable at this time. He denies any radiating leg pain. He continues to perform a home exercise routine. He denies any other health changes.     Interval History (7/20/20);  The patient presents for follow-up of neck pain.  Patient states neck pain is right-sided goes down entire arm to last 2 digits of right hand.  Patient has prior C7/T1 ILESI with % relief with last 1 being  [id number] months prior. He is taking Motrin without focal relief. The patient denies myelopathic symptoms such as handwriting changes or difficulty with  buttons/coins/keys. Denies perineal paresthesias, bowel/bladder dysfunction.        Physical Therapy: Previously tried several years ago - no benefit.  Done before procedures    Non-pharmacologic Treatment: None         · TENS? None    Pain Medications:         · Currently taking: None    · Has tried in the past:  Tramadol 50mg prn and Diclofenac 75mg BID, minimal benefit.  OTC NSAIDS and tylenol    · Has not tried: Gabapentin    Blood thinners: None    Interventional Therapies:   · Selective C6 nerve root block (2008) - Dr. Hughes  · C7/T1 ILESI 10/2015: 100% relief until ~1 week ago  · C7/T1 ILESI 03/14/2017: 100% relief until Tuesday, 09/25/2018  · 10/26/18 C7/T1 IL DEBORAH- 100% relief  · 2/7/2019- C7-T1 IL DEBORAH %    Relevant Surgeries: None    Affecting sleep? Yes    Affecting daily activities? Yes    Depressive symptoms? None          · SI/HI? No    Work status:     Pain Scales  Best: 5/10  Worst: 7/10  Usually: 0/10  Today: 1/10    Neck Pain   This is a chronic problem. The current episode started more than 1 year ago. The problem occurs constantly. The problem has been gradually worsening. The pain is present in the anterior neck and midline. Quality: throbbing numb. The pain is at a severity of 7/10. The pain is mild. Exacerbated by: activity  The pain is same all the time. Stiffness is present all day. He has tried oral narcotics and injection treatment for the symptoms. The treatment provided significant relief.       Review of Systems   Cardiovascular:        Hypertension   Endocrine:        Diabetes   Musculoskeletal: Positive for neck pain.   All other systems reviewed and are negative.            Past Medical History:   Diagnosis Date    AR (allergic rhinitis)     Cervical spinal stenosis     Diabetes mellitus type II     DJD (degenerative joint disease), cervical     Hyperlipidemia     Hypertension     Sleep apnea        Past Surgical History:   Procedure Laterality Date     COLONOSCOPY N/A 4/11/2019    Procedure: COLONOSCOPY;  Surgeon: Gato Corbin MD;  Location[facility] ENDO (4TH FLR);  Service: Endoscopy;  Laterality: N/A;    EPIDURAL BLOCK INJECTION      cervical x 2    EPIDURAL STEROID INJECTION N/A 9/28/2018    Procedure: Injection, Steroid, Epidural  C7-T1;  Surgeon: Bernie Estrada MD;  Location: [facility] PAIN MGT;  Service: Pain Management;  Laterality: N/A;  C7-T1 IL DEBORAH   MRDICALLY URGENT    EPIDURAL STEROID INJECTION N/A 10/26/2018    Procedure: Injection, Steroid, Epidural CERVICAL C7-T1 DEBORAH;  Surgeon: Bernie Estrada MD;  Location: [facility] PAIN MGT;  Service: Pain Management;  Laterality: N/A;    EPIDURAL STEROID INJECTION N/A 2/7/2019    Procedure: INJECTION, STEROID, EPIDURAL;  Surgeon: Tania Bajwa MD;  Location: [facility] PAIN MGT;  Service: Pain Management;  Laterality: N/A;  C DEBORAH C7-T1    ESTRADA PT ON EISSA SCHEDULE    TESTICLE SURGERY  age 16    torsion       Review of patient's allergies indicates:  No Known Allergies    Current Outpatient Medications   Medication Sig Dispense Refill    blood sugar diagnostic (CONTOUR TEST STRIPS) Strp USE TO TEST BLOOD SUGAR TWICE A DAY 50 each 11    butalbital-acetaminophen-caffeine -40 mg (FIORICET, ESGIC) -40 mg per tablet Take 1 tablet by mouth every 6 (six) hours as needed for Headaches. 20 tablet 0    fluocinonide (LIDEX) 0.05 % external solution As directed  0    fluticasone propionate (FLONASE) 50 mcg/actuation nasal spray SHAKE LIQUID AND USE 2 SPRAYS(100 MCG) IN EACH NOSTRIL EVERY DAY 16 mL 11    hydroCHLOROthiazide (HYDRODIURIL) 12.5 MG Tab TAKE 1 TABLET(12.5 MG) BY MOUTH EVERY DAY 90 tablet 1    JANUVIA 100 mg Tab TAKE 1 TABLET(100 MG) BY MOUTH EVERY DAY 90 tablet 3    losartan (COZAAR) 100 MG tablet TAKE 1 TABLET(100 MG) BY MOUTH EVERY DAY 90 tablet 3    metFORMIN (GLUCOPHAGE-XR) 500 MG XR 24hr tablet TAKE 2 TABLETS BY MOUTH TWICE DAILY 360 tablet 3    pravastatin (PRAVACHOL) 10 MG tablet TAKE 1  TABLET(10 MG) BY MOUTH EVERY DAY 90 tablet 1    ibuprofen (ADVIL,MOTRIN) 400 MG tablet       mupirocin (BACTROBAN) 2 % ointment Apply topically 2 (two) times daily. (Patient not taking: Reported on 7/20/2020) 30 g 0     No current facility-administered medications for this visit.        Family History   Problem Relation Age of Onset    Hypertension Mother     Diabetes Mother     Stroke Mother        Social History     Socioeconomic History    Marital status: Single     Spouse name: Not on file    Number of children: 4    Years of education: Not on file    Highest education level: Not on file   Occupational History    Occupation: secutiry supervisor     Employer: [employer]   Social Needs    Financial resource strain: Not hard at all    Food insecurity     Worry: Never true     Inability: Never true    Transportation needs     Medical: No     Non-medical: No   Tobacco Use    Smoking status: Never Smoker    Smokeless tobacco: Never Used   Substance and Sexual Activity    Alcohol use: No     Alcohol/week: 0.0 standard drinks     Comment: none x 4 years; previously social drinker    Drug use: No    Sexual activity: Yes     Partners: Female     Birth control/protection: None   Lifestyle    Physical activity     Days per week: 5 days     Minutes per session: 60 min    Stress: Not on file   Relationships    Social connections     Talks on phone: More than three times a week     Gets together: Three times a week     Attends Confucianist service: More than 4 times per year     Active member of club or organization: Yes     Attends meetings of clubs or organizations: More than 4 times per year     Relationship status:    Other Topics Concern    Not on file   Social History Narrative    The patient does exercise regularly (treadmill, light weights: [id number]).      Rates diet as good.      He is satisfied with weight.    He does drink at least 1/2 gallon water daily.    He drinks 0  "coffee/tea/caffeine-containing soft drinks daily.    Total sleep time at night is 4-5 hours.    He works 65 hours per week.    He does wear seat belts.    Hobbies include bicycling.               Objective:     Vitals:    07/20/20 0811   BP: (!) 157/99   Pulse: 79   Resp: 18   Temp: 97.6 °F (36.4 °C)   SpO2: 100%   Weight: 110 kg (242 lb 8.1 oz)   Height: 6' 3" (1.905 m)   PainSc:   9       GEN:  Well developed, well nourished.  No acute distress.  No pain behavior.  HEENT:  No trauma.  Mucous membranes moist.  Nares patent bilaterally.  PSYCH: Normal affect. Thought content appropriate.  CHEST:  Breathing symmetric.  No audible wheezing.  ABD: Soft, non-tender, non-distended.  SKIN:  Warm, pink, dry.  No rash on exposed areas.    EXT:  No cyanosis, clubbing, or edema.  No color change or changes in nail or hair growth.  NEURO/MUSCULOSKELETAL:  Fully alert, oriented, and appropriate. Speech normal juan. No cranial nerve deficits.   Gait: normal.  5/5 motor strength throughout upper extremities.   C-Spine:  Normal ROM with mild pain on extension and right sided rotation. + pain with flexion, + spurlings to right, (-) shirley, .  Negative facet loading bilaterally.  No sensory deficit in BUE, equal strength 5/5 to tricep/bicept, wrist extnsion, .   Negative TTP cervical paraspinal muscles, shoulders, elbows or hands.       Imaging:      MRI Cervical Spine 10/12/2018:  FINDINGS:  Alignment: Normal.    Vertebrae: Normal marrow signal. No fracture.    Discs: Normal height and signal.    Cord: Normal.    Skull base and craniocervical junction: Normal.    Degenerative findings:    C2-C3: Disc osteophyte complex resulting in mild left neural foraminal narrowing.    C3-C4: Disc osteophyte complex.  This results in mild canal stenosis and moderate left and mild right neural foraminal narrowing.    C4-C5: There is a disc osteophyte complex which was results in mild canal stenosis, moderate severe left and moderate right " neural foraminal narrowing.    C5-C6: Disc osteophyte complex results in mild canal stenosis, mild left and moderate-severe right neural foraminal narrowing.    C6-C7: Disc osteophyte complex resulting in moderate bilateral neural foraminal narrowing.  No significant canal stenosis.    C7-T1: Unremarkable.    Paraspinal muscles & soft tissues: Unremarkable.      Impression       Multilevel degenerative changes as outlined above.  This is most prominent at the C3-4, C4-5, C5-6.         Cervical Xray (6/10/15)  Impression     Multilevel degenerative changes as discussed above, most pronounced at the C5-6 level. There has been some progression of this degenerative change, particularly with regard to be more pronounced vertebral endplate spurring, since the 2008 study.     Cervical MRI from 2008 in [facility] shows large disk protrusion at C5/6 to the right.    Assessment:       Encounter Diagnoses   Name Primary?    Cervical radiculopathy Yes    Spinal stenosis of cervical region     DDD (degenerative disc disease), cervical          Plan:       Diagnoses and all orders for this visit:    Cervical radiculopathy    Spinal stenosis of cervical region    DDD (degenerative disc disease), cervical       Neck pain is consistent with cervical radiculopathy    I discussed the treatment options with the patient today, including risks, benefits, and alternatives. All available images were reviewed. The patient is aware of the risks and benefits of the medications being prescribed, common side effects, and proper usage.    Plan of Care:  - Prior records reviewed    - Schedule C7-T1 ILESI    - Continue Motrin PRN    - RTC 2 weeks after procedure for virtual visit.       The above plan and management options were discussed at length with patient. Patient is in agreement with the above and verbalized understanding.     Zechariah Zambrano NP  07/20/2020

## 2020-07-27 ENCOUNTER — HOSPITAL ENCOUNTER (OUTPATIENT)
Facility: OTHER | Age: 55
Discharge: HOME | End: 2020-07-27
Attending: ANESTHESIOLOGY | Admitting: ANESTHESIOLOGY
Payer: COMMERCIAL

## 2020-07-27 VITALS
DIASTOLIC BLOOD PRESSURE: 94 MMHG | HEIGHT: 75 IN | RESPIRATION RATE: 18 BRPM | WEIGHT: 247 LBS | SYSTOLIC BLOOD PRESSURE: 180 MMHG | OXYGEN SATURATION: 99 % | BODY MASS INDEX: 30.71 KG/M2 | HEART RATE: 81 BPM | TEMPERATURE: 98 F

## 2020-07-27 DIAGNOSIS — M47.812 CERVICAL SPONDYLOSIS: Primary | ICD-10-CM

## 2020-07-27 DIAGNOSIS — G89.29 CHRONIC PAIN: ICD-10-CM

## 2020-07-27 LAB — POCT GLUCOSE: 170 MG/DL (ref 70–110)

## 2020-07-27 PROCEDURE — 25500020 PHARM REV CODE 255: Performed by: ANESTHESIOLOGY

## 2020-07-27 PROCEDURE — 25000003 PHARM REV CODE 250: Performed by: ANESTHESIOLOGY

## 2020-07-27 PROCEDURE — 63600175 PHARM REV CODE 636 W HCPCS: Performed by: ANESTHESIOLOGY

## 2020-07-27 PROCEDURE — 62321 NJX INTERLAMINAR CRV/THRC: CPT | Performed by: ANESTHESIOLOGY

## 2020-07-27 PROCEDURE — 62321 PR INJ CERV/THORAC, W/GUIDANCE: ICD-10-PCS | Mod: ,,, | Performed by: ANESTHESIOLOGY

## 2020-07-27 PROCEDURE — 82947 ASSAY GLUCOSE BLOOD QUANT: CPT | Performed by: ANESTHESIOLOGY

## 2020-07-27 PROCEDURE — 62321 NJX INTERLAMINAR CRV/THRC: CPT | Mod: ,,, | Performed by: ANESTHESIOLOGY

## 2020-07-27 RX ORDER — ALPRAZOLAM 0.5 MG/1
1 TABLET ORAL ONCE
Status: COMPLETED | OUTPATIENT
Start: 2020-07-27 | End: 2020-07-27

## 2020-07-27 RX ORDER — SODIUM CHLORIDE 9 MG/ML
500 INJECTION, SOLUTION INTRAVENOUS CONTINUOUS
Status: DISCONTINUED | OUTPATIENT
Start: 2020-07-27 | End: 2020-07-27 | Stop reason: HOSPADM

## 2020-07-27 RX ORDER — LIDOCAINE HYDROCHLORIDE 10 MG/ML
INJECTION, SOLUTION EPIDURAL; INFILTRATION; INTRACAUDAL; PERINEURAL
Status: DISCONTINUED | OUTPATIENT
Start: 2020-07-27 | End: 2020-07-27 | Stop reason: HOSPADM

## 2020-07-27 RX ORDER — LIDOCAINE HYDROCHLORIDE 10 MG/ML
INJECTION INFILTRATION; PERINEURAL
Status: DISCONTINUED | OUTPATIENT
Start: 2020-07-27 | End: 2020-07-27 | Stop reason: HOSPADM

## 2020-07-27 RX ORDER — DEXAMETHASONE SODIUM PHOSPHATE 100 MG/10ML
INJECTION INTRAMUSCULAR; INTRAVENOUS
Status: DISCONTINUED | OUTPATIENT
Start: 2020-07-27 | End: 2020-07-27 | Stop reason: HOSPADM

## 2020-07-27 RX ADMIN — ALPRAZOLAM 1 MG: 0.5 TABLET ORAL at 02:07

## 2020-07-27 NOTE — INTERVAL H&P NOTE
The patient has been examined and the H&P has been reviewed:    I concur with the findings and no changes have occurred since H&P was written.    Anesthesia/Surgery risks, benefits and alternative options discussed and understood by patient/family.    RRR  CTAB    C7/T1 ILESI    There are no hospital problems to display for this patient.

## 2020-07-27 NOTE — OP NOTE
Cervical Interlaminar Epidural Steroid Injection under fluoroscopic guidance.  Time-out taken to identify patient and procedure side prior to starting the procedure.   I attest that I have reviewed the patient's home medications prior to the procedure and no contraindication have been identified. I  re-evaluated the patient after the patient was positioned for the procedure in the procedure room immediately before the procedural time-out. The vital signs are current and represent the current state of the patient which has not significantly changed since the preprocedure assessment.                                                              Date of Service: 07/27/2020    PCP: Josiah Ching Jr, MD    Referring Physician:    PROCEDURE: C7-T1 cervical interlaminar epidural steroid injection under fluoroscopy.  REASONS FOR PROCEDURE: Cervical radiculopathy [M54.12]  1. Cervical spondylosis    2. Chronic pain      POSTOP DIAGNOSIS:  Cervical radiculopathy [M54.12]     1. Cervical spondylosis    2. Chronic pain      PHYSICIAN: Tania Bajwa MD  ASSISTANTS: Faustina Aamya DO ([facility] Fellow)  Adolfo Vallejo MD Resident  Jeff Glass MD Resident     MEDICATIONS INJECTED:  Preservative-free dexamethasone 10mg and Xylocaine 1% MPF 1ml.  This was followed by a slow injection of 4 mL sterile, preservative-free normal saline.    LOCAL ANESTHETIC USED: Xylocaine 1% 9ml with Sodium Bicarbonate 1ml.     SEDATION MEDICATIONS: None    COMPLICATIONS:  none.    ESTIMATED BLOOD LOSS: none.    TECHNIQUE:  With the patient laying in a prone position with the neck in a flexed forward position, the area was prepped and draped in the usual sterile fashion using ChloraPrep and a fenestrated drape.  The area was determined under fluoroscopic guidance.  Local anesthetic was given using a 27-gauge needle by raising a wheal and going down to the osseous elements of the cervical spine.  A 3.5 inch 20-gauge Touhy needle was introduced under  fluoroscopic guidance to meet the lamina of T1.  The needle was then hinged under the lamina then advanced using loss of resistance technique.  Once the tip of the needle was in the desired position, the contrast dye Omnipaque was injected to determine placement and no vascular runoff.  Digital subtraction was employed to confirm that there is no vascular runoff.  The steroid was then injected slowly followed by a slow injection of the 4 mL of the sterile preservative-free normal saline.  The patient tolerated the procedure well.    PAIN BEFORE THE PROCEDURE: 7-10/10    PAIN AFTER THE PROCEDURE: 5/10    The patient was monitored after the procedure and was given post-procedure and discharge instructions to follow at home. The patient was discharged in a stable condition

## 2020-07-27 NOTE — DISCHARGE SUMMARY
Discharge Note  Short Stay      SUMMARY     Admit Date: 7/27/2020    Attending Physician: Tania Bajwa      Discharge Physician: Tania Bajwa      Discharge Date: 7/27/2020 3:30 PM    Procedure(s) (LRB):  INJECTION, STEROID, EPIDURAL, C7-T1 (N/A)    Final Diagnosis: Cervical radiculopathy [M54.12]    Disposition: Home or self care    Patient Instructions:   Current Discharge Medication List      CONTINUE these medications which have NOT CHANGED    Details   blood sugar diagnostic (CONTOUR TEST STRIPS) Strp USE TO TEST BLOOD SUGAR TWICE A DAY  Qty: 50 each, Refills: 11      butalbital-acetaminophen-caffeine -40 mg (FIORICET, ESGIC) -40 mg per tablet Take 1 tablet by mouth every 6 (six) hours as needed for Headaches.  Qty: 20 tablet, Refills: 0      fluocinonide (LIDEX) 0.05 % external solution As directed  Refills: 0      fluticasone propionate (FLONASE) 50 mcg/actuation nasal spray SHAKE LIQUID AND USE 2 SPRAYS(100 MCG) IN EACH NOSTRIL EVERY DAY  Qty: 16 mL, Refills: 11    Associated Diagnoses: Allergic rhinitis, unspecified seasonality, unspecified trigger      hydroCHLOROthiazide (HYDRODIURIL) 12.5 MG Tab TAKE 1 TABLET(12.5 MG) BY MOUTH EVERY DAY  Qty: 90 tablet, Refills: 1    Associated Diagnoses: Hypertension associated with diabetes      ibuprofen (ADVIL,MOTRIN) 400 MG tablet       JANUVIA 100 mg Tab TAKE 1 TABLET(100 MG) BY MOUTH EVERY DAY  Qty: 90 tablet, Refills: 3      losartan (COZAAR) 100 MG tablet TAKE 1 TABLET(100 MG) BY MOUTH EVERY DAY  Qty: 90 tablet, Refills: 3      metFORMIN (GLUCOPHAGE-XR) 500 MG XR 24hr tablet TAKE 2 TABLETS BY MOUTH TWICE DAILY  Qty: 360 tablet, Refills: 3    Associated Diagnoses: Controlled type 2 diabetes mellitus without complication, without long-term current use of insulin      mupirocin (BACTROBAN) 2 % ointment Apply topically 2 (two) times daily.  Qty: 30 g, Refills: 0    Associated Diagnoses: Boil of lower extremity      pravastatin (PRAVACHOL) 10 MG tablet TAKE  1 TABLET(10 MG) BY MOUTH EVERY DAY  Qty: 90 tablet, Refills: 1    Associated Diagnoses: Hyperlipidemia associated with type 2 diabetes mellitus                 Discharge Diagnosis: Cervical radiculopathy [M54.12]  Condition on Discharge: Stable with no complications to procedure   Diet on Discharge: Same as before.  Activity: as per instruction sheet.  Discharge to: Home with a responsible adult.  Follow up: 2-4 weeks       Please call my office or pager at [phone] if experienced any weakness or loss of sensation, fever > 101.5, pain uncontrolled with oral medications, persistent nausea/vomiting/or diarrhea, redness or drainage from the incisions, or any other worrisome concerns. If physician on call was not reached or could not communicate with our office for any reason please go to the nearest emergency department

## 2020-07-27 NOTE — DISCHARGE INSTRUCTIONS

## 2020-07-30 ENCOUNTER — PATIENT MESSAGE (OUTPATIENT)
Dept: FAMILY MEDICINE | Facility: CLINIC | Age: 55
End: 2020-07-30

## 2020-07-30 ENCOUNTER — OFFICE VISIT (OUTPATIENT)
Dept: URGENT CARE | Facility: CLINIC | Age: 55
End: 2020-07-30
Payer: COMMERCIAL

## 2020-07-30 VITALS
TEMPERATURE: 98 F | WEIGHT: 246.94 LBS | RESPIRATION RATE: 18 BRPM | OXYGEN SATURATION: 99 % | SYSTOLIC BLOOD PRESSURE: 150 MMHG | HEART RATE: 90 BPM | HEIGHT: 75 IN | DIASTOLIC BLOOD PRESSURE: 78 MMHG | BODY MASS INDEX: 30.7 KG/M2

## 2020-07-30 DIAGNOSIS — L02.415 CELLULITIS AND ABSCESS OF RIGHT LEG: Primary | ICD-10-CM

## 2020-07-30 DIAGNOSIS — L03.115 CELLULITIS AND ABSCESS OF RIGHT LEG: Primary | ICD-10-CM

## 2020-07-30 PROCEDURE — 99214 PR OFFICE/OUTPT VISIT, EST, LEVL IV, 30-39 MIN: ICD-10-PCS | Mod: 25,S$GLB,, | Performed by: EMERGENCY MEDICINE

## 2020-07-30 PROCEDURE — 10060 I&D ABSCESS SIMPLE/SINGLE: CPT | Mod: S$GLB,,, | Performed by: EMERGENCY MEDICINE

## 2020-07-30 PROCEDURE — 99214 OFFICE O/P EST MOD 30 MIN: CPT | Mod: 25,S$GLB,, | Performed by: EMERGENCY MEDICINE

## 2020-07-30 PROCEDURE — 10060 INCISION & DRAINAGE: ICD-10-PCS | Mod: S$GLB,,, | Performed by: EMERGENCY MEDICINE

## 2020-07-30 RX ORDER — SULFAMETHOXAZOLE AND TRIMETHOPRIM 800; 160 MG/1; MG/1
1 TABLET ORAL 2 TIMES DAILY
Qty: 20 TABLET | Refills: 0 | Status: SHIPPED | OUTPATIENT
Start: 2020-07-30 | End: 2020-08-09

## 2020-07-30 RX ORDER — MUPIROCIN 20 MG/G
OINTMENT TOPICAL
Qty: 22 G | Refills: 1 | Status: SHIPPED | OUTPATIENT
Start: 2020-07-30 | End: 2020-08-18 | Stop reason: SDUPTHER

## 2020-07-30 NOTE — PATIENT INSTRUCTIONS
SEE ABSCESS AND CELLULITIS SHEET  SEE INFECTED INSECT BITE SHEET    TYLENOL OR MOTRIN FOR PAIN  BACTRIM DS RX  BACTROBAN OINTMENT RX  RETURN FOR ANY CONCERNS, IF HAVING SPREADING REDNESS, FEVERS, CONTINUED DRAINAGE, ANY OTHER CONCERNS    OK TO SHOWER AND IRRIGATE SITE, THEN DRY, THEN APPLY OINTMENT, AND KEEP CLEAN AND COVERED.      Abscess (Incision & Drainage)  An abscess is sometimes called a boil. It happens when bacteria get trapped under the skin and start to grow. Pus forms inside the abscess as the body responds to the bacteria. An abscess can happen with an insect bite, ingrown hair, blocked oil gland, pimple, cyst, or puncture wound.  Your healthcare provider has drained the pus from your abscess. If the abscess pocket was large, your healthcare provider may have put in gauze packing. Your provider will need to remove it on your next visit. He or she may also replace it at that time. You may not need antibiotics to treat a simple abscess, unless the infection is spreading into the skin around the wound (cellulitis).  The wound will take about 1 to 2 weeks to heal, depending on the size of the abscess. Healthy tissue will grow from the bottom and sides of the opening until it seals over.  Home care  These tips can help your wound heal:  · The wound may drain for the first 2 days. Cover the wound with a clean dry dressing. Change the dressing if it becomes soaked with blood or pus.  · If a gauze packing was placed inside the abscess pocket, you may be told to remove it yourself. You may do this in the shower. Once the packing is removed, you should wash the area in the shower, or clean the area as directed by your provider. Continue to do this until the skin opening has closed. Make sure you wash your hands after changing the packing or cleaning the wound.  · If you were prescribed antibiotics, take them as directed until they are all gone.  · You may use acetaminophen or ibuprofen to control pain, unless  another pain medicine was prescribed. If you have liver disease or ever had a stomach ulcer, talk with your doctor before using these medicines.  Follow-up care  Follow up with your healthcare provider, or as advised. If a gauze packing was put in your wound, it should be removed in 1 to 2 days. Check your wound every day for any signs that the infection is getting worse. The signs are listed below.  When to seek medical advice  Call your healthcare provider right away if any of these occur:  · Increasing redness or swelling  · Red streaks in the skin leading away from the wound  · Increasing local pain or swelling  · Continued pus draining from the wound 2 days after treatment  · Fever of 100.4ºF (38ºC) or higher, or as directed by your healthcare provider  · Boil returns when you are at home  Date Last Reviewed: 9/1/2016  © 7993-7065 [employer]. [address] [place] [address]. All rights reserved. This information is not intended as a substitute for professional medical care. Always follow your healthcare professional's instructions.        Discharge Instructions for Cellulitis  You have been diagnosed with cellulitis. This is an infection in the deepest layer of the skin. In some cases, the infection also affects the muscle. Cellulitis is caused by bacteria. The bacteria can enter the body through broken skin. This can happen with a cut, scratch, animal bite, or an insect bite that has been scratched. You may have been treated in the hospital with antibiotics and fluids. You will likely be given a prescription for antibiotics to take at home. This sheet will help you take care of yourself at home.  Home care  When you are home:  · Take the prescribed antibiotic medicine you are given as directed until it is gone. Take it even if you feel better. It treats the infection and stops it from returning. Not taking all the medicine can make future infections hard to treat.  · Keep the infected  area clean.  · When possible, raise the infected area above the level of your heart. This helps keep swelling down.  · Talk with your healthcare provider if you are in pain. Ask what kind of over-the-counter medicine you can take for pain.  · Apply clean bandages as advised.  · Take your temperature once a day for a week.  · Wash your hands often to prevent spreading the infection.  In the future, wash your hands before and after you touch cuts, scratches, or bandages. This will help prevent infection.   When to call your healthcare provider  Call your healthcare provider immediately if you have any of the following:  · Difficulty or pain when moving the joints above or below the infected area  · Discharge or pus draining from the area  · Fever of 100.4°F (38°C) or higher, or as directed by your healthcare provider  · Pain that gets worse in or around the infected   · Redness that gets worse in or around the infected area, particularly if the area of redness expands to a wider area  · Shaking chills  · Swelling of the infected area  · Vomiting   Date Last Reviewed: 8/1/2016  © 7918-0134 [employer]. [address] [place] [address], [place] [address]. All rights reserved. This information is not intended as a substitute for professional medical care. Always follow your healthcare professional's instructions.

## 2020-07-30 NOTE — PROGRESS NOTES
"Subjective:       Patient ID: Suhas Castillo is a 55 y.o. male.    Vitals:  height is 6' 3" (1.905 m) and weight is 112 kg (246 lb 14.6 oz). His temperature is 98.1 °F (36.7 °C). His blood pressure is 150/78 (abnormal) and his pulse is 90. His respiration is 18 and oxygen saturation is 99%.     Chief Complaint: Insect Bite (Right leg)    Patient here today with c/o bug bites on Right leg x's 4 days ago.    Insect Bite  This is a new problem. The current episode started in the past 7 days. The problem has been gradually worsening. Pertinent negatives include no abdominal pain, anorexia, arthralgias, change in bowel habit, chest pain, chills, congestion, coughing, diaphoresis, fatigue, fever, headaches, joint swelling, myalgias, nausea, neck pain, numbness, rash, sore throat, swollen glands, urinary symptoms, vertigo, visual change, vomiting or weakness. Nothing aggravates the symptoms. He has tried nothing for the symptoms. The treatment provided no relief.       Constitution: Negative for chills, sweating, fatigue and fever.   HENT: Negative for congestion and sore throat.    Neck: Negative for neck pain and painful lymph nodes.   Cardiovascular: Negative for chest pain and leg swelling.   Eyes: Negative for double vision and blurred vision.   Respiratory: Negative for cough and shortness of breath.    Gastrointestinal: Negative for abdominal pain, nausea, vomiting and diarrhea.   Genitourinary: Negative for dysuria, frequency and urgency.   Musculoskeletal: Negative for joint pain, joint swelling, muscle cramps and muscle ache.   Skin: Negative for color change, pale, rash and erythema.   Allergic/Immunologic: Negative for seasonal allergies.   Neurological: Negative for dizziness, history of vertigo, light-headedness, passing out, headaches and numbness.   Hematologic/Lymphatic: Negative for swollen lymph nodes, easy bruising/bleeding and history of blood clots. Does not bruise/bleed easily. " "  Psychiatric/Behavioral: Negative for nervous/anxious, sleep disturbance and depression. The patient is not nervous/anxious.        Objective:      Physical Exam   Constitutional: He is oriented to person, place, and time. He appears well-developed.   HENT:   Head: Normocephalic and atraumatic. Head is without abrasion, without contusion and without laceration.   Ears:   Right Ear: External ear normal.   Left Ear: External ear normal.   Nose: Nose normal.   Mouth/Throat: Oropharynx is clear and moist and mucous membranes are normal.   Eyes: Pupils are equal, round, and reactive to light. Conjunctivae, EOM and lids are normal.   Neck: Trachea normal, full passive range of motion without pain and phonation normal. Neck supple.   Cardiovascular: Normal rate, regular rhythm and normal heart sounds.   Pulmonary/Chest: Effort normal and breath sounds normal. No stridor. No respiratory distress.   Musculoskeletal: Normal range of motion.   Neurological: He is alert and oriented to person, place, and time.   Skin: Skin is warm, dry, intact and no rash. Capillary refill takes less than 2 seconds. abrasion, burn, bruising, erythema and ecchymosisPsychiatric: His speech is normal and behavior is normal. Judgment and thought content normal.   Nursing note and vitals reviewed.      Incision & Drainage    Date/Time: 7/30/2020 8:55 AM  Performed by: Robert Rogers MD  Authorized by: Robert Rogers MD     Time out: Immediately prior to procedure a "time out" was called to verify the correct patient, procedure, equipment, support staff and site/side marked as required.    Consent Done?:  Yes (Verbal)    Type:  Abscess  Body area:  Lower extremity  Location details:  Right leg  Local anesthetic: lidocaine 1% with epinephrine  Anesthetic total (ml):  2  Scalpel size:  11  Incision type:  Single straight  Complexity:  Simple  Drainage:  Pus  Drainage amount:  Scant  Wound treatment:  Incision and drainage  Patient tolerance: "  Patient tolerated the procedure well with no immediate complications    COVERED WITH BACTROBAN OINTMENT  COVERED WITH GAUZE  NO COMPLICATIONS  GIVEN INSTRUCTIONS OF WHEN TO RETURN        Assessment:       1. Cellulitis and abscess of right leg        Plan:         Cellulitis and abscess of right leg    Other orders  -     sulfamethoxazole-trimethoprim 800-160mg (BACTRIM DS) 800-160 mg Tab; Take 1 tablet by mouth 2 (two) times daily. for 10 days  Dispense: 20 tablet; Refill: 0  -     mupirocin (BACTROBAN) 2 % ointment; Apply to affected area 3 times daily  Dispense: 22 g; Refill: 1         Patient Instructions     SEE ABSCESS AND CELLULITIS SHEET  SEE INFECTED INSECT BITE SHEET    TYLENOL OR MOTRIN FOR PAIN  BACTRIM DS RX  BACTROBAN OINTMENT RX  RETURN FOR ANY CONCERNS, IF HAVING SPREADING REDNESS, FEVERS, CONTINUED DRAINAGE, ANY OTHER CONCERNS    OK TO SHOWER AND IRRIGATE SITE, THEN DRY, THEN APPLY OINTMENT, AND KEEP CLEAN AND COVERED.      Abscess (Incision & Drainage)  An abscess is sometimes called a boil. It happens when bacteria get trapped under the skin and start to grow. Pus forms inside the abscess as the body responds to the bacteria. An abscess can happen with an insect bite, ingrown hair, blocked oil gland, pimple, cyst, or puncture wound.  Your healthcare provider has drained the pus from your abscess. If the abscess pocket was large, your healthcare provider may have put in gauze packing. Your provider will need to remove it on your next visit. He or she may also replace it at that time. You may not need antibiotics to treat a simple abscess, unless the infection is spreading into the skin around the wound (cellulitis).  The wound will take about 1 to 2 weeks to heal, depending on the size of the abscess. Healthy tissue will grow from the bottom and sides of the opening until it seals over.  Home care  These tips can help your wound heal:  · The wound may drain for the first 2 days. Cover the wound  with a clean dry dressing. Change the dressing if it becomes soaked with blood or pus.  · If a gauze packing was placed inside the abscess pocket, you may be told to remove it yourself. You may do this in the shower. Once the packing is removed, you should wash the area in the shower, or clean the area as directed by your provider. Continue to do this until the skin opening has closed. Make sure you wash your hands after changing the packing or cleaning the wound.  · If you were prescribed antibiotics, take them as directed until they are all gone.  · You may use acetaminophen or ibuprofen to control pain, unless another pain medicine was prescribed. If you have liver disease or ever had a stomach ulcer, talk with your doctor before using these medicines.  Follow-up care  Follow up with your healthcare provider, or as advised. If a gauze packing was put in your wound, it should be removed in 1 to 2 days. Check your wound every day for any signs that the infection is getting worse. The signs are listed below.  When to seek medical advice  Call your healthcare provider right away if any of these occur:  · Increasing redness or swelling  · Red streaks in the skin leading away from the wound  · Increasing local pain or swelling  · Continued pus draining from the wound 2 days after treatment  · Fever of 100.4ºF (38ºC) or higher, or as directed by your healthcare provider  · Boil returns when you are at home  Date Last Reviewed: 9/1/2016  © 4375-4172 [employer]. [address] [place] [address]. All rights reserved. This information is not intended as a substitute for professional medical care. Always follow your healthcare professional's instructions.        Discharge Instructions for Cellulitis  You have been diagnosed with cellulitis. This is an infection in the deepest layer of the skin. In some cases, the infection also affects the muscle. Cellulitis is caused by bacteria. The bacteria  can enter the body through broken skin. This can happen with a cut, scratch, animal bite, or an insect bite that has been scratched. You may have been treated in the hospital with antibiotics and fluids. You will likely be given a prescription for antibiotics to take at home. This sheet will help you take care of yourself at home.  Home care  When you are home:  · Take the prescribed antibiotic medicine you are given as directed until it is gone. Take it even if you feel better. It treats the infection and stops it from returning. Not taking all the medicine can make future infections hard to treat.  · Keep the infected area clean.  · When possible, raise the infected area above the level of your heart. This helps keep swelling down.  · Talk with your healthcare provider if you are in pain. Ask what kind of over-the-counter medicine you can take for pain.  · Apply clean bandages as advised.  · Take your temperature once a day for a week.  · Wash your hands often to prevent spreading the infection.  In the future, wash your hands before and after you touch cuts, scratches, or bandages. This will help prevent infection.   When to call your healthcare provider  Call your healthcare provider immediately if you have any of the following:  · Difficulty or pain when moving the joints above or below the infected area  · Discharge or pus draining from the area  · Fever of 100.4°F (38°C) or higher, or as directed by your healthcare provider  · Pain that gets worse in or around the infected   · Redness that gets worse in or around the infected area, particularly if the area of redness expands to a wider area  · Shaking chills  · Swelling of the infected area  · Vomiting   Date Last Reviewed: 8/1/2016  © 4905-6145 [employer]. [address] [place] [address]. All rights reserved. This information is not intended as a substitute for professional medical care. Always follow your healthcare professional's  instructions.

## 2020-08-10 ENCOUNTER — TELEPHONE (OUTPATIENT)
Dept: PAIN MEDICINE | Facility: CLINIC | Age: 55
End: 2020-08-10

## 2020-08-10 NOTE — TELEPHONE ENCOUNTER
Staff spoke with patient to confirm appointment 8/12/20 at 12:40pm as my chart virtual video visit with Zechariah Zambrano and to inform patient of the e pre check process and also techs support number in case any issues occur during the visit patient verbalized understanding.

## 2020-08-14 NOTE — PROGRESS NOTES
Subjective:       Patient ID: Suhas Castillo is a 55 y.o. male.    Chief Complaint: Insect Bite    Insect Bite  This is a chronic problem. The current episode started 1 to 4 weeks ago. The problem has been gradually improving. Pertinent negatives include no chills, diaphoresis, fever, nausea or vomiting.   Seen at [facility] 7/30; had zI&D, placed on topical mupirocin and oral TMP-SMX.    Patient Active Problem List   Diagnosis    Controlled type 2 diabetes mellitus without complication, without long-term current use of insulin    Hypertension associated with diabetes    Hyperlipidemia associated with type 2 diabetes mellitus    AR (allergic rhinitis)    DJD (degenerative joint disease), cervical    Cervical spinal stenosis    Elevated transaminase level    Vitamin D deficiency    DDD (degenerative disc disease), cervical    Angioedema    LIZZY (obstructive sleep apnea)    [place] cardiac risk 10-20% in next 10 years    Chronic pain         Current Outpatient Medications:     blood sugar diagnostic (CONTOUR TEST STRIPS) Strp, USE TO TEST BLOOD SUGAR TWICE A DAY, Disp: 50 each, Rfl: 11    butalbital-acetaminophen-caffeine -40 mg (FIORICET, ESGIC) -40 mg per tablet, Take 1 tablet by mouth every 6 (six) hours as needed for Headaches., Disp: 20 tablet, Rfl: 0    fluocinonide (LIDEX) 0.05 % external solution, As directed, Disp: , Rfl: 0    fluticasone propionate (FLONASE) 50 mcg/actuation nasal spray, SHAKE LIQUID AND USE 2 SPRAYS(100 MCG) IN EACH NOSTRIL EVERY DAY, Disp: 16 mL, Rfl: 11    hydroCHLOROthiazide (HYDRODIURIL) 12.5 MG Tab, TAKE 1 TABLET(12.5 MG) BY MOUTH EVERY DAY, Disp: 90 tablet, Rfl: 1    ibuprofen (ADVIL,MOTRIN) 400 MG tablet, , Disp: , Rfl:     JANUVIA 100 mg Tab, TAKE 1 TABLET(100 MG) BY MOUTH EVERY DAY, Disp: 90 tablet, Rfl: 3    losartan (COZAAR) 100 MG tablet, TAKE 1 TABLET(100 MG) BY MOUTH EVERY DAY, Disp: 90 tablet, Rfl: 3    metFORMIN (GLUCOPHAGE-XR) 500 MG XR 24hr  "tablet, TAKE 2 TABLETS BY MOUTH TWICE DAILY, Disp: 360 tablet, Rfl: 3    mupirocin (BACTROBAN) 2 % ointment, Apply topically 2 (two) times daily., Disp: 30 g, Rfl: 0    pravastatin (PRAVACHOL) 10 MG tablet, TAKE 1 TABLET(10 MG) BY MOUTH EVERY DAY, Disp: 90 tablet, Rfl: 1    mupirocin (BACTROBAN) 2 % ointment, Apply to affected area 3 times daily, Disp: 22 g, Rfl: 1    The following portions of the patient's history were reviewed and updated as appropriate: allergies, past family history, past medical history, past social history and past surgical history.    Review of Systems   Constitutional: Negative for chills, diaphoresis and fever.   Gastrointestinal: Negative for nausea and vomiting.       Objective:      /88   Pulse 93   Ht 6' 3" (1.905 m)   Wt 108.4 kg (239 lb)   SpO2 97%   BMI 29.87 kg/m²     Physical Exam  Vitals signs reviewed.   Skin:     General: Skin is warm and dry.   Neurological:      Mental Status: He is oriented to person, place, and time.         Serous drainage from small punctate opening.  No tenderness or erythema.      Assessment:       1. Insect bite of right lower leg, sequela    2. Status post incision and drainage          Plan:       Ensure dilated fundoscopic exam with Dr. Sergey jerome.  HgbA1c/BMP as ordered for last week.  Remainder of Health Maintenance reviewed - up to date.    Continue local care to wound; reassured.  We will call the patient with results & make further recommendations at that time.        "This note will not be shared with the patient."    "

## 2020-08-18 ENCOUNTER — OFFICE VISIT (OUTPATIENT)
Dept: FAMILY MEDICINE | Facility: CLINIC | Age: 55
End: 2020-08-18
Attending: FAMILY MEDICINE
Payer: COMMERCIAL

## 2020-08-18 ENCOUNTER — LAB VISIT (OUTPATIENT)
Dept: LAB | Facility: HOSPITAL | Age: 55
End: 2020-08-18
Attending: FAMILY MEDICINE
Payer: COMMERCIAL

## 2020-08-18 VITALS
SYSTOLIC BLOOD PRESSURE: 138 MMHG | BODY MASS INDEX: 29.72 KG/M2 | WEIGHT: 239 LBS | HEIGHT: 75 IN | HEART RATE: 93 BPM | OXYGEN SATURATION: 97 % | DIASTOLIC BLOOD PRESSURE: 88 MMHG

## 2020-08-18 DIAGNOSIS — E11.9 CONTROLLED TYPE 2 DIABETES MELLITUS WITHOUT COMPLICATION, WITHOUT LONG-TERM CURRENT USE OF INSULIN: ICD-10-CM

## 2020-08-18 DIAGNOSIS — S80.861S INSECT BITE OF RIGHT LOWER LEG, SEQUELA: Primary | ICD-10-CM

## 2020-08-18 DIAGNOSIS — I15.2 HYPERTENSION ASSOCIATED WITH DIABETES: ICD-10-CM

## 2020-08-18 DIAGNOSIS — Z98.890 STATUS POST INCISION AND DRAINAGE: ICD-10-CM

## 2020-08-18 DIAGNOSIS — E11.59 HYPERTENSION ASSOCIATED WITH DIABETES: ICD-10-CM
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
## 2020-08-18 LAB
ANION GAP SERPL CALC-SCNC: 11 MMOL/L (ref 8–16)
BUN SERPL-MCNC: 15 MG/DL (ref 6–20)
CALCIUM SERPL-MCNC: 9.2 MG/DL (ref 8.7–10.5)
CHLORIDE SERPL-SCNC: 105 MMOL/L (ref 95–110)
CO2 SERPL-SCNC: 24 MMOL/L (ref 23–29)
CREAT SERPL-MCNC: 1.4 MG/DL (ref 0.5–1.4)
EST. GFR  (AFRICAN AMERICAN): >60 ML/MIN/1.73 M^2
EST. GFR  (NON AFRICAN AMERICAN): 57 ML/MIN/1.73 M^2
GLUCOSE SERPL-MCNC: 316 MG/DL (ref 70–110)
POTASSIUM SERPL-SCNC: 3.7 MMOL/L (ref 3.5–5.1)
SODIUM SERPL-SCNC: 140 MMOL/L (ref 136–145)

## 2020-08-18 PROCEDURE — 80048 BASIC METABOLIC PNL TOTAL CA: CPT

## 2020-08-18 PROCEDURE — 83036 HEMOGLOBIN GLYCOSYLATED A1C: CPT

## 2020-08-18 PROCEDURE — 99999 PR PBB SHADOW E&M-EST. PATIENT-LVL IV: ICD-10-PCS | Mod: PBBFAC,,, | Performed by: FAMILY MEDICINE

## 2020-08-18 PROCEDURE — 36415 COLL VENOUS BLD VENIPUNCTURE: CPT | Mod: PO

## 2020-08-18 PROCEDURE — 99999 PR PBB SHADOW E&M-EST. PATIENT-LVL IV: CPT | Mod: PBBFAC,,, | Performed by: FAMILY MEDICINE

## 2020-08-18 PROCEDURE — 99213 PR OFFICE/OUTPT VISIT, EST, LEVL III, 20-29 MIN: ICD-10-PCS | Mod: S$GLB,,, | Performed by: FAMILY MEDICINE

## 2020-08-18 PROCEDURE — 99213 OFFICE O/P EST LOW 20 MIN: CPT | Mod: S$GLB,,, | Performed by: FAMILY MEDICINE

## 2020-08-18 RX ORDER — MUPIROCIN 20 MG/G
OINTMENT TOPICAL
Qty: 22 G | Refills: 1 | Status: SHIPPED | OUTPATIENT
Start: 2020-08-18 | End: 2021-02-17

## 2020-08-18 NOTE — TELEPHONE ENCOUNTER
----- Message from Ursula Khan sent at 8/18/2020  2:06 PM CDT -----  Name of Who is Calling: TERESE CAMPBELL [[id number]]      What is the request in detail: Pt is calling to speak to staff in regards to the status of a script being sent to the Pharm.... Please call to further assist .       Can the clinic reply by [facility]: N      What Number to Call Back if not in [facility]: [phone]

## 2020-08-19 LAB
ESTIMATED AVG GLUCOSE: 275 MG/DL (ref 68–131)
HBA1C MFR BLD HPLC: 11.2 % (ref 4–5.6)

## 2020-08-21 ENCOUNTER — PATIENT MESSAGE (OUTPATIENT)
Dept: FAMILY MEDICINE | Facility: CLINIC | Age: 55
End: 2020-08-21

## 2020-08-27 ENCOUNTER — PATIENT MESSAGE (OUTPATIENT)
Dept: FAMILY MEDICINE | Facility: CLINIC | Age: 55
End: 2020-08-27

## 2020-10-27 ENCOUNTER — LAB VISIT (OUTPATIENT)
Dept: LAB | Facility: HOSPITAL | Age: 55
End: 2020-10-27
Attending: FAMILY MEDICINE
Payer: COMMERCIAL

## 2020-10-27 ENCOUNTER — OFFICE VISIT (OUTPATIENT)
Dept: FAMILY MEDICINE | Facility: CLINIC | Age: 55
End: 2020-10-27
Attending: FAMILY MEDICINE
Payer: COMMERCIAL

## 2020-10-27 VITALS
DIASTOLIC BLOOD PRESSURE: 90 MMHG | BODY MASS INDEX: 30.29 KG/M2 | WEIGHT: 243.63 LBS | OXYGEN SATURATION: 97 % | HEART RATE: 81 BPM | HEIGHT: 75 IN | SYSTOLIC BLOOD PRESSURE: 146 MMHG

## 2020-10-27 DIAGNOSIS — E11.9 CONTROLLED TYPE 2 DIABETES MELLITUS WITHOUT COMPLICATION, WITHOUT LONG-TERM CURRENT USE OF INSULIN: ICD-10-CM

## 2020-10-27 DIAGNOSIS — S39.012A LUMBAR STRAIN, INITIAL ENCOUNTER: Primary | ICD-10-CM

## 2020-10-27 LAB
ANION GAP SERPL CALC-SCNC: 9 MMOL/L (ref 8–16)
BUN SERPL-MCNC: 14 MG/DL (ref 6–20)
CALCIUM SERPL-MCNC: 9.1 MG/DL (ref 8.7–10.5)
CHLORIDE SERPL-SCNC: 105 MMOL/L (ref 95–110)
CO2 SERPL-SCNC: 25 MMOL/L (ref 23–29)
CREAT SERPL-MCNC: 1.2 MG/DL (ref 0.5–1.4)
EST. GFR  (AFRICAN AMERICAN): >60 ML/MIN/1.73 M^2
EST. GFR  (NON AFRICAN AMERICAN): >60 ML/MIN/1.73 M^2
ESTIMATED AVG GLUCOSE: 203 MG/DL (ref 68–131)
GLUCOSE SERPL-MCNC: 165 MG/DL (ref 70–110)
HBA1C MFR BLD HPLC: 8.7 % (ref 4–5.6)
POTASSIUM SERPL-SCNC: 4.2 MMOL/L (ref 3.5–5.1)
SODIUM SERPL-SCNC: 139 MMOL/L (ref 136–145)

## 2020-10-27 PROCEDURE — 3080F DIAST BP >= 90 MM HG: CPT | Mod: CPTII,S$GLB,, | Performed by: FAMILY MEDICINE

## 2020-10-27 PROCEDURE — 3008F PR BODY MASS INDEX (BMI) DOCUMENTED: ICD-10-PCS | Mod: CPTII,S$GLB,, | Performed by: FAMILY MEDICINE

## 2020-10-27 PROCEDURE — 99214 PR OFFICE/OUTPT VISIT, EST, LEVL IV, 30-39 MIN: ICD-10-PCS | Mod: S$GLB,,, | Performed by: FAMILY MEDICINE

## 2020-10-27 PROCEDURE — 3008F BODY MASS INDEX DOCD: CPT | Mod: CPTII,S$GLB,, | Performed by: FAMILY MEDICINE

## 2020-10-27 PROCEDURE — 83036 HEMOGLOBIN GLYCOSYLATED A1C: CPT

## 2020-10-27 PROCEDURE — 3046F HEMOGLOBIN A1C LEVEL >9.0%: CPT | Mod: CPTII,S$GLB,, | Performed by: FAMILY MEDICINE

## 2020-10-27 PROCEDURE — 80048 BASIC METABOLIC PNL TOTAL CA: CPT

## 2020-10-27 PROCEDURE — 99214 OFFICE O/P EST MOD 30 MIN: CPT | Mod: S$GLB,,, | Performed by: FAMILY MEDICINE

## 2020-10-27 PROCEDURE — 99999 PR PBB SHADOW E&M-EST. PATIENT-LVL III: CPT | Mod: PBBFAC,,, | Performed by: FAMILY MEDICINE

## 2020-10-27 PROCEDURE — 36415 COLL VENOUS BLD VENIPUNCTURE: CPT | Mod: PO

## 2020-10-27 PROCEDURE — 3077F PR MOST RECENT SYSTOLIC BLOOD PRESSURE >= 140 MM HG: ICD-10-PCS | Mod: CPTII,S$GLB,, | Performed by: FAMILY MEDICINE

## 2020-10-27 PROCEDURE — 99999 PR PBB SHADOW E&M-EST. PATIENT-LVL III: ICD-10-PCS | Mod: PBBFAC,,, | Performed by: FAMILY MEDICINE

## 2020-10-27 PROCEDURE — 3046F PR MOST RECENT HEMOGLOBIN A1C LEVEL > 9.0%: ICD-10-PCS | Mod: CPTII,S$GLB,, | Performed by: FAMILY MEDICINE

## 2020-10-27 PROCEDURE — 3077F SYST BP >= 140 MM HG: CPT | Mod: CPTII,S$GLB,, | Performed by: FAMILY MEDICINE

## 2020-10-27 PROCEDURE — 3080F PR MOST RECENT DIASTOLIC BLOOD PRESSURE >= 90 MM HG: ICD-10-PCS | Mod: CPTII,S$GLB,, | Performed by: FAMILY MEDICINE

## 2020-10-27 RX ORDER — OLMESARTAN MEDOXOMIL 20 MG/1
20 TABLET ORAL DAILY
Qty: 30 TABLET | Refills: 0 | Status: SHIPPED | OUTPATIENT
Start: 2020-10-27 | End: 2021-05-25 | Stop reason: ALTCHOICE

## 2020-10-27 RX ORDER — NAPROXEN 500 MG/1
500 TABLET ORAL 2 TIMES DAILY WITH MEALS
Qty: 30 TABLET | Refills: 1 | Status: SHIPPED | OUTPATIENT
Start: 2020-10-27 | End: 2021-08-04

## 2020-10-27 NOTE — PATIENT INSTRUCTIONS
Exercises to Strengthen Your Lower Back  Strong lower back and abdominal muscles work together to support your spine. The exercises below will help strengthen the lower back. It is important that you begin exercising slowly and increase levels gradually.  Always begin any exercise program with stretching. If you feel pain while doing any of these exercises, stop and talk to your doctor about a more specific exercise program that better suits your condition.   Low back stretch  The point of stretching is to make you more flexible and increase your range of motion. Stretch only as much as you are able. Stretch slowly. Do not push your stretch to the limit. If at any point you feel pain while stretching, this is your (temporary) limit.  · Lie on your back with your knees bent and both feet on the ground.  · Slowly raise your left knee to your chest as you flatten your lower back against the floor. Hold for 5 seconds.  · Relax and repeat the exercise with your right knee.  · Do 10 of these exercises for each leg.  · Repeat hugging both knees to your chest at the same time.  Building lower back strength  Start your exercise routine with 10 to 30 minutes a day, 1 to 3 times a day.  Initial exercises  Lying on your back:  1. Ankle pumps: Move your foot up and down, towards your head, and then away. Repeat 10 times with each foot.  2. Heel slides: Slowly bend your knee, drawing the heel of your foot towards you. Then slide your heel/foot from you, straightening your knee. Do not lift your foot off the floor (this is not a leg lift).  3. Abdominal contraction: Bend your knees and put your hands on your stomach. Tighten your stomach muscles. Hold for 5 seconds, then relax. Repeat 10 times.  4. Straight leg raise: Bend one leg at the knee and keep the other leg straight. Tighten your stomach muscles. Slowly lift your straight leg 6 to 12 inches off the floor and hold for up to 5 seconds. Repeat 10 times on each  side.  Standin. Wall squats: Stand with your back against the wall. Move your feet about 12 inches away from the wall. Tighten your stomach muscles, and slowly bend your knees until they are at about a 45 degree angle. Do not go down too far. Hold about 5 seconds. Then slowly return to your starting position. Repeat 10 times.  2. Heel raises: Stand facing the wall. Slowly raise the heels of your feet up and down, while keeping your toes on the floor. If you have trouble balancing, you can touch the wall with your hands. Repeat 10 times.  More advanced exercises  When you feel comfortable enough, try these exercises.  1. Kneeling lumbar extension: Begin on your hands and knees. At the same time, raise and straighten your right arm and left leg until they are parallel to the ground. Hold for 2 seconds and come back slowly to a starting position. Repeat with left arm and right leg, alternating 10 times.  2. Prone lumbar extension: Lie face down, arms extended overhead, palms on the floor. At the same time, raise your right arm and left leg as high as comfortably possible. Hold for 10 seconds and slowly return to start. Repeat with left arm and right leg, alternating 10 times. Gradually build up to 20 times. (Advanced: Repeat this exercise raising both arms and both legs a few inches off the floor at the same time. Hold for 5 seconds and release.)  3. Pelvic tilt: Lie on the floor on your back with your knees bent at 90 degrees. Your feet should be flat on the floor. Inhale, exhale, then slowly contract your abdominal muscles bringing your navel toward your spine. Let your pelvis rock back until your lower back is flat on the floor. Hold for 10 seconds while breathing smoothly.  4. Abdominal crunch: Perform a pelvic tilt (above) flattening your lower back against the floor. Holding the tension in your abdominal muscles, take another breath and raise your shoulder blades off the ground (this is not a full sit-up).  Keep your head in line with your body (dont bend your neck forward). Hold for 2 seconds, then slowly lower.  Date Last Reviewed: 6/1/2016  © 3829-0241 [employer]. [address] [place] [address], [place] [address]. All rights reserved. This information is not intended as a substitute for professional medical care. Always follow your healthcare professional's instructions.

## 2020-10-27 NOTE — PROGRESS NOTES
Subjective:       Patient ID: Suhas Castillo is a 55 y.o. male.    Chief Complaint: Back Pain    Back Pain  This is a new problem. The current episode started in the past 7 days. The problem occurs 2 to 4 times per day. The problem has been waxing and waning since onset. The pain is present in the lumbar spine. The quality of the pain is described as cramping and aching. The pain is at a severity of 2/10. The pain is mild. The symptoms are aggravated by bending and twisting. Pertinent negatives include no bladder incontinence, bowel incontinence, numbness, paresthesias, pelvic pain, perianal numbness, tingling or weakness. He has tried nothing for the symptoms.      Patient Active Problem List   Diagnosis    Controlled type 2 diabetes mellitus without complication, without long-term current use of insulin    Hypertension associated with diabetes    Hyperlipidemia associated with type 2 diabetes mellitus    AR (allergic rhinitis)    DJD (degenerative joint disease), cervical    Cervical spinal stenosis    Elevated transaminase level    Vitamin D deficiency    DDD (degenerative disc disease), cervical    Angioedema    LIZZY (obstructive sleep apnea)    [place] cardiac risk 10-20% in next 10 years    Chronic pain         Current Outpatient Medications:     blood sugar diagnostic (CONTOUR TEST STRIPS) Strp, USE TO TEST BLOOD SUGAR TWICE A DAY, Disp: 50 each, Rfl: 11    butalbital-acetaminophen-caffeine -40 mg (FIORICET, ESGIC) -40 mg per tablet, Take 1 tablet by mouth every 6 (six) hours as needed for Headaches., Disp: 20 tablet, Rfl: 0    fluticasone propionate (FLONASE) 50 mcg/actuation nasal spray, SHAKE LIQUID AND USE 2 SPRAYS(100 MCG) IN EACH NOSTRIL EVERY DAY, Disp: 16 mL, Rfl: 11    hydroCHLOROthiazide (HYDRODIURIL) 12.5 MG Tab, TAKE 1 TABLET(12.5 MG) BY MOUTH EVERY DAY, Disp: 90 tablet, Rfl: 1    ibuprofen (ADVIL,MOTRIN) 600 MG tablet, TK 1 T PO EVERY 6 HOURS PRN FOR PAIN FOR UP  "TO 7 DAYS, Disp: , Rfl:     JANUVIA 100 mg Tab, TAKE 1 TABLET(100 MG) BY MOUTH EVERY DAY, Disp: 90 tablet, Rfl: 3    metFORMIN (GLUCOPHAGE-XR) 500 MG XR 24hr tablet, TAKE 2 TABLETS BY MOUTH TWICE DAILY, Disp: 360 tablet, Rfl: 3    mupirocin (BACTROBAN) 2 % ointment, Apply topically 2 (two) times daily., Disp: 30 g, Rfl: 0    mupirocin (BACTROBAN) 2 % ointment, Apply to affected area 3 times daily, Disp: 22 g, Rfl: 1    pravastatin (PRAVACHOL) 10 MG tablet, TAKE 1 TABLET(10 MG) BY MOUTH EVERY DAY, Disp: 90 tablet, Rfl: 1    fluocinonide (LIDEX) 0.05 % external solution, As directed, Disp: , Rfl: 0    naproxen (EC NAPROSYN) 500 MG EC tablet, Take 1 tablet (500 mg total) by mouth 2 (two) times daily with meals., Disp: 30 tablet, Rfl: 1    olmesartan (BENICAR) 20 MG tablet, Take 1 tablet (20 mg total) by mouth once daily., Disp: 30 tablet, Rfl: 0    The following portions of the patient's history were reviewed and updated as appropriate: allergies, past family history, past medical history, past social history and past surgical history.    Review of Systems   Gastrointestinal: Negative for bowel incontinence.   Genitourinary: Negative for bladder incontinence and pelvic pain.   Musculoskeletal: Positive for back pain.   Neurological: Negative for tingling, weakness, numbness and paresthesias.       Objective:      BP (!) 146/90   Pulse 81   Ht 6' 3" (1.905 m)   Wt 110.5 kg (243 lb 9.6 oz)   SpO2 97%   BMI 30.45 kg/m²     Physical Exam  Vitals signs reviewed.   Constitutional:       General: He is not in acute distress.     Appearance: He is well-developed.   HENT:      Head: Normocephalic and atraumatic.      Right Ear: External ear normal.      Left Ear: External ear normal.      Nose: Nose normal.      Mouth/Throat:      Pharynx: No oropharyngeal exudate.   Eyes:      General: No scleral icterus.     Conjunctiva/sclera: Conjunctivae normal.   Neck:      Musculoskeletal: Neck supple.      Thyroid: No " thyromegaly.      Vascular: No carotid bruit or JVD.   Cardiovascular:      Rate and Rhythm: Normal rate and regular rhythm.      Pulses: Normal pulses.      Heart sounds: Normal heart sounds. No murmur. No friction rub. No gallop.    Pulmonary:      Effort: Pulmonary effort is normal.      Breath sounds: Normal breath sounds. No wheezing, rhonchi or rales.   Abdominal:      General: Bowel sounds are normal. There is no distension.      Palpations: Abdomen is soft. There is no hepatomegaly, splenomegaly or mass.      Tenderness: There is no abdominal tenderness.   Musculoskeletal: Normal range of motion.         General: No tenderness.      Lumbar back: He exhibits bony tenderness (left SI joint), pain and spasm (minimal left (paravertebral mm). He exhibits normal range of motion, no tenderness, no swelling, no edema, no deformity and no laceration.      Comments: Negative SLR; negative FADIR/DEBORAH tests.  Negative piriformis test on left.   Lymphadenopathy:      Cervical: No cervical adenopathy.   Skin:     General: Skin is warm and dry.   Neurological:      Mental Status: He is alert and oriented to person, place, and time.      Cranial Nerves: No cranial nerve deficit.      Sensory: No sensory deficit.      Coordination: Coordination normal.      Deep Tendon Reflexes: Reflexes are normal and symmetric.   Psychiatric:         Behavior: Behavior normal. Behavior is cooperative.         Assessment:       1. Lumbar strain, initial encounter    2. Controlled type 2 diabetes mellitus without complication, without long-term current use of insulin        Plan:       Offered influenza and shingles vaccines: declined.     Orders Placed This Encounter    Basic Metabolic Panel    Hemoglobin A1C    naproxen (EC NAPROSYN) 500 MG EC tablet    olmesartan (BENICAR) 20 MG tablet     D/C losartan; trial of olmesartan  RTC 3-4 weeks for nurse visit/BP check.    Recommend moist heat; given info on low back exercises.  Follow-up  "by phone/email in 1 week.      "This note will not be shared with the patient."    "

## 2020-10-30 ENCOUNTER — PATIENT MESSAGE (OUTPATIENT)
Dept: FAMILY MEDICINE | Facility: CLINIC | Age: 55
End: 2020-10-30

## 2020-12-22 ENCOUNTER — PATIENT MESSAGE (OUTPATIENT)
Dept: FAMILY MEDICINE | Facility: CLINIC | Age: 55
End: 2020-12-22

## 2020-12-23 RX ORDER — BLOOD SUGAR DIAGNOSTIC
STRIP MISCELLANEOUS
Qty: 50 EACH | Refills: 11 | Status: SHIPPED | OUTPATIENT
Start: 2020-12-23 | End: 2021-05-05 | Stop reason: SDUPTHER

## 2020-12-23 RX ORDER — BLOOD SUGAR DIAGNOSTIC
STRIP MISCELLANEOUS
Qty: 50 EACH | Refills: 11 | Status: CANCELLED | OUTPATIENT
Start: 2020-12-23

## 2021-01-10 DIAGNOSIS — E11.69 HYPERLIPIDEMIA ASSOCIATED WITH TYPE 2 DIABETES MELLITUS: ICD-10-CM

## 2021-01-10 DIAGNOSIS — E11.59 HYPERTENSION ASSOCIATED WITH DIABETES: ICD-10-CM

## 2021-01-10 DIAGNOSIS — E78.5 HYPERLIPIDEMIA ASSOCIATED WITH TYPE 2 DIABETES MELLITUS: ICD-10-CM

## 2021-01-10 DIAGNOSIS — I15.2 HYPERTENSION ASSOCIATED WITH DIABETES: ICD-10-CM

## 2021-01-10 RX ORDER — HYDROCHLOROTHIAZIDE 12.5 MG/1
TABLET ORAL
Qty: 90 TABLET | Refills: 1 | Status: SHIPPED | OUTPATIENT
Start: 2021-01-10 | End: 2021-08-04 | Stop reason: SDUPTHER

## 2021-01-10 RX ORDER — PRAVASTATIN SODIUM 10 MG/1
TABLET ORAL
Qty: 90 TABLET | Refills: 1 | Status: SHIPPED | OUTPATIENT
Start: 2021-01-10 | End: 2021-08-04 | Stop reason: SDUPTHER

## 2021-01-27 ENCOUNTER — PATIENT MESSAGE (OUTPATIENT)
Dept: FAMILY MEDICINE | Facility: CLINIC | Age: 56
End: 2021-01-27

## 2021-01-27 ENCOUNTER — OFFICE VISIT (OUTPATIENT)
Dept: FAMILY MEDICINE | Facility: CLINIC | Age: 56
End: 2021-01-27
Attending: FAMILY MEDICINE
Payer: COMMERCIAL

## 2021-01-27 VITALS
HEIGHT: 75 IN | WEIGHT: 232 LBS | BODY MASS INDEX: 28.85 KG/M2 | OXYGEN SATURATION: 97 % | DIASTOLIC BLOOD PRESSURE: 82 MMHG | SYSTOLIC BLOOD PRESSURE: 154 MMHG | HEART RATE: 84 BPM

## 2021-01-27 DIAGNOSIS — E11.59 HYPERTENSION ASSOCIATED WITH DIABETES: ICD-10-CM

## 2021-01-27 DIAGNOSIS — E78.5 HYPERLIPIDEMIA ASSOCIATED WITH TYPE 2 DIABETES MELLITUS: ICD-10-CM

## 2021-01-27 DIAGNOSIS — J30.9 ALLERGIC RHINITIS, UNSPECIFIED SEASONALITY, UNSPECIFIED TRIGGER: ICD-10-CM

## 2021-01-27 DIAGNOSIS — E11.69 HYPERLIPIDEMIA ASSOCIATED WITH TYPE 2 DIABETES MELLITUS: ICD-10-CM

## 2021-01-27 DIAGNOSIS — I15.2 HYPERTENSION ASSOCIATED WITH DIABETES: ICD-10-CM

## 2021-01-27 DIAGNOSIS — J01.00 ACUTE MAXILLARY SINUSITIS, RECURRENCE NOT SPECIFIED: Primary | ICD-10-CM

## 2021-01-27 DIAGNOSIS — E11.9 CONTROLLED TYPE 2 DIABETES MELLITUS WITHOUT COMPLICATION, WITHOUT LONG-TERM CURRENT USE OF INSULIN: ICD-10-CM

## 2021-01-27 PROCEDURE — 3077F SYST BP >= 140 MM HG: CPT | Mod: CPTII,S$GLB,, | Performed by: FAMILY MEDICINE

## 2021-01-27 PROCEDURE — 3052F PR MOST RECENT HEMOGLOBIN A1C LEVEL 8.0 - < 9.0%: ICD-10-PCS | Mod: CPTII,S$GLB,, | Performed by: FAMILY MEDICINE

## 2021-01-27 PROCEDURE — 99999 PR PBB SHADOW E&M-EST. PATIENT-LVL III: CPT | Mod: PBBFAC,,, | Performed by: FAMILY MEDICINE

## 2021-01-27 PROCEDURE — 99999 PR PBB SHADOW E&M-EST. PATIENT-LVL III: ICD-10-PCS | Mod: PBBFAC,,, | Performed by: FAMILY MEDICINE

## 2021-01-27 PROCEDURE — 3008F PR BODY MASS INDEX (BMI) DOCUMENTED: ICD-10-PCS | Mod: CPTII,S$GLB,, | Performed by: FAMILY MEDICINE

## 2021-01-27 PROCEDURE — 3077F PR MOST RECENT SYSTOLIC BLOOD PRESSURE >= 140 MM HG: ICD-10-PCS | Mod: CPTII,S$GLB,, | Performed by: FAMILY MEDICINE

## 2021-01-27 PROCEDURE — 99214 PR OFFICE/OUTPT VISIT, EST, LEVL IV, 30-39 MIN: ICD-10-PCS | Mod: S$GLB,,, | Performed by: FAMILY MEDICINE

## 2021-01-27 PROCEDURE — 3008F BODY MASS INDEX DOCD: CPT | Mod: CPTII,S$GLB,, | Performed by: FAMILY MEDICINE

## 2021-01-27 PROCEDURE — 99214 OFFICE O/P EST MOD 30 MIN: CPT | Mod: S$GLB,,, | Performed by: FAMILY MEDICINE

## 2021-01-27 PROCEDURE — 3079F DIAST BP 80-89 MM HG: CPT | Mod: CPTII,S$GLB,, | Performed by: FAMILY MEDICINE

## 2021-01-27 PROCEDURE — 1125F AMNT PAIN NOTED PAIN PRSNT: CPT | Mod: S$GLB,,, | Performed by: FAMILY MEDICINE

## 2021-01-27 PROCEDURE — 3079F PR MOST RECENT DIASTOLIC BLOOD PRESSURE 80-89 MM HG: ICD-10-PCS | Mod: CPTII,S$GLB,, | Performed by: FAMILY MEDICINE

## 2021-01-27 PROCEDURE — 1125F PR PAIN SEVERITY QUANTIFIED, PAIN PRESENT: ICD-10-PCS | Mod: S$GLB,,, | Performed by: FAMILY MEDICINE

## 2021-01-27 PROCEDURE — 3052F HG A1C>EQUAL 8.0%<EQUAL 9.0%: CPT | Mod: CPTII,S$GLB,, | Performed by: FAMILY MEDICINE

## 2021-01-27 RX ORDER — GUAIFENESIN AND PSEUDOEPHEDRINE HCL 1200; 120 MG/1; MG/1
1 TABLET, EXTENDED RELEASE ORAL 2 TIMES DAILY
Qty: 20 EACH | Refills: 0 | Status: SHIPPED | OUTPATIENT
Start: 2021-01-27 | End: 2021-02-17

## 2021-01-27 RX ORDER — AMOXICILLIN 500 MG/1
1000 TABLET, FILM COATED ORAL 2 TIMES DAILY
Qty: 40 TABLET | Refills: 0 | Status: SHIPPED | OUTPATIENT
Start: 2021-01-27 | End: 2021-02-06

## 2021-01-27 RX ORDER — MINERAL OIL
180 ENEMA (ML) RECTAL DAILY
Qty: 30 TABLET | Refills: 2 | Status: SHIPPED | OUTPATIENT
Start: 2021-01-27 | End: 2021-03-30 | Stop reason: ALTCHOICE

## 2021-02-03 ENCOUNTER — PATIENT OUTREACH (OUTPATIENT)
Dept: ADMINISTRATIVE | Facility: HOSPITAL | Age: 56
End: 2021-02-03

## 2021-02-05 ENCOUNTER — LAB VISIT (OUTPATIENT)
Dept: LAB | Facility: HOSPITAL | Age: 56
End: 2021-02-05
Attending: FAMILY MEDICINE
Payer: COMMERCIAL

## 2021-02-05 DIAGNOSIS — E11.59 HYPERTENSION ASSOCIATED WITH DIABETES: ICD-10-CM

## 2021-02-05 DIAGNOSIS — E11.69 HYPERLIPIDEMIA ASSOCIATED WITH TYPE 2 DIABETES MELLITUS: ICD-10-CM

## 2021-02-05 DIAGNOSIS — E11.9 CONTROLLED TYPE 2 DIABETES MELLITUS WITHOUT COMPLICATION, WITHOUT LONG-TERM CURRENT USE OF INSULIN: ICD-10-CM

## 2021-02-05 DIAGNOSIS — E78.5 HYPERLIPIDEMIA ASSOCIATED WITH TYPE 2 DIABETES MELLITUS: ICD-10-CM

## 2021-02-05 DIAGNOSIS — I15.2 HYPERTENSION ASSOCIATED WITH DIABETES: ICD-10-CM

## 2021-02-05 LAB
ESTIMATED AVG GLUCOSE: 180 MG/DL (ref 68–131)
HBA1C MFR BLD: 7.9 % (ref 4–5.6)

## 2021-02-05 PROCEDURE — 83036 HEMOGLOBIN GLYCOSYLATED A1C: CPT

## 2021-02-05 PROCEDURE — 80053 COMPREHEN METABOLIC PANEL: CPT

## 2021-02-05 PROCEDURE — 36415 COLL VENOUS BLD VENIPUNCTURE: CPT | Mod: PO

## 2021-02-05 PROCEDURE — 80061 LIPID PANEL: CPT

## 2021-02-06 LAB
ALBUMIN SERPL BCP-MCNC: 4 G/DL (ref 3.5–5.2)
ALP SERPL-CCNC: 85 U/L (ref 55–135)
ALT SERPL W/O P-5'-P-CCNC: 28 U/L (ref 10–44)
ANION GAP SERPL CALC-SCNC: 11 MMOL/L (ref 8–16)
AST SERPL-CCNC: 21 U/L (ref 10–40)
BILIRUB SERPL-MCNC: 0.2 MG/DL (ref 0.1–1)
BUN SERPL-MCNC: 18 MG/DL (ref 6–20)
CALCIUM SERPL-MCNC: 8.7 MG/DL (ref 8.7–10.5)
CHLORIDE SERPL-SCNC: 104 MMOL/L (ref 95–110)
CHOLEST SERPL-MCNC: 122 MG/DL (ref 120–199)
CHOLEST/HDLC SERPL: 3.9 {RATIO} (ref 2–5)
CO2 SERPL-SCNC: 23 MMOL/L (ref 23–29)
CREAT SERPL-MCNC: 1.3 MG/DL (ref 0.5–1.4)
EST. GFR  (AFRICAN AMERICAN): >60 ML/MIN/1.73 M^2
EST. GFR  (NON AFRICAN AMERICAN): >60 ML/MIN/1.73 M^2
GLUCOSE SERPL-MCNC: 148 MG/DL (ref 70–110)
HDLC SERPL-MCNC: 31 MG/DL (ref 40–75)
HDLC SERPL: 25.4 % (ref 20–50)
LDLC SERPL CALC-MCNC: 73.6 MG/DL (ref 63–159)
NONHDLC SERPL-MCNC: 91 MG/DL
POTASSIUM SERPL-SCNC: 4.3 MMOL/L (ref 3.5–5.1)
PROT SERPL-MCNC: 7.5 G/DL (ref 6–8.4)
SODIUM SERPL-SCNC: 138 MMOL/L (ref 136–145)
TRIGL SERPL-MCNC: 87 MG/DL (ref 30–150)

## 2021-02-07 ENCOUNTER — PATIENT MESSAGE (OUTPATIENT)
Dept: FAMILY MEDICINE | Facility: CLINIC | Age: 56
End: 2021-02-07

## 2021-02-17 ENCOUNTER — OFFICE VISIT (OUTPATIENT)
Dept: FAMILY MEDICINE | Facility: CLINIC | Age: 56
End: 2021-02-17
Attending: FAMILY MEDICINE
Payer: COMMERCIAL

## 2021-02-17 VITALS
WEIGHT: 252 LBS | DIASTOLIC BLOOD PRESSURE: 90 MMHG | BODY MASS INDEX: 31.33 KG/M2 | HEART RATE: 91 BPM | HEIGHT: 75 IN | OXYGEN SATURATION: 96 % | SYSTOLIC BLOOD PRESSURE: 140 MMHG

## 2021-02-17 DIAGNOSIS — E11.69 HYPERLIPIDEMIA ASSOCIATED WITH TYPE 2 DIABETES MELLITUS: ICD-10-CM

## 2021-02-17 DIAGNOSIS — E78.5 HYPERLIPIDEMIA ASSOCIATED WITH TYPE 2 DIABETES MELLITUS: ICD-10-CM

## 2021-02-17 DIAGNOSIS — Z91.89 FRAMINGHAM CARDIAC RISK 10-20% IN NEXT 10 YEARS: ICD-10-CM

## 2021-02-17 DIAGNOSIS — I15.2 HYPERTENSION ASSOCIATED WITH DIABETES: ICD-10-CM

## 2021-02-17 DIAGNOSIS — E11.9 CONTROLLED TYPE 2 DIABETES MELLITUS WITHOUT COMPLICATION, WITHOUT LONG-TERM CURRENT USE OF INSULIN: Primary | ICD-10-CM

## 2021-02-17 DIAGNOSIS — E11.59 HYPERTENSION ASSOCIATED WITH DIABETES: ICD-10-CM

## 2021-02-17 DIAGNOSIS — G47.33 OSA (OBSTRUCTIVE SLEEP APNEA): ICD-10-CM

## 2021-02-17 PROCEDURE — 3077F PR MOST RECENT SYSTOLIC BLOOD PRESSURE >= 140 MM HG: ICD-10-PCS | Mod: CPTII,S$GLB,, | Performed by: FAMILY MEDICINE

## 2021-02-17 PROCEDURE — 99214 OFFICE O/P EST MOD 30 MIN: CPT | Mod: S$GLB,,, | Performed by: FAMILY MEDICINE

## 2021-02-17 PROCEDURE — 3078F DIAST BP <80 MM HG: CPT | Mod: CPTII,S$GLB,, | Performed by: FAMILY MEDICINE

## 2021-02-17 PROCEDURE — 99999 PR PBB SHADOW E&M-EST. PATIENT-LVL IV: ICD-10-PCS | Mod: PBBFAC,,, | Performed by: FAMILY MEDICINE

## 2021-02-17 PROCEDURE — 1126F AMNT PAIN NOTED NONE PRSNT: CPT | Mod: S$GLB,,, | Performed by: FAMILY MEDICINE

## 2021-02-17 PROCEDURE — 3051F HG A1C>EQUAL 7.0%<8.0%: CPT | Mod: CPTII,S$GLB,, | Performed by: FAMILY MEDICINE

## 2021-02-17 PROCEDURE — 3077F SYST BP >= 140 MM HG: CPT | Mod: CPTII,S$GLB,, | Performed by: FAMILY MEDICINE

## 2021-02-17 PROCEDURE — 3051F PR MOST RECENT HEMOGLOBIN A1C LEVEL 7.0 - < 8.0%: ICD-10-PCS | Mod: CPTII,S$GLB,, | Performed by: FAMILY MEDICINE

## 2021-02-17 PROCEDURE — 99214 PR OFFICE/OUTPT VISIT, EST, LEVL IV, 30-39 MIN: ICD-10-PCS | Mod: S$GLB,,, | Performed by: FAMILY MEDICINE

## 2021-02-17 PROCEDURE — 3078F PR MOST RECENT DIASTOLIC BLOOD PRESSURE < 80 MM HG: ICD-10-PCS | Mod: CPTII,S$GLB,, | Performed by: FAMILY MEDICINE

## 2021-02-17 PROCEDURE — 1126F PR PAIN SEVERITY QUANTIFIED, NO PAIN PRESENT: ICD-10-PCS | Mod: S$GLB,,, | Performed by: FAMILY MEDICINE

## 2021-02-17 PROCEDURE — 99999 PR PBB SHADOW E&M-EST. PATIENT-LVL IV: CPT | Mod: PBBFAC,,, | Performed by: FAMILY MEDICINE

## 2021-02-17 PROCEDURE — 3008F PR BODY MASS INDEX (BMI) DOCUMENTED: ICD-10-PCS | Mod: CPTII,S$GLB,, | Performed by: FAMILY MEDICINE

## 2021-02-17 PROCEDURE — 3008F BODY MASS INDEX DOCD: CPT | Mod: CPTII,S$GLB,, | Performed by: FAMILY MEDICINE

## 2021-02-17 RX ORDER — AMLODIPINE BESYLATE 2.5 MG/1
2.5 TABLET ORAL DAILY
Qty: 30 TABLET | Refills: 0 | Status: SHIPPED | OUTPATIENT
Start: 2021-02-17 | End: 2021-03-10 | Stop reason: SDUPTHER

## 2021-03-10 ENCOUNTER — TELEPHONE (OUTPATIENT)
Dept: FAMILY MEDICINE | Facility: CLINIC | Age: 56
End: 2021-03-10

## 2021-03-10 ENCOUNTER — CLINICAL SUPPORT (OUTPATIENT)
Dept: FAMILY MEDICINE | Facility: CLINIC | Age: 56
End: 2021-03-10
Payer: COMMERCIAL

## 2021-03-10 VITALS — DIASTOLIC BLOOD PRESSURE: 90 MMHG | HEART RATE: 86 BPM | SYSTOLIC BLOOD PRESSURE: 140 MMHG

## 2021-03-10 DIAGNOSIS — E11.9 CONTROLLED TYPE 2 DIABETES MELLITUS WITHOUT COMPLICATION, WITHOUT LONG-TERM CURRENT USE OF INSULIN: ICD-10-CM

## 2021-03-10 DIAGNOSIS — E11.59 HYPERTENSION ASSOCIATED WITH DIABETES: Primary | ICD-10-CM

## 2021-03-10 DIAGNOSIS — I15.2 HYPERTENSION ASSOCIATED WITH DIABETES: Primary | ICD-10-CM

## 2021-03-10 PROCEDURE — 99211 OFF/OP EST MAY X REQ PHY/QHP: CPT | Mod: S$GLB,,, | Performed by: FAMILY MEDICINE

## 2021-03-10 PROCEDURE — 99211 PR OFFICE/OUTPT VISIT, EST, LEVL I: ICD-10-PCS | Mod: S$GLB,,, | Performed by: FAMILY MEDICINE

## 2021-03-10 RX ORDER — AMLODIPINE BESYLATE 5 MG/1
5 TABLET ORAL DAILY
Qty: 30 TABLET | Refills: 0 | Status: SHIPPED | OUTPATIENT
Start: 2021-03-10 | End: 2021-04-20 | Stop reason: SDUPTHER

## 2021-03-24 ENCOUNTER — PATIENT MESSAGE (OUTPATIENT)
Dept: ADMINISTRATIVE | Facility: OTHER | Age: 56
End: 2021-03-24

## 2021-03-24 ENCOUNTER — PATIENT MESSAGE (OUTPATIENT)
Dept: FAMILY MEDICINE | Facility: CLINIC | Age: 56
End: 2021-03-24

## 2021-03-24 DIAGNOSIS — J30.9 ALLERGIC RHINITIS, UNSPECIFIED SEASONALITY, UNSPECIFIED TRIGGER: ICD-10-CM

## 2021-03-24 RX ORDER — FLUTICASONE PROPIONATE 50 MCG
SPRAY, SUSPENSION (ML) NASAL
Qty: 48 G | Refills: 3 | Status: SHIPPED | OUTPATIENT
Start: 2021-03-24 | End: 2023-10-27 | Stop reason: SDUPTHER

## 2021-03-30 ENCOUNTER — PATIENT MESSAGE (OUTPATIENT)
Dept: FAMILY MEDICINE | Facility: CLINIC | Age: 56
End: 2021-03-30

## 2021-03-30 ENCOUNTER — OFFICE VISIT (OUTPATIENT)
Dept: FAMILY MEDICINE | Facility: CLINIC | Age: 56
End: 2021-03-30
Payer: COMMERCIAL

## 2021-03-30 VITALS
HEART RATE: 87 BPM | WEIGHT: 227 LBS | BODY MASS INDEX: 28.37 KG/M2 | OXYGEN SATURATION: 98 % | SYSTOLIC BLOOD PRESSURE: 138 MMHG | DIASTOLIC BLOOD PRESSURE: 70 MMHG

## 2021-03-30 DIAGNOSIS — J30.9 ALLERGIC RHINITIS, UNSPECIFIED SEASONALITY, UNSPECIFIED TRIGGER: ICD-10-CM

## 2021-03-30 DIAGNOSIS — H10.9 BACTERIAL CONJUNCTIVITIS OF LEFT EYE: Primary | ICD-10-CM

## 2021-03-30 DIAGNOSIS — R06.2 WHEEZING: ICD-10-CM

## 2021-03-30 PROCEDURE — 3008F BODY MASS INDEX DOCD: CPT | Mod: CPTII,,, | Performed by: NURSE PRACTITIONER

## 2021-03-30 PROCEDURE — 1126F PR PAIN SEVERITY QUANTIFIED, NO PAIN PRESENT: ICD-10-PCS | Mod: ,,, | Performed by: NURSE PRACTITIONER

## 2021-03-30 PROCEDURE — 3075F PR MOST RECENT SYSTOLIC BLOOD PRESS GE 130-139MM HG: ICD-10-PCS | Mod: CPTII,,, | Performed by: NURSE PRACTITIONER

## 2021-03-30 PROCEDURE — 1126F AMNT PAIN NOTED NONE PRSNT: CPT | Mod: ,,, | Performed by: NURSE PRACTITIONER

## 2021-03-30 PROCEDURE — 3008F PR BODY MASS INDEX (BMI) DOCUMENTED: ICD-10-PCS | Mod: CPTII,,, | Performed by: NURSE PRACTITIONER

## 2021-03-30 PROCEDURE — 71046 XR CHEST PA AND LATERAL: ICD-10-PCS | Mod: S$GLB,,, | Performed by: RADIOLOGY

## 2021-03-30 PROCEDURE — 3078F DIAST BP <80 MM HG: CPT | Mod: CPTII,,, | Performed by: NURSE PRACTITIONER

## 2021-03-30 PROCEDURE — 99999 PR PBB SHADOW E&M-EST. PATIENT-LVL IV: CPT | Mod: PBBFAC,,, | Performed by: NURSE PRACTITIONER

## 2021-03-30 PROCEDURE — 3075F SYST BP GE 130 - 139MM HG: CPT | Mod: CPTII,,, | Performed by: NURSE PRACTITIONER

## 2021-03-30 PROCEDURE — 99999 PR PBB SHADOW E&M-EST. PATIENT-LVL IV: ICD-10-PCS | Mod: PBBFAC,,, | Performed by: NURSE PRACTITIONER

## 2021-03-30 PROCEDURE — 3078F PR MOST RECENT DIASTOLIC BLOOD PRESSURE < 80 MM HG: ICD-10-PCS | Mod: CPTII,,, | Performed by: NURSE PRACTITIONER

## 2021-03-30 PROCEDURE — 71046 X-RAY EXAM CHEST 2 VIEWS: CPT | Mod: S$GLB,,, | Performed by: RADIOLOGY

## 2021-03-30 PROCEDURE — 99214 OFFICE O/P EST MOD 30 MIN: CPT | Mod: ,,, | Performed by: NURSE PRACTITIONER

## 2021-03-30 PROCEDURE — 99214 PR OFFICE/OUTPT VISIT, EST, LEVL IV, 30-39 MIN: ICD-10-PCS | Mod: ,,, | Performed by: NURSE PRACTITIONER

## 2021-03-30 RX ORDER — NEOMYCIN/POLYMYXIN B/HYDROCORT 3.5-10K-1
2 SUSPENSION, DROPS(FINAL DOSAGE FORM)(ML) OPHTHALMIC (EYE) 2 TIMES DAILY
Qty: 2 ML | Refills: 0 | Status: SHIPPED | OUTPATIENT
Start: 2021-03-30 | End: 2021-03-30 | Stop reason: ALTCHOICE

## 2021-03-30 RX ORDER — LEVOCETIRIZINE DIHYDROCHLORIDE 5 MG/1
5 TABLET, FILM COATED ORAL NIGHTLY
Qty: 30 TABLET | Refills: 11 | Status: SHIPPED | OUTPATIENT
Start: 2021-03-30 | End: 2022-09-22

## 2021-03-30 RX ORDER — POLYMYXIN B SULFATE AND TRIMETHOPRIM 1; 10000 MG/ML; [USP'U]/ML
1 SOLUTION OPHTHALMIC EVERY 6 HOURS
Qty: 1.8667 ML | Refills: 0 | Status: SHIPPED | OUTPATIENT
Start: 2021-03-30 | End: 2021-04-06

## 2021-04-18 RX ORDER — AMLODIPINE BESYLATE 2.5 MG/1
TABLET ORAL
Qty: 90 TABLET | Refills: 0 | OUTPATIENT
Start: 2021-04-18

## 2021-04-20 RX ORDER — AMLODIPINE BESYLATE 5 MG/1
5 TABLET ORAL DAILY
Qty: 30 TABLET | Refills: 2 | Status: SHIPPED | OUTPATIENT
Start: 2021-04-20 | End: 2021-07-12 | Stop reason: SDUPTHER

## 2021-04-30 ENCOUNTER — PATIENT OUTREACH (OUTPATIENT)
Dept: ADMINISTRATIVE | Facility: HOSPITAL | Age: 56
End: 2021-04-30

## 2021-05-05 RX ORDER — BLOOD SUGAR DIAGNOSTIC
STRIP MISCELLANEOUS
Qty: 50 EACH | Refills: 11 | Status: SHIPPED | OUTPATIENT
Start: 2021-05-05

## 2021-05-19 DIAGNOSIS — E11.9 TYPE 2 DIABETES MELLITUS WITHOUT COMPLICATION: ICD-10-CM

## 2021-05-23 ENCOUNTER — CLINICAL SUPPORT (OUTPATIENT)
Dept: URGENT CARE | Facility: CLINIC | Age: 56
End: 2021-05-23
Payer: COMMERCIAL

## 2021-05-23 ENCOUNTER — HOSPITAL ENCOUNTER (EMERGENCY)
Facility: HOSPITAL | Age: 56
Discharge: HOME | End: 2021-05-23
Attending: EMERGENCY MEDICINE
Payer: COMMERCIAL

## 2021-05-23 VITALS
OXYGEN SATURATION: 98 % | TEMPERATURE: 98 F | DIASTOLIC BLOOD PRESSURE: 90 MMHG | HEART RATE: 87 BPM | RESPIRATION RATE: 14 BRPM | SYSTOLIC BLOOD PRESSURE: 174 MMHG

## 2021-05-23 DIAGNOSIS — Z11.59 ENCOUNTER FOR SCREENING FOR OTHER VIRAL DISEASES: Primary | ICD-10-CM

## 2021-05-23 DIAGNOSIS — M43.17 SPONDYLOLISTHESIS AT L5-S1 LEVEL: Primary | ICD-10-CM

## 2021-05-23 DIAGNOSIS — M54.42 LEFT-SIDED LOW BACK PAIN WITH LEFT-SIDED SCIATICA, UNSPECIFIED CHRONICITY: ICD-10-CM

## 2021-05-23 LAB
BACTERIA #/AREA URNS AUTO: NORMAL /HPF
BILIRUB UR QL STRIP: NEGATIVE
CLARITY UR REFRACT.AUTO: CLEAR
COLOR UR AUTO: YELLOW
CTP QC/QA: YES
GLUCOSE UR QL STRIP: NEGATIVE
HGB UR QL STRIP: NEGATIVE
KETONES UR QL STRIP: NEGATIVE
LEUKOCYTE ESTERASE UR QL STRIP: NEGATIVE
MICROSCOPIC COMMENT: NORMAL
NITRITE UR QL STRIP: NEGATIVE
PH UR STRIP: 5 [PH] (ref 5–8)
PROT UR QL STRIP: NEGATIVE
RBC #/AREA URNS AUTO: 2 /HPF (ref 0–4)
SARS-COV-2 RDRP RESP QL NAA+PROBE: NEGATIVE
SP GR UR STRIP: 1.03 (ref 1–1.03)
SQUAMOUS #/AREA URNS AUTO: 0 /HPF
URN SPEC COLLECT METH UR: NORMAL
WBC #/AREA URNS AUTO: 0 /HPF (ref 0–5)

## 2021-05-23 PROCEDURE — 81001 URINALYSIS AUTO W/SCOPE: CPT | Performed by: EMERGENCY MEDICINE

## 2021-05-23 PROCEDURE — U0002: ICD-10-PCS | Mod: QW,S$GLB,, | Performed by: NURSE PRACTITIONER

## 2021-05-23 PROCEDURE — U0002 COVID-19 LAB TEST NON-CDC: HCPCS | Mod: QW,S$GLB,, | Performed by: NURSE PRACTITIONER

## 2021-05-23 PROCEDURE — 99284 EMERGENCY DEPT VISIT MOD MDM: CPT | Mod: ,,, | Performed by: PHYSICIAN ASSISTANT

## 2021-05-23 PROCEDURE — 99284 EMERGENCY DEPT VISIT MOD MDM: CPT | Mod: 25

## 2021-05-23 PROCEDURE — 99284 PR EMERGENCY DEPT VISIT,LEVEL IV: ICD-10-PCS | Mod: ,,, | Performed by: PHYSICIAN ASSISTANT

## 2021-05-23 PROCEDURE — 25000003 PHARM REV CODE 250: Performed by: PHYSICIAN ASSISTANT

## 2021-05-23 RX ORDER — METHOCARBAMOL 500 MG/1
500 TABLET, FILM COATED ORAL
Status: COMPLETED | OUTPATIENT
Start: 2021-05-23 | End: 2021-05-23

## 2021-05-23 RX ORDER — ACETAMINOPHEN 325 MG/1
650 TABLET ORAL
Status: COMPLETED | OUTPATIENT
Start: 2021-05-23 | End: 2021-05-23

## 2021-05-23 RX ORDER — LIDOCAINE 50 MG/G
1 PATCH TOPICAL
Status: DISCONTINUED | OUTPATIENT
Start: 2021-05-23 | End: 2021-05-24 | Stop reason: HOSPADM

## 2021-05-23 RX ORDER — NABUMETONE 500 MG/1
500 TABLET, FILM COATED ORAL 2 TIMES DAILY PRN
Qty: 14 TABLET | Refills: 0 | Status: SHIPPED | OUTPATIENT
Start: 2021-05-23 | End: 2021-05-30

## 2021-05-23 RX ORDER — METHOCARBAMOL 750 MG/1
750 TABLET, FILM COATED ORAL 2 TIMES DAILY PRN
Qty: 14 TABLET | Refills: 0 | Status: SHIPPED | OUTPATIENT
Start: 2021-05-23 | End: 2021-05-30

## 2021-05-23 RX ADMIN — ACETAMINOPHEN 650 MG: 325 TABLET ORAL at 09:05

## 2021-05-23 RX ADMIN — LIDOCAINE 1 PATCH: 50 PATCH TOPICAL at 09:05

## 2021-05-23 RX ADMIN — METHOCARBAMOL 500 MG: 500 TABLET ORAL at 09:05

## 2021-05-25 ENCOUNTER — PATIENT MESSAGE (OUTPATIENT)
Dept: FAMILY MEDICINE | Facility: CLINIC | Age: 56
End: 2021-05-25

## 2021-05-25 ENCOUNTER — LAB VISIT (OUTPATIENT)
Dept: LAB | Facility: HOSPITAL | Age: 56
End: 2021-05-25
Attending: FAMILY MEDICINE
Payer: COMMERCIAL

## 2021-05-25 ENCOUNTER — OFFICE VISIT (OUTPATIENT)
Dept: FAMILY MEDICINE | Facility: CLINIC | Age: 56
End: 2021-05-25
Attending: FAMILY MEDICINE
Payer: COMMERCIAL

## 2021-05-25 VITALS
WEIGHT: 248.63 LBS | SYSTOLIC BLOOD PRESSURE: 122 MMHG | HEART RATE: 78 BPM | BODY MASS INDEX: 31.07 KG/M2 | DIASTOLIC BLOOD PRESSURE: 70 MMHG | OXYGEN SATURATION: 99 %

## 2021-05-25 DIAGNOSIS — R10.12 LUQ PAIN: Primary | ICD-10-CM

## 2021-05-25 DIAGNOSIS — E11.9 CONTROLLED TYPE 2 DIABETES MELLITUS WITHOUT COMPLICATION, WITHOUT LONG-TERM CURRENT USE OF INSULIN: ICD-10-CM

## 2021-05-25 DIAGNOSIS — R10.12 LUQ PAIN: ICD-10-CM

## 2021-05-25 DIAGNOSIS — I15.2 HYPERTENSION ASSOCIATED WITH DIABETES: ICD-10-CM

## 2021-05-25 DIAGNOSIS — E11.59 HYPERTENSION ASSOCIATED WITH DIABETES: ICD-10-CM

## 2021-05-25 DIAGNOSIS — E11.9 TYPE 2 DIABETES MELLITUS WITHOUT COMPLICATION: ICD-10-CM

## 2021-05-25 LAB
ALBUMIN SERPL BCP-MCNC: 3.8 G/DL (ref 3.5–5.2)
ALP SERPL-CCNC: 84 U/L (ref 55–135)
ALT SERPL W/O P-5'-P-CCNC: 18 U/L (ref 10–44)
AMORPH CRY UR QL COMP ASSIST: ABNORMAL
ANION GAP SERPL CALC-SCNC: 9 MMOL/L (ref 8–16)
AST SERPL-CCNC: 14 U/L (ref 10–40)
BACTERIA #/AREA URNS AUTO: ABNORMAL /HPF
BILIRUB SERPL-MCNC: 0.5 MG/DL (ref 0.1–1)
BILIRUB UR QL STRIP: NEGATIVE
BUN SERPL-MCNC: 14 MG/DL (ref 6–20)
CALCIUM SERPL-MCNC: 9 MG/DL (ref 8.7–10.5)
CHLORIDE SERPL-SCNC: 105 MMOL/L (ref 95–110)
CLARITY UR REFRACT.AUTO: ABNORMAL
CO2 SERPL-SCNC: 25 MMOL/L (ref 23–29)
COLOR UR AUTO: YELLOW
CREAT SERPL-MCNC: 1.2 MG/DL (ref 0.5–1.4)
EST. GFR  (AFRICAN AMERICAN): >60 ML/MIN/1.73 M^2
EST. GFR  (NON AFRICAN AMERICAN): >60 ML/MIN/1.73 M^2
ESTIMATED AVG GLUCOSE: 194 MG/DL (ref 68–131)
GLUCOSE SERPL-MCNC: 214 MG/DL (ref 70–110)
GLUCOSE UR QL STRIP: ABNORMAL
HBA1C MFR BLD: 8.4 % (ref 4–5.6)
HGB UR QL STRIP: NEGATIVE
KETONES UR QL STRIP: NEGATIVE
LEUKOCYTE ESTERASE UR QL STRIP: NEGATIVE
MICROSCOPIC COMMENT: ABNORMAL
NITRITE UR QL STRIP: NEGATIVE
PH UR STRIP: 5 [PH] (ref 5–8)
POTASSIUM SERPL-SCNC: 4.3 MMOL/L (ref 3.5–5.1)
PROT SERPL-MCNC: 7.6 G/DL (ref 6–8.4)
PROT UR QL STRIP: NEGATIVE
SODIUM SERPL-SCNC: 139 MMOL/L (ref 136–145)
SP GR UR STRIP: 1.03 (ref 1–1.03)
URN SPEC COLLECT METH UR: ABNORMAL
YEAST UR QL AUTO: ABNORMAL

## 2021-05-25 PROCEDURE — 99999 PR PBB SHADOW E&M-EST. PATIENT-LVL III: CPT | Mod: PBBFAC,,, | Performed by: FAMILY MEDICINE

## 2021-05-25 PROCEDURE — 80053 COMPREHEN METABOLIC PANEL: CPT | Performed by: FAMILY MEDICINE

## 2021-05-25 PROCEDURE — 3008F PR BODY MASS INDEX (BMI) DOCUMENTED: ICD-10-PCS | Mod: CPTII,,, | Performed by: FAMILY MEDICINE

## 2021-05-25 PROCEDURE — 99214 OFFICE O/P EST MOD 30 MIN: CPT | Mod: ,,, | Performed by: FAMILY MEDICINE

## 2021-05-25 PROCEDURE — 36415 COLL VENOUS BLD VENIPUNCTURE: CPT | Mod: PO | Performed by: FAMILY MEDICINE

## 2021-05-25 PROCEDURE — 3051F PR MOST RECENT HEMOGLOBIN A1C LEVEL 7.0 - < 8.0%: ICD-10-PCS | Mod: CPTII,,, | Performed by: FAMILY MEDICINE

## 2021-05-25 PROCEDURE — 74019 XR ABDOMEN FLAT AND ERECT: ICD-10-PCS | Mod: S$GLB,,, | Performed by: RADIOLOGY

## 2021-05-25 PROCEDURE — 1126F PR PAIN SEVERITY QUANTIFIED, NO PAIN PRESENT: ICD-10-PCS | Mod: ,,, | Performed by: FAMILY MEDICINE

## 2021-05-25 PROCEDURE — 3051F HG A1C>EQUAL 7.0%<8.0%: CPT | Mod: CPTII,,, | Performed by: FAMILY MEDICINE

## 2021-05-25 PROCEDURE — 81001 URINALYSIS AUTO W/SCOPE: CPT | Performed by: FAMILY MEDICINE

## 2021-05-25 PROCEDURE — 3008F BODY MASS INDEX DOCD: CPT | Mod: CPTII,,, | Performed by: FAMILY MEDICINE

## 2021-05-25 PROCEDURE — 1126F AMNT PAIN NOTED NONE PRSNT: CPT | Mod: ,,, | Performed by: FAMILY MEDICINE

## 2021-05-25 PROCEDURE — 99999 PR PBB SHADOW E&M-EST. PATIENT-LVL III: ICD-10-PCS | Mod: PBBFAC,,, | Performed by: FAMILY MEDICINE

## 2021-05-25 PROCEDURE — 74019 RADEX ABDOMEN 2 VIEWS: CPT | Mod: S$GLB,,, | Performed by: RADIOLOGY

## 2021-05-25 PROCEDURE — 99214 PR OFFICE/OUTPT VISIT, EST, LEVL IV, 30-39 MIN: ICD-10-PCS | Mod: ,,, | Performed by: FAMILY MEDICINE

## 2021-05-25 PROCEDURE — 83036 HEMOGLOBIN GLYCOSYLATED A1C: CPT | Performed by: FAMILY MEDICINE

## 2021-05-25 RX ORDER — LOSARTAN POTASSIUM 100 MG/1
100 TABLET ORAL DAILY
COMMUNITY
Start: 2021-05-03 | End: 2021-08-04 | Stop reason: SDUPTHER

## 2021-06-11 DIAGNOSIS — E11.9 CONTROLLED TYPE 2 DIABETES MELLITUS WITHOUT COMPLICATION, WITHOUT LONG-TERM CURRENT USE OF INSULIN: ICD-10-CM

## 2021-06-11 RX ORDER — METFORMIN HYDROCHLORIDE 500 MG/1
1000 TABLET, EXTENDED RELEASE ORAL 2 TIMES DAILY
Qty: 360 TABLET | Refills: 0 | Status: SHIPPED | OUTPATIENT
Start: 2021-06-11 | End: 2021-09-16

## 2021-07-12 ENCOUNTER — TELEPHONE (OUTPATIENT)
Dept: FAMILY MEDICINE | Facility: CLINIC | Age: 56
End: 2021-07-12

## 2021-07-13 RX ORDER — AMLODIPINE BESYLATE 5 MG/1
5 TABLET ORAL DAILY
Qty: 90 TABLET | Refills: 0 | Status: SHIPPED | OUTPATIENT
Start: 2021-07-13 | End: 2021-08-04 | Stop reason: SDUPTHER

## 2021-07-15 ENCOUNTER — PATIENT MESSAGE (OUTPATIENT)
Dept: INTERNAL MEDICINE | Facility: CLINIC | Age: 56
End: 2021-07-15

## 2021-07-21 ENCOUNTER — PATIENT OUTREACH (OUTPATIENT)
Dept: ADMINISTRATIVE | Facility: HOSPITAL | Age: 56
End: 2021-07-21

## 2021-08-04 ENCOUNTER — OFFICE VISIT (OUTPATIENT)
Dept: PRIMARY CARE CLINIC | Facility: CLINIC | Age: 56
End: 2021-08-04
Payer: COMMERCIAL

## 2021-08-04 ENCOUNTER — LAB VISIT (OUTPATIENT)
Dept: PRIMARY CARE CLINIC | Facility: CLINIC | Age: 56
End: 2021-08-04
Payer: COMMERCIAL

## 2021-08-04 ENCOUNTER — TELEPHONE (OUTPATIENT)
Dept: PRIMARY CARE CLINIC | Facility: CLINIC | Age: 56
End: 2021-08-04

## 2021-08-04 VITALS
TEMPERATURE: 98 F | WEIGHT: 244.38 LBS | RESPIRATION RATE: 18 BRPM | HEART RATE: 69 BPM | BODY MASS INDEX: 30.39 KG/M2 | OXYGEN SATURATION: 99 % | SYSTOLIC BLOOD PRESSURE: 143 MMHG | HEIGHT: 75 IN | DIASTOLIC BLOOD PRESSURE: 73 MMHG

## 2021-08-04 DIAGNOSIS — G89.29 OTHER CHRONIC PAIN: ICD-10-CM

## 2021-08-04 DIAGNOSIS — G47.33 OSA (OBSTRUCTIVE SLEEP APNEA): ICD-10-CM

## 2021-08-04 DIAGNOSIS — E55.9 VITAMIN D DEFICIENCY: ICD-10-CM

## 2021-08-04 DIAGNOSIS — E11.9 CONTROLLED TYPE 2 DIABETES MELLITUS WITHOUT COMPLICATION, WITHOUT LONG-TERM CURRENT USE OF INSULIN: ICD-10-CM

## 2021-08-04 DIAGNOSIS — M48.02 CERVICAL SPINAL STENOSIS: ICD-10-CM

## 2021-08-04 DIAGNOSIS — E11.69 HYPERLIPIDEMIA ASSOCIATED WITH TYPE 2 DIABETES MELLITUS: ICD-10-CM

## 2021-08-04 DIAGNOSIS — J30.9 ALLERGIC RHINITIS, UNSPECIFIED SEASONALITY, UNSPECIFIED TRIGGER: ICD-10-CM

## 2021-08-04 DIAGNOSIS — E78.5 HYPERLIPIDEMIA ASSOCIATED WITH TYPE 2 DIABETES MELLITUS: ICD-10-CM

## 2021-08-04 DIAGNOSIS — M47.812 SPONDYLOSIS OF CERVICAL REGION WITHOUT MYELOPATHY OR RADICULOPATHY: ICD-10-CM

## 2021-08-04 DIAGNOSIS — I15.2 HYPERTENSION ASSOCIATED WITH DIABETES: ICD-10-CM

## 2021-08-04 DIAGNOSIS — E11.65 TYPE 2 DIABETES MELLITUS WITH HYPERGLYCEMIA, WITHOUT LONG-TERM CURRENT USE OF INSULIN: Primary | ICD-10-CM

## 2021-08-04 DIAGNOSIS — E11.59 HYPERTENSION ASSOCIATED WITH DIABETES: ICD-10-CM

## 2021-08-04 LAB
ESTIMATED AVG GLUCOSE: 166 MG/DL (ref 68–131)
HBA1C MFR BLD: 7.4 % (ref 4–5.6)

## 2021-08-04 PROCEDURE — 83036 HEMOGLOBIN GLYCOSYLATED A1C: CPT | Performed by: FAMILY MEDICINE

## 2021-08-04 PROCEDURE — 99999 PR PBB SHADOW E&M-EST. PATIENT-LVL III: CPT | Mod: PBBFAC,,, | Performed by: FAMILY MEDICINE

## 2021-08-04 PROCEDURE — 1159F PR MEDICATION LIST DOCUMENTED IN MEDICAL RECORD: ICD-10-PCS | Mod: CPTII,S$GLB,, | Performed by: FAMILY MEDICINE

## 2021-08-04 PROCEDURE — 1126F AMNT PAIN NOTED NONE PRSNT: CPT | Mod: CPTII,S$GLB,, | Performed by: FAMILY MEDICINE

## 2021-08-04 PROCEDURE — 3008F BODY MASS INDEX DOCD: CPT | Mod: CPTII,S$GLB,, | Performed by: FAMILY MEDICINE

## 2021-08-04 PROCEDURE — 3078F PR MOST RECENT DIASTOLIC BLOOD PRESSURE < 80 MM HG: ICD-10-PCS | Mod: CPTII,S$GLB,, | Performed by: FAMILY MEDICINE

## 2021-08-04 PROCEDURE — 99999 PR PBB SHADOW E&M-EST. PATIENT-LVL III: ICD-10-PCS | Mod: PBBFAC,,, | Performed by: FAMILY MEDICINE

## 2021-08-04 PROCEDURE — 3008F PR BODY MASS INDEX (BMI) DOCUMENTED: ICD-10-PCS | Mod: CPTII,S$GLB,, | Performed by: FAMILY MEDICINE

## 2021-08-04 PROCEDURE — 1126F PR PAIN SEVERITY QUANTIFIED, NO PAIN PRESENT: ICD-10-PCS | Mod: CPTII,S$GLB,, | Performed by: FAMILY MEDICINE

## 2021-08-04 PROCEDURE — 3077F SYST BP >= 140 MM HG: CPT | Mod: CPTII,S$GLB,, | Performed by: FAMILY MEDICINE

## 2021-08-04 PROCEDURE — 3052F HG A1C>EQUAL 8.0%<EQUAL 9.0%: CPT | Mod: CPTII,S$GLB,, | Performed by: FAMILY MEDICINE

## 2021-08-04 PROCEDURE — 3078F DIAST BP <80 MM HG: CPT | Mod: CPTII,S$GLB,, | Performed by: FAMILY MEDICINE

## 2021-08-04 PROCEDURE — 3077F PR MOST RECENT SYSTOLIC BLOOD PRESSURE >= 140 MM HG: ICD-10-PCS | Mod: CPTII,S$GLB,, | Performed by: FAMILY MEDICINE

## 2021-08-04 PROCEDURE — 36415 COLL VENOUS BLD VENIPUNCTURE: CPT | Mod: S$GLB,,, | Performed by: FAMILY MEDICINE

## 2021-08-04 PROCEDURE — 99214 PR OFFICE/OUTPT VISIT, EST, LEVL IV, 30-39 MIN: ICD-10-PCS | Mod: S$GLB,,, | Performed by: FAMILY MEDICINE

## 2021-08-04 PROCEDURE — 3052F PR MOST RECENT HEMOGLOBIN A1C LEVEL 8.0 - < 9.0%: ICD-10-PCS | Mod: CPTII,S$GLB,, | Performed by: FAMILY MEDICINE

## 2021-08-04 PROCEDURE — 36415 PR COLLECTION VENOUS BLOOD,VENIPUNCTURE: ICD-10-PCS | Mod: S$GLB,,, | Performed by: FAMILY MEDICINE

## 2021-08-04 PROCEDURE — 1159F MED LIST DOCD IN RCRD: CPT | Mod: CPTII,S$GLB,, | Performed by: FAMILY MEDICINE

## 2021-08-04 PROCEDURE — 99214 OFFICE O/P EST MOD 30 MIN: CPT | Mod: S$GLB,,, | Performed by: FAMILY MEDICINE

## 2021-08-04 RX ORDER — AMLODIPINE BESYLATE 5 MG/1
7.5 TABLET ORAL DAILY
Qty: 135 TABLET | Refills: 0 | Status: SHIPPED | OUTPATIENT
Start: 2021-08-04 | End: 2022-01-10

## 2021-08-04 RX ORDER — LOSARTAN POTASSIUM 100 MG/1
100 TABLET ORAL DAILY
Qty: 90 TABLET | Refills: 3 | Status: SHIPPED | OUTPATIENT
Start: 2021-08-04 | End: 2022-10-21 | Stop reason: SDUPTHER

## 2021-08-04 RX ORDER — PRAVASTATIN SODIUM 10 MG/1
10 TABLET ORAL DAILY
Qty: 90 TABLET | Refills: 3 | Status: SHIPPED | OUTPATIENT
Start: 2021-08-04 | End: 2022-08-12 | Stop reason: SDUPTHER

## 2021-08-04 RX ORDER — HYDROCHLOROTHIAZIDE 12.5 MG/1
12.5 TABLET ORAL DAILY
Qty: 90 TABLET | Refills: 3 | Status: SHIPPED | OUTPATIENT
Start: 2021-08-04 | End: 2022-08-12 | Stop reason: SDUPTHER

## 2021-08-08 ENCOUNTER — PATIENT MESSAGE (OUTPATIENT)
Dept: PRIMARY CARE CLINIC | Facility: CLINIC | Age: 56
End: 2021-08-08

## 2021-08-08 DIAGNOSIS — E11.65 TYPE 2 DIABETES MELLITUS WITH HYPERGLYCEMIA, WITHOUT LONG-TERM CURRENT USE OF INSULIN: Primary | ICD-10-CM

## 2021-08-14 ENCOUNTER — CLINICAL SUPPORT (OUTPATIENT)
Dept: URGENT CARE | Facility: CLINIC | Age: 56
End: 2021-08-14
Payer: COMMERCIAL

## 2021-08-14 DIAGNOSIS — Z11.9 ENCOUNTER FOR SCREENING EXAMINATION FOR INFECTIOUS DISEASE: Primary | ICD-10-CM

## 2021-08-14 LAB
CTP QC/QA: YES
SARS-COV-2 RDRP RESP QL NAA+PROBE: NEGATIVE

## 2021-08-14 PROCEDURE — U0002 COVID-19 LAB TEST NON-CDC: HCPCS | Mod: QW,S$GLB,, | Performed by: INTERNAL MEDICINE

## 2021-08-14 PROCEDURE — U0002: ICD-10-PCS | Mod: QW,S$GLB,, | Performed by: INTERNAL MEDICINE

## 2021-08-19 ENCOUNTER — PATIENT OUTREACH (OUTPATIENT)
Dept: ADMINISTRATIVE | Facility: HOSPITAL | Age: 56
End: 2021-08-19

## 2021-09-06 ENCOUNTER — PATIENT MESSAGE (OUTPATIENT)
Dept: PRIMARY CARE CLINIC | Facility: CLINIC | Age: 56
End: 2021-09-06

## 2021-09-07 ENCOUNTER — HOSPITAL ENCOUNTER (EMERGENCY)
Facility: HOSPITAL | Age: 56
Discharge: HOME | End: 2021-09-07
Attending: EMERGENCY MEDICINE
Payer: COMMERCIAL

## 2021-09-07 VITALS
WEIGHT: 250 LBS | HEART RATE: 88 BPM | TEMPERATURE: 98 F | RESPIRATION RATE: 18 BRPM | SYSTOLIC BLOOD PRESSURE: 136 MMHG | BODY MASS INDEX: 31.08 KG/M2 | HEIGHT: 75 IN | OXYGEN SATURATION: 99 % | DIASTOLIC BLOOD PRESSURE: 72 MMHG

## 2021-09-07 DIAGNOSIS — M54.50 ACUTE LOW BACK PAIN WITHOUT SCIATICA, UNSPECIFIED BACK PAIN LATERALITY: Primary | ICD-10-CM

## 2021-09-07 PROCEDURE — 99284 EMERGENCY DEPT VISIT MOD MDM: CPT | Mod: ,,, | Performed by: EMERGENCY MEDICINE

## 2021-09-07 PROCEDURE — 25000003 PHARM REV CODE 250: Performed by: EMERGENCY MEDICINE

## 2021-09-07 PROCEDURE — 99283 EMERGENCY DEPT VISIT LOW MDM: CPT

## 2021-09-07 PROCEDURE — 99284 PR EMERGENCY DEPT VISIT,LEVEL IV: ICD-10-PCS | Mod: ,,, | Performed by: EMERGENCY MEDICINE

## 2021-09-07 RX ORDER — IBUPROFEN 400 MG/1
400 TABLET ORAL
Status: COMPLETED | OUTPATIENT
Start: 2021-09-07 | End: 2021-09-07

## 2021-09-07 RX ORDER — ACETAMINOPHEN 500 MG
1000 TABLET ORAL
Status: COMPLETED | OUTPATIENT
Start: 2021-09-07 | End: 2021-09-07

## 2021-09-07 RX ADMIN — IBUPROFEN 400 MG: 400 TABLET ORAL at 08:09

## 2021-09-07 RX ADMIN — ACETAMINOPHEN 1000 MG: 500 TABLET ORAL at 08:09

## 2021-09-22 ENCOUNTER — PATIENT MESSAGE (OUTPATIENT)
Dept: PRIMARY CARE CLINIC | Facility: CLINIC | Age: 56
End: 2021-09-22

## 2021-09-22 DIAGNOSIS — E11.9 TYPE 2 DIABETES MELLITUS WITHOUT COMPLICATION, UNSPECIFIED WHETHER LONG TERM INSULIN USE: ICD-10-CM

## 2021-10-01 ENCOUNTER — OFFICE VISIT (OUTPATIENT)
Dept: PRIMARY CARE CLINIC | Facility: CLINIC | Age: 56
End: 2021-10-01
Payer: COMMERCIAL

## 2021-10-01 VITALS
SYSTOLIC BLOOD PRESSURE: 122 MMHG | BODY MASS INDEX: 29.29 KG/M2 | HEIGHT: 75 IN | DIASTOLIC BLOOD PRESSURE: 76 MMHG | WEIGHT: 235.56 LBS | TEMPERATURE: 98 F | HEART RATE: 90 BPM | RESPIRATION RATE: 18 BRPM | OXYGEN SATURATION: 97 %

## 2021-10-01 DIAGNOSIS — E11.65 TYPE 2 DIABETES MELLITUS WITH HYPERGLYCEMIA, WITHOUT LONG-TERM CURRENT USE OF INSULIN: ICD-10-CM

## 2021-10-01 DIAGNOSIS — E11.59 HYPERTENSION ASSOCIATED WITH DIABETES: Primary | ICD-10-CM

## 2021-10-01 DIAGNOSIS — E11.69 HYPERLIPIDEMIA ASSOCIATED WITH TYPE 2 DIABETES MELLITUS: ICD-10-CM

## 2021-10-01 DIAGNOSIS — G89.29 OTHER CHRONIC PAIN: ICD-10-CM

## 2021-10-01 DIAGNOSIS — M48.02 CERVICAL SPINAL STENOSIS: ICD-10-CM

## 2021-10-01 DIAGNOSIS — J30.9 ALLERGIC RHINITIS, UNSPECIFIED SEASONALITY, UNSPECIFIED TRIGGER: ICD-10-CM

## 2021-10-01 DIAGNOSIS — M54.50 ACUTE LEFT-SIDED LOW BACK PAIN WITHOUT SCIATICA: ICD-10-CM

## 2021-10-01 DIAGNOSIS — G47.33 OSA (OBSTRUCTIVE SLEEP APNEA): ICD-10-CM

## 2021-10-01 DIAGNOSIS — E78.5 HYPERLIPIDEMIA ASSOCIATED WITH TYPE 2 DIABETES MELLITUS: ICD-10-CM

## 2021-10-01 DIAGNOSIS — I15.2 HYPERTENSION ASSOCIATED WITH DIABETES: Primary | ICD-10-CM

## 2021-10-01 DIAGNOSIS — E55.9 VITAMIN D DEFICIENCY: ICD-10-CM

## 2021-10-01 PROCEDURE — 3074F SYST BP LT 130 MM HG: CPT | Mod: CPTII,S$GLB,, | Performed by: FAMILY MEDICINE

## 2021-10-01 PROCEDURE — 1159F MED LIST DOCD IN RCRD: CPT | Mod: CPTII,S$GLB,, | Performed by: FAMILY MEDICINE

## 2021-10-01 PROCEDURE — 1159F PR MEDICATION LIST DOCUMENTED IN MEDICAL RECORD: ICD-10-PCS | Mod: CPTII,S$GLB,, | Performed by: FAMILY MEDICINE

## 2021-10-01 PROCEDURE — 3078F PR MOST RECENT DIASTOLIC BLOOD PRESSURE < 80 MM HG: ICD-10-PCS | Mod: CPTII,S$GLB,, | Performed by: FAMILY MEDICINE

## 2021-10-01 PROCEDURE — 3051F PR MOST RECENT HEMOGLOBIN A1C LEVEL 7.0 - < 8.0%: ICD-10-PCS | Mod: CPTII,S$GLB,, | Performed by: FAMILY MEDICINE

## 2021-10-01 PROCEDURE — 99214 PR OFFICE/OUTPT VISIT, EST, LEVL IV, 30-39 MIN: ICD-10-PCS | Mod: S$GLB,,, | Performed by: FAMILY MEDICINE

## 2021-10-01 PROCEDURE — 99999 PR PBB SHADOW E&M-EST. PATIENT-LVL III: ICD-10-PCS | Mod: PBBFAC,,, | Performed by: FAMILY MEDICINE

## 2021-10-01 PROCEDURE — 3061F PR NEG MICROALBUMINURIA RESULT DOCUMENTED/REVIEW: ICD-10-PCS | Mod: CPTII,S$GLB,, | Performed by: FAMILY MEDICINE

## 2021-10-01 PROCEDURE — 99214 OFFICE O/P EST MOD 30 MIN: CPT | Mod: S$GLB,,, | Performed by: FAMILY MEDICINE

## 2021-10-01 PROCEDURE — 99999 PR PBB SHADOW E&M-EST. PATIENT-LVL III: CPT | Mod: PBBFAC,,, | Performed by: FAMILY MEDICINE

## 2021-10-01 PROCEDURE — 3008F BODY MASS INDEX DOCD: CPT | Mod: CPTII,S$GLB,, | Performed by: FAMILY MEDICINE

## 2021-10-01 PROCEDURE — 3061F NEG MICROALBUMINURIA REV: CPT | Mod: CPTII,S$GLB,, | Performed by: FAMILY MEDICINE

## 2021-10-01 PROCEDURE — 3078F DIAST BP <80 MM HG: CPT | Mod: CPTII,S$GLB,, | Performed by: FAMILY MEDICINE

## 2021-10-01 PROCEDURE — 4010F ACE/ARB THERAPY RXD/TAKEN: CPT | Mod: CPTII,S$GLB,, | Performed by: FAMILY MEDICINE

## 2021-10-01 PROCEDURE — 3066F PR DOCUMENTATION OF TREATMENT FOR NEPHROPATHY: ICD-10-PCS | Mod: CPTII,S$GLB,, | Performed by: FAMILY MEDICINE

## 2021-10-01 PROCEDURE — 3066F NEPHROPATHY DOC TX: CPT | Mod: CPTII,S$GLB,, | Performed by: FAMILY MEDICINE

## 2021-10-01 PROCEDURE — 3051F HG A1C>EQUAL 7.0%<8.0%: CPT | Mod: CPTII,S$GLB,, | Performed by: FAMILY MEDICINE

## 2021-10-01 PROCEDURE — 4010F PR ACE/ARB THEARPY RXD/TAKEN: ICD-10-PCS | Mod: CPTII,S$GLB,, | Performed by: FAMILY MEDICINE

## 2021-10-01 PROCEDURE — 3074F PR MOST RECENT SYSTOLIC BLOOD PRESSURE < 130 MM HG: ICD-10-PCS | Mod: CPTII,S$GLB,, | Performed by: FAMILY MEDICINE

## 2021-10-01 PROCEDURE — 3008F PR BODY MASS INDEX (BMI) DOCUMENTED: ICD-10-PCS | Mod: CPTII,S$GLB,, | Performed by: FAMILY MEDICINE

## 2021-10-05 ENCOUNTER — PATIENT MESSAGE (OUTPATIENT)
Dept: ADMINISTRATIVE | Facility: HOSPITAL | Age: 56
End: 2021-10-05

## 2021-10-14 ENCOUNTER — PATIENT OUTREACH (OUTPATIENT)
Dept: ADMINISTRATIVE | Facility: HOSPITAL | Age: 56
End: 2021-10-14

## 2021-11-01 ENCOUNTER — LAB VISIT (OUTPATIENT)
Dept: LAB | Facility: HOSPITAL | Age: 56
End: 2021-11-01
Attending: FAMILY MEDICINE
Payer: COMMERCIAL

## 2021-11-01 DIAGNOSIS — E11.65 TYPE 2 DIABETES MELLITUS WITH HYPERGLYCEMIA, WITHOUT LONG-TERM CURRENT USE OF INSULIN: ICD-10-CM

## 2021-11-01 DIAGNOSIS — E55.9 VITAMIN D DEFICIENCY: ICD-10-CM

## 2021-11-01 LAB
25(OH)D3+25(OH)D2 SERPL-MCNC: 53 NG/ML (ref 30–96)
ESTIMATED AVG GLUCOSE: 123 MG/DL (ref 68–131)
HBA1C MFR BLD: 5.9 % (ref 4–5.6)

## 2021-11-01 PROCEDURE — 36415 COLL VENOUS BLD VENIPUNCTURE: CPT | Mod: PN | Performed by: FAMILY MEDICINE

## 2021-11-01 PROCEDURE — 83036 HEMOGLOBIN GLYCOSYLATED A1C: CPT | Performed by: FAMILY MEDICINE

## 2021-11-01 PROCEDURE — 82306 VITAMIN D 25 HYDROXY: CPT | Performed by: FAMILY MEDICINE

## 2021-11-06 ENCOUNTER — PATIENT MESSAGE (OUTPATIENT)
Dept: PRIMARY CARE CLINIC | Facility: CLINIC | Age: 56
End: 2021-11-06
Payer: COMMERCIAL

## 2021-11-08 ENCOUNTER — PATIENT MESSAGE (OUTPATIENT)
Dept: PRIMARY CARE CLINIC | Facility: CLINIC | Age: 56
End: 2021-11-08
Payer: COMMERCIAL

## 2021-12-28 ENCOUNTER — LAB VISIT (OUTPATIENT)
Dept: PRIMARY CARE CLINIC | Facility: OTHER | Age: 56
End: 2021-12-28
Payer: COMMERCIAL

## 2021-12-28 DIAGNOSIS — Z20.822 ENCOUNTER FOR LABORATORY TESTING FOR COVID-19 VIRUS: ICD-10-CM

## 2021-12-28 PROCEDURE — U0003 INFECTIOUS AGENT DETECTION BY NUCLEIC ACID (DNA OR RNA); SEVERE ACUTE RESPIRATORY SYNDROME CORONAVIRUS 2 (SARS-COV-2) (CORONAVIRUS DISEASE [COVID-19]), AMPLIFIED PROBE TECHNIQUE, MAKING USE OF HIGH THROUGHPUT TECHNOLOGIES AS DESCRIBED BY CMS-2020-01-R: HCPCS | Performed by: INTERNAL MEDICINE

## 2021-12-29 LAB
SARS-COV-2 RNA RESP QL NAA+PROBE: NOT DETECTED
SARS-COV-2- CYCLE NUMBER: NORMAL

## 2022-01-10 ENCOUNTER — PATIENT MESSAGE (OUTPATIENT)
Dept: PRIMARY CARE CLINIC | Facility: CLINIC | Age: 57
End: 2022-01-10
Payer: COMMERCIAL

## 2022-01-12 ENCOUNTER — PATIENT MESSAGE (OUTPATIENT)
Dept: PRIMARY CARE CLINIC | Facility: CLINIC | Age: 57
End: 2022-01-12

## 2022-01-12 ENCOUNTER — OFFICE VISIT (OUTPATIENT)
Dept: PRIMARY CARE CLINIC | Facility: CLINIC | Age: 57
End: 2022-01-12
Payer: COMMERCIAL

## 2022-01-12 DIAGNOSIS — J01.10 ACUTE FRONTAL SINUSITIS, RECURRENCE NOT SPECIFIED: Primary | ICD-10-CM

## 2022-01-12 DIAGNOSIS — H10.10 ALLERGIC CONJUNCTIVITIS, UNSPECIFIED LATERALITY: ICD-10-CM

## 2022-01-12 PROCEDURE — 99214 OFFICE O/P EST MOD 30 MIN: CPT | Mod: 95,,, | Performed by: FAMILY MEDICINE

## 2022-01-12 PROCEDURE — 4010F PR ACE/ARB THEARPY RXD/TAKEN: ICD-10-PCS | Mod: CPTII,95,, | Performed by: FAMILY MEDICINE

## 2022-01-12 PROCEDURE — 4010F ACE/ARB THERAPY RXD/TAKEN: CPT | Mod: CPTII,95,, | Performed by: FAMILY MEDICINE

## 2022-01-12 PROCEDURE — 99214 PR OFFICE/OUTPT VISIT, EST, LEVL IV, 30-39 MIN: ICD-10-PCS | Mod: 95,,, | Performed by: FAMILY MEDICINE

## 2022-01-12 RX ORDER — KETOROLAC TROMETHAMINE 5 MG/ML
1 SOLUTION OPHTHALMIC 4 TIMES DAILY
Qty: 5 ML | Refills: 0 | Status: SHIPPED | OUTPATIENT
Start: 2022-01-12 | End: 2022-01-22

## 2022-01-12 RX ORDER — METHYLPREDNISOLONE 4 MG/1
TABLET ORAL
Qty: 1 EACH | Refills: 0 | Status: SHIPPED | OUTPATIENT
Start: 2022-01-12 | End: 2022-02-04

## 2022-01-12 NOTE — PROGRESS NOTES
The patient location is: [place]  The chief complaint leading to consultation is: Sinus Problem    Visit type: audio only Unable to connect with patient via AV due to technical difficulties    Face to Face time with patient: 8 mint  15 minutes of total time spent on the encounter, which includes face to face time and non-face to face time preparing to see the patient (eg, review of tests), Obtaining and/or reviewing separately obtained history, Documenting clinical information in the electronic or other health record, Independently interpreting results (not separately reported) and communicating results to the patient/family/caregiver, or Care coordination (not separately reported).     Each patient to whom he or she provides medical services by telemedicine is:  (1) informed of the relationship between the physician and patient and the respective role of any other health care provider with respect to management of the patient; and (2) notified that he or she may decline to receive medical services by telemedicine and may withdraw from such care at any time.    Notes: See Below  Subjective:       Patient ID: Suhas Castillo is a 56 y.o. male.    Chief Complaint: Sinus Problem    57 yo M, established pt of mine, with PMH significant for DM-2, HTN, HLD, AR, C-spine stenosis due to DDD/DJD with chronic neck pain, LIZZY on CPAP, and Vitamin D deficiency. He presents for evaluation via virtual audio visit  with c/o abnormal sinus symptoms x 1 week. Experiencing productive cough, postnasal drip, frontal headache, and bilateral eye discharge. No wheezing or shortness of breath. No fevers/chills. No body aches. No sore throat. No runny nose. His eyes are watery/itchy in the am, but this improves as the day progresses. He reports testing positive for COVID-19 on 1/5/2022. Repeated test next day and it was negative. His son was positive for COVID-19 on 12/29/2021, pt states his COVID testing was negative at that time. He's  had similar symptoms around the same time last year which improved with IM steroid and Z-pack. He is taking Flonase and Cetirizine daily.       Past Medical History:   Diagnosis Date    AR (allergic rhinitis)     Cervical spinal stenosis     Diabetes mellitus type II     DJD (degenerative joint disease), cervical     Hyperlipidemia     Hypertension     Sleep apnea        Patient Active Problem List   Diagnosis    Controlled type 2 diabetes mellitus without complication, without long-term current use of insulin    Hypertension associated with diabetes    Hyperlipidemia associated with type 2 diabetes mellitus    Allergic rhinitis    DJD (degenerative joint disease), cervical    Cervical spinal stenosis    Elevated transaminase level    Vitamin D deficiency    DDD (degenerative disc disease), cervical    Angioedema    LIZZY (obstructive sleep apnea)    [place] cardiac risk 10-20% in next 10 years    Chronic pain       Past Surgical History:   Procedure Laterality Date    COLONOSCOPY N/A 4/11/2019    Procedure: COLONOSCOPY;  Surgeon: Gato Corbin MD;  Location: [facility] ([address] [place] [address]);  Service: Endoscopy;  Laterality: N/A;    EPIDURAL BLOCK INJECTION      cervical x 2    EPIDURAL STEROID INJECTION N/A 9/28/2018    Procedure: Injection, Steroid, Epidural  C7-T1;  Surgeon: Bernie Estrada MD;  Location: [facility];  Service: Pain Management;  Laterality: N/A;  C7-T1 IL DEBORAH   MRDICALLY URGENT    EPIDURAL STEROID INJECTION N/A 10/26/2018    Procedure: Injection, Steroid, Epidural CERVICAL C7-T1 DEBORAH;  Surgeon: Bernie Estrada MD;  Location: [facility];  Service: Pain Management;  Laterality: N/A;    EPIDURAL STEROID INJECTION N/A 2/7/2019    Procedure: INJECTION, STEROID, EPIDURAL;  Surgeon: Tania Bajwa MD;  Location: [facility];  Service: Pain Management;  Laterality: N/A;  C DEBORAH C7-T1    ARIELLE PT ON ANDRÉSSA SCHEDULE    EPIDURAL STEROID INJECTION N/A 7/27/2020    Procedure: INJECTION,  STEROID, EPIDURAL, C7-T1;  Surgeon: Tania Bajwa MD;  Location: [facility];  Service: Pain Management;  Laterality: N/A;    TESTICLE SURGERY  age 16    torsion       Family History   Problem Relation Age of Onset    Hypertension Mother     Diabetes Mother     Stroke Mother        Social History     Tobacco Use   Smoking Status Never Smoker   Smokeless Tobacco Never Used       Medications have been reviewed and reconciled.     Review of patient's allergies indicates:   Allergen Reactions    Ace inhibitors Swelling        Objective:        Vitals Unable to be Obtained    Physical Exam not able to be performed       Assessment:       1. Acute frontal sinusitis, recurrence not specified    2. Allergic conjunctivitis, unspecified laterality        Plan:       Suhas was seen today for sinus problem.    Diagnoses and all orders for this visit:    Acute frontal sinusitis, recurrence not specified  -     methylPREDNISolone (MEDROL, PACO,) 4 mg tablet; use as directed    Allergic conjunctivitis, unspecified laterality  -     ketorolac 0.5% (ACULAR) 0.5 % Drop; Place 1 drop into both eyes 4 (four) times daily. for 10 days      Acute Sinusitis; AC   --Favor allergic etiology.   --Continue Flonase/Cetirizine  --Will rx medrol dose pack + ketorolac OU   --If symptoms unimproved or worsening over next week consider empirically treating with 5 day course of doxycycline.     F/U prn

## 2022-02-04 ENCOUNTER — OFFICE VISIT (OUTPATIENT)
Dept: PRIMARY CARE CLINIC | Facility: CLINIC | Age: 57
End: 2022-02-04
Payer: COMMERCIAL

## 2022-02-04 VITALS
SYSTOLIC BLOOD PRESSURE: 138 MMHG | DIASTOLIC BLOOD PRESSURE: 76 MMHG | HEIGHT: 75 IN | TEMPERATURE: 98 F | RESPIRATION RATE: 18 BRPM | OXYGEN SATURATION: 97 % | BODY MASS INDEX: 30.39 KG/M2 | WEIGHT: 244.38 LBS | HEART RATE: 91 BPM

## 2022-02-04 DIAGNOSIS — E78.5 HYPERLIPIDEMIA ASSOCIATED WITH TYPE 2 DIABETES MELLITUS: ICD-10-CM

## 2022-02-04 DIAGNOSIS — E11.69 HYPERLIPIDEMIA ASSOCIATED WITH TYPE 2 DIABETES MELLITUS: ICD-10-CM

## 2022-02-04 DIAGNOSIS — M79.604 RIGHT LEG PAIN: Primary | ICD-10-CM

## 2022-02-04 DIAGNOSIS — E55.9 VITAMIN D DEFICIENCY: ICD-10-CM

## 2022-02-04 DIAGNOSIS — J30.9 ALLERGIC RHINITIS, UNSPECIFIED SEASONALITY, UNSPECIFIED TRIGGER: ICD-10-CM

## 2022-02-04 DIAGNOSIS — M48.02 CERVICAL SPINAL STENOSIS: ICD-10-CM

## 2022-02-04 DIAGNOSIS — G47.33 OSA (OBSTRUCTIVE SLEEP APNEA): ICD-10-CM

## 2022-02-04 DIAGNOSIS — I15.2 HYPERTENSION ASSOCIATED WITH DIABETES: ICD-10-CM

## 2022-02-04 DIAGNOSIS — E11.65 TYPE 2 DIABETES MELLITUS WITH HYPERGLYCEMIA, WITHOUT LONG-TERM CURRENT USE OF INSULIN: ICD-10-CM

## 2022-02-04 DIAGNOSIS — M47.812 SPONDYLOSIS OF CERVICAL REGION WITHOUT MYELOPATHY OR RADICULOPATHY: ICD-10-CM

## 2022-02-04 DIAGNOSIS — E11.59 HYPERTENSION ASSOCIATED WITH DIABETES: ICD-10-CM

## 2022-02-04 DIAGNOSIS — G89.29 OTHER CHRONIC PAIN: ICD-10-CM

## 2022-02-04 PROBLEM — Z91.89 FRAMINGHAM CARDIAC RISK 10-20% IN NEXT 10 YEARS: Status: RESOLVED | Noted: 2020-05-02 | Resolved: 2022-02-04

## 2022-02-04 PROBLEM — M50.30 DDD (DEGENERATIVE DISC DISEASE), CERVICAL: Status: RESOLVED | Noted: 2017-03-14 | Resolved: 2022-02-04

## 2022-02-04 PROBLEM — T78.3XXA ANGIOEDEMA: Status: RESOLVED | Noted: 2019-07-11 | Resolved: 2022-02-04

## 2022-02-04 LAB
ALBUMIN/CREAT UR: NORMAL UG/MG (ref 0–30)
CREAT UR-MCNC: 142 MG/DL (ref 23–375)
MICROALBUMIN UR DL<=1MG/L-MCNC: <5 UG/ML

## 2022-02-04 PROCEDURE — 99999 PR PBB SHADOW E&M-EST. PATIENT-LVL IV: ICD-10-PCS | Mod: PBBFAC,,, | Performed by: FAMILY MEDICINE

## 2022-02-04 PROCEDURE — 4010F PR ACE/ARB THEARPY RXD/TAKEN: ICD-10-PCS | Mod: CPTII,S$GLB,, | Performed by: FAMILY MEDICINE

## 2022-02-04 PROCEDURE — 1159F MED LIST DOCD IN RCRD: CPT | Mod: CPTII,S$GLB,, | Performed by: FAMILY MEDICINE

## 2022-02-04 PROCEDURE — 99999 PR PBB SHADOW E&M-EST. PATIENT-LVL IV: CPT | Mod: PBBFAC,,, | Performed by: FAMILY MEDICINE

## 2022-02-04 PROCEDURE — 3075F SYST BP GE 130 - 139MM HG: CPT | Mod: CPTII,S$GLB,, | Performed by: FAMILY MEDICINE

## 2022-02-04 PROCEDURE — 3008F BODY MASS INDEX DOCD: CPT | Mod: CPTII,S$GLB,, | Performed by: FAMILY MEDICINE

## 2022-02-04 PROCEDURE — 3075F PR MOST RECENT SYSTOLIC BLOOD PRESS GE 130-139MM HG: ICD-10-PCS | Mod: CPTII,S$GLB,, | Performed by: FAMILY MEDICINE

## 2022-02-04 PROCEDURE — 82043 UR ALBUMIN QUANTITATIVE: CPT | Performed by: FAMILY MEDICINE

## 2022-02-04 PROCEDURE — 1159F PR MEDICATION LIST DOCUMENTED IN MEDICAL RECORD: ICD-10-PCS | Mod: CPTII,S$GLB,, | Performed by: FAMILY MEDICINE

## 2022-02-04 PROCEDURE — 99214 OFFICE O/P EST MOD 30 MIN: CPT | Mod: S$GLB,,, | Performed by: FAMILY MEDICINE

## 2022-02-04 PROCEDURE — 82570 ASSAY OF URINE CREATININE: CPT | Performed by: FAMILY MEDICINE

## 2022-02-04 PROCEDURE — 99214 PR OFFICE/OUTPT VISIT, EST, LEVL IV, 30-39 MIN: ICD-10-PCS | Mod: S$GLB,,, | Performed by: FAMILY MEDICINE

## 2022-02-04 PROCEDURE — 3078F PR MOST RECENT DIASTOLIC BLOOD PRESSURE < 80 MM HG: ICD-10-PCS | Mod: CPTII,S$GLB,, | Performed by: FAMILY MEDICINE

## 2022-02-04 PROCEDURE — 3008F PR BODY MASS INDEX (BMI) DOCUMENTED: ICD-10-PCS | Mod: CPTII,S$GLB,, | Performed by: FAMILY MEDICINE

## 2022-02-04 PROCEDURE — 4010F ACE/ARB THERAPY RXD/TAKEN: CPT | Mod: CPTII,S$GLB,, | Performed by: FAMILY MEDICINE

## 2022-02-04 PROCEDURE — 3078F DIAST BP <80 MM HG: CPT | Mod: CPTII,S$GLB,, | Performed by: FAMILY MEDICINE

## 2022-02-04 NOTE — PROGRESS NOTES
Subjective:       Patient ID: Suhas Castillo is a 56 y.o. male.    Chief Complaint: Follow-up    57 yo M, established pt of mine, with PMH significant for DM-2, HTN, HLD, AR, C-spine stenosis due to DDD/DJD with chronic neck pain, LIZZY on CPAP, and Vitamin D deficiency. He presents for 3 months f/u office visit for chronic disease management. He has generally been feeling well. Reports recently participating in squats and now experiencing pain to right posterolateral lower extremity from hip to knee. States this is not a big issue and he plans to undergo massage therapy. No associated back pain, LE weakness/sensory deficit. No radicular pain.    See Plan below for assessment/plan of chronic diseases. He is adherent with all medications as listed.      Past Medical History:   Diagnosis Date    Angioedema 7/11/2019    AR (allergic rhinitis)     Cervical spinal stenosis     Diabetes mellitus type II     DJD (degenerative joint disease), cervical     Hyperlipidemia     Hypertension     Sleep apnea        Patient Active Problem List   Diagnosis    Type 2 diabetes mellitus with hyperglycemia, without long-term current use of insulin    Hypertension associated with diabetes    Hyperlipidemia associated with type 2 diabetes mellitus    Allergic rhinitis    DJD (degenerative joint disease), cervical    Cervical spinal stenosis    Vitamin D deficiency    LIZZY (obstructive sleep apnea)    Chronic pain       Past Surgical History:   Procedure Laterality Date    COLONOSCOPY N/A 4/11/2019    Procedure: COLONOSCOPY;  Surgeon: Gato Corbin MD;  Location: [address];  Service: Endoscopy;  Laterality: N/A;    EPIDURAL BLOCK INJECTION      cervical x 2    EPIDURAL STEROID INJECTION N/A 9/28/2018    Procedure: Injection, Steroid, Epidural  C7-T1;  Surgeon: Bernie Estrada MD;  Location: [facility] PAIN T;  Service: Pain Management;  Laterality: N/A;  C7-T1 IL DEBORAH   MRDICALLY URGENT    EPIDURAL STEROID  INJECTION N/A 10/26/2018    Procedure: Injection, Steroid, Epidural CERVICAL C7-T1 DEBORAH;  Surgeon: Bernie Estrada MD;  Location: [facility] PAIN MGT;  Service: Pain Management;  Laterality: N/A;    EPIDURAL STEROID INJECTION N/A 2/7/2019    Procedure: INJECTION, STEROID, EPIDURAL;  Surgeon: Tania Bajwa MD;  Location: [facility] PAIN MGT;  Service: Pain Management;  Laterality: N/A;  C DEBORAH C7-T1    ESTRADA PT ON EISSA SCHEDULE    EPIDURAL STEROID INJECTION N/A 7/27/2020    Procedure: INJECTION, STEROID, EPIDURAL, C7-T1;  Surgeon: Tania Bajwa MD;  Location: [facility] PAIN MGT;  Service: Pain Management;  Laterality: N/A;    TESTICLE SURGERY  age 16    torsion       Family History   Problem Relation Age of Onset    Hypertension Mother     Diabetes Mother     Stroke Mother        Social History     Tobacco Use   Smoking Status Never Smoker   Smokeless Tobacco Never Used       Medications have been reviewed and reconciled.     Review of patient's allergies indicates:   Allergen Reactions    Ace inhibitors Swelling        Review of Systems   Constitutional: Negative for activity change, appetite change, chills, fever and unexpected weight change.   HENT: Negative for congestion, sneezing and sore throat.    Eyes: Negative for redness, itching and visual disturbance.   Respiratory: Negative for cough, shortness of breath and wheezing.    Cardiovascular: Negative for chest pain.   Gastrointestinal: Negative for abdominal pain, constipation, diarrhea, nausea and vomiting.   Endocrine: Negative for polydipsia, polyphagia and polyuria.   Genitourinary: Negative for difficulty urinating, dysuria, frequency, penile discharge and urgency.   Musculoskeletal: Positive for myalgias (right lower extremity).   Skin: Negative for rash.   Neurological: Negative for dizziness, syncope, weakness and headaches.   Psychiatric/Behavioral: Negative for dysphoric mood and suicidal ideas. The patient is not nervous/anxious.          Objective:        BP  "138/76 (BP Location: Right arm, Patient Position: Sitting, BP Method: Thigh Cuff (Automatic))   Pulse 91   Temp 97.9 °F (36.6 °C) (Oral)   Resp 18   Ht 6' 3" (1.905 m)   Wt 110.9 kg (244 lb 6.1 oz)   SpO2 97%   BMI 30.55 kg/m²      Physical Exam      Assessment:       1. Right leg pain    2. Hypertension associated with diabetes    3. Type 2 diabetes mellitus with hyperglycemia, without long-term current use of insulin    4. Hyperlipidemia associated with type 2 diabetes mellitus    5. Allergic rhinitis, unspecified seasonality, unspecified trigger    6. Cervical spinal stenosis    7. Spondylosis of cervical region without myelopathy or radiculopathy    8. Other chronic pain    9. LIZZY (obstructive sleep apnea)    10. Vitamin D deficiency        Plan:       Suhas was seen today for follow-up.    Diagnoses and all orders for this visit:    Right leg pain    Hypertension associated with diabetes  -     CBC Auto Differential; Future  -     Comprehensive Metabolic Panel; Future    Type 2 diabetes mellitus with hyperglycemia, without long-term current use of insulin  -     Microalbumin/Creatinine Ratio, Urine; Future  -     CBC Auto Differential; Future  -     Comprehensive Metabolic Panel; Future  -     Hemoglobin A1C; Future  -     Microalbumin/Creatinine Ratio, Urine    Hyperlipidemia associated with type 2 diabetes mellitus  -     CBC Auto Differential; Future  -     Comprehensive Metabolic Panel; Future  -     Lipid Panel; Future    Allergic rhinitis, unspecified seasonality, unspecified trigger    Cervical spinal stenosis    Spondylosis of cervical region without myelopathy or radiculopathy    Other chronic pain    LIZZY (obstructive sleep apnea)    Vitamin D deficiency      Right Lower Extremity Pain  -Occurred while performing squats  -Likely myofascial in nature. No red flag signs/symptoms of back injury  -Encouraged stretching prior to strengthening exercises  -Warm compress/massage prn     HTN   --BP at " target today  Continue Losartan 100 mg daily, HCTZ 12.5 mg daily, and amlodipine 7.5 mg daily     DM-2   --Improving  --Last A1c 5.9 11/1/21   --Has been working on diet/lifestyle modifications   --Continue Metformin ER 1g BID + Januvia 100 mg daily  --Foot exam performed 8/4/2021--normal   --Diabetic eye exam done at [facility] on 10/4/2021.  --Flu vaccine declined today 2/4/22  --Urine micro/cr today 2/4/22    HLD   , TG 87, HDL 31, LDL 73.6 2/5/2021  Continue pravastatin 10 mg qhs     AR  Continue Flonase/Xyzal prn    C-spine stenosis due to DDD/DJD with chronic neck pain  Has had several ESIs in the past   --Has intermittent pain, however, symptoms generally stable at present    LIZZY  --Continue CPAP  --Not following with sleep medicine    Vitamin D deficiency  Last Vitamin D 53 11/1/21.   Continue Vitamin D3 2000 IU daily    Medication List with Changes/Refills   Current Medications    AMLODIPINE (NORVASC) 5 MG TABLET    TAKE 1 AND 1/2 TABLETS(7.5 MG) BY MOUTH EVERY DAY    BLOOD PRESSURE MONITOR (IHEALTH EASE) XL ARM SIZE (OP)    by Other route as needed for High Blood Pressure.    BLOOD SUGAR DIAGNOSTIC (CONTOUR TEST STRIPS) STRP    USE TO TEST BLOOD SUGAR TWICE A DAY    FLUTICASONE PROPIONATE (FLONASE) 50 MCG/ACTUATION NASAL SPRAY    SHAKE LIQUID AND USE 2 SPRAYS(100 MCG) IN EACH NOSTRIL EVERY DAY    HYDROCHLOROTHIAZIDE (HYDRODIURIL) 12.5 MG TAB    Take 1 tablet (12.5 mg total) by mouth once daily.    JARDIANCE 10 MG TABLET    TAKE 1 TABLET(10 MG) BY MOUTH EVERY DAY    LEVOCETIRIZINE (XYZAL) 5 MG TABLET    Take 1 tablet (5 mg total) by mouth every evening.    LOSARTAN (COZAAR) 100 MG TABLET    Take 1 tablet (100 mg total) by mouth once daily.    METFORMIN (GLUCOPHAGE-XR) 500 MG ER 24HR TABLET    TAKE 2 TABLETS(1000 MG) BY MOUTH TWICE DAILY    PRAVASTATIN (PRAVACHOL) 10 MG TABLET    Take 1 tablet (10 mg total) by mouth once daily.    SITAGLIPTIN (JANUVIA) 100 MG TAB    Take 1 tablet (100 mg total)  by mouth once daily.   Discontinued Medications    FLUOCINONIDE (LIDEX) 0.05 % EXTERNAL SOLUTION    As directed    METHYLPREDNISOLONE (MEDROL, PACO,) 4 MG TABLET    use as directed       Follow up in about 5 months (around 7/4/2022). Labs 1 week prior to visit.

## 2022-03-10 ENCOUNTER — PATIENT MESSAGE (OUTPATIENT)
Dept: PRIMARY CARE CLINIC | Facility: CLINIC | Age: 57
End: 2022-03-10
Payer: COMMERCIAL

## 2022-03-11 NOTE — OP NOTE
Attempt to call patient. Left message to call back.    Date: 03/14/2017    Procedure: [id number] Steroid Injection    Referring Provider: None     Pre-op diagnosis: Cervical DDD with cervical radiculitis    Post-op diagnosis: Same     Physician: Dr. Bernie Estrada     Assistant: None    Anesthestia: local/niravam    All medications, allergies, and relevant histories were reviewed. No recent antibiotics or infections.   A time-out was taken to verify the correct patient, procedure, laterality, and appropriate medications/allergies.    Fluoroscopically-Guided, Contrast-Controlled Cervical Interlaminar Epidural Steroid Injection:     Following denial of allergy and review of potential side effects and complications including but not necessarily limited to infection, allergic reaction, local tissue breakdown, nerve injury, paresis, paralysis, spinal cord injury, and seizure, the patient indicated they understood and agreed to proceed.     The patient was positioned prone and prepped and draped in the usual sterile fashion. The C7-T1 interspace was identified fluoroscopically. The skin was anesthetized via 25-gauge 1.5 needle with approximately 4cc of bicarbonated 1% lidocaine. A 20-gauge Tuohy needle was atraumatically introduced and advanced under fluoroscopic guidance. Using HIEU to saline technique with 0.9 % saline the epidural space was entered without difficulties. Following negative aspiration for blood and CSF and confirming the absence of paresthesias, injection of approximately 1.5 cc of Omnipaque 300 demonstrated excellent epidural spread without vascular or intrathecal uptake. At this point, 2 cc of 0.9% normal saline with 80 mg of Depo-Medrol was injected without complication. The needle was flushed with 1% lidocaine withdrawn.     The patient was followedpost procedure and discharged under their own power in excellent condition.    Future Management:   If helpful, can repeat as needed.    Follow up with my clinic in 3 weeks or sooner if needed

## 2022-03-14 ENCOUNTER — OFFICE VISIT (OUTPATIENT)
Dept: PRIMARY CARE CLINIC | Facility: CLINIC | Age: 57
End: 2022-03-14
Payer: COMMERCIAL

## 2022-03-14 VITALS
HEART RATE: 87 BPM | TEMPERATURE: 98 F | HEIGHT: 75 IN | DIASTOLIC BLOOD PRESSURE: 96 MMHG | WEIGHT: 245.5 LBS | BODY MASS INDEX: 30.53 KG/M2 | SYSTOLIC BLOOD PRESSURE: 149 MMHG | OXYGEN SATURATION: 96 %

## 2022-03-14 DIAGNOSIS — E11.59 HYPERTENSION ASSOCIATED WITH DIABETES: ICD-10-CM

## 2022-03-14 DIAGNOSIS — K21.9 GASTROESOPHAGEAL REFLUX DISEASE WITHOUT ESOPHAGITIS: ICD-10-CM

## 2022-03-14 DIAGNOSIS — R10.9 LEFT SIDED ABDOMINAL PAIN: Primary | ICD-10-CM

## 2022-03-14 DIAGNOSIS — I15.2 HYPERTENSION ASSOCIATED WITH DIABETES: ICD-10-CM

## 2022-03-14 DIAGNOSIS — I10 ELEVATED BLOOD PRESSURE READING IN OFFICE WITH DIAGNOSIS OF HYPERTENSION: ICD-10-CM

## 2022-03-14 PROCEDURE — 99999 PR PBB SHADOW E&M-EST. PATIENT-LVL IV: CPT | Mod: PBBFAC,,, | Performed by: NURSE PRACTITIONER

## 2022-03-14 PROCEDURE — 3066F NEPHROPATHY DOC TX: CPT | Mod: CPTII,S$GLB,, | Performed by: NURSE PRACTITIONER

## 2022-03-14 PROCEDURE — 3080F PR MOST RECENT DIASTOLIC BLOOD PRESSURE >= 90 MM HG: ICD-10-PCS | Mod: CPTII,S$GLB,, | Performed by: NURSE PRACTITIONER

## 2022-03-14 PROCEDURE — 4010F PR ACE/ARB THEARPY RXD/TAKEN: ICD-10-PCS | Mod: CPTII,S$GLB,, | Performed by: NURSE PRACTITIONER

## 2022-03-14 PROCEDURE — 3077F PR MOST RECENT SYSTOLIC BLOOD PRESSURE >= 140 MM HG: ICD-10-PCS | Mod: CPTII,S$GLB,, | Performed by: NURSE PRACTITIONER

## 2022-03-14 PROCEDURE — 1159F PR MEDICATION LIST DOCUMENTED IN MEDICAL RECORD: ICD-10-PCS | Mod: CPTII,S$GLB,, | Performed by: NURSE PRACTITIONER

## 2022-03-14 PROCEDURE — 1159F MED LIST DOCD IN RCRD: CPT | Mod: CPTII,S$GLB,, | Performed by: NURSE PRACTITIONER

## 2022-03-14 PROCEDURE — 3061F PR NEG MICROALBUMINURIA RESULT DOCUMENTED/REVIEW: ICD-10-PCS | Mod: CPTII,S$GLB,, | Performed by: NURSE PRACTITIONER

## 2022-03-14 PROCEDURE — 3066F PR DOCUMENTATION OF TREATMENT FOR NEPHROPATHY: ICD-10-PCS | Mod: CPTII,S$GLB,, | Performed by: NURSE PRACTITIONER

## 2022-03-14 PROCEDURE — 99213 PR OFFICE/OUTPT VISIT, EST, LEVL III, 20-29 MIN: ICD-10-PCS | Mod: S$GLB,,, | Performed by: NURSE PRACTITIONER

## 2022-03-14 PROCEDURE — 3077F SYST BP >= 140 MM HG: CPT | Mod: CPTII,S$GLB,, | Performed by: NURSE PRACTITIONER

## 2022-03-14 PROCEDURE — 3080F DIAST BP >= 90 MM HG: CPT | Mod: CPTII,S$GLB,, | Performed by: NURSE PRACTITIONER

## 2022-03-14 PROCEDURE — 3008F PR BODY MASS INDEX (BMI) DOCUMENTED: ICD-10-PCS | Mod: CPTII,S$GLB,, | Performed by: NURSE PRACTITIONER

## 2022-03-14 PROCEDURE — 4010F ACE/ARB THERAPY RXD/TAKEN: CPT | Mod: CPTII,S$GLB,, | Performed by: NURSE PRACTITIONER

## 2022-03-14 PROCEDURE — 3061F NEG MICROALBUMINURIA REV: CPT | Mod: CPTII,S$GLB,, | Performed by: NURSE PRACTITIONER

## 2022-03-14 PROCEDURE — 99213 OFFICE O/P EST LOW 20 MIN: CPT | Mod: S$GLB,,, | Performed by: NURSE PRACTITIONER

## 2022-03-14 PROCEDURE — 3008F BODY MASS INDEX DOCD: CPT | Mod: CPTII,S$GLB,, | Performed by: NURSE PRACTITIONER

## 2022-03-14 PROCEDURE — 99999 PR PBB SHADOW E&M-EST. PATIENT-LVL IV: ICD-10-PCS | Mod: PBBFAC,,, | Performed by: NURSE PRACTITIONER

## 2022-03-14 RX ORDER — OMEPRAZOLE 20 MG/1
20 CAPSULE, DELAYED RELEASE ORAL DAILY
Qty: 30 CAPSULE | Refills: 11 | Status: SHIPPED | OUTPATIENT
Start: 2022-03-14 | End: 2023-03-21

## 2022-03-14 NOTE — PROGRESS NOTES
Subjective:       Patient ID: Suhas Castillo is a 57 y.o. male.    Chief Complaint: Abdominal Pain, Gas, and Muscle Pain     HPI     Mr. Castillo is a 57 year old male patient that presents to clinic with a chief complaint of left sided abdominal pain. Past medical history of angioedema, degenerative joint disc disease, cervical spinal stenosis, hypertension, hyperlipidemia, type 2 diabetes mellitus, vitamin-D deficiency, obstructive sleep apnea, and allergic rhinitis.  Past surgical history and problem list reviewed as listed.  PCP is Dr. Higginbotham, he is new to me.   Reports left sided abdominal pain-characteristics: sharp, aching pain that comes and goes for approximately two to three months. Does not radiate. Localized to ponit of tenderness. Pain is primarily associated with flatulence. One episodes of indigestion and reflux. Denies nausea, vomiting, blood in stool, diarrhea, or constipation.  Aggravating factors: none identified. Treatments tried: eats high fiber diet and exercises daily for approximately 2.5 hours. Denies any OTC medications used to treat this problem.     Hypertension- bp above goal today. Reports adherence with amlodipine 5 mg daily, losartan 100 mg daily, hydrochlorothiazide 12.5 mg daily.  Denies chest pain, shortness and breath, palpitations.    ROS as listed.  Past Medical History:   Diagnosis Date    Angioedema 7/11/2019    AR (allergic rhinitis)     Cervical spinal stenosis     Diabetes mellitus type II     DJD (degenerative joint disease), cervical     Hyperlipidemia     Hypertension     Sleep apnea       Past Surgical History:   Procedure Laterality Date    COLONOSCOPY N/A 4/11/2019    Procedure: COLONOSCOPY;  Surgeon: Gato Corbin MD;  Location: [address];  Service: Endoscopy;  Laterality: N/A;    EPIDURAL BLOCK INJECTION      cervical x 2    EPIDURAL STEROID INJECTION N/A 9/28/2018    Procedure: Injection, Steroid, Epidural  C7-T1;  Surgeon: Bernie LEVINE  "MD Sean;  Location: [facility] PAIN MGT;  Service: Pain Management;  Laterality: N/A;  C7-T1 IL DEBORAH   MRDICALLY URGENT    EPIDURAL STEROID INJECTION N/A 10/26/2018    Procedure: Injection, Steroid, Epidural CERVICAL C7-T1 DEBORAH;  Surgeon: Bernie Estrada MD;  Location: [facility] PAIN MGT;  Service: Pain Management;  Laterality: N/A;    EPIDURAL STEROID INJECTION N/A 2/7/2019    Procedure: INJECTION, STEROID, EPIDURAL;  Surgeon: Tania Bajwa MD;  Location: [facility] PAIN MGT;  Service: Pain Management;  Laterality: N/A;  C DEBORAH C7-T1    ESTRADA PT ON EISSA SCHEDULE    EPIDURAL STEROID INJECTION N/A 7/27/2020    Procedure: INJECTION, STEROID, EPIDURAL, C7-T1;  Surgeon: Tania Bajwa MD;  Location: [facility] PAIN MGT;  Service: Pain Management;  Laterality: N/A;    TESTICLE SURGERY  age 16    torsion      Family History   Problem Relation Age of Onset    Hypertension Mother     Diabetes Mother     Stroke Mother       Review of patient's allergies indicates:   Allergen Reactions    Ace inhibitors Swelling     Review of Systems   Constitutional: Negative for activity change, appetite change, chills, fatigue and fever.   Gastrointestinal: Positive for abdominal pain (left side). Negative for abdominal distention, blood in stool, constipation, diarrhea, nausea and vomiting.        +flatulence   Genitourinary: Negative for difficulty urinating.       Objective:       Vitals:    03/14/22 1431   BP: (!) 149/96   BP Location: Left arm   Patient Position: Sitting   BP Method: Thigh Cuff (Automatic)   Pulse: 87   Temp: 98.1 °F (36.7 °C)   TempSrc: Oral   SpO2: 96%   Weight: 111.4 kg (245 lb 7.7 oz)   Height: 6' 3" (1.905 m)        Physical Exam  Vitals and nursing note reviewed.   Constitutional:       General: He is not in acute distress.     Appearance: Normal appearance. He is normal weight. He is not toxic-appearing.   HENT:      Head: Normocephalic and atraumatic.      Mouth/Throat:      Comments: Mask in place  Eyes:      " Conjunctiva/sclera: Conjunctivae normal.      Pupils: Pupils are equal, round, and reactive to light.   Cardiovascular:      Rate and Rhythm: Normal rate and regular rhythm.      Heart sounds: Normal heart sounds. No murmur heard.    No gallop.   Pulmonary:      Effort: No respiratory distress.      Breath sounds: Normal breath sounds. No wheezing.   Abdominal:      General: Bowel sounds are normal.      Palpations: Abdomen is soft. There is no mass.      Tenderness: There is abdominal tenderness in the left lower quadrant. There is no guarding.   Musculoskeletal:         General: Normal range of motion.      Cervical back: Normal range of motion.      Right lower leg: No edema.      Left lower leg: No edema.   Skin:     General: Skin is warm and dry.      Capillary Refill: Capillary refill takes less than 2 seconds.      Findings: No rash.   Neurological:      Mental Status: He is alert and oriented to person, place, and time.      Gait: Gait normal.   Psychiatric:         Mood and Affect: Mood normal.         Behavior: Behavior normal.         Lab Results   Component Value Date    WBC 8.05 04/26/2020    HGB 13.6 (L) 04/26/2020    HCT 45 04/26/2020     04/26/2020    CHOL 122 02/05/2021    TRIG 87 02/05/2021    HDL 31 (L) 02/05/2021    ALT 18 05/25/2021    AST 14 05/25/2021     05/25/2021    K 4.3 05/25/2021     05/25/2021    CREATININE 1.2 05/25/2021    BUN 14 05/25/2021    CO2 25 05/25/2021    TSH 1.685 04/02/2018    PSA 0.82 03/13/2019    HGBA1C 5.9 (H) 11/01/2021      Assessment:       1. Left sided abdominal pain    2. Gastroesophageal reflux disease without esophagitis    3. Elevated blood pressure reading in office with diagnosis of hypertension    4. Hypertension associated with diabetes        Plan:       Left sided abdominal pain  Left-sided abdominal pain of unknown etiology.  Patient negative for acute abdomen.  Negative for guarding.  Will check abdominal ultrasound to rule out  abnormality.  -     US Abdomen Complete; Future; Expected date: 03/14/2022    Gastroesophageal reflux disease without esophagitis  -     omeprazole (PRILOSEC) 20 MG capsule; Take 1 capsule (20 mg total) by mouth once daily.  Dispense: 30 capsule; Refill: 11    Elevated blood pressure reading in office with diagnosis of hypertension  Blood pressure elevated today.  Reports medication adherence with amlodipine 5 mg daily, losartan 100 mg daily, and hydrochlorothiazide 12.5 mg daily.  Recommend DASH diet.   Return to clinic 2 weeks for bp check.     Hypertension associated with diabetes      Medication List with Changes/Refills   New Medications    OMEPRAZOLE (PRILOSEC) 20 MG CAPSULE    Take 1 capsule (20 mg total) by mouth once daily.   Current Medications    AMLODIPINE (NORVASC) 5 MG TABLET    TAKE 1 AND 1/2 TABLETS(7.5 MG) BY MOUTH EVERY DAY    BLOOD PRESSURE MONITOR (IHEALTH EASE) XL ARM SIZE (OP)    by Other route as needed for High Blood Pressure.    BLOOD SUGAR DIAGNOSTIC (CONTOUR TEST STRIPS) STRP    USE TO TEST BLOOD SUGAR TWICE A DAY    FLUTICASONE PROPIONATE (FLONASE) 50 MCG/ACTUATION NASAL SPRAY    SHAKE LIQUID AND USE 2 SPRAYS(100 MCG) IN EACH NOSTRIL EVERY DAY    HYDROCHLOROTHIAZIDE (HYDRODIURIL) 12.5 MG TAB    Take 1 tablet (12.5 mg total) by mouth once daily.    JARDIANCE 10 MG TABLET    TAKE 1 TABLET(10 MG) BY MOUTH EVERY DAY    LEVOCETIRIZINE (XYZAL) 5 MG TABLET    Take 1 tablet (5 mg total) by mouth every evening.    LOSARTAN (COZAAR) 100 MG TABLET    Take 1 tablet (100 mg total) by mouth once daily.    METFORMIN (GLUCOPHAGE-XR) 500 MG ER 24HR TABLET    TAKE 2 TABLETS(1000 MG) BY MOUTH TWICE DAILY    PRAVASTATIN (PRAVACHOL) 10 MG TABLET    Take 1 tablet (10 mg total) by mouth once daily.    SITAGLIPTIN (JANUVIA) 100 MG TAB    Take 1 tablet (100 mg total) by mouth once daily.       Follow up in about 2 weeks (around 3/28/2022) for nurse visit for BP check.     Vandana Garcia, DNP, APRN,  FNP-C

## 2022-03-15 ENCOUNTER — HOSPITAL ENCOUNTER (OUTPATIENT)
Dept: RADIOLOGY | Facility: OTHER | Age: 57
Discharge: HOME | End: 2022-03-15
Attending: NURSE PRACTITIONER
Payer: COMMERCIAL

## 2022-03-15 DIAGNOSIS — R10.9 LEFT SIDED ABDOMINAL PAIN: ICD-10-CM

## 2022-03-15 PROCEDURE — 76700 US EXAM ABDOM COMPLETE: CPT | Mod: TC

## 2022-03-15 PROCEDURE — 76700 US EXAM ABDOM COMPLETE: CPT | Mod: 26,,, | Performed by: STUDENT IN AN ORGANIZED HEALTH CARE EDUCATION/TRAINING PROGRAM

## 2022-03-15 PROCEDURE — 76700 US ABDOMEN COMPLETE: ICD-10-PCS | Mod: 26,,, | Performed by: STUDENT IN AN ORGANIZED HEALTH CARE EDUCATION/TRAINING PROGRAM

## 2022-03-22 ENCOUNTER — PATIENT MESSAGE (OUTPATIENT)
Dept: PRIMARY CARE CLINIC | Facility: CLINIC | Age: 57
End: 2022-03-22
Payer: COMMERCIAL

## 2022-03-28 ENCOUNTER — CLINICAL SUPPORT (OUTPATIENT)
Dept: PRIMARY CARE CLINIC | Facility: CLINIC | Age: 57
End: 2022-03-28
Payer: COMMERCIAL

## 2022-03-28 VITALS — OXYGEN SATURATION: 97 % | DIASTOLIC BLOOD PRESSURE: 82 MMHG | HEART RATE: 78 BPM | SYSTOLIC BLOOD PRESSURE: 132 MMHG

## 2022-03-28 DIAGNOSIS — E11.59 HYPERTENSION ASSOCIATED WITH DIABETES: Primary | ICD-10-CM

## 2022-03-28 DIAGNOSIS — I15.2 HYPERTENSION ASSOCIATED WITH DIABETES: Primary | ICD-10-CM

## 2022-03-28 PROCEDURE — 99999 PR PBB SHADOW E&M-EST. PATIENT-LVL II: CPT | Mod: PBBFAC,,,

## 2022-03-28 PROCEDURE — 99499 UNLISTED E&M SERVICE: CPT | Mod: S$GLB,,, | Performed by: FAMILY MEDICINE

## 2022-03-28 PROCEDURE — 99999 PR PBB SHADOW E&M-EST. PATIENT-LVL II: ICD-10-PCS | Mod: PBBFAC,,,

## 2022-03-28 PROCEDURE — 99499 NO LOS: ICD-10-PCS | Mod: S$GLB,,, | Performed by: FAMILY MEDICINE

## 2022-03-28 NOTE — PROGRESS NOTES
Suhas Castillo 57 y.o. male is here today for Blood Pressure check.   History of HTN yes.    Review of patient's allergies indicates:   Allergen Reactions    Ace inhibitors Swelling     Creatinine   Date Value Ref Range Status   05/25/2021 1.2 0.5 - 1.4 mg/dL Final     Sodium   Date Value Ref Range Status   05/25/2021 139 136 - 145 mmol/L Final     Potassium   Date Value Ref Range Status   05/25/2021 4.3 3.5 - 5.1 mmol/L Final   ]  Patient verifies taking blood pressure medications on a regular basis at the same time of the day.     Current Outpatient Medications:     amLODIPine (NORVASC) 5 MG tablet, TAKE 1 AND 1/2 TABLETS(7.5 MG) BY MOUTH EVERY DAY, Disp: 135 tablet, Rfl: 3    blood pressure monitor (IHEALTH EASE) XL arm size (OP), by Other route as needed for High Blood Pressure., Disp: 1 each, Rfl: 0    blood sugar diagnostic (CONTOUR TEST STRIPS) Strp, USE TO TEST BLOOD SUGAR TWICE A DAY, Disp: 50 each, Rfl: 11    fluticasone propionate (FLONASE) 50 mcg/actuation nasal spray, SHAKE LIQUID AND USE 2 SPRAYS(100 MCG) IN EACH NOSTRIL EVERY DAY, Disp: 48 g, Rfl: 3    hydroCHLOROthiazide (HYDRODIURIL) 12.5 MG Tab, Take 1 tablet (12.5 mg total) by mouth once daily., Disp: 90 tablet, Rfl: 3    JARDIANCE 10 mg tablet, TAKE 1 TABLET(10 MG) BY MOUTH EVERY DAY, Disp: 90 tablet, Rfl: 3    levocetirizine (XYZAL) 5 MG tablet, Take 1 tablet (5 mg total) by mouth every evening., Disp: 30 tablet, Rfl: 11    losartan (COZAAR) 100 MG tablet, Take 1 tablet (100 mg total) by mouth once daily., Disp: 90 tablet, Rfl: 3    metFORMIN (GLUCOPHAGE-XR) 500 MG ER 24hr tablet, TAKE 2 TABLETS(1000 MG) BY MOUTH TWICE DAILY, Disp: 360 tablet, Rfl: 3    omeprazole (PRILOSEC) 20 MG capsule, Take 1 capsule (20 mg total) by mouth once daily., Disp: 30 capsule, Rfl: 11    pravastatin (PRAVACHOL) 10 MG tablet, Take 1 tablet (10 mg total) by mouth once daily., Disp: 90 tablet, Rfl: 3    SITagliptin (JANUVIA) 100 MG Tab, Take 1 tablet  (100 mg total) by mouth once daily., Disp: 90 tablet, Rfl: 3  Does patient have record of home blood pressure readings no. .   Last dose of blood pressure medication was taken at 6:30 this am.  Patient is asymptomatic.   Complains of none.    Vitals:    03/28/22 0853   BP: 132/82   BP Location: Left arm   Patient Position: Sitting   BP Method: Medium (Manual)   Pulse: 78   SpO2: 97%         Dr. Higginbotham informed of nurse visit.

## 2022-06-07 ENCOUNTER — PATIENT MESSAGE (OUTPATIENT)
Dept: PRIMARY CARE CLINIC | Facility: CLINIC | Age: 57
End: 2022-06-07
Payer: COMMERCIAL

## 2022-06-28 ENCOUNTER — LAB VISIT (OUTPATIENT)
Dept: PRIMARY CARE CLINIC | Facility: CLINIC | Age: 57
End: 2022-06-28
Payer: COMMERCIAL

## 2022-06-28 DIAGNOSIS — E11.69 HYPERLIPIDEMIA ASSOCIATED WITH TYPE 2 DIABETES MELLITUS: ICD-10-CM

## 2022-06-28 DIAGNOSIS — E11.65 TYPE 2 DIABETES MELLITUS WITH HYPERGLYCEMIA, WITHOUT LONG-TERM CURRENT USE OF INSULIN: ICD-10-CM

## 2022-06-28 DIAGNOSIS — I15.2 HYPERTENSION ASSOCIATED WITH DIABETES: ICD-10-CM

## 2022-06-28 DIAGNOSIS — E11.59 HYPERTENSION ASSOCIATED WITH DIABETES: ICD-10-CM

## 2022-06-28 DIAGNOSIS — E78.5 HYPERLIPIDEMIA ASSOCIATED WITH TYPE 2 DIABETES MELLITUS: ICD-10-CM

## 2022-06-28 LAB
ALBUMIN SERPL BCP-MCNC: 3.8 G/DL (ref 3.5–5.2)
ALP SERPL-CCNC: 81 U/L (ref 55–135)
ALT SERPL W/O P-5'-P-CCNC: 18 U/L (ref 10–44)
ANION GAP SERPL CALC-SCNC: 6 MMOL/L (ref 8–16)
AST SERPL-CCNC: 22 U/L (ref 10–40)
BASOPHILS # BLD AUTO: 0.06 K/UL (ref 0–0.2)
BASOPHILS NFR BLD: 0.8 % (ref 0–1.9)
BILIRUB SERPL-MCNC: 0.4 MG/DL (ref 0.1–1)
BUN SERPL-MCNC: 21 MG/DL (ref 6–20)
CALCIUM SERPL-MCNC: 9.1 MG/DL (ref 8.7–10.5)
CHLORIDE SERPL-SCNC: 105 MMOL/L (ref 95–110)
CHOLEST SERPL-MCNC: 112 MG/DL (ref 120–199)
CHOLEST/HDLC SERPL: 3.5 {RATIO} (ref 2–5)
CO2 SERPL-SCNC: 28 MMOL/L (ref 23–29)
CREAT SERPL-MCNC: 1.3 MG/DL (ref 0.5–1.4)
DIFFERENTIAL METHOD: ABNORMAL
EOSINOPHIL # BLD AUTO: 0.1 K/UL (ref 0–0.5)
EOSINOPHIL NFR BLD: 0.8 % (ref 0–8)
ERYTHROCYTE [DISTWIDTH] IN BLOOD BY AUTOMATED COUNT: 15.5 % (ref 11.5–14.5)
EST. GFR  (AFRICAN AMERICAN): >60 ML/MIN/1.73 M^2
EST. GFR  (NON AFRICAN AMERICAN): >60 ML/MIN/1.73 M^2
ESTIMATED AVG GLUCOSE: 140 MG/DL (ref 68–131)
GLUCOSE SERPL-MCNC: 131 MG/DL (ref 70–110)
HBA1C MFR BLD: 6.5 % (ref 4–5.6)
HCT VFR BLD AUTO: 44.1 % (ref 40–54)
HDLC SERPL-MCNC: 32 MG/DL (ref 40–75)
HDLC SERPL: 28.6 % (ref 20–50)
HGB BLD-MCNC: 13.4 G/DL (ref 14–18)
IMM GRANULOCYTES # BLD AUTO: 0.02 K/UL (ref 0–0.04)
IMM GRANULOCYTES NFR BLD AUTO: 0.3 % (ref 0–0.5)
LDLC SERPL CALC-MCNC: 64.6 MG/DL (ref 63–159)
LYMPHOCYTES # BLD AUTO: 2 K/UL (ref 1–4.8)
LYMPHOCYTES NFR BLD: 26.5 % (ref 18–48)
MCH RBC QN AUTO: 22.9 PG (ref 27–31)
MCHC RBC AUTO-ENTMCNC: 30.4 G/DL (ref 32–36)
MCV RBC AUTO: 75 FL (ref 82–98)
MONOCYTES # BLD AUTO: 0.8 K/UL (ref 0.3–1)
MONOCYTES NFR BLD: 10.1 % (ref 4–15)
NEUTROPHILS # BLD AUTO: 4.7 K/UL (ref 1.8–7.7)
NEUTROPHILS NFR BLD: 61.5 % (ref 38–73)
NONHDLC SERPL-MCNC: 80 MG/DL
NRBC BLD-RTO: 0 /100 WBC
PLATELET # BLD AUTO: 266 K/UL (ref 150–450)
PMV BLD AUTO: 12.9 FL (ref 9.2–12.9)
POTASSIUM SERPL-SCNC: 4 MMOL/L (ref 3.5–5.1)
PROT SERPL-MCNC: 7.3 G/DL (ref 6–8.4)
RBC # BLD AUTO: 5.86 M/UL (ref 4.6–6.2)
SODIUM SERPL-SCNC: 139 MMOL/L (ref 136–145)
TRIGL SERPL-MCNC: 77 MG/DL (ref 30–150)
WBC # BLD AUTO: 7.55 K/UL (ref 3.9–12.7)

## 2022-06-28 PROCEDURE — 36415 COLL VENOUS BLD VENIPUNCTURE: CPT | Mod: S$GLB,,, | Performed by: FAMILY MEDICINE

## 2022-06-28 PROCEDURE — 80053 COMPREHEN METABOLIC PANEL: CPT | Performed by: FAMILY MEDICINE

## 2022-06-28 PROCEDURE — 36415 PR COLLECTION VENOUS BLOOD,VENIPUNCTURE: ICD-10-PCS | Mod: S$GLB,,, | Performed by: FAMILY MEDICINE

## 2022-06-28 PROCEDURE — 80061 LIPID PANEL: CPT | Performed by: FAMILY MEDICINE

## 2022-06-28 PROCEDURE — 85025 COMPLETE CBC W/AUTO DIFF WBC: CPT | Performed by: FAMILY MEDICINE

## 2022-06-28 PROCEDURE — 83036 HEMOGLOBIN GLYCOSYLATED A1C: CPT | Performed by: FAMILY MEDICINE

## 2022-07-05 ENCOUNTER — OFFICE VISIT (OUTPATIENT)
Dept: PRIMARY CARE CLINIC | Facility: CLINIC | Age: 57
End: 2022-07-05
Payer: COMMERCIAL

## 2022-07-05 ENCOUNTER — LAB VISIT (OUTPATIENT)
Dept: PRIMARY CARE CLINIC | Facility: CLINIC | Age: 57
End: 2022-07-05
Payer: COMMERCIAL

## 2022-07-05 VITALS
HEIGHT: 75 IN | SYSTOLIC BLOOD PRESSURE: 137 MMHG | BODY MASS INDEX: 29.93 KG/M2 | HEART RATE: 71 BPM | WEIGHT: 240.75 LBS | DIASTOLIC BLOOD PRESSURE: 79 MMHG | OXYGEN SATURATION: 97 %

## 2022-07-05 DIAGNOSIS — E11.69 HYPERLIPIDEMIA ASSOCIATED WITH TYPE 2 DIABETES MELLITUS: ICD-10-CM

## 2022-07-05 DIAGNOSIS — J30.9 ALLERGIC RHINITIS, UNSPECIFIED SEASONALITY, UNSPECIFIED TRIGGER: ICD-10-CM

## 2022-07-05 DIAGNOSIS — M79.604 RIGHT LEG PAIN: ICD-10-CM

## 2022-07-05 DIAGNOSIS — E11.65 TYPE 2 DIABETES MELLITUS WITH HYPERGLYCEMIA, WITHOUT LONG-TERM CURRENT USE OF INSULIN: ICD-10-CM

## 2022-07-05 DIAGNOSIS — E55.9 VITAMIN D DEFICIENCY: ICD-10-CM

## 2022-07-05 DIAGNOSIS — E11.59 HYPERTENSION ASSOCIATED WITH DIABETES: Primary | ICD-10-CM

## 2022-07-05 DIAGNOSIS — Z23 NEED FOR SHINGLES VACCINE: ICD-10-CM

## 2022-07-05 DIAGNOSIS — E78.5 HYPERLIPIDEMIA ASSOCIATED WITH TYPE 2 DIABETES MELLITUS: ICD-10-CM

## 2022-07-05 DIAGNOSIS — I15.2 HYPERTENSION ASSOCIATED WITH DIABETES: Primary | ICD-10-CM

## 2022-07-05 DIAGNOSIS — D50.9 MICROCYTIC ANEMIA: ICD-10-CM

## 2022-07-05 DIAGNOSIS — M48.02 CERVICAL SPINAL STENOSIS: ICD-10-CM

## 2022-07-05 DIAGNOSIS — G47.33 OSA (OBSTRUCTIVE SLEEP APNEA): ICD-10-CM

## 2022-07-05 LAB
FERRITIN SERPL-MCNC: 39 NG/ML (ref 20–300)
IRON SERPL-MCNC: 48 UG/DL (ref 45–160)
SATURATED IRON: 13 % (ref 20–50)
TOTAL IRON BINDING CAPACITY: 374 UG/DL (ref 250–450)
TRANSFERRIN SERPL-MCNC: 253 MG/DL (ref 200–375)

## 2022-07-05 PROCEDURE — 3078F PR MOST RECENT DIASTOLIC BLOOD PRESSURE < 80 MM HG: ICD-10-PCS | Mod: CPTII,S$GLB,, | Performed by: FAMILY MEDICINE

## 2022-07-05 PROCEDURE — 3066F NEPHROPATHY DOC TX: CPT | Mod: CPTII,S$GLB,, | Performed by: FAMILY MEDICINE

## 2022-07-05 PROCEDURE — 99999 PR PBB SHADOW E&M-EST. PATIENT-LVL III: CPT | Mod: PBBFAC,,, | Performed by: FAMILY MEDICINE

## 2022-07-05 PROCEDURE — 3008F PR BODY MASS INDEX (BMI) DOCUMENTED: ICD-10-PCS | Mod: CPTII,S$GLB,, | Performed by: FAMILY MEDICINE

## 2022-07-05 PROCEDURE — 99999 PR PBB SHADOW E&M-EST. PATIENT-LVL III: ICD-10-PCS | Mod: PBBFAC,,, | Performed by: FAMILY MEDICINE

## 2022-07-05 PROCEDURE — 3061F PR NEG MICROALBUMINURIA RESULT DOCUMENTED/REVIEW: ICD-10-PCS | Mod: CPTII,S$GLB,, | Performed by: FAMILY MEDICINE

## 2022-07-05 PROCEDURE — 84466 ASSAY OF TRANSFERRIN: CPT | Performed by: FAMILY MEDICINE

## 2022-07-05 PROCEDURE — 3061F NEG MICROALBUMINURIA REV: CPT | Mod: CPTII,S$GLB,, | Performed by: FAMILY MEDICINE

## 2022-07-05 PROCEDURE — 3066F PR DOCUMENTATION OF TREATMENT FOR NEPHROPATHY: ICD-10-PCS | Mod: CPTII,S$GLB,, | Performed by: FAMILY MEDICINE

## 2022-07-05 PROCEDURE — 3075F SYST BP GE 130 - 139MM HG: CPT | Mod: CPTII,S$GLB,, | Performed by: FAMILY MEDICINE

## 2022-07-05 PROCEDURE — 82728 ASSAY OF FERRITIN: CPT | Performed by: FAMILY MEDICINE

## 2022-07-05 PROCEDURE — 3078F DIAST BP <80 MM HG: CPT | Mod: CPTII,S$GLB,, | Performed by: FAMILY MEDICINE

## 2022-07-05 PROCEDURE — 36415 COLL VENOUS BLD VENIPUNCTURE: CPT | Mod: S$GLB,,, | Performed by: FAMILY MEDICINE

## 2022-07-05 PROCEDURE — 3044F HG A1C LEVEL LT 7.0%: CPT | Mod: CPTII,S$GLB,, | Performed by: FAMILY MEDICINE

## 2022-07-05 PROCEDURE — 99214 OFFICE O/P EST MOD 30 MIN: CPT | Mod: S$GLB,,, | Performed by: FAMILY MEDICINE

## 2022-07-05 PROCEDURE — 36415 PR COLLECTION VENOUS BLOOD,VENIPUNCTURE: ICD-10-PCS | Mod: S$GLB,,, | Performed by: FAMILY MEDICINE

## 2022-07-05 PROCEDURE — 4010F PR ACE/ARB THEARPY RXD/TAKEN: ICD-10-PCS | Mod: CPTII,S$GLB,, | Performed by: FAMILY MEDICINE

## 2022-07-05 PROCEDURE — 3044F PR MOST RECENT HEMOGLOBIN A1C LEVEL <7.0%: ICD-10-PCS | Mod: CPTII,S$GLB,, | Performed by: FAMILY MEDICINE

## 2022-07-05 PROCEDURE — 3008F BODY MASS INDEX DOCD: CPT | Mod: CPTII,S$GLB,, | Performed by: FAMILY MEDICINE

## 2022-07-05 PROCEDURE — 4010F ACE/ARB THERAPY RXD/TAKEN: CPT | Mod: CPTII,S$GLB,, | Performed by: FAMILY MEDICINE

## 2022-07-05 PROCEDURE — 3075F PR MOST RECENT SYSTOLIC BLOOD PRESS GE 130-139MM HG: ICD-10-PCS | Mod: CPTII,S$GLB,, | Performed by: FAMILY MEDICINE

## 2022-07-05 PROCEDURE — 99214 PR OFFICE/OUTPT VISIT, EST, LEVL IV, 30-39 MIN: ICD-10-PCS | Mod: S$GLB,,, | Performed by: FAMILY MEDICINE

## 2022-07-05 RX ORDER — ZOSTER VACCINE RECOMBINANT, ADJUVANTED 50 MCG/0.5
0.5 KIT INTRAMUSCULAR ONCE
Qty: 1 EACH | Refills: 0 | Status: SHIPPED | OUTPATIENT
Start: 2022-07-05 | End: 2022-07-05

## 2022-07-07 ENCOUNTER — PATIENT OUTREACH (OUTPATIENT)
Dept: PRIMARY CARE CLINIC | Facility: CLINIC | Age: 57
End: 2022-07-07
Payer: COMMERCIAL

## 2022-08-09 DIAGNOSIS — E11.65 TYPE 2 DIABETES MELLITUS WITH HYPERGLYCEMIA, WITHOUT LONG-TERM CURRENT USE OF INSULIN: ICD-10-CM

## 2022-08-09 RX ORDER — EMPAGLIFLOZIN 10 MG/1
10 TABLET, FILM COATED ORAL DAILY
Qty: 90 TABLET | Refills: 3 | Status: SHIPPED | OUTPATIENT
Start: 2022-08-09 | End: 2022-12-21 | Stop reason: SDUPTHER

## 2022-08-12 DIAGNOSIS — I15.2 HYPERTENSION ASSOCIATED WITH DIABETES: ICD-10-CM

## 2022-08-12 DIAGNOSIS — E11.69 HYPERLIPIDEMIA ASSOCIATED WITH TYPE 2 DIABETES MELLITUS: ICD-10-CM

## 2022-08-12 DIAGNOSIS — E78.5 HYPERLIPIDEMIA ASSOCIATED WITH TYPE 2 DIABETES MELLITUS: ICD-10-CM

## 2022-08-12 DIAGNOSIS — E11.59 HYPERTENSION ASSOCIATED WITH DIABETES: ICD-10-CM

## 2022-08-14 RX ORDER — HYDROCHLOROTHIAZIDE 12.5 MG/1
12.5 TABLET ORAL DAILY
Qty: 90 TABLET | Refills: 3 | Status: SHIPPED | OUTPATIENT
Start: 2022-08-14 | End: 2023-08-08 | Stop reason: SDUPTHER

## 2022-08-14 RX ORDER — PRAVASTATIN SODIUM 10 MG/1
10 TABLET ORAL DAILY
Qty: 90 TABLET | Refills: 3 | Status: SHIPPED | OUTPATIENT
Start: 2022-08-14 | End: 2023-08-29 | Stop reason: SDUPTHER

## 2022-09-22 ENCOUNTER — OFFICE VISIT (OUTPATIENT)
Dept: PRIMARY CARE CLINIC | Facility: CLINIC | Age: 57
End: 2022-09-22
Payer: COMMERCIAL

## 2022-09-22 VITALS
HEART RATE: 73 BPM | HEIGHT: 75 IN | TEMPERATURE: 99 F | DIASTOLIC BLOOD PRESSURE: 75 MMHG | WEIGHT: 242.63 LBS | BODY MASS INDEX: 30.17 KG/M2 | OXYGEN SATURATION: 98 % | SYSTOLIC BLOOD PRESSURE: 128 MMHG

## 2022-09-22 DIAGNOSIS — J01.00 ACUTE MAXILLARY SINUSITIS, RECURRENCE NOT SPECIFIED: Primary | ICD-10-CM

## 2022-09-22 PROCEDURE — 3044F HG A1C LEVEL LT 7.0%: CPT | Mod: CPTII,S$GLB,, | Performed by: NURSE PRACTITIONER

## 2022-09-22 PROCEDURE — 99999 PR PBB SHADOW E&M-EST. PATIENT-LVL IV: ICD-10-PCS | Mod: PBBFAC,,, | Performed by: NURSE PRACTITIONER

## 2022-09-22 PROCEDURE — 3061F NEG MICROALBUMINURIA REV: CPT | Mod: CPTII,S$GLB,, | Performed by: NURSE PRACTITIONER

## 2022-09-22 PROCEDURE — 3061F PR NEG MICROALBUMINURIA RESULT DOCUMENTED/REVIEW: ICD-10-PCS | Mod: CPTII,S$GLB,, | Performed by: NURSE PRACTITIONER

## 2022-09-22 PROCEDURE — 3066F PR DOCUMENTATION OF TREATMENT FOR NEPHROPATHY: ICD-10-PCS | Mod: CPTII,S$GLB,, | Performed by: NURSE PRACTITIONER

## 2022-09-22 PROCEDURE — 3074F SYST BP LT 130 MM HG: CPT | Mod: CPTII,S$GLB,, | Performed by: NURSE PRACTITIONER

## 2022-09-22 PROCEDURE — 4010F ACE/ARB THERAPY RXD/TAKEN: CPT | Mod: CPTII,S$GLB,, | Performed by: NURSE PRACTITIONER

## 2022-09-22 PROCEDURE — 3066F NEPHROPATHY DOC TX: CPT | Mod: CPTII,S$GLB,, | Performed by: NURSE PRACTITIONER

## 2022-09-22 PROCEDURE — 1159F MED LIST DOCD IN RCRD: CPT | Mod: CPTII,S$GLB,, | Performed by: NURSE PRACTITIONER

## 2022-09-22 PROCEDURE — 3074F PR MOST RECENT SYSTOLIC BLOOD PRESSURE < 130 MM HG: ICD-10-PCS | Mod: CPTII,S$GLB,, | Performed by: NURSE PRACTITIONER

## 2022-09-22 PROCEDURE — 99213 PR OFFICE/OUTPT VISIT, EST, LEVL III, 20-29 MIN: ICD-10-PCS | Mod: S$GLB,,, | Performed by: NURSE PRACTITIONER

## 2022-09-22 PROCEDURE — 1159F PR MEDICATION LIST DOCUMENTED IN MEDICAL RECORD: ICD-10-PCS | Mod: CPTII,S$GLB,, | Performed by: NURSE PRACTITIONER

## 2022-09-22 PROCEDURE — 99999 PR PBB SHADOW E&M-EST. PATIENT-LVL IV: CPT | Mod: PBBFAC,,, | Performed by: NURSE PRACTITIONER

## 2022-09-22 PROCEDURE — 3044F PR MOST RECENT HEMOGLOBIN A1C LEVEL <7.0%: ICD-10-PCS | Mod: CPTII,S$GLB,, | Performed by: NURSE PRACTITIONER

## 2022-09-22 PROCEDURE — 4010F PR ACE/ARB THEARPY RXD/TAKEN: ICD-10-PCS | Mod: CPTII,S$GLB,, | Performed by: NURSE PRACTITIONER

## 2022-09-22 PROCEDURE — 3078F DIAST BP <80 MM HG: CPT | Mod: CPTII,S$GLB,, | Performed by: NURSE PRACTITIONER

## 2022-09-22 PROCEDURE — 99213 OFFICE O/P EST LOW 20 MIN: CPT | Mod: S$GLB,,, | Performed by: NURSE PRACTITIONER

## 2022-09-22 PROCEDURE — 3008F PR BODY MASS INDEX (BMI) DOCUMENTED: ICD-10-PCS | Mod: CPTII,S$GLB,, | Performed by: NURSE PRACTITIONER

## 2022-09-22 PROCEDURE — 3078F PR MOST RECENT DIASTOLIC BLOOD PRESSURE < 80 MM HG: ICD-10-PCS | Mod: CPTII,S$GLB,, | Performed by: NURSE PRACTITIONER

## 2022-09-22 PROCEDURE — 3008F BODY MASS INDEX DOCD: CPT | Mod: CPTII,S$GLB,, | Performed by: NURSE PRACTITIONER

## 2022-09-22 RX ORDER — AMOXICILLIN AND CLAVULANATE POTASSIUM 875; 125 MG/1; MG/1
1 TABLET, FILM COATED ORAL EVERY 12 HOURS
Qty: 20 TABLET | Refills: 0 | Status: SHIPPED | OUTPATIENT
Start: 2022-09-22 | End: 2022-10-02

## 2022-09-22 RX ORDER — GENTAMICIN SULFATE 3 MG/ML
SOLUTION/ DROPS OPHTHALMIC
COMMUNITY
Start: 2022-06-17 | End: 2023-10-27

## 2022-09-22 RX ORDER — SIMETHICONE 180 MG
250 CAPSULE ORAL 2 TIMES DAILY PRN
Status: CANCELLED | OUTPATIENT
Start: 2022-09-22

## 2022-09-22 RX ORDER — SIMETHICONE 125 MG
125 CAPSULE ORAL 4 TIMES DAILY PRN
Refills: 0 | Status: CANCELLED | OUTPATIENT
Start: 2022-09-22

## 2022-09-22 NOTE — PROGRESS NOTES
Subjective:       Patient ID: Suhas Castillo is a 57 y.o. male.    Chief Complaint: Nasal Congestion    Mr. Suhas Castillo is a 57 year old male, new to me, presents to the clinic with sinus issues. PCP is Len Higginbotham. Medical and surgical history in addition to problem list reviewed as listed below.    Sinusitis  This is a new problem. The current episode started 1 to 4 weeks ago. The problem is unchanged. There has been no fever. His pain is at a severity of 0/10. He is experiencing no pain. Associated symptoms include a hoarse voice and sinus pressure. Pertinent negatives include no chills, congestion, coughing, ear pain, headaches, sneezing, sore throat or swollen glands. (Green thick copious amount of phlegm) Past treatments include oral decongestants and spray decongestants. The treatment provided no relief.     Past Medical History:   Diagnosis Date    Angioedema 7/11/2019    AR (allergic rhinitis)     Cervical spinal stenosis     Diabetes mellitus type II     DJD (degenerative joint disease), cervical     Hyperlipidemia     Hypertension     Sleep apnea         Past Surgical History:   Procedure Laterality Date    COLONOSCOPY N/A 4/11/2019    Procedure: COLONOSCOPY;  Surgeon: Gato Corbin MD;  Location: [address]);  Service: Endoscopy;  Laterality: N/A;    EPIDURAL BLOCK INJECTION      cervical x 2    EPIDURAL STEROID INJECTION N/A 9/28/2018    Procedure: Injection, Steroid, Epidural  C7-T1;  Surgeon: Bernie Estrada MD;  [facility];  Service: Pain Management;  Laterality: N/A;  C7-T1 IL DEBORAH   MRDICALLY URGENT    EPIDURAL STEROID INJECTION N/A 10/26/2018    Procedure: Injection, Steroid, Epidural CERVICAL C7-T1 DEBORAH;  Surgeon: Bernie Estrada MD;  Location: [facility];  Service: Pain Management;  Laterality: N/A;    EPIDURAL STEROID INJECTION N/A 2/7/2019    Procedure: INJECTION, STEROID, EPIDURAL;  Surgeon: Tania Bajwa MD;  Location: [facility] T;  Service: Pain  Management;  Laterality: N/A;  C DEBORAH C7-T1    GUZMÁN PT ON EISSA SCHEDULE    EPIDURAL STEROID INJECTION N/A 7/27/2020    Procedure: INJECTION, STEROID, EPIDURAL, C7-T1;  Surgeon: Tania Bajwa MD;  Location: [facility];  Service: Pain Management;  Laterality: N/A;    TESTICLE SURGERY  age 16    torsion        Family History   Problem Relation Age of Onset    Hypertension Mother     Diabetes Mother     Stroke Mother        Social History     Tobacco Use   Smoking Status Never   Smokeless Tobacco Never       Social History     Social History Narrative    The patient does exercise regularly (treadmill, light weights: TIW-QIW).      Rates diet as good.      He is satisfied with weight.    He does drink at least 1/2 gallon water daily.    He drinks 0 coffee/tea/caffeine-containing soft drinks daily.    Total sleep time at night is 4-5 hours.    He works 65 hours per week.    He does wear seat belts.    Hobbies include bicycling.               Review of patient's allergies indicates:   Allergen Reactions    Sinus allergy         Review of Systems   Constitutional:  Negative for chills and fever.   HENT:  Positive for hoarse voice and sinus pressure. Negative for congestion, ear pain, sneezing and sore throat.    Respiratory:  Negative for cough.    Cardiovascular: Negative.    Gastrointestinal:  Negative for nausea and vomiting.   Musculoskeletal: Negative.    Skin: Negative.    Neurological:  Negative for headaches.       Objective:        Vitals:    09/22/22 0851   BP: 128/75   Pulse: 73   Temp: 98.7 °F (37.1 °C)        Physical Exam  Constitutional:       Appearance: He is well-developed.   HENT:      Head: Normocephalic and atraumatic.      Right Ear: External ear normal.      Left Ear: External ear normal.   Eyes:      Extraocular Movements: Extraocular movements intact.      Conjunctiva/sclera: Conjunctivae normal.   Cardiovascular:      Rate and Rhythm: Normal rate and regular rhythm.      Heart sounds: Normal  heart sounds.   Pulmonary:      Effort: Pulmonary effort is normal.      Breath sounds: Normal breath sounds.   Musculoskeletal:         General: Normal range of motion.      Cervical back: Normal range of motion.   Skin:     General: Skin is warm and dry.   Neurological:      Mental Status: He is alert and oriented to person, place, and time.      Motor: No abnormal muscle tone.   Psychiatric:         Behavior: Behavior normal.       Assessment:       1. Acute maxillary sinusitis, recurrence not specified        Plan:       Acute maxillary sinusitis, recurrence not specified  Maintain hydration  Yogurt with Antibiotics as tolerated  Alternate Tylenol and Ibuprofen for pain/discomfort.  Humidifier/Neti Pot as needed  amoxicillin-clavulanate 875-125mg (AUGMENTIN) 875-125 mg per tablet; Take 1 tablet by mouth every 12 (twelve) hours. for 10 days  Dispense: 20 tablet; Refill: 0     Medication List with Changes/Refills   New Medications    AMOXICILLIN-CLAVULANATE 875-125MG (AUGMENTIN) 875-125 MG PER TABLET    Take 1 tablet by mouth every 12 (twelve) hours. for 10 days   Current Medications    AMLODIPINE (NORVASC) 5 MG TABLET    TAKE 1 AND 1/2 TABLETS(7.5 MG) BY MOUTH EVERY DAY    BLOOD PRESSURE MONITOR (IHEALPicaboo EASE) XL ARM SIZE (OP)    by Other route as needed for High Blood Pressure.    BLOOD SUGAR DIAGNOSTIC (CONTOUR TEST STRIPS) STRP    USE TO TEST BLOOD SUGAR TWICE A DAY    EMPAGLIFLOZIN (JARDIANCE) 10 MG TABLET    Take 1 tablet (10 mg total) by mouth once daily.    FLUTICASONE PROPIONATE (FLONASE) 50 MCG/ACTUATION NASAL SPRAY    SHAKE LIQUID AND USE 2 SPRAYS(100 MCG) IN EACH NOSTRIL EVERY DAY    GENTAMICIN (GARAMYCIN) 0.3 % OPHTHALMIC SOLUTION    Place into the right eye.    HYDROCHLOROTHIAZIDE (HYDRODIURIL) 12.5 MG TAB    Take 1 tablet (12.5 mg total) by mouth once daily.    LEVOCETIRIZINE (XYZAL) 5 MG TABLET    Take 1 tablet (5 mg total) by mouth every evening.    LOSARTAN (COZAAR) 100 MG TABLET    Take 1 tablet  (100 mg total) by mouth once daily.    METFORMIN (GLUCOPHAGE-XR) 500 MG ER 24HR TABLET    TAKE 2 TABLETS(1000 MG) BY MOUTH TWICE DAILY    OMEPRAZOLE (PRILOSEC) 20 MG CAPSULE    Take 1 capsule (20 mg total) by mouth once daily.    PRAVASTATIN (PRAVACHOL) 10 MG TABLET    Take 1 tablet (10 mg total) by mouth once daily.    SITAGLIPTIN (JANUVIA) 100 MG TAB    Take 1 tablet (100 mg total) by mouth once daily.            Follow up if symptoms worsen or fail to improve.    Jocelin Santana, APRN, MSN, FNP-C

## 2022-10-04 ENCOUNTER — PATIENT OUTREACH (OUTPATIENT)
Dept: ADMINISTRATIVE | Facility: HOSPITAL | Age: 57
End: 2022-10-04
Payer: COMMERCIAL

## 2022-10-04 ENCOUNTER — PATIENT MESSAGE (OUTPATIENT)
Dept: ADMINISTRATIVE | Facility: HOSPITAL | Age: 57
End: 2022-10-04
Payer: COMMERCIAL

## 2022-10-05 DIAGNOSIS — E11.9 TYPE 2 DIABETES MELLITUS WITHOUT COMPLICATION, UNSPECIFIED WHETHER LONG TERM INSULIN USE: ICD-10-CM

## 2023-04-27 ENCOUNTER — LAB VISIT (OUTPATIENT)
Dept: LAB | Facility: HOSPITAL | Age: 58
End: 2023-04-27
Attending: INTERNAL MEDICINE
Payer: COMMERCIAL

## 2023-04-27 ENCOUNTER — PATIENT MESSAGE (OUTPATIENT)
Dept: ADMINISTRATIVE | Facility: HOSPITAL | Age: 58
End: 2023-04-27
Payer: COMMERCIAL

## 2023-04-27 ENCOUNTER — OFFICE VISIT (OUTPATIENT)
Dept: INTERNAL MEDICINE | Facility: CLINIC | Age: 58
End: 2023-04-27
Payer: COMMERCIAL

## 2023-04-27 VITALS
DIASTOLIC BLOOD PRESSURE: 84 MMHG | HEART RATE: 80 BPM | SYSTOLIC BLOOD PRESSURE: 138 MMHG | HEIGHT: 74 IN | BODY MASS INDEX: 31.29 KG/M2 | WEIGHT: 243.81 LBS | RESPIRATION RATE: 20 BRPM | OXYGEN SATURATION: 98 % | TEMPERATURE: 98 F

## 2023-04-27 DIAGNOSIS — E11.69 HYPERLIPIDEMIA ASSOCIATED WITH TYPE 2 DIABETES MELLITUS: ICD-10-CM

## 2023-04-27 DIAGNOSIS — E66.2 OBESITY HYPOVENTILATION SYNDROME: ICD-10-CM

## 2023-04-27 DIAGNOSIS — E11.59 HYPERTENSION ASSOCIATED WITH DIABETES: ICD-10-CM

## 2023-04-27 DIAGNOSIS — I15.2 HYPERTENSION ASSOCIATED WITH DIABETES: ICD-10-CM

## 2023-04-27 DIAGNOSIS — E78.5 HYPERLIPIDEMIA ASSOCIATED WITH TYPE 2 DIABETES MELLITUS: ICD-10-CM

## 2023-04-27 DIAGNOSIS — E11.65 TYPE 2 DIABETES MELLITUS WITH HYPERGLYCEMIA, WITHOUT LONG-TERM CURRENT USE OF INSULIN: ICD-10-CM

## 2023-04-27 DIAGNOSIS — E78.5 HYPERLIPIDEMIA ASSOCIATED WITH TYPE 2 DIABETES MELLITUS: Primary | ICD-10-CM

## 2023-04-27 DIAGNOSIS — Z00.00 ANNUAL PHYSICAL EXAM: ICD-10-CM

## 2023-04-27 DIAGNOSIS — D50.9 IRON DEFICIENCY ANEMIA, UNSPECIFIED IRON DEFICIENCY ANEMIA TYPE: ICD-10-CM

## 2023-04-27 DIAGNOSIS — M47.812 SPONDYLOSIS OF CERVICAL REGION WITHOUT MYELOPATHY OR RADICULOPATHY: ICD-10-CM

## 2023-04-27 DIAGNOSIS — E11.69 HYPERLIPIDEMIA ASSOCIATED WITH TYPE 2 DIABETES MELLITUS: Primary | ICD-10-CM

## 2023-04-27 LAB
ALBUMIN SERPL BCP-MCNC: 3.9 G/DL (ref 3.5–5.2)
ALP SERPL-CCNC: 89 U/L (ref 55–135)
ALT SERPL W/O P-5'-P-CCNC: 30 U/L (ref 10–44)
ANION GAP SERPL CALC-SCNC: 6 MMOL/L (ref 8–16)
AST SERPL-CCNC: 26 U/L (ref 10–40)
BASOPHILS # BLD AUTO: 0.05 K/UL (ref 0–0.2)
BASOPHILS NFR BLD: 0.8 % (ref 0–1.9)
BILIRUB SERPL-MCNC: 0.6 MG/DL (ref 0.1–1)
BUN SERPL-MCNC: 17 MG/DL (ref 6–20)
CALCIUM SERPL-MCNC: 9 MG/DL (ref 8.7–10.5)
CHLORIDE SERPL-SCNC: 106 MMOL/L (ref 95–110)
CHOLEST SERPL-MCNC: 116 MG/DL (ref 120–199)
CHOLEST/HDLC SERPL: 3.6 {RATIO} (ref 2–5)
CO2 SERPL-SCNC: 27 MMOL/L (ref 23–29)
COMPLEXED PSA SERPL-MCNC: 0.86 NG/ML (ref 0–4)
CREAT SERPL-MCNC: 1.3 MG/DL (ref 0.5–1.4)
DIFFERENTIAL METHOD: ABNORMAL
EOSINOPHIL # BLD AUTO: 0 K/UL (ref 0–0.5)
EOSINOPHIL NFR BLD: 0.6 % (ref 0–8)
ERYTHROCYTE [DISTWIDTH] IN BLOOD BY AUTOMATED COUNT: 14.8 % (ref 11.5–14.5)
EST. GFR  (NO RACE VARIABLE): >60 ML/MIN/1.73 M^2
ESTIMATED AVG GLUCOSE: 171 MG/DL (ref 68–131)
FERRITIN SERPL-MCNC: 78 NG/ML (ref 20–300)
GLUCOSE SERPL-MCNC: 191 MG/DL (ref 70–110)
HBA1C MFR BLD: 7.6 % (ref 4–5.6)
HCT VFR BLD AUTO: 42.4 % (ref 40–54)
HDLC SERPL-MCNC: 32 MG/DL (ref 40–75)
HDLC SERPL: 27.6 % (ref 20–50)
HGB BLD-MCNC: 13.2 G/DL (ref 14–18)
IMM GRANULOCYTES # BLD AUTO: 0.02 K/UL (ref 0–0.04)
IMM GRANULOCYTES NFR BLD AUTO: 0.3 % (ref 0–0.5)
IRON SERPL-MCNC: 75 UG/DL (ref 45–160)
LDLC SERPL CALC-MCNC: 67 MG/DL (ref 63–159)
LYMPHOCYTES # BLD AUTO: 1.7 K/UL (ref 1–4.8)
LYMPHOCYTES NFR BLD: 27.9 % (ref 18–48)
MCH RBC QN AUTO: 23.2 PG (ref 27–31)
MCHC RBC AUTO-ENTMCNC: 31.1 G/DL (ref 32–36)
MCV RBC AUTO: 75 FL (ref 82–98)
MONOCYTES # BLD AUTO: 0.6 K/UL (ref 0.3–1)
MONOCYTES NFR BLD: 9.7 % (ref 4–15)
NEUTROPHILS # BLD AUTO: 3.8 K/UL (ref 1.8–7.7)
NEUTROPHILS NFR BLD: 60.7 % (ref 38–73)
NONHDLC SERPL-MCNC: 84 MG/DL
NRBC BLD-RTO: 0 /100 WBC
PLATELET # BLD AUTO: 242 K/UL (ref 150–450)
PMV BLD AUTO: 12.4 FL (ref 9.2–12.9)
POTASSIUM SERPL-SCNC: 4.1 MMOL/L (ref 3.5–5.1)
PROT SERPL-MCNC: 7.4 G/DL (ref 6–8.4)
RBC # BLD AUTO: 5.68 M/UL (ref 4.6–6.2)
SATURATED IRON: 21 % (ref 20–50)
SODIUM SERPL-SCNC: 139 MMOL/L (ref 136–145)
TOTAL IRON BINDING CAPACITY: 360 UG/DL (ref 250–450)
TRANSFERRIN SERPL-MCNC: 243 MG/DL (ref 200–375)
TRIGL SERPL-MCNC: 85 MG/DL (ref 30–150)
WBC # BLD AUTO: 6.2 K/UL (ref 3.9–12.7)

## 2023-04-27 PROCEDURE — 84466 ASSAY OF TRANSFERRIN: CPT | Performed by: INTERNAL MEDICINE

## 2023-04-27 PROCEDURE — 1159F PR MEDICATION LIST DOCUMENTED IN MEDICAL RECORD: ICD-10-PCS | Mod: CPTII,S$GLB,, | Performed by: INTERNAL MEDICINE

## 2023-04-27 PROCEDURE — 99396 PR PREVENTIVE VISIT,EST,40-64: ICD-10-PCS | Mod: 25,S$GLB,, | Performed by: INTERNAL MEDICINE

## 2023-04-27 PROCEDURE — 82728 ASSAY OF FERRITIN: CPT | Performed by: INTERNAL MEDICINE

## 2023-04-27 PROCEDURE — 36415 COLL VENOUS BLD VENIPUNCTURE: CPT | Mod: PO | Performed by: INTERNAL MEDICINE

## 2023-04-27 PROCEDURE — 99999 PR PBB SHADOW E&M-EST. PATIENT-LVL IV: CPT | Mod: PBBFAC,,, | Performed by: INTERNAL MEDICINE

## 2023-04-27 PROCEDURE — 1160F RVW MEDS BY RX/DR IN RCRD: CPT | Mod: CPTII,S$GLB,, | Performed by: INTERNAL MEDICINE

## 2023-04-27 PROCEDURE — 99999 PR PBB SHADOW E&M-EST. PATIENT-LVL IV: ICD-10-PCS | Mod: PBBFAC,,, | Performed by: INTERNAL MEDICINE

## 2023-04-27 PROCEDURE — 90677 PNEUMOCOCCAL CONJUGATE VACCINE 20-VALENT: ICD-10-PCS | Mod: S$GLB,,, | Performed by: INTERNAL MEDICINE

## 2023-04-27 PROCEDURE — 1160F PR REVIEW ALL MEDS BY PRESCRIBER/CLIN PHARMACIST DOCUMENTED: ICD-10-PCS | Mod: CPTII,S$GLB,, | Performed by: INTERNAL MEDICINE

## 2023-04-27 PROCEDURE — 1159F MED LIST DOCD IN RCRD: CPT | Mod: CPTII,S$GLB,, | Performed by: INTERNAL MEDICINE

## 2023-04-27 PROCEDURE — 84153 ASSAY OF PSA TOTAL: CPT | Performed by: INTERNAL MEDICINE

## 2023-04-27 PROCEDURE — 80061 LIPID PANEL: CPT | Performed by: INTERNAL MEDICINE

## 2023-04-27 PROCEDURE — 3075F PR MOST RECENT SYSTOLIC BLOOD PRESS GE 130-139MM HG: ICD-10-PCS | Mod: CPTII,S$GLB,, | Performed by: INTERNAL MEDICINE

## 2023-04-27 PROCEDURE — 3079F PR MOST RECENT DIASTOLIC BLOOD PRESSURE 80-89 MM HG: ICD-10-PCS | Mod: CPTII,S$GLB,, | Performed by: INTERNAL MEDICINE

## 2023-04-27 PROCEDURE — 3075F SYST BP GE 130 - 139MM HG: CPT | Mod: CPTII,S$GLB,, | Performed by: INTERNAL MEDICINE

## 2023-04-27 PROCEDURE — 4010F ACE/ARB THERAPY RXD/TAKEN: CPT | Mod: CPTII,S$GLB,, | Performed by: INTERNAL MEDICINE

## 2023-04-27 PROCEDURE — 83036 HEMOGLOBIN GLYCOSYLATED A1C: CPT | Performed by: INTERNAL MEDICINE

## 2023-04-27 PROCEDURE — 85025 COMPLETE CBC W/AUTO DIFF WBC: CPT | Performed by: INTERNAL MEDICINE

## 2023-04-27 PROCEDURE — 99396 PREV VISIT EST AGE 40-64: CPT | Mod: 25,S$GLB,, | Performed by: INTERNAL MEDICINE

## 2023-04-27 PROCEDURE — 90677 PCV20 VACCINE IM: CPT | Mod: S$GLB,,, | Performed by: INTERNAL MEDICINE

## 2023-04-27 PROCEDURE — 4010F PR ACE/ARB THEARPY RXD/TAKEN: ICD-10-PCS | Mod: CPTII,S$GLB,, | Performed by: INTERNAL MEDICINE

## 2023-04-27 PROCEDURE — 90471 IMMUNIZATION ADMIN: CPT | Mod: S$GLB,,, | Performed by: INTERNAL MEDICINE

## 2023-04-27 PROCEDURE — 90471 PNEUMOCOCCAL CONJUGATE VACCINE 20-VALENT: ICD-10-PCS | Mod: S$GLB,,, | Performed by: INTERNAL MEDICINE

## 2023-04-27 PROCEDURE — 3008F BODY MASS INDEX DOCD: CPT | Mod: CPTII,S$GLB,, | Performed by: INTERNAL MEDICINE

## 2023-04-27 PROCEDURE — 3079F DIAST BP 80-89 MM HG: CPT | Mod: CPTII,S$GLB,, | Performed by: INTERNAL MEDICINE

## 2023-04-27 PROCEDURE — 3008F PR BODY MASS INDEX (BMI) DOCUMENTED: ICD-10-PCS | Mod: CPTII,S$GLB,, | Performed by: INTERNAL MEDICINE

## 2023-04-27 PROCEDURE — 80053 COMPREHEN METABOLIC PANEL: CPT | Performed by: INTERNAL MEDICINE

## 2023-04-27 RX ORDER — LOSARTAN POTASSIUM 100 MG/1
100 TABLET ORAL DAILY
Qty: 90 TABLET | Refills: 3 | Status: SHIPPED | OUTPATIENT
Start: 2023-04-27 | End: 2023-08-22 | Stop reason: SDUPTHER

## 2023-04-27 RX ORDER — AMLODIPINE BESYLATE 5 MG/1
TABLET ORAL
Qty: 135 TABLET | Refills: 3 | Status: SHIPPED | OUTPATIENT
Start: 2023-04-27 | End: 2023-08-22 | Stop reason: SDUPTHER

## 2023-04-27 NOTE — PROGRESS NOTES
Subjective     Patient ID: Suhas Castillo is a 58 y.o. male.    Chief Complaint: Establish Care    HPI  58 y.o. Male here for annual exam.     Vaccines: Influenza (declined); Tetanus (2020); PNA (current); Shingrix (will consider)  Eye exam: current  Colonoscopy: 2019    Exercise: cardio/weights  Diet: regular    Past Medical History:  7/11/2019: Angioedema  No date: AR (allergic rhinitis)  No date: Cervical spinal stenosis  No date: Diabetes mellitus type II  No date: DJD (degenerative joint disease), cervical  No date: Hyperlipidemia  No date: Hypertension  No date: Sleep apnea  Past Surgical History:  4/11/2019: COLONOSCOPY; N/A      Comment:  Procedure: COLONOSCOPY;  Surgeon: Gato Corbin MD;                Location: [address]);  Service: Endoscopy;                 Laterality: N/A;  No date: EPIDURAL BLOCK INJECTION      Comment:  cervical x 2  9/28/2018: EPIDURAL STEROID INJECTION; N/A      Comment:  Procedure: Injection, Steroid, Epidural  C7-T1;                 Surgeon: Bernie Estrada MD;  Location: [facility];                 Service: Pain Management;  Laterality: N/A;  C7-T1 IL DEBORAH               MRDICALLY URGENT  10/26/2018: EPIDURAL STEROID INJECTION; N/A      Comment:  Procedure: Injection, Steroid, Epidural CERVICAL C7-T1                DEBORAH;  Surgeon: Bernie Estrada MD;  Location: [facility]                [facility];  Service: Pain Management;  Laterality: N/A;  2/7/2019: EPIDURAL STEROID INJECTION; N/A      Comment:  Procedure: INJECTION, STEROID, EPIDURAL;  Surgeon: Tania Bajwa MD;  Location: [facility];  Service: Pain                Management;  Laterality: N/A;  C DEBORAH [id number] PT ON               EISSA SCHEDULE  7/27/2020: EPIDURAL STEROID INJECTION; N/A      Comment:  Procedure: INJECTION, STEROID, EPIDURAL, C7-T1;                 Surgeon: Tania Bajwa MD;  Location: [facility];                 Service: Pain Management;  Laterality: N/A;  age 16: TESTICLE  SURGERY      Comment:  torsion  Social History    Socioeconomic History      Marital status: Single      Number of children: 4    Occupational History      Occupation: [employer] supervisor        Employer: [employer]    Tobacco Use      Smoking status: Never      Smokeless tobacco: Never    Substance and Sexual Activity      Alcohol use: No        Alcohol/week: 0.0 standard drinks        Comment: none x 4 years; previously social drinker      Drug use: No      Sexual activity: Yes        Partners: Female        Birth control/protection: None    Social History Narrative      The patient does exercise regularly (treadmill, light weights: TIW-QIW).        Rates diet as good.        He is satisfied with weight.      He does drink at least 1/2 gallon water daily.      He drinks 0 coffee/tea/caffeine-containing soft drinks daily.      Total sleep time at night is 4-5 hours.      He works 65 hours per week.      He does wear seat belts.      Hobbies include bicycling.                Review of patient's allergies indicates:   -- Sinus allergy   Suhas Castillo had no medications administered during this visit.    Review of Systems   Constitutional:  Negative for activity change, appetite change, chills, diaphoresis, fatigue, fever and unexpected weight change.   HENT:  Negative for nasal congestion, mouth sores, postnasal drip, rhinorrhea, sinus pressure/congestion, sneezing, sore throat, trouble swallowing and voice change.    Eyes:  Negative for discharge, itching and visual disturbance.   Respiratory:  Negative for cough, chest tightness, shortness of breath and wheezing.    Cardiovascular:  Negative for chest pain, palpitations and leg swelling.   Gastrointestinal:  Negative for abdominal pain, blood in stool, constipation, diarrhea, nausea and vomiting.   Endocrine: Negative for cold intolerance and heat intolerance.   Genitourinary:  Negative for difficulty urinating, dysuria, flank pain, hematuria and  urgency.   Musculoskeletal:  Positive for arthralgias. Negative for back pain, myalgias and neck pain.   Integumentary:  Negative for rash and wound.   Allergic/Immunologic: Negative for environmental allergies and food allergies.   Neurological:  Negative for dizziness, tremors, seizures, syncope, weakness and headaches.   Hematological:  Negative for adenopathy. Does not bruise/bleed easily.   Psychiatric/Behavioral:  Negative for confusion, sleep disturbance and suicidal ideas. The patient is not nervous/anxious.         Objective     Physical Exam  Constitutional:       General: He is not in acute distress.     Appearance: Normal appearance. He is well-developed. He is not ill-appearing, toxic-appearing or diaphoretic.   HENT:      Head: Normocephalic and atraumatic.      Right Ear: External ear normal.      Left Ear: External ear normal.      Nose: Nose normal.      Mouth/Throat:      Pharynx: No oropharyngeal exudate.   Eyes:      General: No scleral icterus.        Right eye: No discharge.         Left eye: No discharge.      Extraocular Movements: Extraocular movements intact.      Conjunctiva/sclera: Conjunctivae normal.      Pupils: Pupils are equal, round, and reactive to light.   Neck:      Thyroid: No thyromegaly.      Vascular: No JVD.   Cardiovascular:      Rate and Rhythm: Normal rate and regular rhythm.      Pulses: Normal pulses.      Heart sounds: Normal heart sounds. No murmur heard.  Pulmonary:      Effort: Pulmonary effort is normal. No respiratory distress.      Breath sounds: Normal breath sounds. No wheezing or rales.   Abdominal:      General: Bowel sounds are normal. There is no distension.      Palpations: Abdomen is soft.      Tenderness: There is no abdominal tenderness. There is no right CVA tenderness, left CVA tenderness, guarding or rebound.   Musculoskeletal:      Cervical back: Normal range of motion and neck supple. No rigidity.      Right lower leg: No edema.      Left lower  leg: No edema.   Lymphadenopathy:      Cervical: No cervical adenopathy.   Skin:     General: Skin is warm and dry.      Capillary Refill: Capillary refill takes less than 2 seconds.      Coloration: Skin is not pale.      Findings: No rash.   Neurological:      General: No focal deficit present.      Mental Status: He is alert and oriented to person, place, and time. Mental status is at baseline.      Cranial Nerves: No cranial nerve deficit.      Sensory: No sensory deficit.      Motor: No weakness.      Coordination: Coordination normal.      Gait: Gait normal.      Deep Tendon Reflexes: Reflexes normal.   Psychiatric:         Mood and Affect: Mood normal.         Behavior: Behavior normal.         Thought Content: Thought content normal.         Judgment: Judgment normal.          Assessment and Plan     Problem List Items Addressed This Visit          Neuro    DJD (degenerative joint disease), cervical       Cardiac/Vascular    Hypertension associated with diabetes    Relevant Medications    amLODIPine (NORVASC) 5 MG tablet    losartan (COZAAR) 100 MG tablet    Other Relevant Orders    CBC Auto Differential    Comprehensive Metabolic Panel    PSA, Screening    Hemoglobin A1C    Microalbumin/Creatinine Ratio, Urine    Lipid Panel    Hyperlipidemia associated with type 2 diabetes mellitus - Primary    Relevant Orders    CBC Auto Differential    Comprehensive Metabolic Panel    PSA, Screening    Hemoglobin A1C    Microalbumin/Creatinine Ratio, Urine    Lipid Panel       Endocrine    Type 2 diabetes mellitus with hyperglycemia, without long-term current use of insulin    Relevant Orders    (In Office Administered) Pneumococcal Conjugate Vaccine (20 Valent) (IM) (Completed)    CBC Auto Differential    Comprehensive Metabolic Panel    PSA, Screening    Hemoglobin A1C    Microalbumin/Creatinine Ratio, Urine    Lipid Panel       Other    Obesity hypoventilation syndrome    Relevant Orders    CBC Auto Differential     Comprehensive Metabolic Panel    PSA, Screening    Hemoglobin A1C    Microalbumin/Creatinine Ratio, Urine    Lipid Panel     Other Visit Diagnoses       Annual physical exam        Relevant Orders    (In Office Administered) Pneumococcal Conjugate Vaccine (20 Valent) (IM) (Completed)    CBC Auto Differential    Comprehensive Metabolic Panel    PSA, Screening    Hemoglobin A1C    Microalbumin/Creatinine Ratio, Urine    Lipid Panel    Iron deficiency anemia, unspecified iron deficiency anemia type        Relevant Orders    Iron and TIBC    Ferritin        Blood work ordered     T2DM- stable    LIZZY- on CPAP    DJD Cervical spine- stable    HLD- on statin    MAMADOU- repeat iron studies, did not start replacement      F/u in 6 months

## 2023-05-05 ENCOUNTER — PATIENT OUTREACH (OUTPATIENT)
Dept: ADMINISTRATIVE | Facility: HOSPITAL | Age: 58
End: 2023-05-05
Payer: COMMERCIAL

## 2023-05-05 NOTE — LETTER
AUTHORIZATION FOR RELEASE OF   CONFIDENTIAL INFORMATION    Dear [facility],    We are seeing Suhas Castillo, date of birth 1965, in the clinic at [facility]. Robert Tran DO is the patient's PCP. Suhas Castillo has an outstanding lab/procedure at the time we reviewed his chart. In order to help keep his health information updated, he has authorized us to request the following medical record(s):        (  )  MAMMOGRAM                                      (  )  COLONOSCOPY      (  )  PAP SMEAR                                          (  )  OUTSIDE LAB RESULTS     (  )  DEXA SCAN                                          ( x )  EYE EXAM            (  )  FOOT EXAM                                          (  )  ENTIRE RECORD     (  )  OUTSIDE IMMUNIZATIONS                 (  )  _______________         Please fax records to Robert Tran DO, [phone]      Patient Name: Suhas Castillo  : 1965  Patient Phone #: [phone]

## 2023-06-09 ENCOUNTER — PATIENT MESSAGE (OUTPATIENT)
Dept: ADMINISTRATIVE | Facility: HOSPITAL | Age: 58
End: 2023-06-09
Payer: COMMERCIAL

## 2023-06-09 ENCOUNTER — PATIENT MESSAGE (OUTPATIENT)
Dept: INTERNAL MEDICINE | Facility: CLINIC | Age: 58
End: 2023-06-09
Payer: COMMERCIAL

## 2023-06-09 DIAGNOSIS — E11.65 TYPE 2 DIABETES MELLITUS WITH HYPERGLYCEMIA, WITHOUT LONG-TERM CURRENT USE OF INSULIN: ICD-10-CM

## 2023-06-09 DIAGNOSIS — E11.9 CONTROLLED TYPE 2 DIABETES MELLITUS WITHOUT COMPLICATION, WITHOUT LONG-TERM CURRENT USE OF INSULIN: ICD-10-CM

## 2023-06-09 RX ORDER — METFORMIN HYDROCHLORIDE 500 MG/1
TABLET, EXTENDED RELEASE ORAL
Qty: 360 TABLET | Refills: 0 | Status: SHIPPED | OUTPATIENT
Start: 2023-06-09 | End: 2023-08-22 | Stop reason: SDUPTHER

## 2023-06-09 NOTE — TELEPHONE ENCOUNTER
No care due was identified.  [facility] Embedded Care Due Messages. Reference number: [id number].   6/09/2023 1:07:41 PM CDT

## 2023-07-25 ENCOUNTER — HOSPITAL ENCOUNTER (EMERGENCY)
Facility: HOSPITAL | Age: 58
Discharge: HOME | End: 2023-07-26
Attending: EMERGENCY MEDICINE
Payer: COMMERCIAL

## 2023-07-25 DIAGNOSIS — R16.0 LIVER MASS: ICD-10-CM

## 2023-07-25 DIAGNOSIS — K40.20 BILATERAL INGUINAL HERNIA WITHOUT OBSTRUCTION OR GANGRENE, RECURRENCE NOT SPECIFIED: Primary | ICD-10-CM

## 2023-07-25 LAB
ALBUMIN SERPL BCP-MCNC: 3.9 G/DL (ref 3.5–5.2)
ALP SERPL-CCNC: 85 U/L (ref 55–135)
ALT SERPL W/O P-5'-P-CCNC: 22 U/L (ref 10–44)
ANION GAP SERPL CALC-SCNC: 10 MMOL/L (ref 8–16)
AST SERPL-CCNC: 17 U/L (ref 10–40)
BACTERIA #/AREA URNS AUTO: NORMAL /HPF
BASOPHILS # BLD AUTO: 0.05 K/UL (ref 0–0.2)
BASOPHILS NFR BLD: 0.6 % (ref 0–1.9)
BILIRUB SERPL-MCNC: 0.2 MG/DL (ref 0.1–1)
BILIRUB UR QL STRIP: NEGATIVE
BUN SERPL-MCNC: 14 MG/DL (ref 6–20)
CALCIUM SERPL-MCNC: 9 MG/DL (ref 8.7–10.5)
CHLORIDE SERPL-SCNC: 111 MMOL/L (ref 95–110)
CLARITY UR REFRACT.AUTO: CLEAR
CO2 SERPL-SCNC: 23 MMOL/L (ref 23–29)
COLOR UR AUTO: YELLOW
CREAT SERPL-MCNC: 1.3 MG/DL (ref 0.5–1.4)
DIFFERENTIAL METHOD: ABNORMAL
EOSINOPHIL # BLD AUTO: 0.1 K/UL (ref 0–0.5)
EOSINOPHIL NFR BLD: 1 % (ref 0–8)
ERYTHROCYTE [DISTWIDTH] IN BLOOD BY AUTOMATED COUNT: 15.9 % (ref 11.5–14.5)
EST. GFR  (NO RACE VARIABLE): >60 ML/MIN/1.73 M^2
GLUCOSE SERPL-MCNC: 111 MG/DL (ref 70–110)
GLUCOSE UR QL STRIP: ABNORMAL
HCT VFR BLD AUTO: 44.9 % (ref 40–54)
HCV AB SERPL QL IA: NORMAL
HGB BLD-MCNC: 13.7 G/DL (ref 14–18)
HGB UR QL STRIP: NEGATIVE
HIV 1+2 AB+HIV1 P24 AG SERPL QL IA: NORMAL
IMM GRANULOCYTES # BLD AUTO: 0.03 K/UL (ref 0–0.04)
IMM GRANULOCYTES NFR BLD AUTO: 0.4 % (ref 0–0.5)
KETONES UR QL STRIP: ABNORMAL
LEUKOCYTE ESTERASE UR QL STRIP: NEGATIVE
LIPASE SERPL-CCNC: 31 U/L (ref 4–60)
LYMPHOCYTES # BLD AUTO: 2.3 K/UL (ref 1–4.8)
LYMPHOCYTES NFR BLD: 29.5 % (ref 18–48)
MCH RBC QN AUTO: 22.7 PG (ref 27–31)
MCHC RBC AUTO-ENTMCNC: 30.5 G/DL (ref 32–36)
MCV RBC AUTO: 74 FL (ref 82–98)
MICROSCOPIC COMMENT: NORMAL
MONOCYTES # BLD AUTO: 0.6 K/UL (ref 0.3–1)
MONOCYTES NFR BLD: 8 % (ref 4–15)
NEUTROPHILS # BLD AUTO: 4.7 K/UL (ref 1.8–7.7)
NEUTROPHILS NFR BLD: 60.5 % (ref 38–73)
NITRITE UR QL STRIP: NEGATIVE
NRBC BLD-RTO: 0 /100 WBC
PH UR STRIP: 5 [PH] (ref 5–8)
PLATELET # BLD AUTO: 277 K/UL (ref 150–450)
PMV BLD AUTO: 11.6 FL (ref 9.2–12.9)
POTASSIUM SERPL-SCNC: 3.8 MMOL/L (ref 3.5–5.1)
PROT SERPL-MCNC: 7.5 G/DL (ref 6–8.4)
PROT UR QL STRIP: NEGATIVE
RBC # BLD AUTO: 6.04 M/UL (ref 4.6–6.2)
RBC #/AREA URNS AUTO: 2 /HPF (ref 0–4)
SODIUM SERPL-SCNC: 144 MMOL/L (ref 136–145)
SP GR UR STRIP: >=1.03 (ref 1–1.03)
SQUAMOUS #/AREA URNS AUTO: 0 /HPF
URN SPEC COLLECT METH UR: ABNORMAL
WBC # BLD AUTO: 7.83 K/UL (ref 3.9–12.7)
WBC #/AREA URNS AUTO: 0 /HPF (ref 0–5)
YEAST UR QL AUTO: NORMAL

## 2023-07-25 PROCEDURE — 87389 HIV-1 AG W/HIV-1&-2 AB AG IA: CPT | Performed by: PHYSICIAN ASSISTANT

## 2023-07-25 PROCEDURE — 99285 EMERGENCY DEPT VISIT HI MDM: CPT | Mod: 25

## 2023-07-25 PROCEDURE — 81001 URINALYSIS AUTO W/SCOPE: CPT | Performed by: EMERGENCY MEDICINE

## 2023-07-25 PROCEDURE — 85025 COMPLETE CBC W/AUTO DIFF WBC: CPT | Performed by: EMERGENCY MEDICINE

## 2023-07-25 PROCEDURE — 83690 ASSAY OF LIPASE: CPT | Performed by: EMERGENCY MEDICINE

## 2023-07-25 PROCEDURE — 25500020 PHARM REV CODE 255: Performed by: EMERGENCY MEDICINE

## 2023-07-25 PROCEDURE — 80053 COMPREHEN METABOLIC PANEL: CPT | Performed by: EMERGENCY MEDICINE

## 2023-07-25 PROCEDURE — 86803 HEPATITIS C AB TEST: CPT | Performed by: PHYSICIAN ASSISTANT

## 2023-07-25 RX ADMIN — IOHEXOL 100 ML: 350 INJECTION, SOLUTION INTRAVENOUS at 10:07

## 2023-07-26 ENCOUNTER — PATIENT MESSAGE (OUTPATIENT)
Dept: INTERNAL MEDICINE | Facility: CLINIC | Age: 58
End: 2023-07-26
Payer: COMMERCIAL

## 2023-07-26 VITALS
HEART RATE: 70 BPM | DIASTOLIC BLOOD PRESSURE: 82 MMHG | OXYGEN SATURATION: 99 % | HEIGHT: 74 IN | BODY MASS INDEX: 31.44 KG/M2 | SYSTOLIC BLOOD PRESSURE: 148 MMHG | WEIGHT: 245 LBS | RESPIRATION RATE: 16 BRPM | TEMPERATURE: 98 F

## 2023-07-26 DIAGNOSIS — R16.0 LIVER MASS: ICD-10-CM

## 2023-07-26 DIAGNOSIS — K76.9 LIVER DISEASE, UNSPECIFIED: Primary | ICD-10-CM

## 2023-07-26 NOTE — ED TRIAGE NOTES
Suhas Castillo, a 58 y.o. male presents to the ED w/ complaint of intermittent abdominal discomfort in the pelvic area started last Friday. Was seen @ [facility] last Friday.    Triage note:  Chief Complaint   Patient presents with    Abdominal Pain     R lower abd pain , started friday     Review of patient's allergies indicates:   Allergen Reactions    Ace inhibitors Swelling    Sinus allergy      Past Medical History:   Diagnosis Date    Angioedema 7/11/2019    AR (allergic rhinitis)     Cervical spinal stenosis     Diabetes mellitus type II     DJD (degenerative joint disease), cervical     Hyperlipidemia     Hypertension     Sleep apnea     Patient identifiers for Suhas Casitllo checked and correct.    LOC: The patient is awake, alert and aware of environment with an appropriate affect, the patient is oriented x 4 and speaking appropriately.    APPEARANCE: Patient resting comfortably and in no acute distress, patient is clean and well groomed, patient's clothing is properly fastened.    SKIN: The skin is warm and dry, color consistent with ethnicity, patient has normal skin turgor and moist mucus membranes, skin intact, no breakdown or bruising noted.    MUSCULOSKELETAL: Patient moving all extremities well, no obvious swelling or deformities noted.    RESPIRATORY: Airway is open and patent, respirations are spontaneous and even, patient has a normal effort and rate.    CARDIAC: Patient has a normal rate and rhythm, no periphreal edema noted, capillary refill < 3 seconds.    ABDOMEN: Soft and non tender to palpation, no distention noted. Patient denies any nausea, vomiting, diarrhea, or constipation.     NEUROLOGIC: Eyes open spontaneously, PERRL, behavior appropriate to situation, follows commands, facial expression symmetrical, bilateral hand grasp equal and even, purposeful motor response noted, normal sensation in all extremities.     HEENT: No abnormalities noted. White sclera and pupils  equal round and reactive to light. Denies headache, dizziness.     : Pt voids independently, denies dysuria, hematuria, frequency.

## 2023-07-26 NOTE — DISCHARGE INSTRUCTIONS
CT a/p: 1. No acute abnormality.   2. 5 cm hepatic mass in the right lobe and 2.8 cm lesion in the left lobe.  Recommend nonemergent multiphase MRI follow-up hepatic mass protocol for better characterization.  See above comments.   3. Small fat containing umbilical hernia.   4. Small bilateral fat containing inguinal hernias.   5. Possible constipation.   6. Chronic and degenerative changes of the lumbar spine including grade 1 spondylolisthesis of L5 on S1 with bilateral spondylolysis of L5.

## 2023-07-26 NOTE — ED PROVIDER NOTES
CC: Abdominal Pain (R lower abd pain , started friday)      History provided by:   Patient     HPI: Suhas Castillo is a 58 y.o. year old male Pmh of DM, HTN, HLD,  who presents to the ED for bilateral groin area pain for the last week, intermittent, mild intensity, nonradiating, no mass or skin changes in the area, denies any scrotal swelling or pain no penile discharge   Denies any hematuria, dysuria frequency of urination   Denies any trauma   No nausea vomiting no fever  No constipation or diarrhea      PHYSICAL EXAM:  Vitals:    07/25/23 2048   BP: (!) 153/80   Pulse: 73   Resp: 15   Temp: 98 °F (36.7 °C)     VS: triage VS reviewed    general: comfortable, in no acute distress, pleasant, well nourished  HENT: neck symmetric, trachea midline  Eyes: no conjunctival injection and symmetrical eyelids  CV: RRR, no  murmurs, no rubs, no gallops, no LE edema  Resp: comfortable breathing, speaks in full sentences, CTAB, no wheezing, no crackles, no ronchi  ABD:  soft, ND, no masses, + normal BS, NT.  Mild tenderness to palpation of the inguinal area, no masses no lymph nodes no clear inguinal hernia on palpation  Renal: No CVAT  Neuro: AAO x 3, 5/5 strength x 4 extremities, sensation intact, face symmetric, speech normal  MSK: NC/AT, extremities w/out deformity   Skin: warm, dry      MDM:  Suhas Castillo is a 58 y.o. year old male who presents to the ED for bilateral inguinal area pain    Mild pain, no abnormalities on the physical exam, will get CT abdomen pelvis rule out hernia rule out kidney stones rule out lymphadenopathy    DDX includes but not limited to: as above    Labs ordered and reviewed:   UA negative for nitrites leukocytes and blood   Urine microscopy 0 WBCs no bacteria  CMP no gross abnormalities   CBC normal white count and platelets, hemoglobin 13.7 baseline for the patient  Lipase within normal limits  Medication given in the ED: n/a    CT abdomen pelvis (ordered and reviewed):   1. No acute  abnormality.   2. 5 cm hepatic mass in the right lobe and 2.8 cm lesion in the left lobe.  Recommend nonemergent multiphase MRI follow-up hepatic mass protocol for better characterization.  See above comments.   3. Small fat containing umbilical hernia.   4. Small bilateral fat containing inguinal hernias.   5. Possible constipation  6. Chronic and degenerative changes of the lumbar spine including grade 1 spondylolisthesis of L5 on S1 with bilateral spondylolysis of L5  Imagings independently visualized: n/a    CT findings discussed with the patient, referral to gen surg for bilateral small fat inguinal hernia that cause mild discomfort, pain control with OTC analgesics, advised f/u with PCP within a week to start investigation of the 1 hepatic masses. Signs and symptoms that would require return to the ED were discussed w/ the patient.     IMPRESSION:  1.) b/l small fat containing inguinal hernias  2.) CT a/p incidental findings (hepatic masses x 2, umbilical fat containing hernia)    Dispo: Discharge    Critical Care Time: N/A          Past Medical History:   Diagnosis Date    Angioedema 7/11/2019    AR (allergic rhinitis)     Cervical spinal stenosis     Diabetes mellitus type II     DJD (degenerative joint disease), cervical     Hyperlipidemia     Hypertension     Sleep apnea      Past Surgical History:   Procedure Laterality Date    COLONOSCOPY N/A 4/11/2019    Procedure: COLONOSCOPY;  Surgeon: Gato Corbin MD;  Location: [address];  Service: Endoscopy;  Laterality: N/A;    EPIDURAL BLOCK INJECTION      cervical x 2    EPIDURAL STEROID INJECTION N/A 9/28/2018    Procedure: Injection, Steroid, Epidural  C7-T1;  Surgeon: Bernie Estrada MD;  Location: [facility] PAIN MGT;  Service: Pain Management;  Laterality: N/A;  C7-T1 IL DEBORAH   MRDICALLY URGENT    EPIDURAL STEROID INJECTION N/A 10/26/2018    Procedure: Injection, Steroid, Epidural CERVICAL C7-T1 DEBORAH;  Surgeon: Bernie Estrada MD;  Location: [facility]  PAIN MGT;  Service: Pain Management;  Laterality: N/A;    EPIDURAL STEROID INJECTION N/A 2/7/2019    Procedure: INJECTION, STEROID, EPIDURAL;  Surgeon: Tania Bajwa MD;  Location: [facility] PAIN MGT;  Service: Pain Management;  Laterality: N/A;  C DEBORAH C7-T1    GUZMÁN PT ON EISSA SCHEDULE    EPIDURAL STEROID INJECTION N/A 7/27/2020    Procedure: INJECTION, STEROID, EPIDURAL, C7-T1;  Surgeon: Tania Bajwa MD;  Location: [facility] PAIN MGT;  Service: Pain Management;  Laterality: N/A;    TESTICLE SURGERY  age 16    torsion     Family History   Problem Relation Age of Onset    Hypertension Mother     Diabetes Mother     Stroke Mother      No current facility-administered medications on file prior to encounter.     Current Outpatient Medications on File Prior to Encounter   Medication Sig Dispense Refill    amLODIPine (NORVASC) 5 MG tablet TAKE 1 AND 1/2 TABLETS(7.5 MG) BY MOUTH EVERY  tablet 3    blood pressure monitor (IHEALTH EASE) XL arm size (OP) by Other route as needed for High Blood Pressure. 1 each 0    blood sugar diagnostic (CONTOUR TEST STRIPS) Strp USE TO TEST BLOOD SUGAR TWICE A DAY 50 each 11    empagliflozin (JARDIANCE) 10 mg tablet Take 1 tablet (10 mg total) by mouth once daily. 90 tablet 0    fluticasone propionate (FLONASE) 50 mcg/actuation nasal spray SHAKE LIQUID AND USE 2 SPRAYS(100 MCG) IN EACH NOSTRIL EVERY DAY 48 g 3    gentamicin (GARAMYCIN) 0.3 % ophthalmic solution Place into the right eye.      hydroCHLOROthiazide (HYDRODIURIL) 12.5 MG Tab Take 1 tablet (12.5 mg total) by mouth once daily. 90 tablet 3    levocetirizine (XYZAL) 5 MG tablet Take 1 tablet (5 mg total) by mouth every evening. 30 tablet 11    losartan (COZAAR) 100 MG tablet Take 1 tablet (100 mg total) by mouth once daily. 90 tablet 3    metFORMIN (GLUCOPHAGE-XR) 500 MG ER 24hr tablet TAKE 2 TABLETS(1000 MG) BY MOUTH TWICE DAILY. 360 tablet 0    omeprazole (PRILOSEC) 20 MG capsule TAKE 1 CAPSULE(20 MG) BY MOUTH EVERY DAY 30 capsule  11    pravastatin (PRAVACHOL) 10 MG tablet Take 1 tablet (10 mg total) by mouth once daily. 90 tablet 3    SITagliptin (JANUVIA) 100 MG Tab Take 1 tablet (100 mg total) by mouth once daily. 90 tablet 3     Ace inhibitors and Sinus allergy  Social History     Socioeconomic History    Marital status: Single    Number of children: 4   Occupational History    Occupation: secutiry supervisor     Employer: [employer]   Tobacco Use    Smoking status: Never    Smokeless tobacco: Never   Substance and Sexual Activity    Alcohol use: No     Alcohol/week: 0.0 standard drinks     Comment: none x 4 years; previously social drinker    Drug use: No    Sexual activity: Yes     Partners: Female     Birth control/protection: None   Social History Narrative    The patient does exercise regularly (treadmill, light weights: TIW-QIW).      Rates diet as good.      He is satisfied with weight.    He does drink at least 1/2 gallon water daily.    He drinks 0 coffee/tea/caffeine-containing soft drinks daily.    Total sleep time at night is 4-5 hours.    He works 65 hours per week.    He does wear seat belts.    Hobbies include bicycling.                         DATA & INTERVENTIONS:    LABS reviewed:  Labs Reviewed   COMPREHENSIVE METABOLIC PANEL - Abnormal; Notable for the following components:       Result Value    Chloride 111 (*)     Glucose 111 (*)     All other components within normal limits    Narrative:     Release to patient->Immediate   CBC W/ AUTO DIFFERENTIAL - Abnormal; Notable for the following components:    Hemoglobin 13.7 (*)     MCV 74 (*)     MCH 22.7 (*)     MCHC 30.5 (*)     RDW 15.9 (*)     All other components within normal limits    Narrative:     Release to patient->Immediate   URINALYSIS, REFLEX TO URINE CULTURE - Abnormal; Notable for the following components:    Specific Gravity, UA >=1.030 (*)     Glucose, UA 3+ (*)     Ketones, UA Trace (*)     All other components within normal limits     Narrative:     Specimen Source->Urine   HIV 1 / 2 ANTIBODY    Narrative:     Release to patient->Immediate   HEPATITIS C ANTIBODY    Narrative:     Release to patient->Immediate   LIPASE    Narrative:     Release to patient->Immediate   URINALYSIS MICROSCOPIC    Narrative:     Specimen Source->Urine       RADIOLOGY reviewed:  Imaging Results    None         MEDICATIONS/FLUIDS:  Medications - No data to display           Chantel Oreilly MD  07/26/23 [id number]

## 2023-07-28 ENCOUNTER — TELEPHONE (OUTPATIENT)
Dept: SURGERY | Facility: CLINIC | Age: 58
End: 2023-07-28
Payer: COMMERCIAL

## 2023-08-01 ENCOUNTER — LAB VISIT (OUTPATIENT)
Dept: LAB | Facility: HOSPITAL | Age: 58
End: 2023-08-01
Attending: INTERNAL MEDICINE
Payer: COMMERCIAL

## 2023-08-01 ENCOUNTER — OFFICE VISIT (OUTPATIENT)
Dept: INTERNAL MEDICINE | Facility: CLINIC | Age: 58
End: 2023-08-01
Payer: COMMERCIAL

## 2023-08-01 VITALS
OXYGEN SATURATION: 97 % | TEMPERATURE: 98 F | SYSTOLIC BLOOD PRESSURE: 130 MMHG | WEIGHT: 234.56 LBS | BODY MASS INDEX: 31.09 KG/M2 | DIASTOLIC BLOOD PRESSURE: 78 MMHG | HEIGHT: 73 IN | RESPIRATION RATE: 18 BRPM

## 2023-08-01 DIAGNOSIS — K40.20 BILATERAL INGUINAL HERNIA WITHOUT OBSTRUCTION OR GANGRENE, RECURRENCE NOT SPECIFIED: Primary | ICD-10-CM

## 2023-08-01 DIAGNOSIS — R16.0 LIVER MASS: ICD-10-CM

## 2023-08-01 DIAGNOSIS — K42.9 UMBILICAL HERNIA WITHOUT OBSTRUCTION AND WITHOUT GANGRENE: ICD-10-CM

## 2023-08-01 LAB — AFP SERPL-MCNC: 2.2 NG/ML (ref 0–8.4)

## 2023-08-01 PROCEDURE — 3078F DIAST BP <80 MM HG: CPT | Mod: CPTII,S$GLB,, | Performed by: INTERNAL MEDICINE

## 2023-08-01 PROCEDURE — 3075F PR MOST RECENT SYSTOLIC BLOOD PRESS GE 130-139MM HG: ICD-10-PCS | Mod: CPTII,S$GLB,, | Performed by: INTERNAL MEDICINE

## 2023-08-01 PROCEDURE — 3051F PR MOST RECENT HEMOGLOBIN A1C LEVEL 7.0 - < 8.0%: ICD-10-PCS | Mod: CPTII,S$GLB,, | Performed by: INTERNAL MEDICINE

## 2023-08-01 PROCEDURE — 1159F PR MEDICATION LIST DOCUMENTED IN MEDICAL RECORD: ICD-10-PCS | Mod: CPTII,S$GLB,, | Performed by: INTERNAL MEDICINE

## 2023-08-01 PROCEDURE — 1159F MED LIST DOCD IN RCRD: CPT | Mod: CPTII,S$GLB,, | Performed by: INTERNAL MEDICINE

## 2023-08-01 PROCEDURE — 3066F PR DOCUMENTATION OF TREATMENT FOR NEPHROPATHY: ICD-10-PCS | Mod: CPTII,S$GLB,, | Performed by: INTERNAL MEDICINE

## 2023-08-01 PROCEDURE — 4010F ACE/ARB THERAPY RXD/TAKEN: CPT | Mod: CPTII,S$GLB,, | Performed by: INTERNAL MEDICINE

## 2023-08-01 PROCEDURE — 3078F PR MOST RECENT DIASTOLIC BLOOD PRESSURE < 80 MM HG: ICD-10-PCS | Mod: CPTII,S$GLB,, | Performed by: INTERNAL MEDICINE

## 2023-08-01 PROCEDURE — 82105 ALPHA-FETOPROTEIN SERUM: CPT | Performed by: INTERNAL MEDICINE

## 2023-08-01 PROCEDURE — 3051F HG A1C>EQUAL 7.0%<8.0%: CPT | Mod: CPTII,S$GLB,, | Performed by: INTERNAL MEDICINE

## 2023-08-01 PROCEDURE — 3008F BODY MASS INDEX DOCD: CPT | Mod: CPTII,S$GLB,, | Performed by: INTERNAL MEDICINE

## 2023-08-01 PROCEDURE — 1160F RVW MEDS BY RX/DR IN RCRD: CPT | Mod: CPTII,S$GLB,, | Performed by: INTERNAL MEDICINE

## 2023-08-01 PROCEDURE — 99214 OFFICE O/P EST MOD 30 MIN: CPT | Mod: S$GLB,,, | Performed by: INTERNAL MEDICINE

## 2023-08-01 PROCEDURE — 3075F SYST BP GE 130 - 139MM HG: CPT | Mod: CPTII,S$GLB,, | Performed by: INTERNAL MEDICINE

## 2023-08-01 PROCEDURE — 1160F PR REVIEW ALL MEDS BY PRESCRIBER/CLIN PHARMACIST DOCUMENTED: ICD-10-PCS | Mod: CPTII,S$GLB,, | Performed by: INTERNAL MEDICINE

## 2023-08-01 PROCEDURE — 4010F PR ACE/ARB THEARPY RXD/TAKEN: ICD-10-PCS | Mod: CPTII,S$GLB,, | Performed by: INTERNAL MEDICINE

## 2023-08-01 PROCEDURE — 3008F PR BODY MASS INDEX (BMI) DOCUMENTED: ICD-10-PCS | Mod: CPTII,S$GLB,, | Performed by: INTERNAL MEDICINE

## 2023-08-01 PROCEDURE — 3061F NEG MICROALBUMINURIA REV: CPT | Mod: CPTII,S$GLB,, | Performed by: INTERNAL MEDICINE

## 2023-08-01 PROCEDURE — 36415 COLL VENOUS BLD VENIPUNCTURE: CPT | Mod: PO | Performed by: INTERNAL MEDICINE

## 2023-08-01 PROCEDURE — 99999 PR PBB SHADOW E&M-EST. PATIENT-LVL IV: ICD-10-PCS | Mod: PBBFAC,,, | Performed by: INTERNAL MEDICINE

## 2023-08-01 PROCEDURE — 99999 PR PBB SHADOW E&M-EST. PATIENT-LVL IV: CPT | Mod: PBBFAC,,, | Performed by: INTERNAL MEDICINE

## 2023-08-01 PROCEDURE — 99214 PR OFFICE/OUTPT VISIT, EST, LEVL IV, 30-39 MIN: ICD-10-PCS | Mod: S$GLB,,, | Performed by: INTERNAL MEDICINE

## 2023-08-01 PROCEDURE — 3061F PR NEG MICROALBUMINURIA RESULT DOCUMENTED/REVIEW: ICD-10-PCS | Mod: CPTII,S$GLB,, | Performed by: INTERNAL MEDICINE

## 2023-08-01 PROCEDURE — 3066F NEPHROPATHY DOC TX: CPT | Mod: CPTII,S$GLB,, | Performed by: INTERNAL MEDICINE

## 2023-08-01 NOTE — PROGRESS NOTES
Subjective     Patient ID: Suhas Castillo is a 58 y.o. male.    Chief Complaint: Follow-up (Urgent care follow up pain near Groin area. Went back on Tuesday and CT Scan found hernia.Pt needs Mri done.)    HPI  Pt with T2DM, HTN, HLD is here for f/u from ED visit on 7/25/23 for B/L groin pain. CT showed B/L small fat containing inguinal and an umbilical hernia. It also incidentally showed two liver masses. He currently denies any groin/umbilicus pain and is deferring referral to general surgery for now.  Review of Systems   Constitutional:  Negative for activity change, appetite change, chills, diaphoresis, fatigue, fever and unexpected weight change.   HENT:  Negative for postnasal drip, rhinorrhea, sinus pressure/congestion, sneezing, sore throat, trouble swallowing and voice change.    Respiratory:  Negative for cough, shortness of breath and wheezing.    Cardiovascular:  Negative for chest pain, palpitations and leg swelling.   Gastrointestinal:  Negative for abdominal pain, blood in stool, constipation, diarrhea, nausea and vomiting.   Genitourinary:  Negative for dysuria.   Musculoskeletal:  Negative for arthralgias and myalgias.   Integumentary:  Negative for rash and wound.   Allergic/Immunologic: Negative for environmental allergies and food allergies.   Hematological:  Negative for adenopathy. Does not bruise/bleed easily.          Objective     Physical Exam  Constitutional:       General: He is not in acute distress.     Appearance: Normal appearance. He is well-developed. He is not diaphoretic.   HENT:      Head: Normocephalic and atraumatic.      Right Ear: External ear normal.      Left Ear: External ear normal.      Nose: Nose normal.      Mouth/Throat:      Pharynx: No oropharyngeal exudate.   Eyes:      General: No scleral icterus.        Right eye: No discharge.         Left eye: No discharge.      Conjunctiva/sclera: Conjunctivae normal.      Pupils: Pupils are equal, round, and reactive to  light.   Neck:      Vascular: No JVD.   Cardiovascular:      Rate and Rhythm: Normal rate and regular rhythm.      Pulses: Normal pulses.      Heart sounds: Normal heart sounds. No murmur heard.  Pulmonary:      Effort: Pulmonary effort is normal. No respiratory distress.      Breath sounds: Normal breath sounds. No wheezing or rales.   Abdominal:      General: Bowel sounds are normal.      Tenderness: There is no abdominal tenderness. There is no guarding or rebound.      Hernia: A hernia (no pain currently and are reducible) is present. Hernia is present in the umbilical area, left inguinal area and right inguinal area.   Musculoskeletal:      Cervical back: Normal range of motion and neck supple.      Right lower leg: No edema.      Left lower leg: No edema.   Lymphadenopathy:      Cervical: No cervical adenopathy.   Skin:     General: Skin is warm and dry.      Capillary Refill: Capillary refill takes less than 2 seconds.      Coloration: Skin is not pale.      Findings: No rash.   Neurological:      Mental Status: He is alert and oriented to person, place, and time. Mental status is at baseline.      Cranial Nerves: No cranial nerve deficit.   Psychiatric:         Mood and Affect: Mood normal.         Behavior: Behavior normal.            Assessment and Plan     1. Bilateral inguinal hernia without obstruction or gangrene, recurrence not specified    2. Liver mass  -     AFP TUMOR MARKER; Future; Expected date: 08/01/2023    3. Umbilical hernia without obstruction and without gangrene    Umbilical and B/L inguinal hernias- stable, no pain currently   -not interested in surgery   -ED precautions discussed with pt    Liver masses- MRI ordered for further eval and AFP today

## 2023-08-04 ENCOUNTER — HOSPITAL ENCOUNTER (OUTPATIENT)
Dept: RADIOLOGY | Facility: OTHER | Age: 58
Discharge: HOME | End: 2023-08-04
Attending: INTERNAL MEDICINE
Payer: COMMERCIAL

## 2023-08-04 DIAGNOSIS — R16.0 LIVER MASS: ICD-10-CM

## 2023-08-04 DIAGNOSIS — K76.9 LIVER DISEASE, UNSPECIFIED: ICD-10-CM

## 2023-08-04 PROCEDURE — A9585 GADOBUTROL INJECTION: HCPCS | Performed by: INTERNAL MEDICINE

## 2023-08-04 PROCEDURE — 74183 MRI ABD W/O CNTR FLWD CNTR: CPT | Mod: 26,,, | Performed by: RADIOLOGY

## 2023-08-04 PROCEDURE — 25500020 PHARM REV CODE 255: Performed by: INTERNAL MEDICINE

## 2023-08-04 PROCEDURE — 74183 MRI ABDOMEN W WO CONTRAST: ICD-10-PCS | Mod: 26,,, | Performed by: RADIOLOGY

## 2023-08-04 PROCEDURE — 74183 MRI ABD W/O CNTR FLWD CNTR: CPT | Mod: TC

## 2023-08-04 RX ORDER — GADOBUTROL 604.72 MG/ML
10 INJECTION INTRAVENOUS
Status: COMPLETED | OUTPATIENT
Start: 2023-08-04 | End: 2023-08-04

## 2023-08-04 RX ADMIN — GADOBUTROL 10 ML: 604.72 INJECTION INTRAVENOUS at 06:08

## 2023-08-08 ENCOUNTER — TELEPHONE (OUTPATIENT)
Dept: INTERNAL MEDICINE | Facility: CLINIC | Age: 58
End: 2023-08-08
Payer: COMMERCIAL

## 2023-08-08 ENCOUNTER — PATIENT MESSAGE (OUTPATIENT)
Dept: INTERNAL MEDICINE | Facility: CLINIC | Age: 58
End: 2023-08-08
Payer: COMMERCIAL

## 2023-08-08 DIAGNOSIS — I15.2 HYPERTENSION ASSOCIATED WITH DIABETES: ICD-10-CM

## 2023-08-08 DIAGNOSIS — K76.9 LIVER LESION: Primary | ICD-10-CM

## 2023-08-08 DIAGNOSIS — E11.65 TYPE 2 DIABETES MELLITUS WITH HYPERGLYCEMIA, WITHOUT LONG-TERM CURRENT USE OF INSULIN: ICD-10-CM

## 2023-08-08 DIAGNOSIS — E11.59 HYPERTENSION ASSOCIATED WITH DIABETES: ICD-10-CM

## 2023-08-08 RX ORDER — HYDROCHLOROTHIAZIDE 12.5 MG/1
12.5 TABLET ORAL DAILY
Qty: 90 TABLET | Refills: 3 | Status: SHIPPED | OUTPATIENT
Start: 2023-08-08

## 2023-08-08 NOTE — TELEPHONE ENCOUNTER
Care Due:                  Date            Visit Type   Department     Provider  --------------------------------------------------------------------------------                                EP -                              PRIMARY      [facility] INTERNAL  Last Visit: 08-      CARE ([facility])   MEDICINE       Robert Tran                              EP -                              PRIMARY      [facility] INTERNAL  Next Visit: 10-      CARE ([facility])   [facility]       Robert Tran                                                            Last  Test          Frequency    Reason                     Performed    Due Date  --------------------------------------------------------------------------------    HBA1C.......  6 months...  empagliflozin, metFORMIN.  04-   10-    [facility] Embedded Care Due Messages. Reference number: [id number].   8/08/2023 10:19:33 AM CDT

## 2023-08-10 ENCOUNTER — TELEPHONE (OUTPATIENT)
Dept: HEPATOLOGY | Facility: CLINIC | Age: 58
End: 2023-08-10
Payer: COMMERCIAL

## 2023-08-10 NOTE — TELEPHONE ENCOUNTER
Spoke with patient.  He would like a sooner appt.    Dr Eduardo appt (new pt) 10/17/23  MRI from 8/04/23  Impression:  Two nonspecific indeterminate liver lesions as described above for which surveillance is suggested.    Follow-up study in 3-6 months can be performed with Eovist contrast media.  AFP 2.2 from 8/01/23    Message to Dr Eduardo.          2.2

## 2023-08-22 DIAGNOSIS — E11.59 HYPERTENSION ASSOCIATED WITH DIABETES: ICD-10-CM

## 2023-08-22 DIAGNOSIS — K21.9 GASTROESOPHAGEAL REFLUX DISEASE WITHOUT ESOPHAGITIS: ICD-10-CM

## 2023-08-22 DIAGNOSIS — E11.65 TYPE 2 DIABETES MELLITUS WITH HYPERGLYCEMIA, WITHOUT LONG-TERM CURRENT USE OF INSULIN: ICD-10-CM

## 2023-08-22 DIAGNOSIS — I15.2 HYPERTENSION ASSOCIATED WITH DIABETES: ICD-10-CM

## 2023-08-22 DIAGNOSIS — E11.9 CONTROLLED TYPE 2 DIABETES MELLITUS WITHOUT COMPLICATION, WITHOUT LONG-TERM CURRENT USE OF INSULIN: ICD-10-CM

## 2023-08-22 RX ORDER — OMEPRAZOLE 20 MG/1
20 CAPSULE, DELAYED RELEASE ORAL DAILY
Qty: 90 CAPSULE | Refills: 3 | Status: SHIPPED | OUTPATIENT
Start: 2023-08-22

## 2023-08-22 RX ORDER — AMLODIPINE BESYLATE 5 MG/1
TABLET ORAL
Qty: 135 TABLET | Refills: 3 | Status: SHIPPED | OUTPATIENT
Start: 2023-08-22

## 2023-08-22 RX ORDER — LOSARTAN POTASSIUM 100 MG/1
100 TABLET ORAL DAILY
Qty: 90 TABLET | Refills: 3 | Status: SHIPPED | OUTPATIENT
Start: 2023-08-22

## 2023-08-22 NOTE — TELEPHONE ENCOUNTER
Refill Routing Note   Medication(s) are not appropriate for processing by [facility] for the following reason(s):      No active prescription written by provider    [facility] action(s):  Defer Care Due:  None identified            Appointments  past 12m or future 3m with PCP    Date Provider   Last Visit   8/1/2023 Robert Tran, DO   Next Visit   8/22/2023 Robert Tran, DO   ED visits in past 90 days: 1        Note composed:6:36 PM 08/22/2023

## 2023-08-22 NOTE — TELEPHONE ENCOUNTER
Refill Routing Note   Medication(s) are not appropriate for processing by [facility] for the following reason(s):      New or recently adjusted medication    [facility] action(s):  Defer Care Due:  None identified            Appointments  past 12m or future 3m with PCP    Date Provider   Last Visit   8/1/2023 Robert Tran, DO   Next Visit   10/27/2023 Robert Tran, DO   ED visits in past 90 days: 1        Note composed:6:11 PM 08/22/2023

## 2023-08-22 NOTE — TELEPHONE ENCOUNTER
No care due was identified.  [facility] Embedded Care Due Messages. Reference number: [id number].   8/22/2023 12:20:47 PM CDT

## 2023-08-22 NOTE — TELEPHONE ENCOUNTER
No care due was identified.  [facility] Embedded Care Due Messages. Reference number: [id number].   8/22/2023 12:20:11 PM CDT

## 2023-08-23 RX ORDER — METFORMIN HYDROCHLORIDE 500 MG/1
TABLET, EXTENDED RELEASE ORAL
Qty: 360 TABLET | Refills: 1 | Status: SHIPPED | OUTPATIENT
Start: 2023-08-23 | End: 2024-03-25 | Stop reason: SDUPTHER

## 2023-08-28 DIAGNOSIS — E11.65 TYPE 2 DIABETES MELLITUS WITH HYPERGLYCEMIA, WITHOUT LONG-TERM CURRENT USE OF INSULIN: ICD-10-CM

## 2023-08-28 NOTE — TELEPHONE ENCOUNTER
Refill Routing Note   Medication(s) are not appropriate for processing by [facility] for the following reason(s):      No active prescription written by provider    [facility] action(s):  Defer Care Due:  Labs due     Medication Therapy Plan: Pt due for A1c. Januvia not previously prescribed by you      Appointments  past 12m or future 3m with PCP    Date Provider   Last Visit   8/1/2023 Robert Tran, DO   Next Visit   10/27/2023 Robert Tran, DO   ED visits in past 90 days: 1        Note composed:1:11 AM 08/28/2023

## 2023-08-28 NOTE — TELEPHONE ENCOUNTER
No care due was identified.  [facility] Embedded Care Due Messages. Reference number: [id number].   8/28/2023 1:12:40 AM CDT

## 2023-08-28 NOTE — TELEPHONE ENCOUNTER
Jarred Howard  Signed  8/8/2023     Copy     Care Due:                    Date            Visit Type   Department     Provider  --------------------------------------------------------------------------------                                 EP -                              PRIMARY      [facility] INTERNAL  Last Visit: 08-      CARE ([facility])   MEDICINE       Robert Tran                              [facility]                              PRIMARY      [facility] INTERNAL  Next Visit: 10-      CARE ([facility])   [facility]       Robert Tran                                                              Last  Test          Frequency    Reason                     Performed    Due Date  --------------------------------------------------------------------------------     HBA1C.......  6 months...  empagliflozin, metFORMIN.  04-   10-     [facility] Care Due Messages. Reference number: [id number].   8/08/2023 10:19:33 AM CDT

## 2023-08-28 NOTE — TELEPHONE ENCOUNTER
Encounter closed prior to receipt. Pended in alternate encounter and routed to PCP due to Januvia not previously prescribed by Dr. Clark

## 2023-08-29 ENCOUNTER — PATIENT MESSAGE (OUTPATIENT)
Dept: INTERNAL MEDICINE | Facility: CLINIC | Age: 58
End: 2023-08-29
Payer: COMMERCIAL

## 2023-08-29 DIAGNOSIS — E78.5 HYPERLIPIDEMIA ASSOCIATED WITH TYPE 2 DIABETES MELLITUS: ICD-10-CM

## 2023-08-29 DIAGNOSIS — E11.69 HYPERLIPIDEMIA ASSOCIATED WITH TYPE 2 DIABETES MELLITUS: ICD-10-CM

## 2023-08-29 RX ORDER — PRAVASTATIN SODIUM 10 MG/1
10 TABLET ORAL DAILY
Qty: 90 TABLET | Refills: 3 | Status: SHIPPED | OUTPATIENT
Start: 2023-08-29

## 2023-08-29 NOTE — TELEPHONE ENCOUNTER
No care due was identified.  [facility] Embedded Care Due Messages. Reference number: [id number].   8/29/2023 2:13:59 PM CDT

## 2023-09-01 ENCOUNTER — OFFICE VISIT (OUTPATIENT)
Dept: HEPATOLOGY | Facility: CLINIC | Age: 58
End: 2023-09-01
Payer: COMMERCIAL

## 2023-09-01 DIAGNOSIS — K76.9 LIVER DISEASE, UNSPECIFIED: ICD-10-CM

## 2023-09-01 DIAGNOSIS — K76.9 LIVER LESION: ICD-10-CM

## 2023-09-01 PROCEDURE — 3061F PR NEG MICROALBUMINURIA RESULT DOCUMENTED/REVIEW: ICD-10-PCS | Mod: CPTII,95,, | Performed by: STUDENT IN AN ORGANIZED HEALTH CARE EDUCATION/TRAINING PROGRAM

## 2023-09-01 PROCEDURE — 4010F PR ACE/ARB THEARPY RXD/TAKEN: ICD-10-PCS | Mod: CPTII,95,, | Performed by: STUDENT IN AN ORGANIZED HEALTH CARE EDUCATION/TRAINING PROGRAM

## 2023-09-01 PROCEDURE — 3066F PR DOCUMENTATION OF TREATMENT FOR NEPHROPATHY: ICD-10-PCS | Mod: CPTII,95,, | Performed by: STUDENT IN AN ORGANIZED HEALTH CARE EDUCATION/TRAINING PROGRAM

## 2023-09-01 PROCEDURE — 99203 PR OFFICE/OUTPT VISIT, NEW, LEVL III, 30-44 MIN: ICD-10-PCS | Mod: 95,,, | Performed by: STUDENT IN AN ORGANIZED HEALTH CARE EDUCATION/TRAINING PROGRAM

## 2023-09-01 PROCEDURE — 1160F RVW MEDS BY RX/DR IN RCRD: CPT | Mod: CPTII,95,, | Performed by: STUDENT IN AN ORGANIZED HEALTH CARE EDUCATION/TRAINING PROGRAM

## 2023-09-01 PROCEDURE — 99203 OFFICE O/P NEW LOW 30 MIN: CPT | Mod: 95,,, | Performed by: STUDENT IN AN ORGANIZED HEALTH CARE EDUCATION/TRAINING PROGRAM

## 2023-09-01 PROCEDURE — 3066F NEPHROPATHY DOC TX: CPT | Mod: CPTII,95,, | Performed by: STUDENT IN AN ORGANIZED HEALTH CARE EDUCATION/TRAINING PROGRAM

## 2023-09-01 PROCEDURE — 3051F HG A1C>EQUAL 7.0%<8.0%: CPT | Mod: CPTII,95,, | Performed by: STUDENT IN AN ORGANIZED HEALTH CARE EDUCATION/TRAINING PROGRAM

## 2023-09-01 PROCEDURE — 3061F NEG MICROALBUMINURIA REV: CPT | Mod: CPTII,95,, | Performed by: STUDENT IN AN ORGANIZED HEALTH CARE EDUCATION/TRAINING PROGRAM

## 2023-09-01 PROCEDURE — 1160F PR REVIEW ALL MEDS BY PRESCRIBER/CLIN PHARMACIST DOCUMENTED: ICD-10-PCS | Mod: CPTII,95,, | Performed by: STUDENT IN AN ORGANIZED HEALTH CARE EDUCATION/TRAINING PROGRAM

## 2023-09-01 PROCEDURE — 1159F MED LIST DOCD IN RCRD: CPT | Mod: CPTII,95,, | Performed by: STUDENT IN AN ORGANIZED HEALTH CARE EDUCATION/TRAINING PROGRAM

## 2023-09-01 PROCEDURE — 3051F PR MOST RECENT HEMOGLOBIN A1C LEVEL 7.0 - < 8.0%: ICD-10-PCS | Mod: CPTII,95,, | Performed by: STUDENT IN AN ORGANIZED HEALTH CARE EDUCATION/TRAINING PROGRAM

## 2023-09-01 PROCEDURE — 4010F ACE/ARB THERAPY RXD/TAKEN: CPT | Mod: CPTII,95,, | Performed by: STUDENT IN AN ORGANIZED HEALTH CARE EDUCATION/TRAINING PROGRAM

## 2023-09-01 PROCEDURE — 1159F PR MEDICATION LIST DOCUMENTED IN MEDICAL RECORD: ICD-10-PCS | Mod: CPTII,95,, | Performed by: STUDENT IN AN ORGANIZED HEALTH CARE EDUCATION/TRAINING PROGRAM

## 2023-09-01 NOTE — PROGRESS NOTES
Hepatology Consult Note    The patient location is: [place]  The chief complaint leading to consultation is: liver lesions    Visit type: audiovisual    Face to Face time with patient: 15  30 minutes of total time spent on the encounter, which includes face to face time and non-face to face time preparing to see the patient (eg, review of tests), Obtaining and/or reviewing separately obtained history, Documenting clinical information in the electronic or other health record, Independently interpreting results (not separately reported) and communicating results to the patient/family/caregiver, or Care coordination (not separately reported).     Each patient to whom he or she provides medical services by telemedicine is:  (1) informed of the relationship between the physician and patient and the respective role of any other health care provider with respect to management of the patient; and (2) notified that he or she may decline to receive medical services by telemedicine and may withdraw from such care at any time.    Referring provider: Dr. Robert Tran  PCP: Robert Tran DO    Chief complaint: liver lesions    HPI:  Suhas Castillo is a 58 y.o. male who was referred to Hepatology Clinic for liver lesions.    He had a CT in July 2023 for abdominal pain that incidentally showed a 5 cm lesion in the right hepatic lobe possibly representing hemangioma or FNH though HCC not excluded.  There was also a 2.8 cm lesion in the left hepatic lobe.  Subsequent MRI in August 2023 again showed 2 liver lesions that were indeterminate with recommendations for repeat MRI with [facility] in 3-6 months.    He has otherwise never been told that he had any liver issues.  No known family history of liver disease. He denies signs of decompensated liver disease including no recent abdominal distension, encephalopathy, jaundice, GI bleeding.    Past Medical History:   Diagnosis Date    Angioedema 7/11/2019    AR (allergic  rhinitis)     Cervical spinal stenosis     Diabetes mellitus type II     DJD (degenerative joint disease), cervical     Hyperlipidemia     Hypertension     Sleep apnea        Past Surgical History:   Procedure Laterality Date    COLONOSCOPY N/A 4/11/2019    Procedure: COLONOSCOPY;  Surgeon: Gato Corbin MD;  Location: [facility] ENDO (4TH FLR);  Service: Endoscopy;  Laterality: N/A;    EPIDURAL BLOCK INJECTION      cervical x 2    EPIDURAL STEROID INJECTION N/A 9/28/2018    Procedure: Injection, Steroid, Epidural  C7-T1;  Surgeon: Bernie Estrada MD;  [facility];  Service: Pain Management;  Laterality: N/A;  C7-T1 IL DEBORAH   MRDICALLY URGENT    EPIDURAL STEROID INJECTION N/A 10/26/2018    Procedure: Injection, Steroid, Epidural CERVICAL C7-T1 DEBORAH;  Surgeon: Bernie Estrada MD;  [facility] [place] [facility];  Service: Pain Management;  Laterality: N/A;    EPIDURAL STEROID INJECTION N/A 2/7/2019    Procedure: INJECTION, STEROID, EPIDURAL;  Surgeon: Tania Bajwa MD;  [facility]: [facility];  Service: Pain Management;  Laterality: N/A;  C DEBORAH C7-T1    [facility]    EPIDURAL STEROID INJECTION N/A 7/27/2020    Procedure: INJECTION, STEROID, EPIDURAL, C7-T1;  Surgeon: Tania Bajwa MD;  [facility];  Service: Pain Management;  Laterality: N/A;    TESTICLE SURGERY  age 16    torsion       Family History   Problem Relation Age of Onset    Hypertension Mother     Diabetes Mother     Stroke Mother        Social History     Tobacco Use    Smoking status: Never    Smokeless tobacco: Never   Substance Use Topics    Alcohol use: No     Alcohol/week: 0.0 standard drinks of alcohol     Comment: none x 4 years; previously social drinker    Drug use: No       Current Outpatient Medications   Medication Sig Dispense Refill    amLODIPine (NORVASC) 5 MG tablet TAKE 1 AND 1/2 TABLETS(7.5 MG) BY MOUTH EVERY  tablet 3    blood pressure monitor (IHEALHotPads EASE) XL arm size (OP) by Other route as needed  for High Blood Pressure. 1 each 0    blood sugar diagnostic (CONTOUR TEST STRIPS) Strp USE TO TEST BLOOD SUGAR TWICE A DAY 50 each 11    empagliflozin (JARDIANCE) 10 mg tablet Take 1 tablet (10 mg total) by mouth every morning. 90 tablet 1    fluticasone propionate (FLONASE) 50 mcg/actuation nasal spray SHAKE LIQUID AND USE 2 SPRAYS(100 MCG) IN EACH NOSTRIL EVERY DAY 48 g 3    gentamicin (GARAMYCIN) 0.3 % ophthalmic solution Place into the right eye.      hydroCHLOROthiazide (HYDRODIURIL) 12.5 MG Tab Take 1 tablet (12.5 mg total) by mouth once daily. 90 tablet 3    levocetirizine (XYZAL) 5 MG tablet Take 1 tablet (5 mg total) by mouth every evening. 30 tablet 11    losartan (COZAAR) 100 MG tablet Take 1 tablet (100 mg total) by mouth once daily. 90 tablet 3    metFORMIN (GLUCOPHAGE-XR) 500 MG ER 24hr tablet TAKE 2 TABLETS(1000 MG) BY MOUTH TWICE DAILY. 360 tablet 1    omeprazole (PRILOSEC) 20 MG capsule Take 1 capsule (20 mg total) by mouth once daily. 90 capsule 3    pravastatin (PRAVACHOL) 10 MG tablet Take 1 tablet (10 mg total) by mouth once daily. 90 tablet 3    SITagliptin phosphate (JANUVIA) 100 MG Tab Take 1 tablet (100 mg total) by mouth once daily. 90 tablet 3     No current facility-administered medications for this visit.       Review of patient's allergies indicates:   Allergen Reactions    Ace inhibitors Swelling    Sinus allergy        Review of Systems   Constitutional:  Negative for fever and weight loss.   Cardiovascular:  Negative for leg swelling.   Gastrointestinal:  Negative for abdominal pain, blood in stool, constipation, diarrhea, heartburn, melena, nausea and vomiting.       There were no vitals filed for this visit.    Physical Exam  Constitutional:       General: He is not in acute distress.     Appearance: He is not ill-appearing.   HENT:      Head: Normocephalic and atraumatic.   Eyes:      General: No scleral icterus.     Extraocular Movements: Extraocular movements intact.    Pulmonary:      Effort: No respiratory distress.   Skin:     Coloration: Skin is not jaundiced.   Neurological:      Mental Status: He is alert.         LABS: I personally reviewed pertinent laboratory findings.    Lab Results   Component Value Date    WBC 7.83 07/25/2023    HGB 13.7 (L) 07/25/2023    HCT 44.9 07/25/2023    MCV 74 (L) 07/25/2023     07/25/2023       Lab Results   Component Value Date     07/25/2023    K 3.8 07/25/2023     (H) 07/25/2023    CO2 23 07/25/2023    BUN 14 07/25/2023    CREATININE 1.3 07/25/2023    CALCIUM 9.0 07/25/2023    ANIONGAP 10 07/25/2023    ESTGFRAFRICA >60.0 06/28/2022    EGFRNONAA >60.0 06/28/2022       Lab Results   Component Value Date    ALT 22 07/25/2023    AST 17 07/25/2023    GGT 21 06/23/2016    ALKPHOS 85 07/25/2023    BILITOT 0.2 07/25/2023       Lab Results   Component Value Date    HEPCAB Non-reactive 07/25/2023       IMAGING: I personally reviewed imaging studies.      Assessment/Recommendations:  58 y.o. male who was referred to Hepatology Clinic for liver lesions.    He had a CT in July 2023 for abdominal pain that incidentally showed a 5 cm lesion in the right hepatic lobe possibly representing hemangioma or FNH though HCC not excluded.  There was also a 2.8 cm lesion in the left hepatic lobe.  Subsequent MRI in August 2023 again showed 2 liver lesions that were indeterminate with recommendations for repeat MRI with [facility] in 3-6 months.  LFTs within normal limits in July 2023.    Will obtain MRI with Eovist for further characterization.  Additional recommendations pending MRI results    I have sent communication to the referring physician and/or primary care provider.    This note includes dictation done using [employer] speech recognition program. Word recognition mistakes are occasionally missed on review.    Abrahan Eduardo MD  Staff Physician  Hepatology and Liver Transplant  [facility]  [facility] [place]

## 2023-09-09 ENCOUNTER — PATIENT MESSAGE (OUTPATIENT)
Dept: HEPATOLOGY | Facility: CLINIC | Age: 58
End: 2023-09-09
Payer: COMMERCIAL

## 2023-10-27 ENCOUNTER — TELEPHONE (OUTPATIENT)
Dept: INTERNAL MEDICINE | Facility: CLINIC | Age: 58
End: 2023-10-27

## 2023-10-27 ENCOUNTER — LAB VISIT (OUTPATIENT)
Dept: LAB | Facility: HOSPITAL | Age: 58
End: 2023-10-27
Attending: INTERNAL MEDICINE
Payer: COMMERCIAL

## 2023-10-27 ENCOUNTER — OFFICE VISIT (OUTPATIENT)
Dept: INTERNAL MEDICINE | Facility: CLINIC | Age: 58
End: 2023-10-27
Payer: COMMERCIAL

## 2023-10-27 VITALS
BODY MASS INDEX: 30.34 KG/M2 | RESPIRATION RATE: 18 BRPM | TEMPERATURE: 97 F | OXYGEN SATURATION: 98 % | HEIGHT: 74 IN | DIASTOLIC BLOOD PRESSURE: 84 MMHG | HEART RATE: 85 BPM | WEIGHT: 236.44 LBS | SYSTOLIC BLOOD PRESSURE: 136 MMHG

## 2023-10-27 DIAGNOSIS — D50.9 MICROCYTIC ANEMIA: ICD-10-CM

## 2023-10-27 DIAGNOSIS — E66.2 OBESITY HYPOVENTILATION SYNDROME: ICD-10-CM

## 2023-10-27 DIAGNOSIS — E78.5 HYPERLIPIDEMIA ASSOCIATED WITH TYPE 2 DIABETES MELLITUS: ICD-10-CM

## 2023-10-27 DIAGNOSIS — Z00.00 ANNUAL PHYSICAL EXAM: ICD-10-CM

## 2023-10-27 DIAGNOSIS — E11.65 TYPE 2 DIABETES MELLITUS WITH HYPERGLYCEMIA, WITHOUT LONG-TERM CURRENT USE OF INSULIN: ICD-10-CM

## 2023-10-27 DIAGNOSIS — E11.69 HYPERLIPIDEMIA ASSOCIATED WITH TYPE 2 DIABETES MELLITUS: ICD-10-CM

## 2023-10-27 DIAGNOSIS — I15.2 HYPERTENSION ASSOCIATED WITH DIABETES: ICD-10-CM

## 2023-10-27 DIAGNOSIS — M47.812 SPONDYLOSIS OF CERVICAL REGION WITHOUT MYELOPATHY OR RADICULOPATHY: Primary | ICD-10-CM

## 2023-10-27 DIAGNOSIS — E11.59 HYPERTENSION ASSOCIATED WITH DIABETES: ICD-10-CM

## 2023-10-27 DIAGNOSIS — J30.9 ALLERGIC RHINITIS, UNSPECIFIED SEASONALITY, UNSPECIFIED TRIGGER: ICD-10-CM

## 2023-10-27 DIAGNOSIS — E11.69 HYPERLIPIDEMIA ASSOCIATED WITH TYPE 2 DIABETES MELLITUS: Primary | ICD-10-CM

## 2023-10-27 DIAGNOSIS — E78.5 HYPERLIPIDEMIA ASSOCIATED WITH TYPE 2 DIABETES MELLITUS: Primary | ICD-10-CM

## 2023-10-27 LAB
ALBUMIN SERPL BCP-MCNC: 4 G/DL (ref 3.5–5.2)
ALP SERPL-CCNC: 91 U/L (ref 55–135)
ALT SERPL W/O P-5'-P-CCNC: 22 U/L (ref 10–44)
ANION GAP SERPL CALC-SCNC: 9 MMOL/L (ref 8–16)
AST SERPL-CCNC: 20 U/L (ref 10–40)
BASOPHILS # BLD AUTO: 0.06 K/UL (ref 0–0.2)
BASOPHILS NFR BLD: 0.8 % (ref 0–1.9)
BILIRUB SERPL-MCNC: 0.3 MG/DL (ref 0.1–1)
BUN SERPL-MCNC: 21 MG/DL (ref 6–20)
CALCIUM SERPL-MCNC: 8.9 MG/DL (ref 8.7–10.5)
CHLORIDE SERPL-SCNC: 107 MMOL/L (ref 95–110)
CO2 SERPL-SCNC: 23 MMOL/L (ref 23–29)
CREAT SERPL-MCNC: 1.2 MG/DL (ref 0.5–1.4)
DIFFERENTIAL METHOD: ABNORMAL
EOSINOPHIL # BLD AUTO: 0.1 K/UL (ref 0–0.5)
EOSINOPHIL NFR BLD: 1.1 % (ref 0–8)
ERYTHROCYTE [DISTWIDTH] IN BLOOD BY AUTOMATED COUNT: 16.9 % (ref 11.5–14.5)
EST. GFR  (NO RACE VARIABLE): >60 ML/MIN/1.73 M^2
ESTIMATED AVG GLUCOSE: 134 MG/DL (ref 68–131)
FERRITIN SERPL-MCNC: 70 NG/ML (ref 20–300)
GLUCOSE SERPL-MCNC: 144 MG/DL (ref 70–110)
HBA1C MFR BLD: 6.3 % (ref 4–5.6)
HCT VFR BLD AUTO: 47.2 % (ref 40–54)
HGB BLD-MCNC: 14.4 G/DL (ref 14–18)
IMM GRANULOCYTES # BLD AUTO: 0.03 K/UL (ref 0–0.04)
IMM GRANULOCYTES NFR BLD AUTO: 0.4 % (ref 0–0.5)
IRON SERPL-MCNC: 40 UG/DL (ref 45–160)
LYMPHOCYTES # BLD AUTO: 1.7 K/UL (ref 1–4.8)
LYMPHOCYTES NFR BLD: 23 % (ref 18–48)
MCH RBC QN AUTO: 22.5 PG (ref 27–31)
MCHC RBC AUTO-ENTMCNC: 30.5 G/DL (ref 32–36)
MCV RBC AUTO: 74 FL (ref 82–98)
MONOCYTES # BLD AUTO: 0.9 K/UL (ref 0.3–1)
MONOCYTES NFR BLD: 11.9 % (ref 4–15)
NEUTROPHILS # BLD AUTO: 4.5 K/UL (ref 1.8–7.7)
NEUTROPHILS NFR BLD: 62.8 % (ref 38–73)
NRBC BLD-RTO: 0 /100 WBC
PLATELET # BLD AUTO: 238 K/UL (ref 150–450)
PMV BLD AUTO: 12.4 FL (ref 9.2–12.9)
POTASSIUM SERPL-SCNC: 3.9 MMOL/L (ref 3.5–5.1)
PROT SERPL-MCNC: 7.8 G/DL (ref 6–8.4)
RBC # BLD AUTO: 6.39 M/UL (ref 4.6–6.2)
SATURATED IRON: 11 % (ref 20–50)
SODIUM SERPL-SCNC: 139 MMOL/L (ref 136–145)
TOTAL IRON BINDING CAPACITY: 377 UG/DL (ref 250–450)
TRANSFERRIN SERPL-MCNC: 255 MG/DL (ref 200–375)
WBC # BLD AUTO: 7.22 K/UL (ref 3.9–12.7)

## 2023-10-27 PROCEDURE — 3079F PR MOST RECENT DIASTOLIC BLOOD PRESSURE 80-89 MM HG: ICD-10-PCS | Mod: CPTII,S$GLB,, | Performed by: INTERNAL MEDICINE

## 2023-10-27 PROCEDURE — 81269 HBA1/HBA2 GENE DUP/DEL VRNTS: CPT | Performed by: INTERNAL MEDICINE

## 2023-10-27 PROCEDURE — 99999 PR PBB SHADOW E&M-EST. PATIENT-LVL IV: ICD-10-PCS | Mod: PBBFAC,,, | Performed by: INTERNAL MEDICINE

## 2023-10-27 PROCEDURE — 3066F NEPHROPATHY DOC TX: CPT | Mod: CPTII,S$GLB,, | Performed by: INTERNAL MEDICINE

## 2023-10-27 PROCEDURE — 83540 ASSAY OF IRON: CPT | Performed by: INTERNAL MEDICINE

## 2023-10-27 PROCEDURE — 3008F PR BODY MASS INDEX (BMI) DOCUMENTED: ICD-10-PCS | Mod: CPTII,S$GLB,, | Performed by: INTERNAL MEDICINE

## 2023-10-27 PROCEDURE — 1160F RVW MEDS BY RX/DR IN RCRD: CPT | Mod: CPTII,S$GLB,, | Performed by: INTERNAL MEDICINE

## 2023-10-27 PROCEDURE — 83036 HEMOGLOBIN GLYCOSYLATED A1C: CPT | Performed by: INTERNAL MEDICINE

## 2023-10-27 PROCEDURE — 3051F PR MOST RECENT HEMOGLOBIN A1C LEVEL 7.0 - < 8.0%: ICD-10-PCS | Mod: CPTII,S$GLB,, | Performed by: INTERNAL MEDICINE

## 2023-10-27 PROCEDURE — 3075F PR MOST RECENT SYSTOLIC BLOOD PRESS GE 130-139MM HG: ICD-10-PCS | Mod: CPTII,S$GLB,, | Performed by: INTERNAL MEDICINE

## 2023-10-27 PROCEDURE — 84466 ASSAY OF TRANSFERRIN: CPT | Performed by: INTERNAL MEDICINE

## 2023-10-27 PROCEDURE — 82728 ASSAY OF FERRITIN: CPT | Mod: 91 | Performed by: INTERNAL MEDICINE

## 2023-10-27 PROCEDURE — 96372 THER/PROPH/DIAG INJ SC/IM: CPT | Mod: S$GLB,,, | Performed by: INTERNAL MEDICINE

## 2023-10-27 PROCEDURE — 3075F SYST BP GE 130 - 139MM HG: CPT | Mod: CPTII,S$GLB,, | Performed by: INTERNAL MEDICINE

## 2023-10-27 PROCEDURE — 3079F DIAST BP 80-89 MM HG: CPT | Mod: CPTII,S$GLB,, | Performed by: INTERNAL MEDICINE

## 2023-10-27 PROCEDURE — 85025 COMPLETE CBC W/AUTO DIFF WBC: CPT | Performed by: INTERNAL MEDICINE

## 2023-10-27 PROCEDURE — 1159F MED LIST DOCD IN RCRD: CPT | Mod: CPTII,S$GLB,, | Performed by: INTERNAL MEDICINE

## 2023-10-27 PROCEDURE — 36415 COLL VENOUS BLD VENIPUNCTURE: CPT | Mod: PO | Performed by: INTERNAL MEDICINE

## 2023-10-27 PROCEDURE — 1159F PR MEDICATION LIST DOCUMENTED IN MEDICAL RECORD: ICD-10-PCS | Mod: CPTII,S$GLB,, | Performed by: INTERNAL MEDICINE

## 2023-10-27 PROCEDURE — 3066F PR DOCUMENTATION OF TREATMENT FOR NEPHROPATHY: ICD-10-PCS | Mod: CPTII,S$GLB,, | Performed by: INTERNAL MEDICINE

## 2023-10-27 PROCEDURE — 99214 OFFICE O/P EST MOD 30 MIN: CPT | Mod: 25,S$GLB,, | Performed by: INTERNAL MEDICINE

## 2023-10-27 PROCEDURE — 80053 COMPREHEN METABOLIC PANEL: CPT | Performed by: INTERNAL MEDICINE

## 2023-10-27 PROCEDURE — 99999 PR PBB SHADOW E&M-EST. PATIENT-LVL IV: CPT | Mod: PBBFAC,,, | Performed by: INTERNAL MEDICINE

## 2023-10-27 PROCEDURE — 82728 ASSAY OF FERRITIN: CPT | Performed by: INTERNAL MEDICINE

## 2023-10-27 PROCEDURE — 4010F PR ACE/ARB THEARPY RXD/TAKEN: ICD-10-PCS | Mod: CPTII,S$GLB,, | Performed by: INTERNAL MEDICINE

## 2023-10-27 PROCEDURE — 3051F HG A1C>EQUAL 7.0%<8.0%: CPT | Mod: CPTII,S$GLB,, | Performed by: INTERNAL MEDICINE

## 2023-10-27 PROCEDURE — 4010F ACE/ARB THERAPY RXD/TAKEN: CPT | Mod: CPTII,S$GLB,, | Performed by: INTERNAL MEDICINE

## 2023-10-27 PROCEDURE — 83789 MASS SPECTROMETRY QUAL/QUAN: CPT | Performed by: INTERNAL MEDICINE

## 2023-10-27 PROCEDURE — 99214 PR OFFICE/OUTPT VISIT, EST, LEVL IV, 30-39 MIN: ICD-10-PCS | Mod: 25,S$GLB,, | Performed by: INTERNAL MEDICINE

## 2023-10-27 PROCEDURE — 83020 HEMOGLOBIN ELECTROPHORESIS: CPT | Mod: 91 | Performed by: INTERNAL MEDICINE

## 2023-10-27 PROCEDURE — 3061F PR NEG MICROALBUMINURIA RESULT DOCUMENTED/REVIEW: ICD-10-PCS | Mod: CPTII,S$GLB,, | Performed by: INTERNAL MEDICINE

## 2023-10-27 PROCEDURE — 96372 PR INJECTION,THERAP/PROPH/DIAG2ST, IM OR SUBCUT: ICD-10-PCS | Mod: S$GLB,,, | Performed by: INTERNAL MEDICINE

## 2023-10-27 PROCEDURE — 3008F BODY MASS INDEX DOCD: CPT | Mod: CPTII,S$GLB,, | Performed by: INTERNAL MEDICINE

## 2023-10-27 PROCEDURE — 3061F NEG MICROALBUMINURIA REV: CPT | Mod: CPTII,S$GLB,, | Performed by: INTERNAL MEDICINE

## 2023-10-27 PROCEDURE — 1160F PR REVIEW ALL MEDS BY PRESCRIBER/CLIN PHARMACIST DOCUMENTED: ICD-10-PCS | Mod: CPTII,S$GLB,, | Performed by: INTERNAL MEDICINE

## 2023-10-27 RX ORDER — TRIAMCINOLONE ACETONIDE 40 MG/ML
40 INJECTION, SUSPENSION INTRA-ARTICULAR; INTRAMUSCULAR
Status: COMPLETED | OUTPATIENT
Start: 2023-10-27 | End: 2023-10-27

## 2023-10-27 RX ORDER — VALACYCLOVIR HYDROCHLORIDE 1 G/1
TABLET, FILM COATED ORAL
COMMUNITY
Start: 2023-08-07

## 2023-10-27 RX ORDER — FLUTICASONE PROPIONATE 50 MCG
SPRAY, SUSPENSION (ML) NASAL
Qty: 48 G | Refills: 3 | Status: SHIPPED | OUTPATIENT
Start: 2023-10-27

## 2023-10-27 RX ADMIN — TRIAMCINOLONE ACETONIDE 40 MG: 40 INJECTION, SUSPENSION INTRA-ARTICULAR; INTRAMUSCULAR at 07:10

## 2023-10-27 NOTE — PROGRESS NOTES
Subjective     Patient ID: Suhas Castillo is a 58 y.o. male.    Chief Complaint: Low-back Pain and allergies     HPI  Pt with T2DM, LIZZY, DJD Cervical spine, HLD, Liver lesions, MAMADOU is here for 6 month f/u. Pt c/o 4 days of mild sinus pressure/congestion, post nasal drip and dry cough. No sore throat, fevers/chills, wheezing/SOB, myalgias. Some relief with antihistamines.   Review of Systems   Constitutional:  Negative for activity change, appetite change, chills, diaphoresis, fatigue, fever and unexpected weight change.   HENT:  Positive for postnasal drip, rhinorrhea and sinus pressure/congestion. Negative for sneezing, sore throat, trouble swallowing and voice change.    Respiratory:  Positive for cough. Negative for shortness of breath and wheezing.    Cardiovascular:  Negative for chest pain, palpitations and leg swelling.   Gastrointestinal:  Negative for abdominal pain, blood in stool, constipation, diarrhea, nausea and vomiting.   Genitourinary:  Negative for dysuria.   Musculoskeletal:  Positive for arthralgias. Negative for myalgias.   Integumentary:  Negative for rash and wound.   Allergic/Immunologic: Negative for environmental allergies and food allergies.   Hematological:  Negative for adenopathy. Does not bruise/bleed easily.          Objective     Physical Exam  Constitutional:       General: He is not in acute distress.     Appearance: Normal appearance. He is well-developed. He is not diaphoretic.   HENT:      Head: Normocephalic and atraumatic.      Right Ear: External ear normal.      Left Ear: External ear normal.      Nose: Nose normal.      Right Turbinates: Swollen.      Left Turbinates: Swollen.      Mouth/Throat:      Pharynx: No oropharyngeal exudate.   Eyes:      General: No scleral icterus.        Right eye: No discharge.         Left eye: No discharge.      Conjunctiva/sclera: Conjunctivae normal.      Pupils: Pupils are equal, round, and reactive to light.   Neck:      Vascular: No  JVD.   Cardiovascular:      Rate and Rhythm: Normal rate and regular rhythm.      Pulses: Normal pulses.      Heart sounds: Normal heart sounds. No murmur heard.  Pulmonary:      Effort: Pulmonary effort is normal. No respiratory distress.      Breath sounds: Normal breath sounds. No wheezing or rales.   Abdominal:      General: Bowel sounds are normal.      Tenderness: There is no abdominal tenderness. There is no guarding or rebound.   Musculoskeletal:      Cervical back: Normal range of motion and neck supple.      Right lower leg: No edema.      Left lower leg: No edema.   Lymphadenopathy:      Cervical: No cervical adenopathy.   Skin:     General: Skin is warm and dry.      Capillary Refill: Capillary refill takes less than 2 seconds.      Coloration: Skin is not pale.      Findings: No rash.   Neurological:      Mental Status: He is alert and oriented to person, place, and time. Mental status is at baseline.      Cranial Nerves: No cranial nerve deficit.   Psychiatric:         Mood and Affect: Mood normal.         Behavior: Behavior normal.            Assessment and Plan     1. Spondylosis of cervical region without myelopathy or radiculopathy    2. Obesity hypoventilation syndrome    3. Hyperlipidemia associated with type 2 diabetes mellitus  -     CBC Auto Differential; Future; Expected date: 10/27/2023  -     Comprehensive Metabolic Panel; Future; Expected date: 10/27/2023    4. Hypertension associated with diabetes  -     CBC Auto Differential; Future; Expected date: 10/27/2023  -     Comprehensive Metabolic Panel; Future; Expected date: 10/27/2023    5. Type 2 diabetes mellitus with hyperglycemia, without long-term current use of insulin  -     Hemoglobin A1C; Future; Expected date: 10/27/2023    6. Microcytic anemia  -     Iron and TIBC; Future; Expected date: 10/27/2023  -     Ferritin; Future; Expected date: 10/27/2023  -     Thalasseima and Hemoglobinopathy Eval; Future; Expected date:  10/27/2023    7. Allergic rhinitis, unspecified seasonality, unspecified trigger  -     fluticasone propionate (FLONASE) 50 mcg/actuation nasal spray; SHAKE LIQUID AND USE 2 SPRAYS(100 MCG) IN EACH NOSTRIL EVERY DAY  Dispense: 48 g; Refill: 3  -     triamcinolone acetonide injection 40 mg      T2DM- last A1C of 7.6(4/23)<--6.5(6/22)<--5.9(11/21)   -on Metformin/januvia/jardiance     LIZZY- on CPAP     DJD Cervical spine- stable     HLD- on statin     Multiple liver lesions- followed by Hepatology    Microcytic anemia- repeat iron studies, has been on Iron qd    Viral sinusitis- Rx Flonase and continue daily antihistamine    -Kenalog 40 mg IM    F/u in 6 months for annual exam

## 2023-11-01 ENCOUNTER — PATIENT OUTREACH (OUTPATIENT)
Dept: ADMINISTRATIVE | Facility: HOSPITAL | Age: 58
End: 2023-11-01
Payer: COMMERCIAL

## 2023-11-01 NOTE — LETTER
AUTHORIZATION FOR RELEASE OF   CONFIDENTIAL INFORMATION    Dear Dr. Rincon,    We are seeing Suhas Castillo, date of birth 1965, in the clinic at [facility]. Robert Tran DO is the patient's PCP. Suhas Castillo has an outstanding lab/procedure at the time we reviewed his chart. In order to help keep his health information updated, he has authorized us to request the following medical record(s):        (  )  MAMMOGRAM                                      (  )  COLONOSCOPY      (  )  PAP SMEAR                                          (  )  OUTSIDE LAB RESULTS     (  )  DEXA SCAN                                          ( x )  EYE EXAM            (  )  FOOT EXAM                                          (  )  ENTIRE RECORD     (  )  OUTSIDE IMMUNIZATIONS                 (  )  _______________         Please fax records to Robert Tran DO, [phone]     If you have any questions, please contact ABILIO Ledbetter at [phone].           Patient Name: Suhas Castillo  : 1965  Patient Phone #: [phone]

## 2023-11-02 LAB
FERRITIN SERPL-MCNC: 75 MCG/L (ref 31–409)
HEMOGLOBIN VARIANT BY MASS SPECTROMETRY: NORMAL
HGB A MFR BLD ELPH: 97.6 % (ref 95.8–98)
HGB A2 MFR BLD: 2.4 % (ref 2–3.3)
HGB A2+XXX MFR BLD ELPH: NORMAL %
HGB F MFR BLD: 0 % (ref 0–0.9)
HGB XXX MFR BLD ELPH: NORMAL %
HPLC HB VARIANT: NORMAL
PATH REV BLD -IMP: NORMAL
PROVIDER SIGNING NAME: NORMAL

## 2023-11-08 ENCOUNTER — TELEPHONE (OUTPATIENT)
Dept: INTERNAL MEDICINE | Facility: CLINIC | Age: 58
End: 2023-11-08
Payer: COMMERCIAL

## 2023-11-08 DIAGNOSIS — D50.9 IRON DEFICIENCY ANEMIA, UNSPECIFIED IRON DEFICIENCY ANEMIA TYPE: Primary | ICD-10-CM

## 2023-11-08 RX ORDER — FERROUS SULFATE 325(65) MG
325 TABLET, DELAYED RELEASE (ENTERIC COATED) ORAL DAILY
Qty: 90 TABLET | Refills: 1 | Status: SHIPPED | OUTPATIENT
Start: 2023-11-08

## 2023-11-10 LAB
ALPHA GLOBIN RELEASED BY: NORMAL
ALPHA-GLOBIN SPECIMEN: NORMAL
GENETICIST REVIEW: NORMAL
HBA1 GENE MUT ANL BLD/T: NORMAL
HBA1 GENE MUT ANL BLD/T: NORMAL
REF LAB TEST METHOD: NORMAL
SERVICE CMNT-IMP: NORMAL
SPECIMEN SOURCE: NORMAL

## 2023-11-13 LAB
PATH REV BLD -IMP: NORMAL
PROVIDER SIGNING NAME: NORMAL

## 2023-11-15 ENCOUNTER — HOSPITAL ENCOUNTER (OUTPATIENT)
Dept: RADIOLOGY | Facility: OTHER | Age: 58
Discharge: HOME | End: 2023-11-15
Attending: STUDENT IN AN ORGANIZED HEALTH CARE EDUCATION/TRAINING PROGRAM
Payer: COMMERCIAL

## 2023-11-15 DIAGNOSIS — K76.9 LIVER DISEASE, UNSPECIFIED: ICD-10-CM

## 2023-11-15 DIAGNOSIS — K76.9 LIVER LESION: ICD-10-CM

## 2023-11-15 PROCEDURE — A9585 GADOBUTROL INJECTION: HCPCS | Performed by: STUDENT IN AN ORGANIZED HEALTH CARE EDUCATION/TRAINING PROGRAM

## 2023-11-15 PROCEDURE — 74183 MRI ABD W/O CNTR FLWD CNTR: CPT | Mod: 26,,, | Performed by: RADIOLOGY

## 2023-11-15 PROCEDURE — 74183 MRI ABDOMEN W WO CONTRAST: ICD-10-PCS | Mod: 26,,, | Performed by: RADIOLOGY

## 2023-11-15 PROCEDURE — 74183 MRI ABD W/O CNTR FLWD CNTR: CPT | Mod: TC

## 2023-11-15 PROCEDURE — 25500020 PHARM REV CODE 255: Performed by: STUDENT IN AN ORGANIZED HEALTH CARE EDUCATION/TRAINING PROGRAM

## 2023-11-15 RX ORDER — GADOBUTROL 604.72 MG/ML
9 INJECTION INTRAVENOUS
Status: COMPLETED | OUTPATIENT
Start: 2023-11-15 | End: 2023-11-15

## 2023-11-15 RX ADMIN — GADOBUTROL 7.5 ML: 604.72 INJECTION INTRAVENOUS at 05:11

## 2023-11-17 ENCOUNTER — TELEPHONE (OUTPATIENT)
Dept: HEPATOLOGY | Facility: CLINIC | Age: 58
End: 2023-11-17
Payer: COMMERCIAL

## 2023-11-17 NOTE — TELEPHONE ENCOUNTER
Patient: Suhas Castillo       MRN: [id number]      : 1965     Age: 58 y.o.  [address] [facility] [address]    Providers: Abrahan Eduarod MD    Priority of review: Other    Patient Transplant Status: Hepatology    Reason for presentation: Reassessment    Clinical Summary:  58-year-old male with no known liver disease referred for liver lesions. CT in 2023 for abdominal pain incidentally showed a 5 cm lesion in the right hepatic lobe possibly representing hemangioma or FNH though HCC not excluded.  There was also a 2.8 cm lesion in the left hepatic lobe.  Subsequent MRI in 2023 again showed liver lesions that were indeterminate.  Repeat MRI in 2023 showed stable liver lesions that are very nonspecific with recommendations for continued surveillance.    Imaging to be reviewed: MRI 2023    HCC Treatment History: N/A    Platelets:   Lab Results   Component Value Date/Time     10/27/2023 07:56 AM     Creatinine:   Lab Results   Component Value Date/Time    CREATININE 1.2 10/27/2023 07:56 AM     Bilirubin:   Lab Results   Component Value Date/Time    BILITOT 0.3 10/27/2023 07:56 AM     AFP Last 3 each if available:   Lab Results   Component Value Date/Time    AFP 2.2 2023 09:30 AM       MELD: Computed MELD 3.0 unavailable. Necessary lab results were not found in the last year.  Computed MELD-Na unavailable. Necessary lab results were not found in the last year.      Plan:     Follow-up Provider:   
Submitted for review at liver conference 11/21/2023  
Name band;

## 2023-12-09 NOTE — PROGRESS NOTES
Dr. Isa Price paged re: discharge    [phone]- Dr. Isa Price not on call- Dr. Renita Taylor called no answer will reattempt. Subjective:       Patient ID: Suhas Castillo is a 57 y.o. male.    Chief Complaint: Diabetes and Hypertension    56 yo M, established pt of mine, with PMH significant for DM-2, HTN, HLD, AR, C-spine stenosis due to DDD/DJD with chronic neck pain, LIZZY on CPAP, and Vitamin D deficiency. He was last evaluated by me 02/04/2022. He presents for a f/u office visit for chronic disease management. He has generally been feeling well. Reports performing squats and continues to experiencing pain to right lateral lower extremity from hip to knee. States this is not a big issue. He is not stretching prior to performing strengthening exercises. No associated back pain, LE weakness/sensory deficits. No radicular pain.  His diet has been poor over the past 5 months and he does plan to work on this.  See Plan below for assessment/plan of chronic diseases. He is adherent with all medications as listed.      Past Medical History:   Diagnosis Date    Angioedema 7/11/2019    AR (allergic rhinitis)     Cervical spinal stenosis     Diabetes mellitus type II     DJD (degenerative joint disease), cervical     Hyperlipidemia     Hypertension     Sleep apnea        Patient Active Problem List   Diagnosis    Type 2 diabetes mellitus with hyperglycemia, without long-term current use of insulin    Hypertension associated with diabetes    Hyperlipidemia associated with type 2 diabetes mellitus    Allergic rhinitis    DJD (degenerative joint disease), cervical    Cervical spinal stenosis    Vitamin D deficiency    LIZZY (obstructive sleep apnea)    Chronic pain    Microcytic anemia       Past Surgical History:   Procedure Laterality Date    COLONOSCOPY N/A 4/11/2019    Procedure: COLONOSCOPY;  Surgeon: Gato Corbin MD;  Location: [address]);  Service: Endoscopy;  Laterality: N/A;    EPIDURAL BLOCK INJECTION      cervical x 2    EPIDURAL STEROID INJECTION N/A 9/28/2018    Procedure: Injection, Steroid, Epidural   C7-T1;  Surgeon: Bernie Estrada MD;  Location: [facility];  Service: Pain Management;  Laterality: N/A;  C7-T1 IL DEBORAH   MRDICALLY URGENT    EPIDURAL STEROID INJECTION N/A 10/26/2018    Procedure: Injection, Steroid, Epidural CERVICAL C7-T1 DEBORAH;  Surgeon: Bernie Estrada MD;  Location: [facility];  Service: Pain Management;  Laterality: N/A;    EPIDURAL STEROID INJECTION N/A 2/7/2019    Procedure: INJECTION, STEROID, EPIDURAL;  Surgeon: Tania Bajwa MD;  Location: [facility];  Service: Pain Management;  Laterality: N/A;  C DEBORAH C7-T1    ESTRADA PT ON EISSA SCHEDULE    EPIDURAL STEROID INJECTION N/A 7/27/2020    Procedure: INJECTION, STEROID, EPIDURAL, C7-T1;  Surgeon: Tania Bajwa MD;  Location: [facility];  Service: Pain Management;  Laterality: N/A;    TESTICLE SURGERY  age 16    torsion       Family History   Problem Relation Age of Onset    Hypertension Mother     Diabetes Mother     Stroke Mother        Social History     Tobacco Use   Smoking Status Never Smoker   Smokeless Tobacco Never Used       Social History     Social History Narrative    The patient does exercise regularly (treadmill, light weights: TIW-QIW).      Rates diet as good.      He is satisfied with weight.    He does drink at least 1/2 gallon water daily.    He drinks 0 coffee/tea/caffeine-containing soft drinks daily.    Total sleep time at night is 4-5 hours.    He works 65 hours per week.    He does wear seat belts.    Hobbies include bicycling.               Medications have been reviewed and reconciled.     Review of patient's allergies indicates:   Allergen Reactions    Ace inhibitors Swelling        Review of Systems   Constitutional: Negative for activity change, appetite change, chills, fever and unexpected weight change.   HENT: Negative for congestion, sneezing and sore throat.    Eyes: Negative for redness, itching and visual disturbance.   Respiratory: Negative for cough, shortness of breath and wheezing.   "  Cardiovascular: Negative for chest pain.   Gastrointestinal: Negative for abdominal pain, constipation, diarrhea, nausea and vomiting.   Endocrine: Negative for polydipsia, polyphagia and polyuria.   Genitourinary: Negative for difficulty urinating, dysuria, frequency, penile discharge and urgency.   Musculoskeletal: Positive for myalgias (lateral aspect of right lower extremity from hip to knee).   Skin: Negative for rash.   Neurological: Negative for dizziness, syncope, weakness and headaches.   Psychiatric/Behavioral: Negative for dysphoric mood and suicidal ideas. The patient is not nervous/anxious.          Objective:        /79 (BP Location: Left arm, Patient Position: Sitting, BP Method: X-Large (Automatic))   Pulse 71   Ht 6' 3" (1.905 m)   Wt 109.2 kg (240 lb 11.9 oz)   SpO2 97%   BMI 30.09 kg/m²      Physical Exam  Constitutional:       General: He is not in acute distress.     Appearance: He is well-developed.   HENT:      Head: Normocephalic and atraumatic.      Right Ear: External ear normal.      Left Ear: External ear normal.   Eyes:      General: No scleral icterus.     Extraocular Movements: Extraocular movements intact.      Conjunctiva/sclera: Conjunctivae normal.   Cardiovascular:      Rate and Rhythm: Normal rate and regular rhythm.      Pulses:           Dorsalis pedis pulses are 2+ on the right side and 2+ on the left side.        Posterior tibial pulses are 2+ on the right side and 2+ on the left side.      Heart sounds: Normal heart sounds. No murmur heard.    No friction rub. No gallop.   Pulmonary:      Effort: Pulmonary effort is normal. No respiratory distress.      Breath sounds: Normal breath sounds. No wheezing or rales.   Musculoskeletal:         General: No tenderness or deformity. Normal range of motion.      Cervical back: Normal range of motion.      Right lower leg: No edema.        Legs:       Comments: Pt experiencing intermittent pain to area outlined in red with " squatting.   Feet:      Right foot:      Protective Sensation: 4 sites tested. 4 sites sensed.      Skin integrity: Skin integrity normal.      Toenail Condition: Right toenails are normal.      Left foot:      Protective Sensation: 4 sites tested. 4 sites sensed.      Skin integrity: Skin integrity normal.      Toenail Condition: Left toenails are normal.   Skin:     General: Skin is warm and dry.      Findings: No erythema or rash.   Neurological:      Mental Status: He is alert and oriented to person, place, and time.      Cranial Nerves: No cranial nerve deficit.   Psychiatric:         Behavior: Behavior normal.           Protective Sensation (w/ 10 gram monofilament):  Right: Intact  Left: Intact    Visual Inspection:  Normal -  Bilateral    Pedal Pulses:   Right: Present  Left: Present    Posterior tibialis:   Right:Present  Left: Present    Assessment:       1. Hypertension associated with diabetes    2. Type 2 diabetes mellitus with hyperglycemia, without long-term current use of insulin    3. Hyperlipidemia associated with type 2 diabetes mellitus    4. Microcytic anemia    5. Right leg pain    6. Allergic rhinitis, unspecified seasonality, unspecified trigger    7. Cervical spinal stenosis    8. LIZZY (obstructive sleep apnea)    9. Vitamin D deficiency    10. Need for shingles vaccine        Plan:       Suhas was seen today for diabetes and hypertension.    Diagnoses and all orders for this visit:    Hypertension associated with diabetes    Type 2 diabetes mellitus with hyperglycemia, without long-term current use of insulin  -     Foot Exam Performed    Hyperlipidemia associated with type 2 diabetes mellitus    Microcytic anemia  -     Ferritin; Future  -     Iron and TIBC; Future    Right leg pain    Allergic rhinitis, unspecified seasonality, unspecified trigger    Cervical spinal stenosis    LIZZY (obstructive sleep apnea)    Vitamin D deficiency    Need for shingles vaccine  -     varicella-zoster  [id number], PF, (SHINGRIX, PF,) 50 mcg/0.5 mL injection; Inject 0.5 mLs into the muscle once. for 1 dose    HTN   --BP at target today  Continue Losartan 100 mg daily, HCTZ 12.5 mg daily, and amlodipine 7.5 mg daily     DM-2   --Last A1c 6.5 06/28/2022; prior to this 5.9 11/1/21   --Plans to improve diet/lifestyle modifications   --Continue Metformin ER 1g BID + Januvia 100 mg daily + Jardiance 10 mg daily  --Foot exam performed today 07/05/2022 wnl.   --Diabetic eye exam done at [facility] on 10/4/2021--request records. Pt plans to bring this in.  --Flu vaccine previously declined 2/4/22  --Urine micro/cr normal 2/4/22    HLD   , TG 77, HDL 32, LDL 64.6 06/28/2022  Continue pravastatin 10 mg qhs     Anemia  H/H 13.4/44.1; MCV 75   Has had stable microcytic anemia over 10 years   C-scope neg 04/11/2019  Assess Fe studies  Consider Hbg electrophoresis if Fe stores normal     Right Lower Extremity Pain  -Occurrs while performing squats  -Likely myofascial in nature; suspect iliotibial band syndrome No red flag signs/symptoms of back injury  -Encouraged stretching prior to strengthening exercises  -Warm compress/massage prn    AR  Continue Flonase/Xyzal prn    C-spine stenosis due to DDD/DJD with chronic neck pain  Has had several ESIs in the past   --Has intermittent pain, however, symptoms generally stable at present    LIZZY  --Continue CPAP  --Not following with sleep medicine    Vitamin D deficiency  Last Vitamin D 53 11/1/21.   Continue Vitamin D3 2000 IU daily    HM   Encourage Shingrix and 4th COVID-19 vaccine from pharmacy  PSA screen DUE with next lab draw      Follow up in about 6 months (around 1/5/2023) for Chronic Disease Management.     I spent a total of 33 minutes on the day of the visit.This includes face to face time and non-face to face time preparing to see the patient (eg, review of tests), obtaining and/or reviewing separately obtained history, documenting clinical information in the  electronic or other health record, independently interpreting results and communicating results to the patient/family/caregiver, or care coordinator.

## 2024-03-25 DIAGNOSIS — E11.9 CONTROLLED TYPE 2 DIABETES MELLITUS WITHOUT COMPLICATION, WITHOUT LONG-TERM CURRENT USE OF INSULIN: ICD-10-CM

## 2024-03-25 NOTE — TELEPHONE ENCOUNTER
No care due was identified.  [facility] Embedded Care Due Messages. Reference number: [id number].   3/25/2024 3:18:49 PM CDT

## 2024-03-26 RX ORDER — METFORMIN HYDROCHLORIDE 500 MG/1
TABLET, EXTENDED RELEASE ORAL
Qty: 360 TABLET | Refills: 0 | Status: SHIPPED | OUTPATIENT
Start: 2024-03-26 | End: 2024-06-03 | Stop reason: SDUPTHER

## 2024-03-27 NOTE — TELEPHONE ENCOUNTER
Refill Decision Note   Suhas Castillo  is requesting a refill authorization.  Brief Assessment and Rationale for Refill:  Approve     Medication Therapy Plan:         Comments:     Note composed:9:32 PM 03/26/2024

## 2024-04-14 DIAGNOSIS — E11.59 HYPERTENSION ASSOCIATED WITH DIABETES: ICD-10-CM

## 2024-04-14 DIAGNOSIS — I15.2 HYPERTENSION ASSOCIATED WITH DIABETES: ICD-10-CM

## 2024-04-15 RX ORDER — LOSARTAN POTASSIUM 100 MG/1
100 TABLET ORAL DAILY
Qty: 90 TABLET | Refills: 3 | Status: SHIPPED | OUTPATIENT
Start: 2024-04-15

## 2024-04-15 RX ORDER — HYDROCHLOROTHIAZIDE 12.5 MG/1
12.5 TABLET ORAL DAILY
Qty: 90 TABLET | Refills: 3 | Status: SHIPPED | OUTPATIENT
Start: 2024-04-15

## 2024-04-15 RX ORDER — AMLODIPINE BESYLATE 5 MG/1
TABLET ORAL
Qty: 135 TABLET | Refills: 3 | Status: SHIPPED | OUTPATIENT
Start: 2024-04-15

## 2024-04-15 NOTE — TELEPHONE ENCOUNTER
No care due was identified.  [facility] Embedded Care Due Messages. Reference number: [id number].   4/14/2024 8:17:17 PM CDT

## 2024-04-26 ENCOUNTER — OFFICE VISIT (OUTPATIENT)
Dept: INTERNAL MEDICINE | Facility: CLINIC | Age: 59
End: 2024-04-26
Payer: COMMERCIAL

## 2024-04-26 ENCOUNTER — LAB VISIT (OUTPATIENT)
Dept: LAB | Facility: HOSPITAL | Age: 59
End: 2024-04-26
Attending: INTERNAL MEDICINE
Payer: COMMERCIAL

## 2024-04-26 VITALS
OXYGEN SATURATION: 98 % | HEIGHT: 74 IN | BODY MASS INDEX: 30.31 KG/M2 | DIASTOLIC BLOOD PRESSURE: 80 MMHG | RESPIRATION RATE: 18 BRPM | WEIGHT: 236.13 LBS | TEMPERATURE: 97 F | HEART RATE: 86 BPM | SYSTOLIC BLOOD PRESSURE: 138 MMHG

## 2024-04-26 DIAGNOSIS — G47.33 OSA (OBSTRUCTIVE SLEEP APNEA): ICD-10-CM

## 2024-04-26 DIAGNOSIS — K76.9 LIVER LESION: ICD-10-CM

## 2024-04-26 DIAGNOSIS — E11.59 HYPERTENSION ASSOCIATED WITH DIABETES: ICD-10-CM

## 2024-04-26 DIAGNOSIS — E11.65 TYPE 2 DIABETES MELLITUS WITH HYPERGLYCEMIA, WITHOUT LONG-TERM CURRENT USE OF INSULIN: ICD-10-CM

## 2024-04-26 DIAGNOSIS — E78.5 HYPERLIPIDEMIA ASSOCIATED WITH TYPE 2 DIABETES MELLITUS: ICD-10-CM

## 2024-04-26 DIAGNOSIS — Z00.00 ANNUAL PHYSICAL EXAM: Primary | ICD-10-CM

## 2024-04-26 DIAGNOSIS — M47.812 SPONDYLOSIS OF CERVICAL REGION WITHOUT MYELOPATHY OR RADICULOPATHY: ICD-10-CM

## 2024-04-26 DIAGNOSIS — E11.69 HYPERLIPIDEMIA ASSOCIATED WITH TYPE 2 DIABETES MELLITUS: ICD-10-CM

## 2024-04-26 DIAGNOSIS — I15.2 HYPERTENSION ASSOCIATED WITH DIABETES: ICD-10-CM

## 2024-04-26 DIAGNOSIS — D56.3 ALPHA THALASSEMIA TRAIT: ICD-10-CM

## 2024-04-26 DIAGNOSIS — Z00.00 ANNUAL PHYSICAL EXAM: ICD-10-CM

## 2024-04-26 PROBLEM — D50.9 MICROCYTIC ANEMIA: Status: RESOLVED | Noted: 2022-07-05 | Resolved: 2024-04-26

## 2024-04-26 PROBLEM — E66.2 OBESITY HYPOVENTILATION SYNDROME: Status: RESOLVED | Noted: 2023-04-27 | Resolved: 2024-04-26

## 2024-04-26 LAB
ALBUMIN/CREAT UR: NORMAL UG/MG (ref 0–30)
BACTERIA #/AREA URNS AUTO: NORMAL /HPF
BACTERIA #/AREA URNS AUTO: NORMAL /HPF
BILIRUB UR QL STRIP: NEGATIVE
CAOX CRY UR QL COMP ASSIST: NORMAL
CAOX CRY UR QL COMP ASSIST: NORMAL
CLARITY UR REFRACT.AUTO: CLEAR
COLOR UR AUTO: YELLOW
CREAT UR-MCNC: 160 MG/DL (ref 23–375)
GLUCOSE UR QL STRIP: ABNORMAL
HGB UR QL STRIP: NEGATIVE
KETONES UR QL STRIP: NEGATIVE
LEUKOCYTE ESTERASE UR QL STRIP: NEGATIVE
MICROALBUMIN UR DL<=1MG/L-MCNC: <5 UG/ML
MICROSCOPIC COMMENT: NORMAL
MICROSCOPIC COMMENT: NORMAL
NITRITE UR QL STRIP: NEGATIVE
PH UR STRIP: 5 [PH] (ref 5–8)
PROT UR QL STRIP: NEGATIVE
RBC #/AREA URNS AUTO: 2 /HPF (ref 0–4)
RBC #/AREA URNS AUTO: 3 /HPF (ref 0–4)
SP GR UR STRIP: >=1.03 (ref 1–1.03)
SQUAMOUS #/AREA URNS AUTO: 0 /HPF
URN SPEC COLLECT METH UR: ABNORMAL
WBC #/AREA URNS AUTO: 0 /HPF (ref 0–5)
WBC #/AREA URNS AUTO: 1 /HPF (ref 0–5)
YEAST UR QL AUTO: NORMAL

## 2024-04-26 PROCEDURE — 3079F DIAST BP 80-89 MM HG: CPT | Mod: CPTII,S$GLB,, | Performed by: INTERNAL MEDICINE

## 2024-04-26 PROCEDURE — 99999 PR PBB SHADOW E&M-EST. PATIENT-LVL IV: CPT | Mod: PBBFAC,,, | Performed by: INTERNAL MEDICINE

## 2024-04-26 PROCEDURE — 1160F RVW MEDS BY RX/DR IN RCRD: CPT | Mod: CPTII,S$GLB,, | Performed by: INTERNAL MEDICINE

## 2024-04-26 PROCEDURE — 3008F BODY MASS INDEX DOCD: CPT | Mod: CPTII,S$GLB,, | Performed by: INTERNAL MEDICINE

## 2024-04-26 PROCEDURE — 1159F MED LIST DOCD IN RCRD: CPT | Mod: CPTII,S$GLB,, | Performed by: INTERNAL MEDICINE

## 2024-04-26 PROCEDURE — 81001 URINALYSIS AUTO W/SCOPE: CPT | Performed by: INTERNAL MEDICINE

## 2024-04-26 PROCEDURE — 4010F ACE/ARB THERAPY RXD/TAKEN: CPT | Mod: CPTII,S$GLB,, | Performed by: INTERNAL MEDICINE

## 2024-04-26 PROCEDURE — 82043 UR ALBUMIN QUANTITATIVE: CPT | Performed by: INTERNAL MEDICINE

## 2024-04-26 PROCEDURE — 99396 PREV VISIT EST AGE 40-64: CPT | Mod: S$GLB,,, | Performed by: INTERNAL MEDICINE

## 2024-04-26 PROCEDURE — 3075F SYST BP GE 130 - 139MM HG: CPT | Mod: CPTII,S$GLB,, | Performed by: INTERNAL MEDICINE

## 2024-04-26 NOTE — PROGRESS NOTES
Subjective     Patient ID: Suhas Castillo is a 59 y.o. male.    Chief Complaint: Annual Exam    HPI  59 y.o. Male here for annual exam.      Vaccines: Influenza (declined); Tetanus (2020); PNA (current); Shingrix (will consider)  Eye exam: current  Colonoscopy: 2019     Exercise: cardio/weights  Diet: regular    Past Medical History:  7/11/2019: Angioedema  No date: AR (allergic rhinitis)  No date: Cervical spinal stenosis  No date: Diabetes mellitus type II  No date: DJD (degenerative joint disease), cervical  No date: Hyperlipidemia  No date: Hypertension  No date: Sleep apnea  Past Surgical History:  4/11/2019: COLONOSCOPY; N/A      Comment:  Procedure: COLONOSCOPY;  Surgeon: Gato Corbin MD;                Location: [address]);  Service: Endoscopy;                 Laterality: N/A;  No date: EPIDURAL BLOCK INJECTION      Comment:  cervical x 2  9/28/2018: EPIDURAL STEROID INJECTION; N/A      Comment:  Procedure: Injection, Steroid, Epidural  C7-T1;                 Surgeon: Bernie Estrada MD;  Location: [facility] PAIN [facility]GT;                 Service: Pain Management;  Laterality: N/A;  C7-T1 IL DEBORAH               MRDICALLY URGENT  10/26/2018: EPIDURAL STEROID INJECTION; N/A      Comment:  Procedure: Injection, Steroid, Epidural CERVICAL C7-T1                DEBORAH;  Surgeon: Bernie Estrada MD;  Location: [facility] PAIN                MGT;  Service: Pain Management;  Laterality: N/A;  2/7/2019: EPIDURAL STEROID INJECTION; N/A      Comment:  Procedure: INJECTION, STEROID, EPIDURAL;  Surgeon: Tania Bajwa MD;  Location: [facility] PAIN [facility]GT;  Service: Pain                Management;  Laterality: N/A;  C DEBORAH C7-T2BTCTU PT ON               EISSA SCHEDULE  7/27/2020: EPIDURAL STEROID INJECTION; N/A      Comment:  Procedure: INJECTION, STEROID, EPIDURAL, C7-T1;                 Surgeon: Tania Bajwa MD;  Location: [facility] PAIN MGT;                 Service: Pain Management;  Laterality: N/A;  age 16: TESTICLE  SURGERY      Comment:  torsion  Social History    Socioeconomic History      Marital status: Single      Number of children: 4    Occupational History      Occupation: secutiry supervisor        Employer: [employer]    Tobacco Use      Smoking status: Never      Smokeless tobacco: Never    Substance and Sexual Activity      Alcohol use: No        Alcohol/week: 0.0 standard drinks of alcohol        Comment: none x 4 years; previously social drinker      Drug use: No      Sexual activity: Yes        Partners: Female        Birth control/protection: None    Social History Narrative      The patient does exercise regularly (treadmill, light weights: TIW-QIW).        Rates diet as good.        He is satisfied with weight.      He does drink at least 1/2 gallon water daily.      He drinks 0 coffee/tea/caffeine-containing soft drinks daily.      Total sleep time at night is 4-5 hours.      He works 65 hours per week.      He does wear seat belts.      Hobbies include bicycling.                Social Determinants of Health  Financial Resource Strain: Low Risk  (3/16/2020)      Overall Financial Resource Strain (CARDIA)          Difficulty of Paying Living Expenses: Not hard at all  Food Insecurity: No Food Insecurity (3/16/2020)      Hunger Vital Sign          Worried About Running Out of Food in the Last Year: Never true          Ran Out of Food in the Last Year: Never true  Transportation Needs: No Transportation Needs (3/16/2020)      PRAPARE - Transportation          Lack of Transportation (Medical): No          Lack of Transportation (Non-Medical): No  Physical Activity: Sufficiently Active (3/16/2020)      Exercise Vital Sign          Days of Exercise per Week: 5 days          Minutes of Exercise per Session: 60 min  Social Connections: Socially Integrated (3/16/2020)      Social Connection and Isolation Panel [NHANES]          Frequency of Communication with Friends and Family: More than three times a  week          Frequency of Social Gatherings with Friends and Family: Three times a week          Attends Hinduism Services: More than 4 times per year          Active Member of Clubs or Organizations: Yes          Attends Club or Organization Meetings: More than 4 times per year          Marital Status:   Review of patient's allergies indicates:   -- Ace inhibitors -- Swelling   -- Sinus allergy   Suhas Castillo had no medications administered during this visit.  Review of Systems   Constitutional:  Negative for activity change, appetite change, chills, diaphoresis, fatigue, fever and unexpected weight change.   HENT:  Negative for nasal congestion, mouth sores, postnasal drip, rhinorrhea, sinus pressure/congestion, sneezing, sore throat, trouble swallowing and voice change.    Eyes:  Negative for discharge, itching and visual disturbance.   Respiratory:  Negative for cough, chest tightness, shortness of breath and wheezing.    Cardiovascular:  Negative for chest pain, palpitations and leg swelling.   Gastrointestinal:  Negative for abdominal pain, blood in stool, constipation, diarrhea, nausea and vomiting.   Endocrine: Negative for cold intolerance and heat intolerance.   Genitourinary:  Negative for difficulty urinating, dysuria, flank pain, hematuria and urgency.   Musculoskeletal:  Positive for arthralgias and neck pain. Negative for back pain and myalgias.   Integumentary:  Negative for rash and wound.   Allergic/Immunologic: Negative for environmental allergies and food allergies.   Neurological:  Negative for dizziness, tremors, seizures, syncope, weakness and headaches.   Hematological:  Negative for adenopathy. Does not bruise/bleed easily.   Psychiatric/Behavioral:  Negative for confusion, sleep disturbance and suicidal ideas. The patient is not nervous/anxious.           Objective     Physical Exam  Constitutional:       General: He is not in acute distress.     Appearance: Normal appearance.  He is well-developed. He is not ill-appearing, toxic-appearing or diaphoretic.   HENT:      Head: Normocephalic and atraumatic.      Right Ear: External ear normal.      Left Ear: External ear normal.      Nose: Nose normal.      Mouth/Throat:      Pharynx: No oropharyngeal exudate.   Eyes:      General: No scleral icterus.        Right eye: No discharge.         Left eye: No discharge.      Extraocular Movements: Extraocular movements intact.      Conjunctiva/sclera: Conjunctivae normal.      Pupils: Pupils are equal, round, and reactive to light.   Neck:      Thyroid: No thyromegaly.      Vascular: No JVD.   Cardiovascular:      Rate and Rhythm: Normal rate and regular rhythm.      Pulses: Normal pulses.      Heart sounds: Normal heart sounds. No murmur heard.  Pulmonary:      Effort: Pulmonary effort is normal. No respiratory distress.      Breath sounds: Normal breath sounds. No wheezing or rales.   Abdominal:      General: Bowel sounds are normal. There is no distension.      Palpations: Abdomen is soft.      Tenderness: There is no abdominal tenderness. There is no right CVA tenderness, left CVA tenderness, guarding or rebound.   Musculoskeletal:      Cervical back: Normal range of motion and neck supple. No rigidity.      Right lower leg: No edema.      Left lower leg: No edema.   Lymphadenopathy:      Cervical: No cervical adenopathy.   Skin:     General: Skin is warm and dry.      Capillary Refill: Capillary refill takes less than 2 seconds.      Coloration: Skin is not pale.      Findings: No rash.   Neurological:      General: No focal deficit present.      Mental Status: He is alert and oriented to person, place, and time. Mental status is at baseline.      Cranial Nerves: No cranial nerve deficit.      Sensory: No sensory deficit.      Motor: No weakness.      Coordination: Coordination normal.      Gait: Gait normal.      Deep Tendon Reflexes: Reflexes normal.   Psychiatric:         Mood and Affect:  Mood normal.         Behavior: Behavior normal.         Thought Content: Thought content normal.         Judgment: Judgment normal.            Assessment and Plan     1. LIZZY (obstructive sleep apnea)    2. Hypertension associated with diabetes    3. Type 2 diabetes mellitus with hyperglycemia, without long-term current use of insulin    4. Hyperlipidemia associated with type 2 diabetes mellitus    5. Spondylosis of cervical region without myelopathy or radiculopathy    6. Liver lesion    7. Alpha thalassemia trait        Blood work ordered     T2DM- last A1C of 6.3(10/23)<--7.6(4/23)<--6.5(6/22)<--5.9(11/21)   -on Metformin/januvia/jardiance     LIZZY- on CPAP     DJD Cervical spine- stable     HLD- on statin     Multiple liver lesions- stable, followed by Hepatology     Alpha thalassemia trait    F/u in 6 months

## 2024-05-03 ENCOUNTER — TELEPHONE (OUTPATIENT)
Dept: INTERNAL MEDICINE | Facility: CLINIC | Age: 59
End: 2024-05-03
Payer: COMMERCIAL

## 2024-05-03 DIAGNOSIS — E11.65 TYPE 2 DIABETES MELLITUS WITH HYPERGLYCEMIA, WITHOUT LONG-TERM CURRENT USE OF INSULIN: Primary | ICD-10-CM

## 2024-05-08 ENCOUNTER — TELEPHONE (OUTPATIENT)
Dept: INTERNAL MEDICINE | Facility: CLINIC | Age: 59
End: 2024-05-08
Payer: COMMERCIAL

## 2024-05-08 DIAGNOSIS — E61.1 IRON DEFICIENCY: Primary | ICD-10-CM

## 2024-05-08 RX ORDER — FERROUS SULFATE TAB 325 MG (65 MG ELEMENTAL FE) 325 (65 FE) MG
TAB ORAL
Qty: 90 TABLET | Refills: 3 | Status: SHIPPED | OUTPATIENT
Start: 2024-05-08

## 2024-05-08 NOTE — TELEPHONE ENCOUNTER
Refill Routing Note   Medication(s) are not appropriate for processing by [facility] for the following reason(s):        Outside of protocol    [facility] action(s):  Route             Appointments  past 12m or future 3m with PCP    Date Provider   Last Visit   4/26/2024 Robert Tran, DO   Next Visit   10/18/2024 Robert Tran, DO   ED visits in past 90 days: 0        Note composed:8:52 AM 05/08/2024

## 2024-05-20 ENCOUNTER — PATIENT MESSAGE (OUTPATIENT)
Dept: ADMINISTRATIVE | Facility: HOSPITAL | Age: 59
End: 2024-05-20
Payer: COMMERCIAL

## 2024-05-24 ENCOUNTER — PATIENT OUTREACH (OUTPATIENT)
Dept: ADMINISTRATIVE | Facility: HOSPITAL | Age: 59
End: 2024-05-24
Payer: COMMERCIAL

## 2024-05-24 NOTE — LETTER
AUTHORIZATION FOR RELEASE OF   CONFIDENTIAL INFORMATION        We are seeing Suhas Castillo, date of birth 1965, in the clinic at [facility] INTERNAL [facility]. Robert Tran DO is the patient's PCP. Suhas Castillo has an outstanding lab/procedure at the time we reviewed his chart. In order to help keep his health information updated, he has authorized us to request the following medical record(s):        (  )  MAMMOGRAM                                      (  )  COLONOSCOPY      (  )  PAP SMEAR                                          (  )  OUTSIDE LAB RESULTS     (  )  DEXA SCAN                                          ( x )  EYE EXAM            (  )  FOOT EXAM                                          (  )  ENTIRE RECORD     (  )  OUTSIDE IMMUNIZATIONS                 (  )  _______________         Please fax records to [facility], Helmstetter, Brian M., DO, [phone]     If you have any questions, please contact Ingrid at [phone].           Patient Name: Suhas Castillo  : 1965  Patient Phone #: [phone]

## 2024-06-03 DIAGNOSIS — E11.9 CONTROLLED TYPE 2 DIABETES MELLITUS WITHOUT COMPLICATION, WITHOUT LONG-TERM CURRENT USE OF INSULIN: ICD-10-CM

## 2024-06-03 DIAGNOSIS — E11.65 TYPE 2 DIABETES MELLITUS WITH HYPERGLYCEMIA, WITHOUT LONG-TERM CURRENT USE OF INSULIN: ICD-10-CM

## 2024-06-04 RX ORDER — METFORMIN HYDROCHLORIDE 500 MG/1
TABLET, EXTENDED RELEASE ORAL
Qty: 360 TABLET | Refills: 1 | Status: SHIPPED | OUTPATIENT
Start: 2024-06-04

## 2024-06-04 NOTE — TELEPHONE ENCOUNTER
Refill Routing Note   Medication(s) are not appropriate for processing by [facility] for the following reason(s):        Drug-disease interaction    [facility] action(s):  Defer  Approve        Medication Therapy Plan: empagliflozin and Hypertension associated with diabetes; metFORMIN and Liver lesion    Pharmacist review requested: Yes     Appointments  past 12m or future 3m with PCP    Date Provider   Last Visit   4/26/2024 Robert Tran, DO   Next Visit   10/18/2024 Robert Tran, DO   ED visits in past 90 days: 0        Note composed:9:39 AM 06/04/2024

## 2024-06-04 NOTE — TELEPHONE ENCOUNTER
No care due was identified.  [facility] Embedded Care Due Messages. Reference number: [id number].   6/03/2024 10:07:13 PM CDT

## 2024-06-04 NOTE — TELEPHONE ENCOUNTER
Refill Decision Note   Suhas Castillo  is requesting a refill authorization.  Brief Assessment and Rationale for Refill:  Approve     Medication Therapy Plan:        Pharmacist review requested: Yes   Comments:     Note composed:10:35 AM 06/04/2024

## 2024-06-10 ENCOUNTER — PATIENT OUTREACH (OUTPATIENT)
Dept: ADMINISTRATIVE | Facility: HOSPITAL | Age: 59
End: 2024-06-10
Payer: COMMERCIAL

## 2024-06-10 NOTE — LETTER
AUTHORIZATION FOR RELEASE OF   CONFIDENTIAL INFORMATION    Dear [facility] Maher,    We are seeing Suhas Castillo, date of birth 1965, in the clinic at [facility]. Robert Tran DO is the patient's PCP. Suhas Castillo has an outstanding lab/procedure at the time we reviewed his chart. In order to help keep his health information updated, he has authorized us to request the following medical record(s):        (  )  MAMMOGRAM                                      (  )  COLONOSCOPY      (  )  PAP SMEAR                                          (  )  OUTSIDE LAB RESULTS     (  )  DEXA SCAN                                          ( x )  EYE EXAM            (  )  FOOT EXAM                                          (  )  ENTIRE RECORD     (  )  OUTSIDE IMMUNIZATIONS                 (  )  _______________         Please fax records to Robert Tran DO, [phone]     If you have any questions, please contact ABILIO Ledbetter at [phone].           Patient Name: Suhas Castillo  : 1965  Patient Phone #: [phone]

## 2024-07-15 ENCOUNTER — PATIENT OUTREACH (OUTPATIENT)
Dept: ADMINISTRATIVE | Facility: HOSPITAL | Age: 59
End: 2024-07-15
Payer: COMMERCIAL

## 2024-07-15 NOTE — LETTER
AUTHORIZATION FOR RELEASE OF   CONFIDENTIAL INFORMATION    Dear Eye Fabiola Maher,    We are seeing Suhas Castillo, date of birth 1965, in the clinic at [facility] INTERNAL MEDICINE. Robert Tran DO is the patient's PCP. Suhas Castillo has an outstanding lab/procedure at the time we reviewed his chart. In order to help keep his health information updated, he has authorized us to request the following medical record(s):        (  )  MAMMOGRAM                                      (  )  COLONOSCOPY      (  )  PAP SMEAR                                          (  )  OUTSIDE LAB RESULTS     (  )  DEXA SCAN                                          ( x )  EYE EXAM            (  )  FOOT EXAM                                          (  )  ENTIRE RECORD     (  )  OUTSIDE IMMUNIZATIONS                 (  )  _______________         Please fax records to Robert Tran DO, [phone]     If you have any questions, please contact ABILIO Ledbetter at [phone].           Patient Name: Suhas Castillo  : 1965  Patient Phone #: [phone]

## 2024-07-17 DIAGNOSIS — J30.9 ALLERGIC RHINITIS, UNSPECIFIED SEASONALITY, UNSPECIFIED TRIGGER: ICD-10-CM

## 2024-07-17 RX ORDER — FLUTICASONE PROPIONATE 50 MCG
SPRAY, SUSPENSION (ML) NASAL
Qty: 48 G | Refills: 3 | Status: SHIPPED | OUTPATIENT
Start: 2024-07-17

## 2024-07-17 NOTE — TELEPHONE ENCOUNTER
Refill Decision Note   Suhas Castillo  is requesting a refill authorization.  Brief Assessment and Rationale for Refill:  Approve     Medication Therapy Plan:        Comments:     Note composed:8:45 AM 07/17/2024

## 2024-07-17 NOTE — TELEPHONE ENCOUNTER
No care due was identified.  Health [facility] Embedded Care Due Messages. Reference number: [id number].   7/17/2024 8:06:28 AM CDT

## 2024-09-03 DIAGNOSIS — E78.5 HYPERLIPIDEMIA ASSOCIATED WITH TYPE 2 DIABETES MELLITUS: ICD-10-CM

## 2024-09-03 DIAGNOSIS — E11.69 HYPERLIPIDEMIA ASSOCIATED WITH TYPE 2 DIABETES MELLITUS: ICD-10-CM

## 2024-09-04 RX ORDER — PRAVASTATIN SODIUM 10 MG/1
10 TABLET ORAL DAILY
Qty: 90 TABLET | Refills: 2 | Status: SHIPPED | OUTPATIENT
Start: 2024-09-04

## 2024-09-04 NOTE — TELEPHONE ENCOUNTER
Care Due:                  Date            Visit Type   Department     Provider  --------------------------------------------------------------------------------                                EP -                              PRIMARY      [facility] INTERNAL  Last Visit: 04-      CARE ([facility])   MEDICINE       Robert Tran                              [facility]                              PRIMARY      [facility] INTERNAL  Next Visit: 10-      CARE ([facility])   MEDICINE       Robert Tran                                                            Last  Test          Frequency    Reason                     Performed    Due Date  --------------------------------------------------------------------------------    HBA1C.......  6 months...  SITagliptin,               04-   10-                             empagliflozin, metFORMIN.    Health [facility] Embedded Care Due Messages. Reference number: [id number].   9/03/2024 9:47:32 PM CDT

## 2024-09-04 NOTE — TELEPHONE ENCOUNTER
Suhas Castillo  is requesting a refill authorization.  Brief Assessment and Rationale for Refill:  Approve     Medication Therapy Plan:  FLOS      Comments:     Note composed:11:05 AM 09/04/2024

## 2024-10-01 ENCOUNTER — PATIENT MESSAGE (OUTPATIENT)
Dept: INTERNAL MEDICINE | Facility: CLINIC | Age: 59
End: 2024-10-01
Payer: COMMERCIAL

## 2024-10-11 ENCOUNTER — LAB VISIT (OUTPATIENT)
Dept: LAB | Facility: HOSPITAL | Age: 59
End: 2024-10-11
Attending: INTERNAL MEDICINE
Payer: COMMERCIAL

## 2024-10-11 ENCOUNTER — OFFICE VISIT (OUTPATIENT)
Dept: INTERNAL MEDICINE | Facility: CLINIC | Age: 59
End: 2024-10-11
Payer: COMMERCIAL

## 2024-10-11 VITALS
HEART RATE: 78 BPM | HEIGHT: 74 IN | BODY MASS INDEX: 29.71 KG/M2 | SYSTOLIC BLOOD PRESSURE: 138 MMHG | DIASTOLIC BLOOD PRESSURE: 78 MMHG | RESPIRATION RATE: 14 BRPM | TEMPERATURE: 98 F | OXYGEN SATURATION: 97 % | WEIGHT: 231.5 LBS

## 2024-10-11 DIAGNOSIS — R10.31 GROIN PAIN, RIGHT: ICD-10-CM

## 2024-10-11 DIAGNOSIS — R10.32 GROIN PAIN, LEFT: Primary | ICD-10-CM

## 2024-10-11 DIAGNOSIS — E11.65 TYPE 2 DIABETES MELLITUS WITH HYPERGLYCEMIA, WITHOUT LONG-TERM CURRENT USE OF INSULIN: ICD-10-CM

## 2024-10-11 DIAGNOSIS — E61.1 IRON DEFICIENCY: ICD-10-CM

## 2024-10-11 LAB
ALBUMIN SERPL BCP-MCNC: 3.7 G/DL (ref 3.5–5.2)
ALP SERPL-CCNC: 80 U/L (ref 55–135)
ALT SERPL W/O P-5'-P-CCNC: 15 U/L (ref 10–44)
ANION GAP SERPL CALC-SCNC: 6 MMOL/L (ref 8–16)
AST SERPL-CCNC: 18 U/L (ref 10–40)
BASOPHILS # BLD AUTO: 0.05 K/UL (ref 0–0.2)
BASOPHILS NFR BLD: 0.7 % (ref 0–1.9)
BILIRUB SERPL-MCNC: 0.6 MG/DL (ref 0.1–1)
BUN SERPL-MCNC: 18 MG/DL (ref 6–20)
CALCIUM SERPL-MCNC: 8.8 MG/DL (ref 8.7–10.5)
CHLORIDE SERPL-SCNC: 109 MMOL/L (ref 95–110)
CO2 SERPL-SCNC: 24 MMOL/L (ref 23–29)
CREAT SERPL-MCNC: 1.2 MG/DL (ref 0.5–1.4)
DIFFERENTIAL METHOD BLD: ABNORMAL
EOSINOPHIL # BLD AUTO: 0 K/UL (ref 0–0.5)
EOSINOPHIL NFR BLD: 0.6 % (ref 0–8)
ERYTHROCYTE [DISTWIDTH] IN BLOOD BY AUTOMATED COUNT: 15.9 % (ref 11.5–14.5)
EST. GFR  (NO RACE VARIABLE): >60 ML/MIN/1.73 M^2
ESTIMATED AVG GLUCOSE: 128 MG/DL (ref 68–131)
FERRITIN SERPL-MCNC: 81 NG/ML (ref 20–300)
GLUCOSE SERPL-MCNC: 134 MG/DL (ref 70–110)
HBA1C MFR BLD: 6.1 % (ref 4–5.6)
HCT VFR BLD AUTO: 43.2 % (ref 40–54)
HGB BLD-MCNC: 13.5 G/DL (ref 14–18)
IMM GRANULOCYTES # BLD AUTO: 0.02 K/UL (ref 0–0.04)
IMM GRANULOCYTES NFR BLD AUTO: 0.3 % (ref 0–0.5)
IRON SERPL-MCNC: 123 UG/DL (ref 45–160)
LYMPHOCYTES # BLD AUTO: 1.7 K/UL (ref 1–4.8)
LYMPHOCYTES NFR BLD: 25.5 % (ref 18–48)
MCH RBC QN AUTO: 23.2 PG (ref 27–31)
MCHC RBC AUTO-ENTMCNC: 31.3 G/DL (ref 32–36)
MCV RBC AUTO: 74 FL (ref 82–98)
MONOCYTES # BLD AUTO: 0.6 K/UL (ref 0.3–1)
MONOCYTES NFR BLD: 9.5 % (ref 4–15)
NEUTROPHILS # BLD AUTO: 4.3 K/UL (ref 1.8–7.7)
NEUTROPHILS NFR BLD: 63.4 % (ref 38–73)
NRBC BLD-RTO: 0 /100 WBC
PLATELET # BLD AUTO: 219 K/UL (ref 150–450)
PMV BLD AUTO: ABNORMAL FL (ref 9.2–12.9)
POTASSIUM SERPL-SCNC: 3.9 MMOL/L (ref 3.5–5.1)
PROT SERPL-MCNC: 7 G/DL (ref 6–8.4)
RBC # BLD AUTO: 5.81 M/UL (ref 4.6–6.2)
RETICS/RBC NFR AUTO: 1.2 % (ref 0.4–2)
SATURATED IRON: 38 % (ref 20–50)
SODIUM SERPL-SCNC: 139 MMOL/L (ref 136–145)
TOTAL IRON BINDING CAPACITY: 323 UG/DL (ref 250–450)
TRANSFERRIN SERPL-MCNC: 218 MG/DL (ref 200–375)
WBC # BLD AUTO: 6.71 K/UL (ref 3.9–12.7)

## 2024-10-11 PROCEDURE — 80053 COMPREHEN METABOLIC PANEL: CPT | Performed by: INTERNAL MEDICINE

## 2024-10-11 PROCEDURE — 82728 ASSAY OF FERRITIN: CPT | Performed by: INTERNAL MEDICINE

## 2024-10-11 PROCEDURE — 36415 COLL VENOUS BLD VENIPUNCTURE: CPT | Mod: PO | Performed by: INTERNAL MEDICINE

## 2024-10-11 PROCEDURE — 99999 PR PBB SHADOW E&M-EST. PATIENT-LVL V: CPT | Mod: PBBFAC,,, | Performed by: NURSE PRACTITIONER

## 2024-10-11 PROCEDURE — 85025 COMPLETE CBC W/AUTO DIFF WBC: CPT | Performed by: INTERNAL MEDICINE

## 2024-10-11 PROCEDURE — 83540 ASSAY OF IRON: CPT | Performed by: INTERNAL MEDICINE

## 2024-10-11 PROCEDURE — 83036 HEMOGLOBIN GLYCOSYLATED A1C: CPT | Performed by: INTERNAL MEDICINE

## 2024-10-11 PROCEDURE — 85045 AUTOMATED RETICULOCYTE COUNT: CPT | Performed by: INTERNAL MEDICINE

## 2024-10-11 NOTE — PROGRESS NOTES
Subjective:       Patient ID: Suhas Castillo is a 59 y.o. male.    Chief Complaint: Groin Pain    Groin Pain        History of Present Illness    CHIEF COMPLAINT:  Patient presents today for inguinal groin pain.    INGUINAL GROIN PAIN:  He reports bilateral inguinal groin pain that began a few days after heavy lifting (moving a generator and toolkit). The pain is intermittent, worse with certain sitting positions, and rates 2-3/10 at its worst. He denies worsening with exercise or daily workouts, has not taken any medication for the pain, and has not noticed any bulges in the bilateral inguinal area.    MEDICAL HISTORY:  He has a history of two small, currently asymptomatic ventral hernias, which he was advised to monitor and consider surgery if they become bothersome.     IMAGING HISTORY:  Previous abdominal imaging due to side pain incidentally found two spots on the liver. Initially suspected to be cancerous, further investigation including labs and follow-up imaging revealed these spots have been present since 2008 and remained unchanged.    EXERCISE:  He reports working out daily.         Review of Systems   Gastrointestinal:         Positional bilateral inguinal tenderness   All other systems reviewed and are negative.    Objective:      Physical Exam  Vitals reviewed.   Constitutional:       Appearance: Normal appearance.   HENT:      Head: Normocephalic and atraumatic.      Nose: Nose normal.      Mouth/Throat:      Mouth: Mucous membranes are moist.   Eyes:      Pupils: Pupils are equal, round, and reactive to light.   Cardiovascular:      Rate and Rhythm: Normal rate and regular rhythm.      Heart sounds: Normal heart sounds.   Pulmonary:      Effort: Pulmonary effort is normal.      Breath sounds: Normal breath sounds.   Abdominal:      General: Abdomen is flat. Bowel sounds are normal.      Palpations: Abdomen is soft.          Comments: Positional pain to bilateral highlighted regions  No pain  palpated during PE.    Musculoskeletal:         General: Normal range of motion.      Cervical back: Normal range of motion.   Skin:     General: Skin is warm and dry.   Neurological:      General: No focal deficit present.      Mental Status: He is alert and oriented to person, place, and time.   Psychiatric:         Mood and Affect: Mood normal.         Behavior: Behavior normal.         Assessment:       1. Groin pain, left  - US Soft Tissue, Groin Right; Future  - US Soft Tissue, Groin Left; Future    2. Groin pain, right  - US Soft Tissue, Groin Right; Future  - US Soft Tissue, Groin Left; Future      Plan:         Assessment & Plan    IMPRESSION:  Suspected possible herniation or muscle strain in bilateral inguinal area due to recent heavy lifting  Noted patient's history of 2 small ventral hernias, currently asymptomatic  Considered ultrasound imaging necessary to evaluate for potential new herniation  Assessed pain as intermittent and mild (2-3/10), not exacerbated by physical activity  Determined watchful waiting approach appropriate pending ultrasound results    POSSIBLE GROIN HERNIA:  - Explained possibility of muscle strain or herniation following heavy lifting.  - Discussed that absence of palpable bulge does not rule out herniation.  - Informed patient about watchful waiting approach for asymptomatic hernias.  - Ordered bilateral groin ultrasound.  - Will refer to general surgeon if ultrasound results are positive for herniation.    FOLLOW UP:  - Follow up after ultrasound results for further management discussion.     This note was generated with the assistance of ambient listening technology. Verbal consent was obtained by the patient and accompanying visitor(s) for the recording of patient appointment to facilitate this note. I attest to having reviewed and edited the generated note for accuracy, though some syntax or spelling errors may persist. Please contact the author of this note for any  clarification.

## 2024-10-14 DIAGNOSIS — K21.9 GASTROESOPHAGEAL REFLUX DISEASE WITHOUT ESOPHAGITIS: ICD-10-CM

## 2024-10-14 RX ORDER — OMEPRAZOLE 20 MG/1
20 CAPSULE, DELAYED RELEASE ORAL DAILY
Qty: 90 CAPSULE | Refills: 1 | Status: SHIPPED | OUTPATIENT
Start: 2024-10-14

## 2024-10-14 NOTE — TELEPHONE ENCOUNTER
Refill Decision Note   Suhas Castillo  is requesting a refill authorization.  Brief Assessment and Rationale for Refill:  Approve     Medication Therapy Plan:         Comments:     Note composed:1:59 PM 10/14/2024

## 2024-10-14 NOTE — TELEPHONE ENCOUNTER
No care due was identified.  [facility] Embedded Care Due Messages. Reference number: [id number].   10/14/2024 9:18:13 AM CDT

## 2024-10-15 ENCOUNTER — HOSPITAL ENCOUNTER (OUTPATIENT)
Dept: RADIOLOGY | Facility: OTHER | Age: 59
Discharge: HOME | End: 2024-10-15
Attending: NURSE PRACTITIONER
Payer: COMMERCIAL

## 2024-10-15 DIAGNOSIS — R10.31 GROIN PAIN, RIGHT: ICD-10-CM

## 2024-10-15 DIAGNOSIS — R10.32 GROIN PAIN, LEFT: ICD-10-CM

## 2024-10-15 PROCEDURE — 76705 ECHO EXAM OF ABDOMEN: CPT | Mod: TC

## 2024-10-15 PROCEDURE — 76705 ECHO EXAM OF ABDOMEN: CPT | Mod: 26,,, | Performed by: RADIOLOGY

## 2024-10-16 ENCOUNTER — TELEPHONE (OUTPATIENT)
Dept: INTERNAL MEDICINE | Facility: CLINIC | Age: 59
End: 2024-10-16
Payer: COMMERCIAL

## 2024-10-16 DIAGNOSIS — K40.20 BILATERAL INGUINAL HERNIA WITHOUT OBSTRUCTION OR GANGRENE, RECURRENCE NOT SPECIFIED: Primary | ICD-10-CM

## 2024-10-18 ENCOUNTER — OFFICE VISIT (OUTPATIENT)
Dept: INTERNAL MEDICINE | Facility: CLINIC | Age: 59
End: 2024-10-18
Payer: COMMERCIAL

## 2024-10-18 VITALS
SYSTOLIC BLOOD PRESSURE: 124 MMHG | TEMPERATURE: 98 F | HEIGHT: 74 IN | DIASTOLIC BLOOD PRESSURE: 70 MMHG | BODY MASS INDEX: 30.27 KG/M2 | OXYGEN SATURATION: 98 % | HEART RATE: 63 BPM | RESPIRATION RATE: 12 BRPM | WEIGHT: 235.88 LBS

## 2024-10-18 DIAGNOSIS — E78.5 HYPERLIPIDEMIA ASSOCIATED WITH TYPE 2 DIABETES MELLITUS: ICD-10-CM

## 2024-10-18 DIAGNOSIS — K40.20 BILATERAL INGUINAL HERNIA WITHOUT OBSTRUCTION OR GANGRENE, RECURRENCE NOT SPECIFIED: ICD-10-CM

## 2024-10-18 DIAGNOSIS — E11.69 HYPERLIPIDEMIA ASSOCIATED WITH TYPE 2 DIABETES MELLITUS: ICD-10-CM

## 2024-10-18 DIAGNOSIS — I15.2 HYPERTENSION ASSOCIATED WITH DIABETES: ICD-10-CM

## 2024-10-18 DIAGNOSIS — E11.59 HYPERTENSION ASSOCIATED WITH DIABETES: ICD-10-CM

## 2024-10-18 DIAGNOSIS — G47.33 OSA (OBSTRUCTIVE SLEEP APNEA): ICD-10-CM

## 2024-10-18 DIAGNOSIS — D56.3 ALPHA THALASSEMIA TRAIT: Primary | ICD-10-CM

## 2024-10-18 DIAGNOSIS — E11.65 TYPE 2 DIABETES MELLITUS WITH HYPERGLYCEMIA, WITHOUT LONG-TERM CURRENT USE OF INSULIN: ICD-10-CM

## 2024-10-18 PROCEDURE — G2211 COMPLEX E/M VISIT ADD ON: HCPCS | Mod: S$GLB,,, | Performed by: INTERNAL MEDICINE

## 2024-10-18 PROCEDURE — 3074F SYST BP LT 130 MM HG: CPT | Mod: CPTII,S$GLB,, | Performed by: INTERNAL MEDICINE

## 2024-10-18 PROCEDURE — 4010F ACE/ARB THERAPY RXD/TAKEN: CPT | Mod: CPTII,S$GLB,, | Performed by: INTERNAL MEDICINE

## 2024-10-18 PROCEDURE — 3044F HG A1C LEVEL LT 7.0%: CPT | Mod: CPTII,S$GLB,, | Performed by: INTERNAL MEDICINE

## 2024-10-18 PROCEDURE — 3066F NEPHROPATHY DOC TX: CPT | Mod: CPTII,S$GLB,, | Performed by: INTERNAL MEDICINE

## 2024-10-18 PROCEDURE — 3078F DIAST BP <80 MM HG: CPT | Mod: CPTII,S$GLB,, | Performed by: INTERNAL MEDICINE

## 2024-10-18 PROCEDURE — 3061F NEG MICROALBUMINURIA REV: CPT | Mod: CPTII,S$GLB,, | Performed by: INTERNAL MEDICINE

## 2024-10-18 PROCEDURE — 99214 OFFICE O/P EST MOD 30 MIN: CPT | Mod: S$GLB,,, | Performed by: INTERNAL MEDICINE

## 2024-10-18 PROCEDURE — 99999 PR PBB SHADOW E&M-EST. PATIENT-LVL III: CPT | Mod: PBBFAC,,, | Performed by: INTERNAL MEDICINE

## 2024-10-18 PROCEDURE — 3008F BODY MASS INDEX DOCD: CPT | Mod: CPTII,S$GLB,, | Performed by: INTERNAL MEDICINE

## 2024-10-18 NOTE — PROGRESS NOTES
Patient ID: Suhas Castillo is a 59 y.o. male.    Chief Complaint: Follow-up (Labs/)    History of Present Illness    CHIEF COMPLAINT:  Suhas presents today for a six-month follow-up.    MEDICAL HISTORY:  He has a history of type 2 diabetes, obstructive sleep apnea (managed with nightly CPAP), degenerative joint disease, cervical spine issues, hyperlipidemia, multiple liver lesions, alpha thalassemia trait, and obesity. He has mild anemia secondary to alpha thalassemia trait, which has been stable. Iron studies were negative for iron deficiency anemia.    BILATERAL INGUINAL HERNIAS:  He reports bilateral inguinal hernias confirmed by ultrasound. The right hernia neck measures 1.6 cm, while the left hernia neck measures 2.2 cm. He experiences mild pain at times associated with the hernias, but denies severe pain or significant discomfort.    MEDICATIONS:  Current medications include Januvia 100 mg daily, Metformin 500 mg two tablets twice daily, Jardiance 10 mg every morning, Pravastatin 10 mg daily, Prilosec 20 mg daily, Losartan 100 mg daily, Hydrochlorothiazide 12.5 mg daily, and Amlodipine 7.5 mg daily.    DIABETES MANAGEMENT:  He reports continued efforts to manage his blood sugar. A1C has been trending downward over the last 11 months, with values of 6.3, 6.2, and most recently 6.1. He expresses motivation to further improve his A1C.    LAB RESULTS:  Lab results show mild anemia, normal white count and platelets. Kidney function, liver enzymes, and electrolytes are within normal range.    LIFESTYLE AND DIET:  He reports significant dietary changes, including eliminating potatoes and corn, limiting bread, rice, and pasta consumption (though he acknowledges occasional difficulty avoiding rice due to cultural preferences). He has adopted almonds as a healthier snack option. He expresses commitment to these dietary changes as a permanent lifestyle modification to improve his long-term health.    PREVENTIVE  CARE:  He reports having an eye exam scheduled for next month and acknowledges the importance of yearly eye exam screenings for diabetes management.         Physical Exam    General: No acute distress. Well-developed. Well-nourished.  Eyes: EOMI. Sclerae anicteric.  HENT: Normocephalic. Atraumatic. Nares patent. Moist oral mucosa.  Ears: Bilateral TMs clear. Bilateral EACs clear.  Cardiovascular: Regular rate. Regular rhythm. No murmurs. No rubs. No gallops. Normal S1, S2.  Respiratory: Normal respiratory effort. Clear to auscultation bilaterally. No rales. No rhonchi. No wheezing.  Abdomen: Soft. Non-tender. Non-distended. Normoactive bowel sounds. Right inguinal hernia neck measures 1.6 cm. Left inguinal hernia neck measures 2.2 cm.  Musculoskeletal: No  obvious deformity.  Extremities: No lower extremity edema.  Neurological: Alert & oriented x3. No slurred speech. Normal gait.  Psychiatric: Normal mood. Normal affect. Good insight. Good judgment.  Skin: Warm. Dry. No rash.         Assessment & Plan    Assessed hypertension associated with diabetes; noted well-controlled A1C improving from 6.3 to 6.1 over past 11 months  Evaluated hyperlipidemia, found stable on current regimen  Reviewed acid reflux, determined stable on current treatment  Assessed hypertension, found well-controlled on current medication regimen  Evaluated sleep apnea, noted stable on CPAP therapy  Considered iron supplementation no longer necessary due to negative iron deficiency anemia studies; mild anemia attributed to benign alpha thalassemia  Reviewed bilateral inguinal hernias confirmed by ultrasound; right hernia neck 1.6 cm, left 2.2 cm    DIABETES:  - Educated on importance of consistent dietary habits for diabetes management.  - Suhas to continue current efforts to improve A1C.  - Suhas to avoid high-carbohydrate foods such as potatoes, corn, bread, rice, and pasta.  - Suhas to limit intake of highly processed foods, refined  sugars, and calorie-dense snacks.  - Continued Januvia 100 mg daily.  - Continued metformin 500 mg twice daily.  - Continued Jardiance 10 mg every morning.    WEIGHT MANAGEMENT:  - Explained the concept of calorie-dense foods without protein leading to continued hunger.  - Recommend consuming small portions of nuts (e.g., almonds) as a snack when hungry.    MEDICATIONS/SUPPLEMENTS:  - Discontinued ferrous sulfate.  - Continued pravastatin 10 mg daily.  - Continued Prilosec 20 mg daily.  - Continued losartan 100 mg daily.  - Continued hydrochlorothiazide 12.5 mg daily.  - Continued amlodipine 7.5 mg daily.    LABS:  - Labs ordered to be completed prior to next appointment.    BILATERAL INGUINAL HERNIAS:  - Referred to general surgery for evaluation of bilateral inguinal hernias.  - Contact the office to schedule appointment with general surgery for hernia evaluation.    FOLLOW UP:  - Follow up in 6 months for annual exam with labs to be completed prior to visit.  - Suhas to fax over a copy of recent eye exam report to office; fax number provided.              This note was generated with the assistance of ambient listening technology. Verbal consent was obtained by the patient and accompanying visitor(s) for the recording of patient appointment to facilitate this note. I attest to having reviewed and edited the generated note for accuracy, though some syntax or spelling errors may persist. Please contact the author of this note for any clarification.

## 2024-10-30 ENCOUNTER — OFFICE VISIT (OUTPATIENT)
Dept: SURGERY | Facility: CLINIC | Age: 59
End: 2024-10-30
Payer: COMMERCIAL

## 2024-10-30 VITALS — HEIGHT: 74 IN | WEIGHT: 238.88 LBS | BODY MASS INDEX: 30.66 KG/M2

## 2024-10-30 DIAGNOSIS — K40.20 BILATERAL INGUINAL HERNIA WITHOUT OBSTRUCTION OR GANGRENE, RECURRENCE NOT SPECIFIED: ICD-10-CM

## 2024-10-30 PROCEDURE — 3044F HG A1C LEVEL LT 7.0%: CPT | Mod: CPTII,S$GLB,, | Performed by: STUDENT IN AN ORGANIZED HEALTH CARE EDUCATION/TRAINING PROGRAM

## 2024-10-30 PROCEDURE — 4010F ACE/ARB THERAPY RXD/TAKEN: CPT | Mod: CPTII,S$GLB,, | Performed by: STUDENT IN AN ORGANIZED HEALTH CARE EDUCATION/TRAINING PROGRAM

## 2024-10-30 PROCEDURE — 99999 PR PBB SHADOW E&M-EST. PATIENT-LVL III: CPT | Mod: PBBFAC,,, | Performed by: STUDENT IN AN ORGANIZED HEALTH CARE EDUCATION/TRAINING PROGRAM

## 2024-10-30 PROCEDURE — 1160F RVW MEDS BY RX/DR IN RCRD: CPT | Mod: CPTII,S$GLB,, | Performed by: STUDENT IN AN ORGANIZED HEALTH CARE EDUCATION/TRAINING PROGRAM

## 2024-10-30 PROCEDURE — 3061F NEG MICROALBUMINURIA REV: CPT | Mod: CPTII,S$GLB,, | Performed by: STUDENT IN AN ORGANIZED HEALTH CARE EDUCATION/TRAINING PROGRAM

## 2024-10-30 PROCEDURE — 3066F NEPHROPATHY DOC TX: CPT | Mod: CPTII,S$GLB,, | Performed by: STUDENT IN AN ORGANIZED HEALTH CARE EDUCATION/TRAINING PROGRAM

## 2024-10-30 PROCEDURE — 1159F MED LIST DOCD IN RCRD: CPT | Mod: CPTII,S$GLB,, | Performed by: STUDENT IN AN ORGANIZED HEALTH CARE EDUCATION/TRAINING PROGRAM

## 2024-10-30 PROCEDURE — 99204 OFFICE O/P NEW MOD 45 MIN: CPT | Mod: S$GLB,,, | Performed by: STUDENT IN AN ORGANIZED HEALTH CARE EDUCATION/TRAINING PROGRAM

## 2024-10-30 PROCEDURE — 3008F BODY MASS INDEX DOCD: CPT | Mod: CPTII,S$GLB,, | Performed by: STUDENT IN AN ORGANIZED HEALTH CARE EDUCATION/TRAINING PROGRAM

## 2024-11-05 ENCOUNTER — TELEPHONE (OUTPATIENT)
Dept: PHARMACY | Facility: CLINIC | Age: 59
End: 2024-11-05
Payer: COMMERCIAL

## 2024-11-05 NOTE — TELEPHONE ENCOUNTER
[facility]/Population Health Chart Review & Patient Outreach Details For Medication Adherence Project    Reason for Outreach Encounter: 3rd Party payor non-compliance report ([employer], [employer], C, etc)  2.  Patient Outreach Method: Reviewed Patient Chart  3.   Medication in question: losartan   LAST FILLED: 10/15/24 for 90 day supply  Hypertension Medications               amLODIPine (NORVASC) 5 MG tablet TAKE 1 AND 1/2 TABLETS(7.5 MG) BY MOUTH EVERY DAY    hydroCHLOROthiazide (HYDRODIURIL) 12.5 MG Tab Take 1 tablet (12.5 mg total) by mouth once daily.    losartan (COZAAR) 100 MG tablet Take 1 tablet (100 mg total) by mouth once daily.              4.  Reviewed and or Updates Made To: Patient Chart  5. Outreach Outcomes and/or actions taken: Patient filled medication and is on track to be adherent

## 2024-11-13 ENCOUNTER — TELEPHONE (OUTPATIENT)
Dept: SURGERY | Facility: CLINIC | Age: 59
End: 2024-11-13
Payer: COMMERCIAL

## 2024-11-13 ENCOUNTER — TELEPHONE (OUTPATIENT)
Dept: PREADMISSION TESTING | Facility: HOSPITAL | Age: 59
End: 2024-11-13
Payer: COMMERCIAL

## 2024-11-13 DIAGNOSIS — K40.20 BILATERAL INGUINAL HERNIA WITHOUT OBSTRUCTION OR GANGRENE, RECURRENCE NOT SPECIFIED: Primary | ICD-10-CM

## 2024-11-13 DIAGNOSIS — E11.59 HYPERTENSION ASSOCIATED WITH DIABETES: ICD-10-CM

## 2024-11-13 DIAGNOSIS — K40.90 INGUINAL HERNIA: ICD-10-CM

## 2024-11-13 DIAGNOSIS — Z01.818 PRE-OP EVALUATION: Primary | ICD-10-CM

## 2024-11-13 DIAGNOSIS — I15.2 HYPERTENSION ASSOCIATED WITH DIABETES: ICD-10-CM

## 2024-11-13 NOTE — TELEPHONE ENCOUNTER
----- Message from Lizbeth sent at 11/13/2024  9:57 AM CST -----  Contact: [phone]  Patient called, would like to setup surgery in December, would like nurse to call him back.

## 2024-12-05 ENCOUNTER — CLINICAL SUPPORT (OUTPATIENT)
Dept: LAB | Facility: HOSPITAL | Age: 59
End: 2024-12-05
Attending: NURSE PRACTITIONER
Payer: COMMERCIAL

## 2024-12-05 DIAGNOSIS — Z01.818 PRE-OP EVALUATION: ICD-10-CM

## 2024-12-05 DIAGNOSIS — E11.59 HYPERTENSION ASSOCIATED WITH DIABETES: ICD-10-CM

## 2024-12-05 DIAGNOSIS — I15.2 HYPERTENSION ASSOCIATED WITH DIABETES: ICD-10-CM

## 2024-12-05 PROCEDURE — 93010 ELECTROCARDIOGRAM REPORT: CPT | Mod: ,,, | Performed by: INTERNAL MEDICINE

## 2024-12-05 PROCEDURE — 93005 ELECTROCARDIOGRAM TRACING: CPT

## 2024-12-06 LAB
OHS QRS DURATION: 76 MS
OHS QTC CALCULATION: 402 MS

## 2024-12-09 ENCOUNTER — PATIENT MESSAGE (OUTPATIENT)
Dept: PREADMISSION TESTING | Facility: HOSPITAL | Age: 59
End: 2024-12-09

## 2024-12-09 ENCOUNTER — ANESTHESIA EVENT (OUTPATIENT)
Dept: SURGERY | Facility: HOSPITAL | Age: 59
End: 2024-12-09
Payer: COMMERCIAL

## 2024-12-09 ENCOUNTER — HOSPITAL ENCOUNTER (OUTPATIENT)
Dept: PREADMISSION TESTING | Facility: HOSPITAL | Age: 59
Discharge: HOME | End: 2024-12-09
Attending: NURSE PRACTITIONER
Payer: COMMERCIAL

## 2024-12-09 DIAGNOSIS — E11.65 TYPE 2 DIABETES MELLITUS WITH HYPERGLYCEMIA, WITHOUT LONG-TERM CURRENT USE OF INSULIN: ICD-10-CM

## 2024-12-09 DIAGNOSIS — I15.2 HYPERTENSION ASSOCIATED WITH DIABETES: ICD-10-CM

## 2024-12-09 DIAGNOSIS — E11.69 HYPERLIPIDEMIA ASSOCIATED WITH TYPE 2 DIABETES MELLITUS: ICD-10-CM

## 2024-12-09 DIAGNOSIS — E11.9 CONTROLLED TYPE 2 DIABETES MELLITUS WITHOUT COMPLICATION, WITHOUT LONG-TERM CURRENT USE OF INSULIN: ICD-10-CM

## 2024-12-09 DIAGNOSIS — E11.59 HYPERTENSION ASSOCIATED WITH DIABETES: ICD-10-CM

## 2024-12-09 DIAGNOSIS — E78.5 HYPERLIPIDEMIA ASSOCIATED WITH TYPE 2 DIABETES MELLITUS: ICD-10-CM

## 2024-12-09 NOTE — DISCHARGE INSTRUCTIONS

## 2024-12-09 NOTE — ANESTHESIA PREPROCEDURE EVALUATION
12/09/2024  Suhas Castillo is a 59 y.o., male scheduled for  ROBOTIC REPAIR, HERNIA, INGUINAL (Bilateral: Abdomen) and ROBOTIC REPAIR, HERNIA, VENTRAL (Abdomen) 12/19/24.    TTE 5/18/20  Normal left ventricular systolic function. The estimated ejection fraction is 60%.  Normal right ventricular systolic function.  Normal LV diastolic function.  The test was stopped because the patient experienced atypical leg pain.  The patient's exercise capacity was above average.  The ECG portion of this study is abnormal but not diagnostic for ischemia.  The stress echo portion of this study is negative for myocardial ischemia.  Overall, this study is negative for myocardial ischemia.    Past Medical History:   Diagnosis Date    Angioedema 7/11/2019    AR (allergic rhinitis)     Cervical spinal stenosis     Diabetes mellitus type II     DJD (degenerative joint disease), cervical     Hyperlipidemia     Hypertension     Sleep apnea      Past Surgical History:   Procedure Laterality Date    COLONOSCOPY N/A 4/11/2019    Procedure: COLONOSCOPY;  Surgeon: Gato Corbin MD;  Location: [address]);  Service: Endoscopy;  Laterality: N/A;    EPIDURAL BLOCK INJECTION      cervical x 2    EPIDURAL STEROID INJECTION N/A 9/28/2018    Procedure: Injection, Steroid, Epidural  C7-T1;  Surgeon: Bernie Estrada MD;  Location: [facility];  Service: Pain Management;  Laterality: N/A;  C7-T1 IL DEBORAH   MRDICALLY URGENT    EPIDURAL STEROID INJECTION N/A 10/26/2018    Procedure: Injection, Steroid, Epidural CERVICAL C7-T1 DEBORAH;  Surgeon: Bernie Estrada MD;  Location: [facility];  Service: Pain Management;  Laterality: N/A;    EPIDURAL STEROID INJECTION N/A 2/7/2019    Procedure: INJECTION, STEROID, EPIDURAL;  Surgeon: Tania Bajwa MD;  Location: [facility];  Service: Pain Management;  Laterality: N/A;  C DEBORAH  C7-T1    GUZMÁN PT ON EISSA SCHEDULE    EPIDURAL STEROID INJECTION N/A 7/27/2020    Procedure: INJECTION, STEROID, EPIDURAL, C7-T1;  Surgeon: Tania Bajwa MD;  [facility];  Service: Pain Management;  Laterality: N/A;    TESTICLE SURGERY  age 16    torsion     Review of patient's allergies indicates:   Allergen Reactions    Ace inhibitors Swelling    Sinus allergy      Current Outpatient Medications   Medication Instructions    amLODIPine (NORVASC) 5 MG tablet TAKE 1 AND 1/2 TABLETS(7.5 MG) BY MOUTH EVERY DAY    blood pressure monitor ([employer] EASE) XL arm size (OP) Other, As needed (PRN)    blood sugar diagnostic (CONTOUR TEST STRIPS) Strp USE TO TEST BLOOD SUGAR TWICE A DAY    empagliflozin (JARDIANCE) 10 mg, Oral, Every morning    fluticasone propionate (FLONASE) 50 mcg/actuation nasal spray SHAKE LIQUID AND USE 2 SPRAYS(100 MCG) IN EACH NOSTRIL EVERY DAY    hydroCHLOROthiazide (HYDRODIURIL) 12.5 mg, Oral, Daily    levocetirizine (XYZAL) 5 mg, Oral, Nightly    losartan (COZAAR) 100 mg, Oral, Daily    metFORMIN (GLUCOPHAGE-XR) 500 MG ER 24hr tablet TAKE 2 TABLETS(1000 MG) BY MOUTH TWICE DAILY.    omeprazole (PRILOSEC) 20 mg, Oral, Daily    pravastatin (PRAVACHOL) 10 mg, Oral, Daily    SITagliptin phosphate (JANUVIA) 100 mg, Oral, Daily       Pre-op Assessment    I have reviewed the Patient Summary Reports.     I have reviewed the Nursing Notes. I have reviewed the NPO Status.   I have reviewed the Medications.     Review of Systems  Anesthesia Hx:  No problems with previous Anesthesia               Denies Personal Hx of Anesthesia complications.                    Social:  Non-Smoker, Social Alcohol Use       Hematology/Oncology:       -- Anemia:                                  Cardiovascular:  Exercise tolerance: good   Denies Pacemaker. Hypertension       Denies Angina.     hyperlipidemia                               Pulmonary:       Denies Shortness of breath.  Sleep Apnea, CPAP           Education  provided regarding risk of obstructive sleep apnea            Hepatic/GI:      Liver Disease,   Not Taking GLP-1 Agonists            Musculoskeletal:  Arthritis          Spine Disorders: cervical Degenerative disease           Neurological:  Denies TIA.  Denies CVA.    Denies Seizures.          Chronic Pain Syndrome                         Endocrine:  Diabetes (a1c 6.1 10/11/24), type 2         Obesity / BMI > 30      Physical Exam  General: Cooperative and Oriented    Airway:  Mallampati: III / II  Mouth Opening: Normal  TM Distance: Normal  Tongue: Normal    Dental:  Intact  Any loose or missing teeth verified with patient  Chest/Lungs:  Normal Respiratory Rate    Heart:  Rate: Normal      Lab Results   Component Value Date    WBC 6.71 10/11/2024    HGB 13.5 (L) 10/11/2024    HCT 43.2 10/11/2024     10/11/2024    CHOL 113 (L) 04/26/2024    TRIG 84 04/26/2024    HDL 33 (L) 04/26/2024    ALT 15 10/11/2024    AST 18 10/11/2024     10/11/2024    K 3.9 10/11/2024     10/11/2024    CREATININE 1.2 10/11/2024    BUN 18 10/11/2024    CO2 24 10/11/2024    TSH 1.297 04/26/2024    PSA 0.98 04/26/2024    HGBA1C 6.1 (H) 10/11/2024     Results for orders placed or performed in visit on 12/05/24   EKG 12-lead    Collection Time: 12/05/24  9:21 AM   Result Value Ref Range    QRS Duration 76 ms    [facility] QTC Calculation 402 ms    Narrative    Test Reason : Z01.818,E11.59,I15.2,    Vent. Rate :  75 BPM     Atrial Rate :  75 BPM     P-R Int : 126 ms          QRS Dur :  76 ms      QT Int : 360 ms       P-R-T Axes :  58   3   5 degrees    QTcB Int : 402 ms    Normal sinus rhythm  Nonspecific T wave abnormality  Abnormal ECG  When compared with ECG of 18-May-2020 14:33,  T wve changes improved inferolaterla leads   Confirmed by Lizzette Morley ([id number]) on 12/6/2024 9:52:14 AM    Referred By: SAÚL REED           Confirmed By: Lizzette Morley         Anesthesia Plan  Type of Anesthesia, risks & benefits  discussed:    Anesthesia Type: Gen ETT  Intra-op Monitoring Plan: Standard ASA Monitors  Post Op Pain Control Plan: multimodal analgesia  Induction:  IV  Airway Plan: Direct, Post-Induction  Informed Consent: Informed consent signed with the Patient and all parties understand the risks and agree with anesthesia plan.  All questions answered.   ASA Score: 3  Day of Surgery Review of History & Physical: H&P Update referred to the surgeon/provider.    Ready For Surgery From Anesthesia Perspective.     .

## 2024-12-09 NOTE — PRE-PROCEDURE INSTRUCTIONS
Mom.    Allergies, medical, surgical, family and psychosocial histories reviewed with patient. Periop plan of care reviewed. Preop instructions given, including medications to take and to hold. Hibiclens soap and instructions on use given. Time allotted for questions to be addressed.      Arrival time 0730.      Please take Amlodipine, Losartan, and Omeprazole the morning of surgery. Stop Jardiance 3 days prior to surgery. Do not take Metformin the night before and morning of surgery.     Surgery instructions reviewed and surgery booklet sent or emailed to the patient via the portal.

## 2024-12-10 RX ORDER — METFORMIN HYDROCHLORIDE 500 MG/1
TABLET, EXTENDED RELEASE ORAL
Qty: 360 TABLET | Refills: 1 | Status: SHIPPED | OUTPATIENT
Start: 2024-12-10

## 2024-12-10 RX ORDER — HYDROCHLOROTHIAZIDE 12.5 MG/1
12.5 TABLET ORAL DAILY
Qty: 90 TABLET | Refills: 3 | Status: SHIPPED | OUTPATIENT
Start: 2024-12-10

## 2024-12-10 RX ORDER — PRAVASTATIN SODIUM 10 MG/1
10 TABLET ORAL DAILY
Qty: 90 TABLET | Refills: 1 | Status: SHIPPED | OUTPATIENT
Start: 2024-12-10

## 2024-12-10 NOTE — TELEPHONE ENCOUNTER
No care due was identified.  [facility] Embedded Care Due Messages. Reference number: [id number].   12/09/2024 9:15:32 PM CST

## 2024-12-11 NOTE — TELEPHONE ENCOUNTER
Refill Decision Note   Suhas Castillo  is requesting a refill authorization.  Brief Assessment and Rationale for Refill:  Approve     Medication Therapy Plan:         Pharmacist review requested: Yes   Extended chart review required: Yes   Comments:     Note composed:9:27 PM 12/10/2024

## 2024-12-11 NOTE — TELEPHONE ENCOUNTER
Refill Routing Note   Medication(s) are not appropriate for processing by [facility] for the following reason(s):        Drug-disease interaction    [facility] action(s):  Defer  Approve      Medication Therapy Plan: Drug-Disease: metFORMIN and Liver lesion; Drug-Disease: empagliflozin and Hypertension associated with diabetes    Pharmacist review requested: Yes     Appointments  past 12m or future 3m with PCP    Date Provider   Last Visit   10/18/2024 Robert Tran, DO   Next Visit   4/28/2025 Robert Tran, DO   ED visits in past 90 days: 0        Note composed:9:13 PM 12/10/2024

## 2024-12-16 ENCOUNTER — PATIENT MESSAGE (OUTPATIENT)
Dept: SURGERY | Facility: CLINIC | Age: 59
End: 2024-12-16
Payer: COMMERCIAL

## 2024-12-19 ENCOUNTER — HOSPITAL ENCOUNTER (OUTPATIENT)
Facility: HOSPITAL | Age: 59
Discharge: HOME | End: 2024-12-19
Attending: STUDENT IN AN ORGANIZED HEALTH CARE EDUCATION/TRAINING PROGRAM | Admitting: STUDENT IN AN ORGANIZED HEALTH CARE EDUCATION/TRAINING PROGRAM
Payer: COMMERCIAL

## 2024-12-19 ENCOUNTER — ANESTHESIA (OUTPATIENT)
Dept: SURGERY | Facility: HOSPITAL | Age: 59
End: 2024-12-19
Payer: COMMERCIAL

## 2024-12-19 VITALS
TEMPERATURE: 98 F | WEIGHT: 240 LBS | SYSTOLIC BLOOD PRESSURE: 132 MMHG | RESPIRATION RATE: 16 BRPM | BODY MASS INDEX: 31.81 KG/M2 | DIASTOLIC BLOOD PRESSURE: 79 MMHG | HEART RATE: 88 BPM | OXYGEN SATURATION: 97 % | HEIGHT: 73 IN

## 2024-12-19 DIAGNOSIS — K40.20 BILATERAL INGUINAL HERNIA WITHOUT OBSTRUCTION OR GANGRENE, RECURRENCE NOT SPECIFIED: Primary | ICD-10-CM

## 2024-12-19 DIAGNOSIS — K40.90 INGUINAL HERNIA: ICD-10-CM

## 2024-12-19 PROBLEM — K43.9 VENTRAL HERNIA WITHOUT OBSTRUCTION OR GANGRENE: Status: ACTIVE | Noted: 2023-08-01

## 2024-12-19 LAB — POCT GLUCOSE: 171 MG/DL (ref 70–110)

## 2024-12-19 PROCEDURE — 36000711: Performed by: STUDENT IN AN ORGANIZED HEALTH CARE EDUCATION/TRAINING PROGRAM

## 2024-12-19 PROCEDURE — C9290 INJ, BUPIVACAINE LIPOSOME: HCPCS | Performed by: STUDENT IN AN ORGANIZED HEALTH CARE EDUCATION/TRAINING PROGRAM

## 2024-12-19 PROCEDURE — C1781 MESH (IMPLANTABLE): HCPCS | Performed by: STUDENT IN AN ORGANIZED HEALTH CARE EDUCATION/TRAINING PROGRAM

## 2024-12-19 PROCEDURE — 37000009 HC ANESTHESIA EA ADD 15 MINS: Performed by: STUDENT IN AN ORGANIZED HEALTH CARE EDUCATION/TRAINING PROGRAM

## 2024-12-19 PROCEDURE — 25000003 PHARM REV CODE 250: Performed by: STUDENT IN AN ORGANIZED HEALTH CARE EDUCATION/TRAINING PROGRAM

## 2024-12-19 PROCEDURE — 63600175 PHARM REV CODE 636 W HCPCS: Performed by: STUDENT IN AN ORGANIZED HEALTH CARE EDUCATION/TRAINING PROGRAM

## 2024-12-19 PROCEDURE — 25000003 PHARM REV CODE 250

## 2024-12-19 PROCEDURE — 71000016 HC POSTOP RECOV ADDL HR: Performed by: STUDENT IN AN ORGANIZED HEALTH CARE EDUCATION/TRAINING PROGRAM

## 2024-12-19 PROCEDURE — 27201423 OPTIME MED/SURG SUP & DEVICES STERILE SUPPLY: Performed by: STUDENT IN AN ORGANIZED HEALTH CARE EDUCATION/TRAINING PROGRAM

## 2024-12-19 PROCEDURE — 63600175 PHARM REV CODE 636 W HCPCS

## 2024-12-19 PROCEDURE — 71000039 HC RECOVERY, EACH ADD'L HOUR: Performed by: STUDENT IN AN ORGANIZED HEALTH CARE EDUCATION/TRAINING PROGRAM

## 2024-12-19 PROCEDURE — 37000008 HC ANESTHESIA 1ST 15 MINUTES: Performed by: STUDENT IN AN ORGANIZED HEALTH CARE EDUCATION/TRAINING PROGRAM

## 2024-12-19 PROCEDURE — 71000015 HC POSTOP RECOV 1ST HR: Performed by: STUDENT IN AN ORGANIZED HEALTH CARE EDUCATION/TRAINING PROGRAM

## 2024-12-19 PROCEDURE — 49650 LAP ING HERNIA REPAIR INIT: CPT | Mod: 50,51,, | Performed by: STUDENT IN AN ORGANIZED HEALTH CARE EDUCATION/TRAINING PROGRAM

## 2024-12-19 PROCEDURE — 71000033 HC RECOVERY, INTIAL HOUR: Performed by: STUDENT IN AN ORGANIZED HEALTH CARE EDUCATION/TRAINING PROGRAM

## 2024-12-19 PROCEDURE — 36000710: Performed by: STUDENT IN AN ORGANIZED HEALTH CARE EDUCATION/TRAINING PROGRAM

## 2024-12-19 PROCEDURE — 49593 RPR AA HRN 1ST 3-10 RDC: CPT | Mod: ,,, | Performed by: STUDENT IN AN ORGANIZED HEALTH CARE EDUCATION/TRAINING PROGRAM

## 2024-12-19 DEVICE — LAPAROSCOPIC SELF-FIXATING MESH, RIGHT ANATOMICAL
Type: IMPLANTABLE DEVICE | Site: INGUINAL | Status: FUNCTIONAL
Brand: PROGRIP

## 2024-12-19 DEVICE — LAPAROSCOPIC SELF-FIXATING MESH, LEFT ANATOMICAL
Type: IMPLANTABLE DEVICE | Site: INGUINAL | Status: FUNCTIONAL
Brand: PROGRIP

## 2024-12-19 DEVICE — COMPOSITE MESH,MONOFILAMENT POLYESTER WITH ABSORBABLE COLLAGEN FILM AND MARKING
Type: IMPLANTABLE DEVICE | Site: ABDOMEN | Status: FUNCTIONAL
Brand: SYMBOTEX

## 2024-12-19 RX ORDER — KETOROLAC TROMETHAMINE 30 MG/ML
INJECTION, SOLUTION INTRAMUSCULAR; INTRAVENOUS
Status: DISCONTINUED | OUTPATIENT
Start: 2024-12-19 | End: 2024-12-19

## 2024-12-19 RX ORDER — PROPOFOL 10 MG/ML
VIAL (ML) INTRAVENOUS
Status: DISCONTINUED | OUTPATIENT
Start: 2024-12-19 | End: 2024-12-19

## 2024-12-19 RX ORDER — DEXMEDETOMIDINE HYDROCHLORIDE 100 UG/ML
INJECTION, SOLUTION INTRAVENOUS
Status: DISCONTINUED | OUTPATIENT
Start: 2024-12-19 | End: 2024-12-19

## 2024-12-19 RX ORDER — FENTANYL CITRATE 50 UG/ML
INJECTION, SOLUTION INTRAMUSCULAR; INTRAVENOUS
Status: DISCONTINUED | OUTPATIENT
Start: 2024-12-19 | End: 2024-12-19

## 2024-12-19 RX ORDER — OXYCODONE AND ACETAMINOPHEN 5; 325 MG/1; MG/1
1 TABLET ORAL EVERY 4 HOURS PRN
Qty: 15 TABLET | Refills: 0 | Status: SHIPPED | OUTPATIENT
Start: 2024-12-19

## 2024-12-19 RX ORDER — BUPIVACAINE 13.3 MG/ML
INJECTION, SUSPENSION, LIPOSOMAL INFILTRATION
Status: DISCONTINUED | OUTPATIENT
Start: 2024-12-19 | End: 2024-12-19 | Stop reason: HOSPADM

## 2024-12-19 RX ORDER — GLUCAGON 1 MG
1 KIT INJECTION
Status: DISCONTINUED | OUTPATIENT
Start: 2024-12-19 | End: 2024-12-19 | Stop reason: HOSPADM

## 2024-12-19 RX ORDER — OXYCODONE HYDROCHLORIDE 5 MG/1
5 TABLET ORAL
Status: DISCONTINUED | OUTPATIENT
Start: 2024-12-19 | End: 2024-12-19 | Stop reason: HOSPADM

## 2024-12-19 RX ORDER — LIDOCAINE HYDROCHLORIDE 10 MG/ML
1 INJECTION, SOLUTION EPIDURAL; INFILTRATION; INTRACAUDAL; PERINEURAL ONCE
Status: DISCONTINUED | OUTPATIENT
Start: 2024-12-19 | End: 2024-12-19 | Stop reason: HOSPADM

## 2024-12-19 RX ORDER — ROCURONIUM BROMIDE 10 MG/ML
INJECTION, SOLUTION INTRAVENOUS
Status: DISCONTINUED | OUTPATIENT
Start: 2024-12-19 | End: 2024-12-19

## 2024-12-19 RX ORDER — LIDOCAINE HYDROCHLORIDE 20 MG/ML
INJECTION INTRAVENOUS
Status: DISCONTINUED | OUTPATIENT
Start: 2024-12-19 | End: 2024-12-19

## 2024-12-19 RX ORDER — BUPIVACAINE HYDROCHLORIDE 5 MG/ML
INJECTION, SOLUTION PERINEURAL
Status: DISCONTINUED | OUTPATIENT
Start: 2024-12-19 | End: 2024-12-19 | Stop reason: HOSPADM

## 2024-12-19 RX ORDER — KETAMINE HCL IN 0.9 % NACL 50 MG/5 ML
SYRINGE (ML) INTRAVENOUS
Status: DISCONTINUED | OUTPATIENT
Start: 2024-12-19 | End: 2024-12-19

## 2024-12-19 RX ORDER — CEFAZOLIN SODIUM 1 G/3ML
INJECTION, POWDER, FOR SOLUTION INTRAMUSCULAR; INTRAVENOUS
Status: DISCONTINUED | OUTPATIENT
Start: 2024-12-19 | End: 2024-12-19

## 2024-12-19 RX ORDER — HYDROMORPHONE HYDROCHLORIDE 2 MG/ML
0.2 INJECTION, SOLUTION INTRAMUSCULAR; INTRAVENOUS; SUBCUTANEOUS EVERY 5 MIN PRN
Status: DISCONTINUED | OUTPATIENT
Start: 2024-12-19 | End: 2024-12-19 | Stop reason: HOSPADM

## 2024-12-19 RX ORDER — AMOXICILLIN 250 MG
1 CAPSULE ORAL 2 TIMES DAILY
COMMUNITY
Start: 2024-12-19

## 2024-12-19 RX ORDER — CEFAZOLIN 2 G/1
2 INJECTION, POWDER, FOR SOLUTION INTRAMUSCULAR; INTRAVENOUS
Status: DISCONTINUED | OUTPATIENT
Start: 2024-12-19 | End: 2024-12-19 | Stop reason: HOSPADM

## 2024-12-19 RX ORDER — MIDAZOLAM HYDROCHLORIDE 1 MG/ML
INJECTION INTRAMUSCULAR; INTRAVENOUS
Status: DISCONTINUED | OUTPATIENT
Start: 2024-12-19 | End: 2024-12-19

## 2024-12-19 RX ORDER — HYDROCODONE BITARTRATE AND ACETAMINOPHEN 5; 325 MG/1; MG/1
1 TABLET ORAL EVERY 4 HOURS PRN
Status: DISCONTINUED | OUTPATIENT
Start: 2024-12-19 | End: 2024-12-19 | Stop reason: HOSPADM

## 2024-12-19 RX ORDER — ACETAMINOPHEN 10 MG/ML
INJECTION, SOLUTION INTRAVENOUS
Status: DISCONTINUED | OUTPATIENT
Start: 2024-12-19 | End: 2024-12-19

## 2024-12-19 RX ORDER — ONDANSETRON HYDROCHLORIDE 2 MG/ML
INJECTION, SOLUTION INTRAVENOUS
Status: DISCONTINUED | OUTPATIENT
Start: 2024-12-19 | End: 2024-12-19

## 2024-12-19 RX ORDER — PROCHLORPERAZINE EDISYLATE 5 MG/ML
5 INJECTION INTRAMUSCULAR; INTRAVENOUS EVERY 30 MIN PRN
Status: DISCONTINUED | OUTPATIENT
Start: 2024-12-19 | End: 2024-12-19 | Stop reason: HOSPADM

## 2024-12-19 RX ORDER — DEXAMETHASONE SODIUM PHOSPHATE 4 MG/ML
INJECTION, SOLUTION INTRA-ARTICULAR; INTRALESIONAL; INTRAMUSCULAR; INTRAVENOUS; SOFT TISSUE
Status: DISCONTINUED | OUTPATIENT
Start: 2024-12-19 | End: 2024-12-19

## 2024-12-19 RX ADMIN — HYDROMORPHONE HYDROCHLORIDE 0.2 MG: 2 INJECTION INTRAMUSCULAR; INTRAVENOUS; SUBCUTANEOUS at 01:12

## 2024-12-19 RX ADMIN — ROCURONIUM BROMIDE 20 MG: 10 INJECTION, SOLUTION INTRAVENOUS at 11:12

## 2024-12-19 RX ADMIN — ONDANSETRON 4 MG: 2 INJECTION, SOLUTION INTRAMUSCULAR; INTRAVENOUS at 09:12

## 2024-12-19 RX ADMIN — Medication 25 MG: at 09:12

## 2024-12-19 RX ADMIN — ONDANSETRON 4 MG: 2 INJECTION, SOLUTION INTRAMUSCULAR; INTRAVENOUS at 12:12

## 2024-12-19 RX ADMIN — SODIUM CHLORIDE: 0.9 INJECTION, SOLUTION INTRAVENOUS at 09:12

## 2024-12-19 RX ADMIN — Medication 15 MG: at 10:12

## 2024-12-19 RX ADMIN — PROPOFOL 200 MG: 10 INJECTION, EMULSION INTRAVENOUS at 09:12

## 2024-12-19 RX ADMIN — SODIUM CHLORIDE: 0.9 INJECTION, SOLUTION INTRAVENOUS at 12:12

## 2024-12-19 RX ADMIN — ROCURONIUM BROMIDE 20 MG: 10 INJECTION, SOLUTION INTRAVENOUS at 12:12

## 2024-12-19 RX ADMIN — DEXAMETHASONE SODIUM PHOSPHATE 4 MG: 4 INJECTION, SOLUTION INTRA-ARTICULAR; INTRALESIONAL; INTRAMUSCULAR; INTRAVENOUS; SOFT TISSUE at 09:12

## 2024-12-19 RX ADMIN — OXYCODONE 5 MG: 5 TABLET ORAL at 01:12

## 2024-12-19 RX ADMIN — LIDOCAINE HYDROCHLORIDE 100 MG: 20 INJECTION, SOLUTION INTRAVENOUS at 09:12

## 2024-12-19 RX ADMIN — ROCURONIUM BROMIDE 50 MG: 10 INJECTION, SOLUTION INTRAVENOUS at 09:12

## 2024-12-19 RX ADMIN — ROCURONIUM BROMIDE 20 MG: 10 INJECTION, SOLUTION INTRAVENOUS at 09:12

## 2024-12-19 RX ADMIN — ACETAMINOPHEN 1000 MG: 10 INJECTION, SOLUTION INTRAVENOUS at 12:12

## 2024-12-19 RX ADMIN — SUGAMMADEX 200 MG: 100 INJECTION, SOLUTION INTRAVENOUS at 12:12

## 2024-12-19 RX ADMIN — KETOROLAC TROMETHAMINE 30 MG: 30 INJECTION, SOLUTION INTRAMUSCULAR; INTRAVENOUS at 12:12

## 2024-12-19 RX ADMIN — FENTANYL CITRATE 100 MCG: 50 INJECTION INTRAMUSCULAR; INTRAVENOUS at 09:12

## 2024-12-19 RX ADMIN — MIDAZOLAM HYDROCHLORIDE 2 MG: 1 INJECTION, SOLUTION INTRAMUSCULAR; INTRAVENOUS at 09:12

## 2024-12-19 RX ADMIN — ROCURONIUM BROMIDE 20 MG: 10 INJECTION, SOLUTION INTRAVENOUS at 10:12

## 2024-12-19 RX ADMIN — Medication 10 MG: at 11:12

## 2024-12-19 RX ADMIN — DEXMEDETOMIDINE 8 MCG: 200 INJECTION, SOLUTION INTRAVENOUS at 09:12

## 2024-12-19 RX ADMIN — HYDROMORPHONE HYDROCHLORIDE 0.2 MG: 2 INJECTION INTRAMUSCULAR; INTRAVENOUS; SUBCUTANEOUS at 02:12

## 2024-12-19 RX ADMIN — DEXMEDETOMIDINE 8 MCG: 200 INJECTION, SOLUTION INTRAVENOUS at 12:12

## 2024-12-19 RX ADMIN — CEFAZOLIN 2 G: 330 INJECTION, POWDER, FOR SOLUTION INTRAMUSCULAR; INTRAVENOUS at 09:12

## 2024-12-19 NOTE — OP NOTE
DATE OF PROCEDURE: 12/19/2024       PREOPERATIVE DIAGNOSIS: Bilateral inguinal hernias and ventral hernia.     POSTOPERATIVE DIAGNOSIS: Bilateral direct inguinal hernias and ventral hernia x2     PROCEDURE: Robotic repair of bilateral inguinal hernia with mesh and robotic repair of 3cm ventral hernia with 9cm symbotex mesh.      SURGEON: Pravin Stevens M.D.     ASSISTANT: Claudia Petersen PGY4     ANESTHESIA: General endotracheal     PREP: Chlorhexidine.     ESTIMATED BLOOD LOSS: Minimal.     INDICATIONS: The patient is a 59 y.o.  male  , who presented to the clinic   with symptomatic bilateral inguinal hernia. The patient was counseled on his surgical options for   treatment and desired laparoscopic repair of inguinal hernia. The   risks of the procedure were described to the patient including bleeding,   infection, pain, scarring, wound complications, injury to local structures such   as the vas deferens or testicular vessels resulting in infertility or testicular  ischemia or necrosis, recurrence, potential need for further surgery. The   patient demonstrated the understanding of these risks and a consent form was   Obtained.     PROCEDURE IN DETAIL: The patient was identified in the Preoperative Unit and   taken back to the Operating Room, laid supine on the operating room table. IV   antibiotics were administered prior to the induction of general anesthesia.   General anesthesia was induced without complication. The patient was then   prepped and draped in the standard sterile fashion. A timeout procedure was   performed in accordance with the hospital protocol. A 8mm incision was made in  The supraumbilical location with a #15-blade scalpel. The trocar was inserted through the ventral defect.   Two 8mm trocars were inserted under direct vision laterally on each side. The peritoneum over the LLQ was incised sharply. The preperitoneal space was dissected and the hernia sac was indentified.  There was a fat  containing hernia in the direct space. This was easily reduced.   The defect was closed using running 2-0 vloc.   The remainder of the iliopubic tract was cleaned off laterally. No femoral or indirect hernia sac was noted. The lipoma was resected and the sac reduced into the retroperitoneum.      Next the peritoneum was taken down on the right side. The direct, indirect and femoral spaces were exposed. A small direct defect was noted and the hernia sac removed.     The Progip mesh was then unrolled superiorly to inferiorly on both sides. The mesh was flattened out and found to lay smoothly across the area for direct, indirect and femoral hernia.  Once this was achieved and hemostasis was confirmed, the   peritoneum was reapproximated using 2-0 vloc suture.  At the conclusion, we inspected the pelvis and   hemostasis was achieved. The peritoneum was greatly approximated with no mesh   exposed.      Next the right sided and supraumbilical trocars were removed and placed on the left side. The defect was noted at the umbilicus as well as a second defect superior to this. The peritoneal flap was created and the hernia sacs reduced. The defects measured about 1cm by 2cm with the distance between the superior aspected of the superior defect and the inferior aspect of the inferior defect to be 3.5cm. A 9cm symbotex mesh was placed and sutured to the fascia using running 2-0 vloc. The peritoneal flap was closed using running 2-0 vloc.     At this point, the ports were removed under   direct visualization and CO2 gas was evacuated.   The skin incisions were   closed using 5-0 Monocryl suture in an interrupted fashion.  Dry sterile   dressings were applied.The patient was awakened from general anesthesia without   complication and returned to the Postoperative Recovery Unit in a stable   condition. At the end of the case, sponge, instrument and needle counts were   correct on 2 occasions. I was present and scrubbed throughout  the entirety of   the case.     COMPLICATIONS: None.     CONDITION: Stable.

## 2024-12-19 NOTE — ANESTHESIA PROCEDURE NOTES
Intubation    Date/Time: 12/19/2024 9:16 AM    Performed by: Kelsy Willson CRNA  Authorized by: Sen Rutherford MD    Intubation:     Induction:  Intravenous    Intubated:  Postinduction    Mask Ventilation:  Easy mask    Attempts:  1    Attempted By:  Student    Method of Intubation:  Direct    Blade:  Fox 2    Laryngeal View Grade: Grade I - full view of cords      Difficult Airway Encountered?: No      Complications:  None    Airway Device:  Oral endotracheal tube    Airway Device Size:  7.5    Style/Cuff Inflation:  Cuffed    Tube secured:  23    Secured at:  The lips    Placement Verified By:  Capnometry    Complicating Factors:  None    Findings Post-Intubation:  BS equal bilateral

## 2024-12-19 NOTE — OP NOTE
HPI  59M with bilateral groin bulge, discomfort for about a month. US showed bilateral inguinal hernia.   Denies NV. No pain at umbilicus  Recently got MRI for liver mass, stable, benign appearing.   Hx of testicular torsion, right? Years ago.         Review of Systems   Constitutional:  Negative for chills, diaphoresis and fever.   HENT:  Negative for trouble swallowing.    Respiratory:  Negative for cough, shortness of breath, wheezing and stridor.    Cardiovascular:  Negative for chest pain and palpitations.   Gastrointestinal:  Negative for abdominal distention, abdominal pain, blood in stool, diarrhea, nausea and vomiting.   Endocrine: Negative for cold intolerance and heat intolerance.   Genitourinary:  Negative for difficulty urinating.   Musculoskeletal:  Negative for back pain.   Skin:  Negative for rash.   Allergic/Immunologic: Negative for immunocompromised state.   Neurological:  Negative for dizziness, syncope and numbness.   Hematological:  Negative for adenopathy.   Psychiatric/Behavioral:  Negative for agitation.          Current Medications          Current Outpatient Medications   Medication Sig Dispense Refill    amLODIPine (NORVASC) 5 MG tablet TAKE 1 AND 1/2 TABLETS(7.5 MG) BY MOUTH EVERY  tablet 3    blood pressure monitor (IHEALTH EASE) XL arm size (OP) by Other route as needed for High Blood Pressure. 1 each 0    blood sugar diagnostic (CONTOUR TEST STRIPS) Strp USE TO TEST BLOOD SUGAR TWICE A DAY 50 each 11    empagliflozin (JARDIANCE) 10 mg tablet Take 1 tablet (10 mg total) by mouth every morning. 90 tablet 1    fluticasone propionate (FLONASE) 50 mcg/actuation nasal spray SHAKE LIQUID AND USE 2 SPRAYS(100 MCG) IN EACH NOSTRIL EVERY DAY 48 g 3    hydroCHLOROthiazide (HYDRODIURIL) 12.5 MG Tab Take 1 tablet (12.5 mg total) by mouth once daily. 90 tablet 3    losartan (COZAAR) 100 MG tablet Take 1 tablet (100 mg total) by mouth once daily. 90 tablet 3    metFORMIN (GLUCOPHAGE-XR) 500  MG ER 24hr tablet TAKE 2 TABLETS(1000 MG) BY MOUTH TWICE DAILY. 360 tablet 1    omeprazole (PRILOSEC) 20 MG capsule Take 1 capsule (20 mg total) by mouth once daily. 90 capsule 1    pravastatin (PRAVACHOL) 10 MG tablet Take 1 tablet (10 mg total) by mouth once daily. 90 tablet 2    SITagliptin phosphate (JANUVIA) 100 MG Tab Take 1 tablet (100 mg total) by mouth once daily. 90 tablet 1    levocetirizine (XYZAL) 5 MG tablet Take 1 tablet (5 mg total) by mouth every evening. 30 tablet 11      No current facility-administered medications for this visit.                 Review of patient's allergies indicates:   Allergen Reactions    Ace inhibitors Swelling    Sinus allergy                Past Medical History:   Diagnosis Date    Angioedema 7/11/2019    AR (allergic rhinitis)      Cervical spinal stenosis      Diabetes mellitus type II      DJD (degenerative joint disease), cervical      Hyperlipidemia      Hypertension      Sleep apnea                 Past Surgical History:   Procedure Laterality Date    COLONOSCOPY N/A 4/11/2019     Procedure: COLONOSCOPY;  Surgeon: Gato Corbin MD;  Location: [address]);  Service: Endoscopy;  Laterality: N/A;    EPIDURAL BLOCK INJECTION         cervical x 2    EPIDURAL STEROID INJECTION N/A 9/28/2018     Procedure: Injection, Steroid, Epidural  C7-T1;  Surgeon: Bernie Estrada MD;  Location: [facility] PAIN MGT;  Service: Pain Management;  Laterality: N/A;  C7-T1 IL DEBORAH   MRDICALLY URGENT    EPIDURAL STEROID INJECTION N/A 10/26/2018     Procedure: Injection, Steroid, Epidural CERVICAL C7-T1 DEBORAH;  Surgeon: Bernie Estrada MD;  Location: [facility] PAIN MGT;  Service: Pain Management;  Laterality: N/A;    EPIDURAL STEROID INJECTION N/A 2/7/2019     Procedure: INJECTION, STEROID, EPIDURAL;  Surgeon: Tania Bajwa MD;  Location: [facility] PAIN MGT;  Service: Pain Management;  Laterality: N/A;  C DEBORAH C7-T1     ARIELLE PT ON YONI SCHEDULE    EPIDURAL STEROID INJECTION N/A 7/27/2020      Procedure: INJECTION, STEROID, EPIDURAL, C7-T1;  Surgeon: Tania Bajwa MD;  Location: [facility] PAIN T;  Service: Pain Management;  Laterality: N/A;    TESTICLE SURGERY   age 16     torsion         Social History            Socioeconomic History    Marital status: Single    Number of children: 4   Occupational History    Occupation: secutiry supervisor       Employer: [employer]   Tobacco Use    Smoking status: Never    Smokeless tobacco: Never   Substance and Sexual Activity    Alcohol use: No       Alcohol/week: 0.0 standard drinks of alcohol       Comment: none x 4 years; previously social drinker    Drug use: No    Sexual activity: Yes       Partners: Female       Birth control/protection: None   Social History Narrative     The patient does exercise regularly (treadmill, light weights: TIW-QIW).       Rates diet as good.       He is satisfied with weight.     He does drink at least 1/2 gallon water daily.     He drinks 0 coffee/tea/caffeine-containing soft drinks daily.     Total sleep time at night is 4-5 hours.     He works 65 hours per week.     He does wear seat belts.     Hobbies include bicycling.                  Social Drivers of Health           Financial Resource Strain: Low Risk  (10/12/2024)     Overall Financial Resource Strain (CARDIA)      Difficulty of Paying Living Expenses: Not very hard   Food Insecurity: No Food Insecurity (10/12/2024)     Hunger Vital Sign      Worried About Running Out of Food in the Last Year: Never true      Ran Out of Food in the Last Year: Never true   Transportation Needs: No Transportation Needs (3/16/2020)     PRAPARE - Transportation      Lack of Transportation (Medical): No      Lack of Transportation (Non-Medical): No   Physical Activity: Sufficiently Active (10/12/2024)     Exercise Vital Sign      Days of Exercise per Week: 5 days      Minutes of Exercise per Session: 90 min   Stress: No Stress Concern Present (10/12/2024)     [employer]  [employer] - Occupational Stress Questionnaire      Feeling of Stress : Not at all   Housing Stability: Unknown (10/12/2024)     Housing Stability Vital Sign      Unable to Pay for Housing in the Last Year: No         There were no vitals filed for this visit.     Physical Exam  Constitutional:       General: He is not in acute distress.  HENT:      Head: Normocephalic and atraumatic.   Eyes:      General: No scleral icterus.  Cardiovascular:      Rate and Rhythm: Normal rate.   Pulmonary:      Effort: Pulmonary effort is normal. No respiratory distress.      Breath sounds: No stridor.   Abdominal:      Palpations: Abdomen is soft.      Tenderness: There is no abdominal tenderness.      Hernia: A hernia is present.      Comments: Small samy Iing hernias  Small ventral hernia 2x at umbilicus   Lymphadenopathy:      Cervical: No cervical adenopathy.   Skin:     General: Skin is warm.      Findings: No erythema.   Neurological:      Mental Status: He is alert and oriented to person, place, and time.   Psychiatric:         Behavior: Behavior normal.      Body mass index is 30.67 kg/m².  US shows bilateral ing hernia  CT from 2017 shows umbilicla hernia 1cm and small defect just above umbilicus  Hga1c 6.1  Cmp normal        Assessment & Plan:  59M with bilateral inguinal hernia, ventral hernia x2  Wants all repaired at same time  Plan for robot bilateral inguinal repair then redock and do ventral hernias with mesh given multiple  He will call us to pick a date, wants December

## 2024-12-19 NOTE — TRANSFER OF CARE
"Anesthesia Transfer of Care Note    Patient: Suhas Castillo    Procedure(s) Performed: Procedure(s) (LRB):  ROBOTIC REPAIR, HERNIA, INGUINAL (Bilateral)  ROBOTIC REPAIR, HERNIA, VENTRAL (N/A)    Patient location: [facility]    Anesthesia Type: general    Transport from OR: Transported from OR on 6-10 L/min O2 by face mask with adequate spontaneous ventilation    Post pain: adequate analgesia    Post assessment: no apparent anesthetic complications and tolerated procedure well    Post vital signs: stable    Level of consciousness: sedated    Nausea/Vomiting: no nausea/vomiting    Complications: none    Transfer of care protocol was followed      Last vitals: Visit Vitals  BP (!) 172/90   Pulse 78   Temp 36.8 °C (98.2 °F) (Temporal)   Resp 16   Ht 6' 1" (1.854 m)   Wt 108.9 kg (240 lb)   SpO2 98%   BMI 31.66 kg/m²     "

## 2024-12-19 NOTE — H&P
HPI  59M with bilateral groin bulge, discomfort for about a month. US showed bilateral inguinal hernia.   Denies NV. No pain at umbilicus  Recently got MRI for liver mass, stable, benign appearing.   Hx of testicular torsion, right? Years ago.         Review of Systems   Constitutional:  Negative for chills, diaphoresis and fever.   HENT:  Negative for trouble swallowing.    Respiratory:  Negative for cough, shortness of breath, wheezing and stridor.    Cardiovascular:  Negative for chest pain and palpitations.   Gastrointestinal:  Negative for abdominal distention, abdominal pain, blood in stool, diarrhea, nausea and vomiting.   Endocrine: Negative for cold intolerance and heat intolerance.   Genitourinary:  Negative for difficulty urinating.   Musculoskeletal:  Negative for back pain.   Skin:  Negative for rash.   Allergic/Immunologic: Negative for immunocompromised state.   Neurological:  Negative for dizziness, syncope and numbness.   Hematological:  Negative for adenopathy.   Psychiatric/Behavioral:  Negative for agitation.          Current Medications               Current Outpatient Medications   Medication Sig Dispense Refill    amLODIPine (NORVASC) 5 MG tablet TAKE 1 AND 1/2 TABLETS(7.5 MG) BY MOUTH EVERY  tablet 3    blood pressure monitor (IHEALTH EASE) XL arm size (OP) by Other route as needed for High Blood Pressure. 1 each 0    blood sugar diagnostic (CONTOUR TEST STRIPS) Strp USE TO TEST BLOOD SUGAR TWICE A DAY 50 each 11    empagliflozin (JARDIANCE) 10 mg tablet Take 1 tablet (10 mg total) by mouth every morning. 90 tablet 1    fluticasone propionate (FLONASE) 50 mcg/actuation nasal spray SHAKE LIQUID AND USE 2 SPRAYS(100 MCG) IN EACH NOSTRIL EVERY DAY 48 g 3    hydroCHLOROthiazide (HYDRODIURIL) 12.5 MG Tab Take 1 tablet (12.5 mg total) by mouth once daily. 90 tablet 3    losartan (COZAAR) 100 MG tablet Take 1 tablet (100 mg total) by mouth once daily. 90 tablet 3    metFORMIN (GLUCOPHAGE-XR)  500 MG ER 24hr tablet TAKE 2 TABLETS(1000 MG) BY MOUTH TWICE DAILY. 360 tablet 1    omeprazole (PRILOSEC) 20 MG capsule Take 1 capsule (20 mg total) by mouth once daily. 90 capsule 1    pravastatin (PRAVACHOL) 10 MG tablet Take 1 tablet (10 mg total) by mouth once daily. 90 tablet 2    SITagliptin phosphate (JANUVIA) 100 MG Tab Take 1 tablet (100 mg total) by mouth once daily. 90 tablet 1    levocetirizine (XYZAL) 5 MG tablet Take 1 tablet (5 mg total) by mouth every evening. 30 tablet 11      No current facility-administered medications for this visit.                    Review of patient's allergies indicates:   Allergen Reactions    Ace inhibitors Swelling    Sinus allergy                   Past Medical History:   Diagnosis Date    Angioedema 7/11/2019    AR (allergic rhinitis)      Cervical spinal stenosis      Diabetes mellitus type II      DJD (degenerative joint disease), cervical      Hyperlipidemia      Hypertension      Sleep apnea                     Past Surgical History:   Procedure Laterality Date    COLONOSCOPY N/A 4/11/2019     Procedure: COLONOSCOPY;  Surgeon: Gato Corbin MD;  Location: [address]);  Service: Endoscopy;  Laterality: N/A;    EPIDURAL BLOCK INJECTION         cervical x 2    EPIDURAL STEROID INJECTION N/A 9/28/2018     Procedure: Injection, Steroid, Epidural  C7-T1;  Surgeon: Bernie Estrada MD;  Location: [facility] PAIN MGT;  Service: Pain Management;  Laterality: N/A;  C7-T1 IL DEBORAH   MRDICALLY URGENT    EPIDURAL STEROID INJECTION N/A 10/26/2018     Procedure: Injection, Steroid, Epidural CERVICAL C7-T1 DEBORAH;  Surgeon: Bernie Estrada MD;  Location: [facility] PAIN MGT;  Service: Pain Management;  Laterality: N/A;    EPIDURAL STEROID INJECTION N/A 2/7/2019     Procedure: INJECTION, STEROID, EPIDURAL;  Surgeon: Tania Bajwa MD;  Location: [facility] PAIN MGT;  Service: Pain Management;  Laterality: N/A;  C DEBORAH C7-T1     ARIELLE PT ON OYNI SCHEDULE    EPIDURAL STEROID INJECTION N/A  7/27/2020     Procedure: INJECTION, STEROID, EPIDURAL, C7-T1;  Surgeon: Tania Bajwa MD;  Location: [facility] [place] PAIN MGT;  Service: Pain Management;  Laterality: N/A;    TESTICLE SURGERY   age 16     torsion         Social History                Socioeconomic History    Marital status: Single    Number of children: 4   Occupational History    Occupation: secutiry supervisor       Employer: [employer]   Tobacco Use    Smoking status: Never    Smokeless tobacco: Never   Substance and Sexual Activity    Alcohol use: No       Alcohol/week: 0.0 standard drinks of alcohol       Comment: none x 4 years; previously social drinker    Drug use: No    Sexual activity: Yes       Partners: Female       Birth control/protection: None   Social History Narrative     The patient does exercise regularly (treadmill, light weights: TIW-QIW).       Rates diet as good.       He is satisfied with weight.     He does drink at least 1/2 gallon water daily.     He drinks 0 coffee/tea/caffeine-containing soft drinks daily.     Total sleep time at night is 4-5 hours.     He works 65 hours per week.     He does wear seat belts.     Hobbies include bicycling.                  Social Drivers of Health              Financial Resource Strain: Low Risk  (10/12/2024)     Overall Financial Resource Strain (CARDIA)      Difficulty of Paying Living Expenses: Not very hard   Food Insecurity: No Food Insecurity (10/12/2024)     Hunger Vital Sign      Worried About Running Out of Food in the Last Year: Never true      Ran Out of Food in the Last Year: Never true   Transportation Needs: No Transportation Needs (3/16/2020)     PRAPARE - Transportation      Lack of Transportation (Medical): No      Lack of Transportation (Non-Medical): No   Physical Activity: Sufficiently Active (10/12/2024)     Exercise Vital Sign      Days of Exercise per Week: 5 days      Minutes of Exercise per Session: 90 min   Stress: No Stress Concern Present (10/12/2024)      [facility] [place] of [employer] - Occupational Stress Questionnaire      Feeling of Stress : Not at all   Housing Stability: Unknown (10/12/2024)     Housing Stability Vital Sign      Unable to Pay for Housing in the Last Year: No         There were no vitals filed for this visit.     Physical Exam  Constitutional:       General: He is not in acute distress.  HENT:      Head: Normocephalic and atraumatic.   Eyes:      General: No scleral icterus.  Cardiovascular:      Rate and Rhythm: Normal rate.   Pulmonary:      Effort: Pulmonary effort is normal. No respiratory distress.      Breath sounds: No stridor.   Abdominal:      Palpations: Abdomen is soft.      Tenderness: There is no abdominal tenderness.      Hernia: A hernia is present.      Comments: Small samy Iing hernias  Small ventral hernia 2x at umbilicus   Lymphadenopathy:      Cervical: No cervical adenopathy.   Skin:     General: Skin is warm.      Findings: No erythema.   Neurological:      Mental Status: He is alert and oriented to person, place, and time.   Psychiatric:         Behavior: Behavior normal.      Body mass index is 30.67 kg/m².  US shows bilateral ing hernia  CT from 2017 shows umbilicla hernia 1cm and small defect just above umbilicus  Hga1c 6.1  Cmp normal        Assessment & Plan:  59M with bilateral inguinal hernia, ventral hernia x2  Wants all repaired at same time  Plan for robot bilateral inguinal repair then redock and do ventral hernias with mesh given multiple  He will call us to pick a date, wants December

## 2024-12-19 NOTE — ANESTHESIA POSTPROCEDURE EVALUATION
Anesthesia Post Evaluation    Patient: Suhas Castillo    Procedure(s) Performed: Procedure(s) (LRB):  ROBOTIC REPAIR, HERNIA, INGUINAL (Bilateral)  ROBOTIC REPAIR, HERNIA, VENTRAL (N/A)    Final Anesthesia Type: general      Patient location during evaluation: PACU  Patient participation: Yes- Able to Participate  Level of consciousness: awake and alert  Post-procedure vital signs: reviewed and stable  Pain management: adequate  Airway patency: patent    PONV status at discharge: No PONV  Anesthetic complications: no      Cardiovascular status: stable  Respiratory status: spontaneous ventilation  Hydration status: euvolemic  Follow-up not needed.              Vitals Value Taken Time   /79 12/19/24 1600   Temp 36.8 °C (98.2 °F) 12/19/24 1445   Pulse 88 12/19/24 1600   Resp 16 12/19/24 1600   SpO2 97 % 12/19/24 1600         Event Time   Out of Recovery 14:39:51         Pain/Lio Score: Pain Rating Prior to Med Admin: 6 (12/19/2024  1:55 PM)  Lio Score: 10 (12/19/2024  4:00 PM)

## 2024-12-23 NOTE — DISCHARGE SUMMARY
[facility] Surgery ([facility])  Short Stay  Discharge Summary    Admit Date: 12/19/2024    Discharge Date and Time: 12/19/2024  4:35 PM      Discharge Attending Physician: No att. providers found     Hospital Course (synopsis of major diagnoses, care, treatment, and services provided during the course of the hospital stay): Patient brought in for elective surgery. Underwent procedure without complication and was discharged.       Final Diagnoses:    Principal Problem: Bilateral inguinal hernia without obstruction or gangrene   Secondary Diagnoses:   Active Hospital Problems    Diagnosis  POA    *Bilateral inguinal hernia without obstruction or gangrene [K40.20]  Yes    Ventral hernia without obstruction or gangrene [K43.9]  Yes      Resolved Hospital Problems   No resolved problems to display.       Discharged Condition: stable    Disposition: Home or Self Care    Follow up/Patient Instructions:    Medications:  Reconciled Home Medications:      Medication List        START taking these medications      oxyCODONE-acetaminophen 5-325 mg per tablet  Commonly known as: PERCOCET  Take 1 tablet by mouth every 4 (four) hours as needed for Pain.  Notes to patient: Start at 5:00 pm     senna-docusate 8.6-50 mg 8.6-50 mg per tablet  Commonly known as: PERICOLACE  Take 1 tablet by mouth 2 (two) times daily.  Notes to patient: Start at bedtime            CONTINUE taking these medications      amLODIPine 5 MG tablet  Commonly known as: NORVASC  TAKE 1 AND 1/2 TABLETS(7.5 MG) BY MOUTH EVERY DAY     blood pressure monitor XL arm size (OP)  Commonly known as: iHEALTH EASE  by Other route as needed for High Blood Pressure.     CONTOUR TEST STRIPS Strp  Generic drug: blood sugar diagnostic  USE TO TEST BLOOD SUGAR TWICE A DAY     empagliflozin 10 mg tablet  Commonly known as: JARDIANCE  Take 1 tablet (10 mg total) by mouth every morning.     fluticasone propionate 50 mcg/actuation nasal spray  Commonly known as: FLONASE  SHAKE LIQUID  AND USE 2 SPRAYS(100 MCG) IN EACH NOSTRIL EVERY DAY     hydroCHLOROthiazide 12.5 MG Tab  Commonly known as: HYDRODIURIL  Take 1 tablet (12.5 mg total) by mouth once daily.     levocetirizine 5 MG tablet  Commonly known as: XYZAL  Take 1 tablet (5 mg total) by mouth every evening.     losartan 100 MG tablet  Commonly known as: COZAAR  Take 1 tablet (100 mg total) by mouth once daily.     metFORMIN 500 MG ER 24hr tablet  Commonly known as: GLUCOPHAGE-XR  TAKE 2 TABLETS(1000 MG) BY MOUTH TWICE DAILY.     omeprazole 20 MG capsule  Commonly known as: PRILOSEC  Take 1 capsule (20 mg total) by mouth once daily.     pravastatin 10 MG tablet  Commonly known as: PRAVACHOL  Take 1 tablet (10 mg total) by mouth once daily.     SITagliptin phosphate 100 MG Tab  Commonly known as: JANUVIA  Take 1 tablet (100 mg total) by mouth once daily.            Discharge Procedure Orders   Diet general     Call MD for:  temperature >100.4     Call MD for:  persistent nausea and vomiting     Call MD for:  severe uncontrolled pain     No dressing needed      Follow-up Information       Pravin Stevens MD Follow up in 2 week(s).    Specialties: Surgery, General Surgery  Contact information:  [address]  [address]  [place] [address] [phone]

## 2025-01-03 ENCOUNTER — OFFICE VISIT (OUTPATIENT)
Dept: SURGERY | Facility: CLINIC | Age: 60
End: 2025-01-03
Payer: COMMERCIAL

## 2025-01-03 VITALS
BODY MASS INDEX: 30.17 KG/M2 | HEIGHT: 73 IN | SYSTOLIC BLOOD PRESSURE: 129 MMHG | DIASTOLIC BLOOD PRESSURE: 81 MMHG | WEIGHT: 227.63 LBS

## 2025-01-03 DIAGNOSIS — K40.20 BILATERAL INGUINAL HERNIA WITHOUT OBSTRUCTION OR GANGRENE, RECURRENCE NOT SPECIFIED: Primary | ICD-10-CM

## 2025-01-03 PROCEDURE — 99999 PR PBB SHADOW E&M-EST. PATIENT-LVL III: CPT | Mod: PBBFAC,,, | Performed by: STUDENT IN AN ORGANIZED HEALTH CARE EDUCATION/TRAINING PROGRAM

## 2025-01-03 NOTE — PROGRESS NOTES
Patient ID: Suhas Castillo is a 59 y.o. male.    Chief Complaint: Post-op Evaluation      HPI:  HPI  S/p bilateral robot IHR and robot VHR with mesh  Feeling well  No pain  Ambulating well      Review of Systems   Constitutional:  Negative for chills, diaphoresis and fever.   HENT:  Negative for trouble swallowing.    Respiratory:  Negative for cough, shortness of breath, wheezing and stridor.    Cardiovascular:  Negative for chest pain and palpitations.   Gastrointestinal:  Negative for abdominal distention, abdominal pain, blood in stool, diarrhea, nausea and vomiting.   Endocrine: Negative for cold intolerance and heat intolerance.   Genitourinary:  Negative for difficulty urinating.   Musculoskeletal:  Negative for back pain.   Skin:  Negative for rash.   Allergic/Immunologic: Negative for immunocompromised state.   Neurological:  Negative for dizziness, syncope and numbness.   Hematological:  Negative for adenopathy.   Psychiatric/Behavioral:  Negative for agitation.        Current Outpatient Medications   Medication Sig Dispense Refill    amLODIPine (NORVASC) 5 MG tablet TAKE 1 AND 1/2 TABLETS(7.5 MG) BY MOUTH EVERY  tablet 3    blood pressure monitor (IHEALDotflux EASE) XL arm size (OP) by Other route as needed for High Blood Pressure. 1 each 0    blood sugar diagnostic (CONTOUR TEST STRIPS) Strp USE TO TEST BLOOD SUGAR TWICE A DAY 50 each 11    empagliflozin (JARDIANCE) 10 mg tablet Take 1 tablet (10 mg total) by mouth every morning. 90 tablet 1    fluticasone propionate (FLONASE) 50 mcg/actuation nasal spray SHAKE LIQUID AND USE 2 SPRAYS(100 MCG) IN EACH NOSTRIL EVERY DAY 48 g 3    hydroCHLOROthiazide (HYDRODIURIL) 12.5 MG Tab Take 1 tablet (12.5 mg total) by mouth once daily. 90 tablet 3    levocetirizine (XYZAL) 5 MG tablet Take 1 tablet (5 mg total) by mouth every evening. 30 tablet 11    losartan (COZAAR) 100 MG tablet Take 1 tablet (100 mg total) by mouth once daily. 90 tablet 3    metFORMIN  (GLUCOPHAGE-XR) 500 MG ER 24hr tablet TAKE 2 TABLETS(1000 MG) BY MOUTH TWICE DAILY. 360 tablet 1    omeprazole (PRILOSEC) 20 MG capsule Take 1 capsule (20 mg total) by mouth once daily. 90 capsule 1    oxyCODONE-acetaminophen (PERCOCET) 5-325 mg per tablet Take 1 tablet by mouth every 4 (four) hours as needed for Pain. 15 tablet 0    pravastatin (PRAVACHOL) 10 MG tablet Take 1 tablet (10 mg total) by mouth once daily. 90 tablet 1    senna-docusate 8.6-50 mg (PERICOLACE) 8.6-50 mg per tablet Take 1 tablet by mouth 2 (two) times daily.      SITagliptin phosphate (JANUVIA) 100 MG Tab Take 1 tablet (100 mg total) by mouth once daily. 90 tablet 1     No current facility-administered medications for this visit.       Review of patient's allergies indicates:   Allergen Reactions    Ace inhibitors Swelling    Sinus allergy        Past Medical History:   Diagnosis Date    Angioedema 7/11/2019    AR (allergic rhinitis)     Cervical spinal stenosis     Diabetes mellitus type II     DJD (degenerative joint disease), cervical     Hyperlipidemia     Hypertension     Sleep apnea        Past Surgical History:   Procedure Laterality Date    COLONOSCOPY N/A 4/11/2019    Procedure: COLONOSCOPY;  Surgeon: Gato Corbin MD;  [facility]  Service: Endoscopy;  Laterality: N/A;    EPIDURAL BLOCK INJECTION      cervical x 2    EPIDURAL STEROID INJECTION N/A 9/28/2018    Procedure: Injection, Steroid, Epidural  C7-T1;  Surgeon: Bernie Estrada MD;  Location: [facility]IN [facility]GT;  Service: Pain Management;  Laterality: N/A;  C7-T1 IL DEBORAH   MRDICALLY URGENT    EPIDURAL STEROID INJECTION N/A 10/26/2018    Procedure: Injection, Steroid, Epidural CERVICAL C7-T1 DEBORAH;  Surgeon: Bernie Estrada MD;  Location: [facility]IN [facility]GT;  Service: Pain Management;  Laterality: N/A;    EPIDURAL STEROID INJECTION N/A 2/7/2019    Procedure: INJECTION, STEROID, EPIDURAL;  Surgeon: Tania Bajwa MD;  Location: [facility];  Service: Pain  Management;  Laterality: N/A;  C DEBORAH C7-T1    GUZMÁN PT ON EISSA SCHEDULE    EPIDURAL STEROID INJECTION N/A 7/27/2020    Procedure: INJECTION, STEROID, EPIDURAL, C7-T1;  Surgeon: Tania Bajwa MD;  Location: [facility];  Service: Pain Management;  Laterality: N/A;    ROBOT-ASSISTED LAPAROSCOPIC REPAIR OF INGUINAL HERNIA Bilateral 12/19/2024    Procedure: ROBOTIC REPAIR, HERNIA, INGUINAL;  Surgeon: Pravin Stevens MD;  Location: [facility] OR;  Service: General;  Laterality: Bilateral;  Make 2nd case    ROBOT-ASSISTED LAPAROSCOPIC REPAIR OF VENTRAL HERNIA N/A 12/19/2024    Procedure: ROBOTIC REPAIR, HERNIA, VENTRAL;  Surgeon: Pravin Stevens MD;  Location: [facility] OR;  Service: General;  Laterality: N/A;    TESTICLE SURGERY  age 16    torsion       Social History     Socioeconomic History    Marital status: Single    Number of children: 4   Occupational History    Occupation: secutiry supervisor     Employer: [employer]   Tobacco Use    Smoking status: Never    Smokeless tobacco: Never   Substance and Sexual Activity    Alcohol use: No     Alcohol/week: 0.0 standard drinks of alcohol     Comment: none x 4 years; previously social drinker    Drug use: No    Sexual activity: Yes     Partners: Female     Birth control/protection: None   Social History Narrative    The patient does exercise regularly (treadmill, light weights: TIW-QIW).      Rates diet as good.      He is satisfied with weight.    He does drink at least 1/2 gallon water daily.    He drinks 0 coffee/tea/caffeine-containing soft drinks daily.    Total sleep time at night is 4-5 hours.    He works 65 hours per week.    He does wear seat belts.    Hobbies include bicycling.             Social Drivers of Health     Financial Resource Strain: Low Risk  (10/12/2024)    Overall Financial Resource Strain (CARDIA)     Difficulty of Paying Living Expenses: Not very hard   Food Insecurity: No Food Insecurity (10/12/2024)    Hunger Vital Sign      Worried About Running Out of Food in the Last Year: Never true     Ran Out of Food in the Last Year: Never true   Transportation Needs: No Transportation Needs (3/16/2020)    PRAPARE - Transportation     Lack of Transportation (Medical): No     Lack of Transportation (Non-Medical): No   Physical Activity: Sufficiently Active (10/12/2024)    Exercise Vital Sign     Days of Exercise per Week: 5 days     Minutes of Exercise per Session: 90 min   Stress: No Stress Concern Present (10/12/2024)    [place] [employer] - Occupational Stress Questionnaire     Feeling of Stress : Not at all   Housing Stability: Unknown (10/12/2024)    Housing Stability Vital Sign     Unable to Pay for Housing in the Last Year: No       Vitals:    01/03/25 0955   BP: 129/81       Physical Exam  Constitutional:       General: He is not in acute distress.  HENT:      Head: Normocephalic and atraumatic.   Eyes:      General: No scleral icterus.  Cardiovascular:      Rate and Rhythm: Normal rate.   Pulmonary:      Effort: Pulmonary effort is normal. No respiratory distress.      Breath sounds: No stridor.   Abdominal:      Palpations: Abdomen is soft.      Tenderness: There is no abdominal tenderness.   Lymphadenopathy:      Cervical: No cervical adenopathy.   Skin:     General: Skin is warm.      Findings: No erythema.   Neurological:      Mental Status: He is alert and oriented to person, place, and time.   Psychiatric:         Behavior: Behavior normal.     Body mass index is 30.03 kg/m².  Incisions good      Assessment & Plan:  59M s/p robot BIHR and VHR  Doing well  Gradually increase activity  RTC prn

## 2025-01-17 ENCOUNTER — TELEPHONE (OUTPATIENT)
Dept: PHARMACY | Facility: CLINIC | Age: 60
End: 2025-01-17
Payer: COMMERCIAL

## 2025-01-17 NOTE — TELEPHONE ENCOUNTER
[facility]/Population Health Chart Review & Patient Outreach Details For Medication Adherence Project    Reason for Outreach Encounter: 3rd Party payor non-compliance report ([employer], BCBS, [employer], etc)  2.  Patient Outreach Method: Reviewed patient chart   3.   Medication in question:    Hypertension Medications               amLODIPine (NORVASC) 5 MG tablet TAKE 1 AND 1/2 TABLETS(7.5 MG) BY MOUTH EVERY DAY    hydroCHLOROthiazide (HYDRODIURIL) 12.5 MG Tab Take 1 tablet (12.5 mg total) by mouth once daily.    losartan (COZAAR) 100 MG tablet Take 1 tablet (100 mg total) by mouth once daily.                 Losartan  last filled  1/8/25 for 90 day supply      4.  Reviewed and or Updates Made To: Patient Chart  5. Outreach Outcomes and/or actions taken: Patient filled medication and is on track to be adherent  Additional Notes:

## 2025-01-27 ENCOUNTER — TELEPHONE (OUTPATIENT)
Dept: SURGERY | Facility: CLINIC | Age: 60
End: 2025-01-27
Payer: COMMERCIAL

## 2025-01-27 NOTE — TELEPHONE ENCOUNTER
----- Message from Ingrid sent at 1/27/2025 10:33 AM CST -----  Type:  Needs Medical Advice/3rd attempt     Who Called: pt    Would the patient rather a call back or a response via [facility]? Call   Best Call Back Number: [phone]  Additional Information: pt requesting a call back 3rd attempt to reach office.          01/27/2025                      [id number]  Spoke to patient regarding the above message. Let him know Dr. Stevens is in surgery, however, Dr. Stevens has his portal message and will respond by the end of the day tomorrow. Patient verbalized understanding.

## 2025-02-21 ENCOUNTER — PATIENT MESSAGE (OUTPATIENT)
Dept: RESEARCH | Facility: OTHER | Age: 60
End: 2025-02-21
Payer: COMMERCIAL

## 2025-03-20 ENCOUNTER — OFFICE VISIT (OUTPATIENT)
Dept: INTERNAL MEDICINE | Facility: CLINIC | Age: 60
End: 2025-03-20
Payer: COMMERCIAL

## 2025-03-20 VITALS
TEMPERATURE: 97 F | SYSTOLIC BLOOD PRESSURE: 128 MMHG | HEART RATE: 74 BPM | WEIGHT: 238 LBS | DIASTOLIC BLOOD PRESSURE: 82 MMHG | OXYGEN SATURATION: 99 % | BODY MASS INDEX: 31.4 KG/M2

## 2025-03-20 DIAGNOSIS — S39.012A BACK STRAIN, INITIAL ENCOUNTER: Primary | ICD-10-CM

## 2025-03-20 DIAGNOSIS — E11.59 HYPERTENSION ASSOCIATED WITH DIABETES: ICD-10-CM

## 2025-03-20 DIAGNOSIS — I15.2 HYPERTENSION ASSOCIATED WITH DIABETES: ICD-10-CM

## 2025-03-20 PROCEDURE — 99999 PR PBB SHADOW E&M-EST. PATIENT-LVL IV: CPT | Mod: PBBFAC,,, | Performed by: NURSE PRACTITIONER

## 2025-03-20 RX ORDER — LIDOCAINE 50 MG/G
1 PATCH TOPICAL DAILY
Qty: 30 PATCH | Refills: 0 | Status: SHIPPED | OUTPATIENT
Start: 2025-03-20

## 2025-03-20 RX ORDER — KETOROLAC TROMETHAMINE 30 MG/ML
60 INJECTION, SOLUTION INTRAMUSCULAR; INTRAVENOUS
Status: COMPLETED | OUTPATIENT
Start: 2025-03-20 | End: 2025-03-20

## 2025-03-20 RX ORDER — CYCLOBENZAPRINE HCL 10 MG
10 TABLET ORAL NIGHTLY
Qty: 10 TABLET | Refills: 0 | Status: SHIPPED | OUTPATIENT
Start: 2025-03-20 | End: 2025-03-30

## 2025-03-20 RX ORDER — IBUPROFEN 800 MG/1
800 TABLET ORAL
Qty: 90 TABLET | Refills: 2 | Status: SHIPPED | OUTPATIENT
Start: 2025-03-20

## 2025-03-20 RX ADMIN — KETOROLAC TROMETHAMINE 60 MG: 30 INJECTION, SOLUTION INTRAMUSCULAR; INTRAVENOUS at 10:03

## 2025-03-20 NOTE — PROGRESS NOTES
Subjective:       Patient ID: Suhas Castillo is a 60 y.o. male.    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 The pain is localized to a specific area without radiation. He denies pain in other regions of his back. He has been using Epsom salt soaks for symptom relief.    RECENT SURGERY:  He underwent robotic hernia surgery on December 19 and continues to experience expected post-operative discomfort. But not concerning findings.     ROS:  Constitutional: +sleep disturbances  Musculoskeletal: +back pain, +muscle pain     Objective:      Physical Exam  Vitals reviewed.   Constitutional:       Appearance: Normal appearance.   HENT:      Head: Normocephalic and atraumatic.      Nose: Nose normal.      Mouth/Throat:      Mouth: Mucous membranes are moist.   Eyes:      Pupils: Pupils are equal, round, and reactive to light.   Cardiovascular:      Rate and Rhythm: Normal rate and regular rhythm.      Heart sounds: Normal heart sounds.   Pulmonary:      Effort: Pulmonary effort is normal.      Breath sounds: Normal breath sounds.   Abdominal:      General: Abdomen is flat. Bowel sounds are normal.      Palpations: Abdomen is soft.   Musculoskeletal:         General: Normal range of motion.      Cervical back: Normal range of motion.        Legs:    Skin:     General: Skin is warm and dry.   Neurological:      General: No focal deficit present.      Mental Status: He is alert and oriented to person, place, and time.   Psychiatric:         Mood and Affect: Mood normal.         Behavior: Behavior normal.         Assessment:       1. Back strain, initial encounter  - ibuprofen (ADVIL,MOTRIN) 800 MG tablet; Take 1 tablet (800 mg total) by mouth after meals as needed for Pain.   Dispense: 90 tablet; Refill: 2  - LIDOcaine (LIDODERM) 5 %; Place 1 patch onto the skin once daily. Remove & Discard patch within 12 hours or as directed by MD  Dispense: 30 patch; Refill: 0  - ketorolac injection 60 mg  - cyclobenzaprine (FLEXERIL) 10 MG tablet; Take 1 tablet (10 mg total) by mouth every evening. for 10 days  Dispense: 10 tablet; Refill: 0    2. Hypertension associated with diabetes  -Chronic, stable.   - Continue with current dose of amlodipine 5 mg QD.       Plan:       Assessment & Plan    IMPRESSION:  Assessed back muscle strain, likely due to improper lifting technique.  Determined anti-inflammatory treatment appropriate for acute phase management.  Considered lidocaine patch for topical pain relief, pending insurance approval.    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  - Diagnosed inflammation and muscle strain in the lower back.  - Administered intramuscular Toradol injection for immediate anti-inflammatory effect.  - Prescribed ibuprofen 800 mg 3 times daily for 2-3 days, then as needed. Take with food to help prevent GI side effects.   - Cautioned the patient not to take ibuprofen for 8 hours following the injection.  - Prescribed a muscle relaxant in pill form. Take at night as these may cause grogginess.   - Discussed OTC alternatives if insurance does not approve prescription strength muscle relaxant.  - Recommend continuing Epsom salt soaks to help relax muscles.  - Evaluated the patient's low back pain following an incident on a school bus.  - Prescribed lidocaine 5% patch, pending insurance approval.  - Advised using OTC 4% lidocaine patches (e.g., Salonpas or Icy Hot) if prescription is not approved.  - Explained the similarity between 5% prescription and 4% OTC lidocaine patches.  - Emphasized the importance of consistent anti-inflammatory use in  the acute phase to reduce inflammation.  - Recommend continuing Epsom salt soaks for pain relief.  - F/u in 6-8 weeks especially if pain dose not improve.     This note was generated with the assistance of ambient listening technology. Verbal consent was obtained by the patient and accompanying visitor(s) for the recording of patient appointment to facilitate this note. I attest to having reviewed and edited the generated note for accuracy, though some syntax or spelling errors may persist. Please contact the author of this note for any clarification.

## 2025-04-09 ENCOUNTER — OFFICE VISIT (OUTPATIENT)
Dept: SLEEP MEDICINE | Facility: CLINIC | Age: 60
End: 2025-04-09
Payer: COMMERCIAL

## 2025-04-09 VITALS
HEART RATE: 80 BPM | DIASTOLIC BLOOD PRESSURE: 90 MMHG | HEIGHT: 73 IN | BODY MASS INDEX: 31.56 KG/M2 | WEIGHT: 238.13 LBS | SYSTOLIC BLOOD PRESSURE: 141 MMHG

## 2025-04-09 DIAGNOSIS — G47.33 OSA (OBSTRUCTIVE SLEEP APNEA): Primary | ICD-10-CM

## 2025-04-09 DIAGNOSIS — R35.1 NOCTURIA: ICD-10-CM

## 2025-04-09 DIAGNOSIS — G47.10 HYPERSOMNOLENCE: ICD-10-CM

## 2025-04-09 DIAGNOSIS — F51.09 OTHER INSOMNIA NOT DUE TO A SUBSTANCE OR KNOWN PHYSIOLOGICAL CONDITION: ICD-10-CM

## 2025-04-09 PROCEDURE — 99999 PR PBB SHADOW E&M-EST. PATIENT-LVL IV: CPT | Mod: PBBFAC,,, | Performed by: PHYSICIAN ASSISTANT

## 2025-04-09 PROCEDURE — 1159F MED LIST DOCD IN RCRD: CPT | Mod: CPTII,S$GLB,, | Performed by: PHYSICIAN ASSISTANT

## 2025-04-09 PROCEDURE — 3077F SYST BP >= 140 MM HG: CPT | Mod: CPTII,S$GLB,, | Performed by: PHYSICIAN ASSISTANT

## 2025-04-09 PROCEDURE — 3080F DIAST BP >= 90 MM HG: CPT | Mod: CPTII,S$GLB,, | Performed by: PHYSICIAN ASSISTANT

## 2025-04-09 PROCEDURE — 1160F RVW MEDS BY RX/DR IN RCRD: CPT | Mod: CPTII,S$GLB,, | Performed by: PHYSICIAN ASSISTANT

## 2025-04-09 PROCEDURE — 99204 OFFICE O/P NEW MOD 45 MIN: CPT | Mod: S$GLB,,, | Performed by: PHYSICIAN ASSISTANT

## 2025-04-09 PROCEDURE — 4010F ACE/ARB THERAPY RXD/TAKEN: CPT | Mod: CPTII,S$GLB,, | Performed by: PHYSICIAN ASSISTANT

## 2025-04-09 PROCEDURE — 3008F BODY MASS INDEX DOCD: CPT | Mod: CPTII,S$GLB,, | Performed by: PHYSICIAN ASSISTANT

## 2025-04-09 NOTE — PROGRESS NOTES
"NEW PATIENT VISIT  Previously evaluated in sleep medicine. Last visit 2/4/20 with Dr Aiden Beckmanony Anna  is a pleasant 60 y.o. male  with PMH significant for  HTN, HLD,  DM II, AR, DJD, BMI 31+, LIZZY who presents for LIZZY management     Reports dx LIZZY in 2020 (AHI 34) following c/o snoring, witnessed apneas, poor disrupted and un-refreshing sleep, and excessive daytime sleepiness and fatigue. States he has been using CPAP nightly since dx with good control of sx. States recently CPAP began blowing out inconsistent pressures and humidity has acting up, sometimes flowing out water, sometimes very dry. States he needs a new CPAP machine, which is why he presents today to re-establish care with sleep medicine.     PAP history   Problems CPAP broken   Mask    Pressure 4-20cwp   DME HME   Machine age 3/16/2020   Download 4/8/25: 28/30 x 5hrs 54mins, 4-20cwp (8.2), leak 0.6, %, AHI 1.1       SLEEP SCHEDULE   Environment    Bed Time 10PM   Sleep Latency 10mins   Arousals 0   Nocturia 0   Back to sleep N/a   Wake time 4:30AM   Naps None    Work          Past Medical History:   Diagnosis Date    Angioedema 7/11/2019    AR (allergic rhinitis)     Cervical spinal stenosis     Diabetes mellitus type II     DJD (degenerative joint disease), cervical     Hyperlipidemia     Hypertension     Sleep apnea      Problem List[1]  Current Medications[2]       Vitals:    04/09/25 0952   BP: (!) 141/90   Pulse: 80   Weight: 108 kg (238 lb 1.6 oz)   Height: 6' 1" (1.854 m)     Physical Exam:    GEN:   Well-appearing  Psych:  Appropriate affect, demonstrates insight  SKIN:  No rash on the face or bridge of the nose      LABS:   Lab Results   Component Value Date    HGB 13.5 (L) 10/11/2024    CO2 24 10/11/2024         RECORDS REVIEWED:    2/21/20 PSG: AHI 34.6, thalia 73%; recommended CPAP 5-9cwp    Previous sleep notes: 2/4/20    CPAP interrogation 4/8/25: 28/30 x 5hrs 54mins, 4-20cwp (8.2), leak 0.6, %, AHI " 1.1    ASSESSMENT    [place] Sleepiness Scale:  Sitting and readin  Watching TV:    2  Passenger in a car x 1 hr:  0  Sitting quietly after lunch:  1  Lying down to rest in PM:  0  Sitting, inactive in public:  0  Sitting+ talking to someone:  0  Stopped in traffic:   0  Total        PROBLEM DESCRIPTION/ Sx on Presentation Interval Hx STATUS   Hx LIZZY   + snoring, + witnessed apneas  + wakes feeling un-refreshed     PSG  AHI 34.6  Using CPAP nightly since dx with good control of sx Good usage and efficiency, until CPAP broke New to me   Daytime Sx   + sleepiness when inactive   ESS  on intake (reviewed from 20) Feels more refreshed on CPAP New to me   Insomnia   Trouble falling asleep: 10-15mins  Arousals:         2  Hard to get back to sleep?: no issues    Prior pertinent medications:  Current pertinent medications:  Sleep is more consolidated on CPAP New to me   Nocturia   x 2 per sleep period 0 x nightly New to me   Other issues:       PLAN      -using and benefiting from CPAP therapy until CPAP machine recently broke  -CPAP machine has reached the end of its usable life, patient will need a new one  -CPAP and supplies ordered  -in the meantime, please try to continue using broken PAP tot the best of your ability  -adjusted old PAP pressures to 6-13cwp  -discussed LIZZY and PAP with patient in detail, including possible complications of untreated LIZZY like heart attack/stroke  -advised on strict driving precautions; advised never to drive drowsy     Advised on plan of care. Answered all patient questions. Patient verbalized understanding and voiced agreement with plan of care.     RTC  will need follow up visit 31-90 days after receiving new CPAP machine for compliance       The patient was given open opportunity to ask questions and/or express concerns about treatment plan. All questions/concerns were discussed.     Two patient identifiers used prior to evaluation.                  [1]    Patient Active Problem List  Diagnosis    Type 2 diabetes mellitus with hyperglycemia, without long-term current use of insulin    Hypertension associated with diabetes    Hyperlipidemia associated with type 2 diabetes mellitus    Allergic rhinitis    DJD (degenerative joint disease), cervical    Cervical spinal stenosis    Vitamin D deficiency    LIZZY (obstructive sleep apnea)    Chronic pain    Bilateral inguinal hernia without obstruction or gangrene    Ventral hernia without obstruction or gangrene    Liver lesion    Alpha thalassemia trait    Groin pain, left    Groin pain, right    Strain of back   [2]   Current Outpatient Medications:     amLODIPine (NORVASC) 5 MG tablet, TAKE 1 AND 1/2 TABLETS(7.5 MG) BY MOUTH EVERY DAY, Disp: 135 tablet, Rfl: 3    blood pressure monitor (IHEALTH EASE) XL arm size (OP), by Other route as needed for High Blood Pressure., Disp: 1 each, Rfl: 0    blood sugar diagnostic (CONTOUR TEST STRIPS) Strp, USE TO TEST BLOOD SUGAR TWICE A DAY, Disp: 50 each, Rfl: 11    empagliflozin (JARDIANCE) 10 mg tablet, Take 1 tablet (10 mg total) by mouth every morning., Disp: 90 tablet, Rfl: 1    fluticasone propionate (FLONASE) 50 mcg/actuation nasal spray, SHAKE LIQUID AND USE 2 SPRAYS(100 MCG) IN EACH NOSTRIL EVERY DAY, Disp: 48 g, Rfl: 3    hydroCHLOROthiazide (HYDRODIURIL) 12.5 MG Tab, Take 1 tablet (12.5 mg total) by mouth once daily., Disp: 90 tablet, Rfl: 3    ibuprofen (ADVIL,MOTRIN) 800 MG tablet, Take 1 tablet (800 mg total) by mouth after meals as needed for Pain., Disp: 90 tablet, Rfl: 2    levocetirizine (XYZAL) 5 MG tablet, Take 1 tablet (5 mg total) by mouth every evening., Disp: 30 tablet, Rfl: 11    LIDOcaine (LIDODERM) 5 %, Place 1 patch onto the skin once daily. Remove & Discard patch within 12 hours or as directed by MD, Disp: 30 patch, Rfl: 0    losartan (COZAAR) 100 MG tablet, Take 1 tablet (100 mg total) by mouth once daily., Disp: 90 tablet, Rfl: 3    metFORMIN  (GLUCOPHAGE-XR) 500 MG ER 24hr tablet, TAKE 2 TABLETS(1000 MG) BY MOUTH TWICE DAILY., Disp: 360 tablet, Rfl: 1    omeprazole (PRILOSEC) 20 MG capsule, Take 1 capsule (20 mg total) by mouth once daily., Disp: 90 capsule, Rfl: 1    oxyCODONE-acetaminophen (PERCOCET) 5-325 mg per tablet, Take 1 tablet by mouth every 4 (four) hours as needed for Pain., Disp: 15 tablet, Rfl: 0    pravastatin (PRAVACHOL) 10 MG tablet, Take 1 tablet (10 mg total) by mouth once daily., Disp: 90 tablet, Rfl: 1    senna-docusate 8.6-50 mg (PERICOLACE) 8.6-50 mg per tablet, Take 1 tablet by mouth 2 (two) times daily., Disp: , Rfl:     SITagliptin phosphate (JANUVIA) 100 MG Tab, Take 1 tablet (100 mg total) by mouth once daily., Disp: 90 tablet, Rfl: 1

## 2025-04-16 DIAGNOSIS — K21.9 GASTROESOPHAGEAL REFLUX DISEASE WITHOUT ESOPHAGITIS: ICD-10-CM

## 2025-04-16 RX ORDER — OMEPRAZOLE 20 MG/1
CAPSULE, DELAYED RELEASE ORAL
Qty: 90 CAPSULE | Refills: 1 | Status: SHIPPED | OUTPATIENT
Start: 2025-04-16

## 2025-04-16 NOTE — TELEPHONE ENCOUNTER
Provider Staff:  Action required for this patient    Requires labs      Please see care gap opportunities below in Care Due Message.    Thanks!  [facility]     Appointments      Date Provider   Last Visit   10/18/2024 Robert Tran, DO   Next Visit   4/28/2025 Robert Tran, DO     Refill Decision Note   Suhas Anna  is requesting a refill authorization.  Brief Assessment and Rationale for Refill:  Approve     Medication Therapy Plan:         Comments:     Note composed:2:58 PM 04/16/2025

## 2025-04-16 NOTE — TELEPHONE ENCOUNTER
Care Due:                  Date            Visit Type   Department     Provider  --------------------------------------------------------------------------------                                MYCHART                              FOLLOWUP/OF  MET INTERNAL  Last Visit: 10-      FICE VISIT   MEDICINE       Robert Tran                              EP -                              PRIMARY      [facility] INTERNAL  Next Visit: 04-      CARE ([facility])   MEDICINE       Robert Tran                                                            Last  Test          Frequency    Reason                     Performed    Due Date  --------------------------------------------------------------------------------    HBA1C.......  6 months...  SITagliptin,               10-   04-                             empagliflozin, metFORMIN.    Lipid Panel.  12 months..  pravastatin..............  04- 04-    Health [facility] Embedded Care Due Messages. Reference number: [id number].   4/16/2025 8:36:52 AM CDT

## 2025-04-19 ENCOUNTER — TELEPHONE (OUTPATIENT)
Dept: PHARMACY | Facility: CLINIC | Age: 60
End: 2025-04-19
Payer: COMMERCIAL

## 2025-04-19 DIAGNOSIS — I15.2 HYPERTENSION ASSOCIATED WITH DIABETES: ICD-10-CM

## 2025-04-19 DIAGNOSIS — E11.59 HYPERTENSION ASSOCIATED WITH DIABETES: ICD-10-CM

## 2025-04-19 NOTE — TELEPHONE ENCOUNTER
No care due was identified.  [facility] Embedded Care Due Messages. Reference number: [id number].   4/19/2025 3:35:34 PM CDT

## 2025-04-20 NOTE — TELEPHONE ENCOUNTER
[facility]/Population Health Chart Review & Patient Outreach Details For Medication Adherence Project    Reason for Outreach Encounter: 3rd Party payor non-compliance report ([employer], [employer], [employer], etc)  2.  Patient Outreach Method: Reviewed patient chart  and [employer] message  3.   Medication in question:    Hypertension Medications              amLODIPine (NORVASC) 5 MG tablet TAKE 1 AND 1/2 TABLETS(7.5 MG) BY MOUTH EVERY DAY    hydroCHLOROthiazide (HYDRODIURIL) 12.5 MG Tab Take 1 tablet (12.5 mg total) by mouth once daily.    losartan (COZAAR) 100 MG tablet Take 1 tablet (100 mg total) by mouth once daily.                 losartan  last filled  1/8/25 for 90 day supply      4.  Reviewed and or Updates Made To: Patient Chart  5. Outreach Outcomes and/or actions taken: Patient reminded to  prescription  Additional Notes:

## 2025-04-21 RX ORDER — LOSARTAN POTASSIUM 100 MG/1
100 TABLET ORAL DAILY
Qty: 90 TABLET | Refills: 1 | Status: SHIPPED | OUTPATIENT
Start: 2025-04-21

## 2025-04-21 NOTE — TELEPHONE ENCOUNTER
Refill Routing Note   Medication(s) are not appropriate for processing by [facility] for the following reason(s):        Required vitals abnormal    [facility] action(s):  Defer               Appointments  past 12m or future 3m with PCP    Date Provider   Last Visit   10/18/2024 Robert Tran, DO   Next Visit   4/28/2025 Robert Tran, DO   ED visits in past 90 days: 0        Note composed:1:12 PM 04/21/2025

## 2025-04-23 ENCOUNTER — PATIENT OUTREACH (OUTPATIENT)
Dept: ADMINISTRATIVE | Facility: HOSPITAL | Age: 60
End: 2025-04-23
Payer: COMMERCIAL

## 2025-04-28 ENCOUNTER — OFFICE VISIT (OUTPATIENT)
Dept: INTERNAL MEDICINE | Facility: CLINIC | Age: 60
End: 2025-04-28
Payer: COMMERCIAL

## 2025-04-28 VITALS
WEIGHT: 242.63 LBS | TEMPERATURE: 98 F | BODY MASS INDEX: 32.16 KG/M2 | HEIGHT: 73 IN | OXYGEN SATURATION: 97 % | RESPIRATION RATE: 14 BRPM | HEART RATE: 84 BPM | DIASTOLIC BLOOD PRESSURE: 80 MMHG | SYSTOLIC BLOOD PRESSURE: 136 MMHG

## 2025-04-28 DIAGNOSIS — E11.65 TYPE 2 DIABETES MELLITUS WITH HYPERGLYCEMIA, WITHOUT LONG-TERM CURRENT USE OF INSULIN: ICD-10-CM

## 2025-04-28 DIAGNOSIS — I15.2 HYPERTENSION ASSOCIATED WITH DIABETES: ICD-10-CM

## 2025-04-28 DIAGNOSIS — K76.9 LIVER LESION: ICD-10-CM

## 2025-04-28 DIAGNOSIS — D56.3 ALPHA THALASSEMIA TRAIT: ICD-10-CM

## 2025-04-28 DIAGNOSIS — G47.33 OSA (OBSTRUCTIVE SLEEP APNEA): ICD-10-CM

## 2025-04-28 DIAGNOSIS — E11.59 HYPERTENSION ASSOCIATED WITH DIABETES: ICD-10-CM

## 2025-04-28 DIAGNOSIS — E78.5 HYPERLIPIDEMIA ASSOCIATED WITH TYPE 2 DIABETES MELLITUS: ICD-10-CM

## 2025-04-28 DIAGNOSIS — Z00.00 ANNUAL PHYSICAL EXAM: ICD-10-CM

## 2025-04-28 DIAGNOSIS — K76.9 LIVER DISEASE, UNSPECIFIED: ICD-10-CM

## 2025-04-28 DIAGNOSIS — M47.812 SPONDYLOSIS OF CERVICAL REGION WITHOUT MYELOPATHY OR RADICULOPATHY: Primary | ICD-10-CM

## 2025-04-28 DIAGNOSIS — E11.69 HYPERLIPIDEMIA ASSOCIATED WITH TYPE 2 DIABETES MELLITUS: ICD-10-CM

## 2025-04-28 PROCEDURE — 99999 PR PBB SHADOW E&M-EST. PATIENT-LVL V: CPT | Mod: PBBFAC,,, | Performed by: INTERNAL MEDICINE

## 2025-04-28 NOTE — PROGRESS NOTES
Subjective     Patient ID: Suhas Castillo is a 60 y.o. male.    Chief Complaint: Annual Exam    HPI  60 y.o. Male here for annual exam.      Vaccines: Influenza (declined); Tetanus (2020); PNA (current); Shingrix (will consider)  Eye exam: current  Colonoscopy: 2019     Exercise: cardio/weights  Diet: regular    Past Medical History:  7/11/2019: Angioedema  No date: AR (allergic rhinitis)  No date: Cervical spinal stenosis  No date: Diabetes mellitus type II  No date: DJD (degenerative joint disease), cervical  No date: Hyperlipidemia  No date: Hypertension  No date: Sleep apnea  Past Surgical History:  4/11/2019: COLONOSCOPY; N/A      Comment:  Procedure: COLONOSCOPY;  Surgeon: Gato Corbin MD;                Location: [address]);  Service: Endoscopy;                 Laterality: N/A;  No date: EPIDURAL BLOCK INJECTION      Comment:  cervical x 2  9/28/2018: EPIDURAL STEROID INJECTION; N/A      Comment:  Procedure: Injection, Steroid, Epidural  C7-T1;                 Surgeon: Bernie Estrada MD;  Location: [facility];                 Service: Pain Management;  Laterality: N/A;  C7-T1 IL DEBORAH               MRDICALLY URGENT  10/26/2018: EPIDURAL STEROID INJECTION; N/A      Comment:  Procedure: Injection, Steroid, Epidural CERVICAL C7-T1                DEBORAH;  Surgeon: Bernie Estrada MD;  Location: [facility]                [facility]GT;  Service: Pain Management;  Laterality: N/A;  2/7/2019: EPIDURAL STEROID INJECTION; N/A      Comment:  Procedure: INJECTION, STEROID, EPIDURAL;  Surgeon: Tania Bajwa MD;  Location: [facility];  Service: Pain                Management;  Laterality: N/A;  C DEBORAH [id number] PT ON               EISSA SCHEDULE  7/27/2020: EPIDURAL STEROID INJECTION; N/A      Comment:  Procedure: INJECTION, STEROID, EPIDURAL, C7-T1;                 Surgeon: Tania Bajwa MD;  Location: [facility];                 Service: Pain Management;  Laterality: N/A;  12/19/2024:  ROBOT-ASSISTED LAPAROSCOPIC REPAIR OF INGUINAL HERNIA;   Bilateral      Comment:  Procedure: ROBOTIC REPAIR, HERNIA, INGUINAL;  Surgeon:                Pravin Stevens MD;  Location: [facility] OR;  Service:                General;  Laterality: Bilateral;  Make 2nd case  12/19/2024: ROBOT-ASSISTED LAPAROSCOPIC REPAIR OF VENTRAL HERNIA; N/A      Comment:  Procedure: ROBOTIC REPAIR, HERNIA, VENTRAL;  Surgeon:                Pravin Stevens MD;  Location: [facility] OR;  Service:                General;  Laterality: N/A;  age 16: TESTICLE SURGERY      Comment:  torsion  Social History    Socioeconomic History      Marital status: Single      Number of children: 4    Occupational History      Occupation: secutiry supervisor        Employer: [employer]    Tobacco Use      Smoking status: Never      Smokeless tobacco: Never    Substance and Sexual Activity      Alcohol use: No        Alcohol/week: 0.0 standard drinks of alcohol        Comment: none x 4 years; previously social drinker      Drug use: No      Sexual activity: Yes        Partners: Female        Birth control/protection: None    Social History Narrative      The patient does exercise regularly (treadmill, light weights: TIW-QIW).        Rates diet as good.        He is satisfied with weight.      He does drink at least 1/2 gallon water daily.      He drinks 0 coffee/tea/caffeine-containing soft drinks daily.      Total sleep time at night is 4-5 hours.      He works 65 hours per week.      He does wear seat belts.      Hobbies include bicycling.        Social Drivers of Health  Financial Resource Strain: Low Risk  (10/12/2024)      Overall Financial Resource Strain (CARDIA)          Difficulty of Paying Living Expenses: Not very hard  Food Insecurity: No Food Insecurity (10/12/2024)      Hunger Vital Sign          Worried About Running Out of Food in the Last Year: Never true          Ran Out of Food in the Last Year: Never true  Transportation  Needs: No Transportation Needs (3/16/2020)      PRAPARE - Transportation          Lack of Transportation (Medical): No          Lack of Transportation (Non-Medical): No  Physical Activity: Sufficiently Active (10/12/2024)      Exercise Vital Sign          Days of Exercise per Week: 5 days          Minutes of Exercise per Session: 90 min  Stress: No Stress Concern Present (10/12/2024)      Citizen of [place] [employer] Occupational [employer] - Occupational Stress Questionnaire          Feeling of Stress : Not at all  Housing Stability: Unknown (10/12/2024)      Housing Stability Vital Sign          Unable to Pay for Housing in the Last Year: No  Review of patient's allergies indicates:   -- Ace inhibitors -- Swelling   -- Sinus allergy   Suhas Castillo had no medications administered during this visit.  Review of Systems   Constitutional:  Negative for activity change, appetite change, chills, diaphoresis, fatigue, fever and unexpected weight change.   HENT:  Negative for nasal congestion, mouth sores, postnasal drip, rhinorrhea, sinus pressure/congestion, sneezing, sore throat, trouble swallowing and voice change.    Eyes:  Negative for discharge, itching and visual disturbance.   Respiratory:  Negative for cough, chest tightness, shortness of breath and wheezing.    Cardiovascular:  Negative for chest pain, palpitations and leg swelling.   Gastrointestinal:  Negative for abdominal pain, blood in stool, constipation, diarrhea, nausea and vomiting.   Endocrine: Negative for cold intolerance and heat intolerance.   Genitourinary:  Negative for difficulty urinating, dysuria, flank pain, hematuria and urgency.   Musculoskeletal:  Positive for arthralgias and neck pain. Negative for back pain and myalgias.   Integumentary:  Negative for rash and wound.   Allergic/Immunologic: Negative for environmental allergies and food allergies.   Neurological:  Negative for dizziness, tremors, seizures, syncope, weakness and headaches.    Hematological:  Negative for adenopathy. Does not bruise/bleed easily.   Psychiatric/Behavioral:  Negative for confusion, sleep disturbance and suicidal ideas. The patient is not nervous/anxious.           Objective     Physical Exam  Constitutional:       General: He is not in acute distress.     Appearance: Normal appearance. He is well-developed. He is not ill-appearing, toxic-appearing or diaphoretic.   HENT:      Head: Normocephalic and atraumatic.      Right Ear: External ear normal.      Left Ear: External ear normal.      Nose: Nose normal.      Mouth/Throat:      Pharynx: No oropharyngeal exudate.   Eyes:      General: No scleral icterus.        Right eye: No discharge.         Left eye: No discharge.      Extraocular Movements: Extraocular movements intact.      Conjunctiva/sclera: Conjunctivae normal.      Pupils: Pupils are equal, round, and reactive to light.   Neck:      Thyroid: No thyromegaly.      Vascular: No JVD.   Cardiovascular:      Rate and Rhythm: Normal rate and regular rhythm.      Pulses: Normal pulses.      Heart sounds: Normal heart sounds. No murmur heard.  Pulmonary:      Effort: Pulmonary effort is normal. No respiratory distress.      Breath sounds: Normal breath sounds. No wheezing or rales.   Abdominal:      General: Bowel sounds are normal. There is no distension.      Palpations: Abdomen is soft.      Tenderness: There is no abdominal tenderness. There is no right CVA tenderness, left CVA tenderness, guarding or rebound.   Musculoskeletal:      Cervical back: Normal range of motion and neck supple. No rigidity.      Right lower leg: No edema.      Left lower leg: No edema.   Lymphadenopathy:      Cervical: No cervical adenopathy.   Skin:     General: Skin is warm and dry.      Capillary Refill: Capillary refill takes less than 2 seconds.      Coloration: Skin is not pale.      Findings: No rash.   Neurological:      General: No focal deficit present.      Mental Status: He is  alert and oriented to person, place, and time. Mental status is at baseline.      Cranial Nerves: No cranial nerve deficit.      Sensory: No sensory deficit.      Motor: No weakness.      Coordination: Coordination normal.      Gait: Gait normal.      Deep Tendon Reflexes: Reflexes normal.   Psychiatric:         Mood and Affect: Mood normal.         Behavior: Behavior normal.         Thought Content: Thought content normal.         Judgment: Judgment normal.            Assessment and Plan     1. Spondylosis of cervical region without myelopathy or radiculopathy    2. Hypertension associated with diabetes    3. Hyperlipidemia associated with type 2 diabetes mellitus    4. Alpha thalassemia trait    5. Type 2 diabetes mellitus with hyperglycemia, without long-term current use of insulin  -     CBC Auto Differential; Future; Expected date: 10/28/2025  -     Comprehensive Metabolic Panel; Future; Expected date: 10/28/2025  -     Hemoglobin A1C; Future; Expected date: 10/28/2025  -     Ambulatory referral/consult to Optometry; Future; Expected date: 05/05/2025    6. Liver lesion    7. LIZZY (obstructive sleep apnea)    8. Annual physical exam  -     CBC auto differential; Future; Expected date: 04/28/2025  -     Comprehensive metabolic panel; Future; Expected date: 04/28/2025  -     Hemoglobin A1c; Future; Expected date: 04/28/2025  -     Lipid panel; Future; Expected date: 04/28/2025  -     PSA, Screening; Future; Expected date: 04/28/2025  -     TSH; Future; Expected date: 04/28/2025  -     Urinalysis; Future; Expected date: 04/28/2025  -     Microalbumin/Creatinine Ratio, Urine; Future; Expected date: 04/28/2025        Blood work ordered      T2DM- last A1C of 6.1(10/24)<--6.2(4/24)<--6.3(10/23)<--7.6(4/23)<--6.5(6/22)<--5.9(11/21)   -continue Metformin/januvia/jardiance     LIZZY- stable on CPAP     DJD Cervical spine- stable     HLD- stable on statin     Multiple liver lesions- stable, followed by Hepatology   -repeat  MRI     Alpha thalassemia trait     F/u in 6 months

## 2025-05-01 ENCOUNTER — LAB VISIT (OUTPATIENT)
Dept: LAB | Facility: HOSPITAL | Age: 60
End: 2025-05-01
Attending: INTERNAL MEDICINE
Payer: COMMERCIAL

## 2025-05-01 DIAGNOSIS — Z00.00 ANNUAL PHYSICAL EXAM: ICD-10-CM

## 2025-05-01 LAB
ABSOLUTE EOSINOPHIL (OHS): 0.06 K/UL
ABSOLUTE MONOCYTE (OHS): 0.7 K/UL (ref 0.3–1)
ABSOLUTE NEUTROPHIL COUNT (OHS): 4.44 K/UL (ref 1.8–7.7)
ALBUMIN SERPL BCP-MCNC: 3.7 G/DL (ref 3.5–5.2)
ALP SERPL-CCNC: 80 UNIT/L (ref 40–150)
ALT SERPL W/O P-5'-P-CCNC: 26 UNIT/L (ref 10–44)
ANION GAP (OHS): 13 MMOL/L (ref 8–16)
AST SERPL-CCNC: 19 UNIT/L (ref 11–45)
BASOPHILS # BLD AUTO: 0.07 K/UL
BASOPHILS NFR BLD AUTO: 1 %
BILIRUB SERPL-MCNC: 0.3 MG/DL (ref 0.1–1)
BUN SERPL-MCNC: 15 MG/DL (ref 6–20)
CALCIUM SERPL-MCNC: 9.2 MG/DL (ref 8.7–10.5)
CHLORIDE SERPL-SCNC: 105 MMOL/L (ref 95–110)
CHOLEST SERPL-MCNC: 116 MG/DL (ref 120–199)
CHOLEST/HDLC SERPL: 4 {RATIO} (ref 2–5)
CO2 SERPL-SCNC: 22 MMOL/L (ref 23–29)
CREAT SERPL-MCNC: 1.1 MG/DL (ref 0.5–1.4)
EAG (OHS): 157 MG/DL (ref 68–131)
ERYTHROCYTE [DISTWIDTH] IN BLOOD BY AUTOMATED COUNT: 15.4 % (ref 11.5–14.5)
GFR SERPLBLD CREATININE-BSD FMLA CKD-EPI: >60 ML/MIN/1.73/M2
GLUCOSE SERPL-MCNC: 152 MG/DL (ref 70–110)
HBA1C MFR BLD: 7.1 % (ref 4–5.6)
HCT VFR BLD AUTO: 45.4 % (ref 40–54)
HDLC SERPL-MCNC: 29 MG/DL (ref 40–75)
HDLC SERPL: 25 % (ref 20–50)
HGB BLD-MCNC: 13.5 GM/DL (ref 14–18)
IMM GRANULOCYTES # BLD AUTO: 0.03 K/UL (ref 0–0.04)
IMM GRANULOCYTES NFR BLD AUTO: 0.4 % (ref 0–0.5)
LDLC SERPL CALC-MCNC: 67.2 MG/DL (ref 63–159)
LYMPHOCYTES # BLD AUTO: 1.98 K/UL (ref 1–4.8)
MCH RBC QN AUTO: 22.5 PG (ref 27–31)
MCHC RBC AUTO-ENTMCNC: 29.7 G/DL (ref 32–36)
MCV RBC AUTO: 76 FL (ref 82–98)
NONHDLC SERPL-MCNC: 87 MG/DL
NUCLEATED RBC (/100WBC) (OHS): 0 /100 WBC
PLATELET # BLD AUTO: 266 K/UL (ref 150–450)
PMV BLD AUTO: 12.4 FL (ref 9.2–12.9)
POTASSIUM SERPL-SCNC: 4 MMOL/L (ref 3.5–5.1)
PROT SERPL-MCNC: 7.3 GM/DL (ref 6–8.4)
PSA SERPL-MCNC: 0.96 NG/ML
RBC # BLD AUTO: 6.01 M/UL (ref 4.6–6.2)
RELATIVE EOSINOPHIL (OHS): 0.8 %
RELATIVE LYMPHOCYTE (OHS): 27.2 % (ref 18–48)
RELATIVE MONOCYTE (OHS): 9.6 % (ref 4–15)
RELATIVE NEUTROPHIL (OHS): 61 % (ref 38–73)
SODIUM SERPL-SCNC: 140 MMOL/L (ref 136–145)
TRIGL SERPL-MCNC: 99 MG/DL (ref 30–150)
TSH SERPL-ACNC: 1.21 UIU/ML (ref 0.4–4)
WBC # BLD AUTO: 7.28 K/UL (ref 3.9–12.7)

## 2025-05-01 PROCEDURE — 80053 COMPREHEN METABOLIC PANEL: CPT

## 2025-05-01 PROCEDURE — 36415 COLL VENOUS BLD VENIPUNCTURE: CPT | Mod: PO

## 2025-05-01 PROCEDURE — 82465 ASSAY BLD/SERUM CHOLESTEROL: CPT

## 2025-05-01 PROCEDURE — 83036 HEMOGLOBIN GLYCOSYLATED A1C: CPT

## 2025-05-01 PROCEDURE — 84153 ASSAY OF PSA TOTAL: CPT

## 2025-05-01 PROCEDURE — 85025 COMPLETE CBC W/AUTO DIFF WBC: CPT

## 2025-05-01 PROCEDURE — 84443 ASSAY THYROID STIM HORMONE: CPT

## 2025-05-05 ENCOUNTER — RESULTS FOLLOW-UP (OUTPATIENT)
Dept: INTERNAL MEDICINE | Facility: CLINIC | Age: 60
End: 2025-05-05

## 2025-06-04 DIAGNOSIS — E11.65 TYPE 2 DIABETES MELLITUS WITH HYPERGLYCEMIA, WITHOUT LONG-TERM CURRENT USE OF INSULIN: ICD-10-CM

## 2025-06-05 RX ORDER — SITAGLIPTIN 100 MG/1
TABLET, FILM COATED ORAL
Qty: 90 TABLET | Refills: 0 | Status: SHIPPED | OUTPATIENT
Start: 2025-06-05

## 2025-06-16 DIAGNOSIS — I15.2 HYPERTENSION ASSOCIATED WITH DIABETES: ICD-10-CM

## 2025-06-16 DIAGNOSIS — S39.012A BACK STRAIN, INITIAL ENCOUNTER: ICD-10-CM

## 2025-06-16 DIAGNOSIS — E11.59 HYPERTENSION ASSOCIATED WITH DIABETES: ICD-10-CM

## 2025-06-16 DIAGNOSIS — E11.65 TYPE 2 DIABETES MELLITUS WITH HYPERGLYCEMIA, WITHOUT LONG-TERM CURRENT USE OF INSULIN: ICD-10-CM

## 2025-06-17 RX ORDER — AMLODIPINE BESYLATE 5 MG/1
TABLET ORAL
Qty: 135 TABLET | Refills: 3 | Status: SHIPPED | OUTPATIENT
Start: 2025-06-17

## 2025-06-17 RX ORDER — LIDOCAINE 50 MG/G
1 PATCH TOPICAL DAILY
Qty: 30 PATCH | Refills: 5 | Status: SHIPPED | OUTPATIENT
Start: 2025-06-17

## 2025-06-17 NOTE — TELEPHONE ENCOUNTER
No care due was identified.  Health [facility] Embedded Care Due Messages. Reference number: [id number].   6/17/2025 7:07:54 AM CDT

## 2025-06-17 NOTE — TELEPHONE ENCOUNTER
Refill Decision Note   Suhas Castillo  is requesting a refill authorization.  Brief Assessment and Rationale for Refill:  Approve     Medication Therapy Plan:        Comments:     Note composed:7:09 AM 06/17/2025

## 2025-06-17 NOTE — TELEPHONE ENCOUNTER
Refill Routing Note   Medication(s) are not appropriate for processing by [facility] for the following reason(s):        Outside of protocol    [facility] action(s):  Route             Appointments  past 12m or future 3m with PCP    Date Provider   Last Visit   4/28/2025 Robert Tran, DO   Next Visit   10/23/2025 Robert Tran, DO   ED visits in past 90 days: 0        Note composed:7:12 AM 06/17/2025

## 2025-06-17 NOTE — TELEPHONE ENCOUNTER
No care due was identified.  [facility] Embedded Care Due Messages. Reference number: [id number].   6/16/2025 9:47:21 PM CDT

## 2025-06-18 ENCOUNTER — HOSPITAL ENCOUNTER (OUTPATIENT)
Dept: RADIOLOGY | Facility: HOSPITAL | Age: 60
Discharge: HOME | End: 2025-06-18
Attending: INTERNAL MEDICINE
Payer: COMMERCIAL

## 2025-06-18 DIAGNOSIS — K76.9 LIVER DISEASE, UNSPECIFIED: ICD-10-CM

## 2025-06-18 DIAGNOSIS — K76.9 LIVER LESION: ICD-10-CM

## 2025-06-18 PROCEDURE — 74183 MRI ABD W/O CNTR FLWD CNTR: CPT | Mod: 26,,, | Performed by: STUDENT IN AN ORGANIZED HEALTH CARE EDUCATION/TRAINING PROGRAM

## 2025-06-18 PROCEDURE — 25500020 PHARM REV CODE 255: Performed by: INTERNAL MEDICINE

## 2025-06-18 PROCEDURE — 74183 MRI ABD W/O CNTR FLWD CNTR: CPT | Mod: TC

## 2025-06-18 PROCEDURE — A9585 GADOBUTROL INJECTION: HCPCS | Performed by: INTERNAL MEDICINE

## 2025-06-18 RX ORDER — GADOBUTROL 604.72 MG/ML
10 INJECTION INTRAVENOUS
Status: COMPLETED | OUTPATIENT
Start: 2025-06-18 | End: 2025-06-18

## 2025-06-18 RX ADMIN — GADOBUTROL 10 ML: 604.72 INJECTION INTRAVENOUS at 08:06

## 2025-06-21 DIAGNOSIS — E11.9 CONTROLLED TYPE 2 DIABETES MELLITUS WITHOUT COMPLICATION, WITHOUT LONG-TERM CURRENT USE OF INSULIN: ICD-10-CM

## 2025-06-22 DIAGNOSIS — E11.65 TYPE 2 DIABETES MELLITUS WITH HYPERGLYCEMIA, WITHOUT LONG-TERM CURRENT USE OF INSULIN: ICD-10-CM

## 2025-06-22 RX ORDER — METFORMIN HYDROCHLORIDE 500 MG/1
TABLET, EXTENDED RELEASE ORAL
Qty: 360 TABLET | Refills: 1 | Status: SHIPPED | OUTPATIENT
Start: 2025-06-22

## 2025-06-22 NOTE — TELEPHONE ENCOUNTER
No care due was identified.  [facility] Embedded Care Due Messages. Reference number: [id number].   6/21/2025 8:57:31 PM CDT

## 2025-06-22 NOTE — TELEPHONE ENCOUNTER
No care due was identified.  [facility] Embedded Care Due Messages. Reference number: [id number].   6/22/2025 10:58:31 AM CDT

## 2025-06-22 NOTE — TELEPHONE ENCOUNTER
No care due was identified.  [facility] Embedded Care Due Messages. Reference number: [id number].   6/21/2025 8:58:56 PM CDT

## 2025-06-22 NOTE — TELEPHONE ENCOUNTER
Refill Authorization Note     Refill Decision Note   Suhas Anna  is requesting a refill authorization.  Brief Assessment and Rationale for Refill:  Approve     Medication Therapy Plan:       Medication Reconciliation Completed: No   Comments:     No Care Gaps recommended.     Note composed:12:13 PM 06/22/2025

## 2025-06-23 RX ORDER — METFORMIN HYDROCHLORIDE 500 MG/1
TABLET, EXTENDED RELEASE ORAL
Qty: 360 TABLET | Refills: 1 | OUTPATIENT
Start: 2025-06-23

## 2025-06-23 RX ORDER — EMPAGLIFLOZIN 10 MG/1
10 TABLET, FILM COATED ORAL EVERY MORNING
Qty: 90 TABLET | Refills: 1 | Status: SHIPPED | OUTPATIENT
Start: 2025-06-23

## 2025-06-23 NOTE — TELEPHONE ENCOUNTER
Refill Decision Note   Suhas Anna  is requesting a refill authorization.    Brief Assessment and Rationale for Refill:   Quick Discontinue       Medication Therapy Plan:   E-Prescribing Status: Receipt confirmed by pharmacy (6/22/2025 12:13 PM CDT)      Comments:     Note composed:10:09 AM 06/23/2025

## 2025-06-23 NOTE — TELEPHONE ENCOUNTER
Refill Routing Note   Medication(s) are not appropriate for processing by [facility] for the following reason(s):        Drug-disease interaction (no prev provider override found)    [facility] action(s):  Defer        Medication Therapy Plan: DDI with no prev provider override    Pharmacist review requested: Yes     Appointments  past 12m or future 3m with PCP    Date Provider   Last Visit   4/28/2025 Robert Tran, DO   Next Visit   10/23/2025 Robert Tran, DO   ED visits in past 90 days: 0        Note composed:10:51 AM 06/23/2025

## 2025-06-23 NOTE — TELEPHONE ENCOUNTER
Refill Decision Note   Suhas Castillo  is requesting a refill authorization.  Brief Assessment and Rationale for Refill:  Approve     Medication Therapy Plan:  DDI with no prev provider override      Pharmacist review requested: Yes   Comments:     Note composed:11:02 AM 06/23/2025

## 2025-07-09 ENCOUNTER — PATIENT OUTREACH (OUTPATIENT)
Dept: ADMINISTRATIVE | Facility: HOSPITAL | Age: 60
End: 2025-07-09
Payer: COMMERCIAL

## 2025-08-24 DIAGNOSIS — I15.2 HYPERTENSION ASSOCIATED WITH DIABETES: ICD-10-CM

## 2025-08-24 DIAGNOSIS — E11.59 HYPERTENSION ASSOCIATED WITH DIABETES: ICD-10-CM

## 2025-08-25 RX ORDER — AMLODIPINE BESYLATE 5 MG/1
TABLET ORAL
Qty: 135 TABLET | Refills: 2 | Status: SHIPPED | OUTPATIENT
Start: 2025-08-25

## (undated) DEVICE — BANDAGE ADHESIVE

## (undated) DEVICE — SUT STRATAFIX 0 PDS+ 23CM 9IN

## (undated) DEVICE — GOWN POLY REINF BRTH SLV LG

## (undated) DEVICE — DRAPE COLUMN DAVINCI XI

## (undated) DEVICE — PENCIL ROCKER SWITCH 10FT CORD

## (undated) DEVICE — DRAPE ARM DAVINCI XI

## (undated) DEVICE — SUT VLOC 90 3-0 V-20 NDL 9

## (undated) DEVICE — DRESSING LEUKOPLAST FLEX 1X3IN

## (undated) DEVICE — SUT MCRYL PLUS 4-0 PS2 27IN

## (undated) DEVICE — SUPPORTER ADLT A3 3 IN. WB LA

## (undated) DEVICE — COVER TIP CURVED SCISSORS XI

## (undated) DEVICE — NDL HYPO REG 25G X 1 1/2

## (undated) DEVICE — COVER MAYO STND XL 30X57IN

## (undated) DEVICE — ELECTRODE REM PLYHSV RETURN 9

## (undated) DEVICE — SUT V-LOC 90 UD GS22 2-0 9IN

## (undated) DEVICE — ADHESIVE DERMABOND ADVANCED

## (undated) DEVICE — SEAL CANN UNIVERSAL 5-12MM

## (undated) DEVICE — OBTURATOR BLADELESS 8MM XI CLR

## (undated) DEVICE — NDL HYPO STD REG BVL 22GX1.5IN

## (undated) DEVICE — GOWN POLY REINF BRTH SLV XL

## (undated) DEVICE — DRAPE TOP 53X102IN

## (undated) DEVICE — SOL SALINE STER BOTTLE 500ML

## (undated) DEVICE — Device

## (undated) DEVICE — SUT V-LOC 90 GS22 2-0 VIO 23CM

## (undated) DEVICE — GLOVE SURGICAL LATEX SZ 7

## (undated) DEVICE — PAD PINK TRENDELENBURG POS XL